# Patient Record
Sex: FEMALE | Race: WHITE | Employment: PART TIME | URBAN - METROPOLITAN AREA
[De-identification: names, ages, dates, MRNs, and addresses within clinical notes are randomized per-mention and may not be internally consistent; named-entity substitution may affect disease eponyms.]

---

## 2017-02-10 ENCOUNTER — HOSPITAL ENCOUNTER (OUTPATIENT)
Dept: LAB | Age: 62
Discharge: HOME | End: 2017-02-10
Payer: COMMERCIAL

## 2017-02-10 LAB
BASOPHILS # BLD AUTO: 0 K/UL (ref 0–0.1)
BASOPHILS # BLD: 1 % (ref 0–2)
DIFFERENTIAL METHOD BLD: ABNORMAL
EOSINOPHIL # BLD: 0.1 K/UL (ref 0–0.4)
EOSINOPHIL NFR BLD: 1 % (ref 0–5)
ERYTHROCYTE [DISTWIDTH] IN BLOOD BY AUTOMATED COUNT: 12.6 % (ref 11.6–14.5)
HCT VFR BLD AUTO: 39.4 % (ref 35–45)
HGB BLD-MCNC: 13.4 G/DL (ref 12–16)
LYMPHOCYTES # BLD AUTO: 34 % (ref 21–52)
LYMPHOCYTES # BLD: 1.4 K/UL (ref 0.9–3.6)
MCH RBC QN AUTO: 30.9 PG (ref 24–34)
MCHC RBC AUTO-ENTMCNC: 34 G/DL (ref 31–37)
MCV RBC AUTO: 91 FL (ref 74–97)
MONOCYTES # BLD: 0.5 K/UL (ref 0.05–1.2)
MONOCYTES NFR BLD AUTO: 13 % (ref 3–10)
NEUTS SEG # BLD: 2.1 K/UL (ref 1.8–8)
NEUTS SEG NFR BLD AUTO: 51 % (ref 40–73)
PLATELET # BLD AUTO: 195 K/UL (ref 135–420)
PMV BLD AUTO: 10.3 FL (ref 9.2–11.8)
RBC # BLD AUTO: 4.33 M/UL (ref 4.2–5.3)
WBC # BLD AUTO: 4.1 K/UL (ref 4.6–13.2)

## 2017-02-10 PROCEDURE — 36415 COLL VENOUS BLD VENIPUNCTURE: CPT | Performed by: NURSE PRACTITIONER

## 2017-02-10 PROCEDURE — 85025 COMPLETE CBC W/AUTO DIFF WBC: CPT | Performed by: NURSE PRACTITIONER

## 2017-11-24 ENCOUNTER — HOSPITAL ENCOUNTER (OUTPATIENT)
Dept: LAB | Age: 62
Discharge: HOME | End: 2017-11-24
Payer: COMMERCIAL

## 2017-11-24 ENCOUNTER — HOSPITAL ENCOUNTER (OUTPATIENT)
Age: 62
Discharge: HOME | End: 2017-11-24
Attending: NURSE PRACTITIONER
Payer: COMMERCIAL

## 2017-11-24 DIAGNOSIS — G36.8: ICD-10-CM

## 2017-11-24 LAB
ALBUMIN SERPL-MCNC: 4.1 G/DL (ref 3.4–5)
ALBUMIN/GLOB SERPL: 1.2 {RATIO} (ref 0.8–1.7)
ALP SERPL-CCNC: 80 U/L (ref 45–117)
ALT SERPL-CCNC: 32 U/L (ref 13–56)
ANION GAP SERPL CALC-SCNC: 7 MMOL/L (ref 3–18)
AST SERPL-CCNC: 18 U/L (ref 15–37)
BILIRUB SERPL-MCNC: 0.5 MG/DL (ref 0.2–1)
BUN SERPL-MCNC: 11 MG/DL (ref 7–18)
BUN/CREAT SERPL: 17 (ref 12–20)
CALCIUM SERPL-MCNC: 9.1 MG/DL (ref 8.5–10.1)
CHLORIDE SERPL-SCNC: 106 MMOL/L (ref 100–108)
CO2 SERPL-SCNC: 29 MMOL/L (ref 21–32)
CREAT SERPL-MCNC: 0.64 MG/DL (ref 0.6–1.3)
CREAT UR-MCNC: 0.7 MG/DL (ref 0.6–1.3)
ERYTHROCYTE [DISTWIDTH] IN BLOOD BY AUTOMATED COUNT: 12.7 % (ref 11.6–14.5)
GLOBULIN SER CALC-MCNC: 3.3 G/DL (ref 2–4)
GLUCOSE SERPL-MCNC: 122 MG/DL (ref 74–99)
HCT VFR BLD AUTO: 42.3 % (ref 35–45)
HGB BLD-MCNC: 14.6 G/DL (ref 12–16)
MCH RBC QN AUTO: 31.7 PG (ref 24–34)
MCHC RBC AUTO-ENTMCNC: 34.5 G/DL (ref 31–37)
MCV RBC AUTO: 92 FL (ref 74–97)
PLATELET # BLD AUTO: 213 K/UL (ref 135–420)
PMV BLD AUTO: 10.4 FL (ref 9.2–11.8)
POTASSIUM SERPL-SCNC: 4.2 MMOL/L (ref 3.5–5.5)
PROT SERPL-MCNC: 7.4 G/DL (ref 6.4–8.2)
RBC # BLD AUTO: 4.6 M/UL (ref 4.2–5.3)
SODIUM SERPL-SCNC: 142 MMOL/L (ref 136–145)
WBC # BLD AUTO: 5.4 K/UL (ref 4.6–13.2)

## 2017-11-24 PROCEDURE — A9585 GADOBUTROL INJECTION: HCPCS

## 2017-11-24 PROCEDURE — 74011250636 HC RX REV CODE- 250/636

## 2017-11-24 PROCEDURE — 70553 MRI BRAIN STEM W/O & W/DYE: CPT

## 2017-11-24 RX ADMIN — GADOBUTROL 6.5 ML: 604.72 INJECTION INTRAVENOUS at 16:00

## 2019-02-25 ENCOUNTER — TELEPHONE (OUTPATIENT)
Dept: SURGERY | Age: 64
End: 2019-02-25

## 2022-05-09 ENCOUNTER — APPOINTMENT (OUTPATIENT)
Age: 67
DRG: 059 | End: 2022-05-09
Attending: STUDENT IN AN ORGANIZED HEALTH CARE EDUCATION/TRAINING PROGRAM
Payer: MEDICARE

## 2022-05-09 ENCOUNTER — APPOINTMENT (OUTPATIENT)
Dept: GENERAL RADIOLOGY | Age: 67
DRG: 059 | End: 2022-05-09
Attending: STUDENT IN AN ORGANIZED HEALTH CARE EDUCATION/TRAINING PROGRAM
Payer: MEDICARE

## 2022-05-09 ENCOUNTER — HOSPITAL ENCOUNTER (INPATIENT)
Age: 67
LOS: 5 days | Discharge: NURSING, REHAB OR LONG-TERM CARE | DRG: 059 | End: 2022-05-14
Attending: STUDENT IN AN ORGANIZED HEALTH CARE EDUCATION/TRAINING PROGRAM | Admitting: INTERNAL MEDICINE
Payer: MEDICARE

## 2022-05-09 DIAGNOSIS — M19.90 ARTHRITIS: ICD-10-CM

## 2022-05-09 DIAGNOSIS — G35 MULTIPLE SCLEROSIS (HCC): Primary | ICD-10-CM

## 2022-05-09 DIAGNOSIS — R53.1 WEAKNESS: ICD-10-CM

## 2022-05-09 LAB
ANION GAP SERPL CALC-SCNC: 6 MMOL/L (ref 3–18)
APPEARANCE UR: CLEAR
BACTERIA URNS QL MICRO: NEGATIVE /HPF
BASOPHILS # BLD: 0 K/UL (ref 0–0.1)
BASOPHILS NFR BLD: 0 % (ref 0–2)
BILIRUB UR QL: NEGATIVE
BUN SERPL-MCNC: 17 MG/DL (ref 7–18)
BUN/CREAT SERPL: 31 (ref 12–20)
CALCIUM SERPL-MCNC: 9 MG/DL (ref 8.5–10.1)
CHLORIDE SERPL-SCNC: 109 MMOL/L (ref 100–111)
CO2 SERPL-SCNC: 26 MMOL/L (ref 21–32)
COLOR UR: YELLOW
CREAT SERPL-MCNC: 0.54 MG/DL (ref 0.6–1.3)
CREAT UR-MCNC: 0.5 MG/DL (ref 0.6–1.3)
DIFFERENTIAL METHOD BLD: ABNORMAL
EOSINOPHIL # BLD: 0 K/UL (ref 0–0.4)
EOSINOPHIL NFR BLD: 1 % (ref 0–5)
EPITH CASTS URNS QL MICRO: NORMAL /LPF (ref 0–5)
ERYTHROCYTE [DISTWIDTH] IN BLOOD BY AUTOMATED COUNT: 11.9 % (ref 11.6–14.5)
GLUCOSE SERPL-MCNC: 85 MG/DL (ref 74–99)
GLUCOSE UR STRIP.AUTO-MCNC: NEGATIVE MG/DL
HCT VFR BLD AUTO: 36.7 % (ref 35–45)
HGB BLD-MCNC: 13 G/DL (ref 12–16)
HGB UR QL STRIP: NEGATIVE
IMM GRANULOCYTES # BLD AUTO: 0 K/UL (ref 0–0.04)
IMM GRANULOCYTES NFR BLD AUTO: 0 % (ref 0–0.5)
KETONES UR QL STRIP.AUTO: 80 MG/DL
LEUKOCYTE ESTERASE UR QL STRIP.AUTO: ABNORMAL
LYMPHOCYTES # BLD: 1.5 K/UL (ref 0.9–3.6)
LYMPHOCYTES NFR BLD: 25 % (ref 21–52)
MCH RBC QN AUTO: 31.4 PG (ref 24–34)
MCHC RBC AUTO-ENTMCNC: 35.4 G/DL (ref 31–37)
MCV RBC AUTO: 88.6 FL (ref 78–100)
MONOCYTES # BLD: 0.6 K/UL (ref 0.05–1.2)
MONOCYTES NFR BLD: 11 % (ref 3–10)
NEUTS SEG # BLD: 3.8 K/UL (ref 1.8–8)
NEUTS SEG NFR BLD: 63 % (ref 40–73)
NITRITE UR QL STRIP.AUTO: NEGATIVE
NRBC # BLD: 0 K/UL (ref 0–0.01)
NRBC BLD-RTO: 0 PER 100 WBC
PH UR STRIP: 5 [PH] (ref 5–8)
PLATELET # BLD AUTO: 218 K/UL (ref 135–420)
PMV BLD AUTO: 9.6 FL (ref 9.2–11.8)
POTASSIUM SERPL-SCNC: 4 MMOL/L (ref 3.5–5.5)
PROT UR STRIP-MCNC: NEGATIVE MG/DL
RBC # BLD AUTO: 4.14 M/UL (ref 4.2–5.3)
RBC #/AREA URNS HPF: NEGATIVE /HPF (ref 0–5)
SODIUM SERPL-SCNC: 141 MMOL/L (ref 136–145)
SP GR UR REFRACTOMETRY: >1.03 (ref 1–1.03)
UROBILINOGEN UR QL STRIP.AUTO: 1 EU/DL (ref 0.2–1)
WBC # BLD AUTO: 6.1 K/UL (ref 4.6–13.2)
WBC URNS QL MICRO: NORMAL /HPF (ref 0–4)

## 2022-05-09 PROCEDURE — 96375 TX/PRO/DX INJ NEW DRUG ADDON: CPT

## 2022-05-09 PROCEDURE — 72170 X-RAY EXAM OF PELVIS: CPT

## 2022-05-09 PROCEDURE — 96374 THER/PROPH/DIAG INJ IV PUSH: CPT

## 2022-05-09 PROCEDURE — 85025 COMPLETE CBC W/AUTO DIFF WBC: CPT

## 2022-05-09 PROCEDURE — 99285 EMERGENCY DEPT VISIT HI MDM: CPT

## 2022-05-09 PROCEDURE — 80048 BASIC METABOLIC PNL TOTAL CA: CPT

## 2022-05-09 PROCEDURE — 73610 X-RAY EXAM OF ANKLE: CPT

## 2022-05-09 PROCEDURE — 72156 MRI NECK SPINE W/O & W/DYE: CPT

## 2022-05-09 PROCEDURE — 73090 X-RAY EXAM OF FOREARM: CPT

## 2022-05-09 PROCEDURE — 70553 MRI BRAIN STEM W/O & W/DYE: CPT

## 2022-05-09 PROCEDURE — 74011250636 HC RX REV CODE- 250/636: Performed by: STUDENT IN AN ORGANIZED HEALTH CARE EDUCATION/TRAINING PROGRAM

## 2022-05-09 PROCEDURE — 73552 X-RAY EXAM OF FEMUR 2/>: CPT

## 2022-05-09 PROCEDURE — 82565 ASSAY OF CREATININE: CPT

## 2022-05-09 PROCEDURE — 82607 VITAMIN B-12: CPT

## 2022-05-09 PROCEDURE — A9575 INJ GADOTERATE MEGLUMI 0.1ML: HCPCS | Performed by: STUDENT IN AN ORGANIZED HEALTH CARE EDUCATION/TRAINING PROGRAM

## 2022-05-09 PROCEDURE — 82306 VITAMIN D 25 HYDROXY: CPT

## 2022-05-09 PROCEDURE — 72157 MRI CHEST SPINE W/O & W/DYE: CPT

## 2022-05-09 PROCEDURE — 65270000029 HC RM PRIVATE

## 2022-05-09 PROCEDURE — 81001 URINALYSIS AUTO W/SCOPE: CPT

## 2022-05-09 PROCEDURE — 72158 MRI LUMBAR SPINE W/O & W/DYE: CPT

## 2022-05-09 RX ORDER — FENTANYL CITRATE 50 UG/ML
25 INJECTION, SOLUTION INTRAMUSCULAR; INTRAVENOUS
Status: COMPLETED | OUTPATIENT
Start: 2022-05-09 | End: 2022-05-09

## 2022-05-09 RX ORDER — LORAZEPAM 2 MG/ML
1 INJECTION, SOLUTION INTRAMUSCULAR; INTRAVENOUS ONCE
Status: COMPLETED | OUTPATIENT
Start: 2022-05-09 | End: 2022-05-09

## 2022-05-09 RX ADMIN — GADOTERATE MEGLUMINE 12 ML: 376.9 INJECTION INTRAVENOUS at 19:48

## 2022-05-09 RX ADMIN — LORAZEPAM 1 MG: 2 INJECTION, SOLUTION INTRAMUSCULAR; INTRAVENOUS at 18:00

## 2022-05-09 RX ADMIN — FENTANYL CITRATE 25 MCG: 50 INJECTION, SOLUTION INTRAMUSCULAR; INTRAVENOUS at 18:51

## 2022-05-09 NOTE — Clinical Note
Status[de-identified] INPATIENT [101]   Type of Bed: Medical [8]   Cardiac Monitoring Required?: No   Inpatient Hospitalization Certified Necessary for the Following Reasons: 3. Patient receiving treatment that can only be provided in an inpatient setting (further clarification in H&P documentation)   Admitting Diagnosis: [facility] ([facility] [340. ICD-9-CM]   Admitting Diagnosis: Weakness [556558]   Admitting Physician: Neli Arciniega [[id number]]   Attending Physician: Neli Arciniega [[id number]]   Estimated Length of Stay: 2 Midnights   Discharge Plan[de-identified] Home with Office Follow-up

## 2022-05-09 NOTE — ED TRIAGE NOTES
Pt states hx of MS and sustained a fall on Friday. States legs \"gave out\" on her causing fall but was still able to walk.  Saturday had no strength in legs and was numb from waist down.  unable to bear weight in left foot.  has not seen her doctors in 3 years. Also reports right arm pain, good Rom. Bruising noted to right hip.    family member has been carrying her to the bathroom since she has been unable to walk on Saturday

## 2022-05-10 ENCOUNTER — APPOINTMENT (OUTPATIENT)
Dept: GENERAL RADIOLOGY | Age: 67
DRG: 059 | End: 2022-05-10
Attending: NURSE PRACTITIONER
Payer: MEDICARE

## 2022-05-10 ENCOUNTER — APPOINTMENT (OUTPATIENT)
Dept: CT IMAGING | Age: 67
DRG: 059 | End: 2022-05-10
Attending: NURSE PRACTITIONER
Payer: MEDICARE

## 2022-05-10 LAB
25(OH)D3 SERPL-MCNC: 16.6 NG/ML (ref 30–100)
VIT B12 SERPL-MCNC: 568 PG/ML (ref 211–911)

## 2022-05-10 PROCEDURE — 72192 CT PELVIS W/O DYE: CPT

## 2022-05-10 PROCEDURE — 77030038269 HC DRN EXT URIN PURWCK BARD -A

## 2022-05-10 PROCEDURE — 74011250637 HC RX REV CODE- 250/637: Performed by: NURSE PRACTITIONER

## 2022-05-10 PROCEDURE — 73080 X-RAY EXAM OF ELBOW: CPT

## 2022-05-10 PROCEDURE — 74011000250 HC RX REV CODE- 250: Performed by: NURSE PRACTITIONER

## 2022-05-10 PROCEDURE — APPSS180 APP SPLIT SHARED TIME > 60 MINUTES: Performed by: NURSE PRACTITIONER

## 2022-05-10 PROCEDURE — 74011250636 HC RX REV CODE- 250/636: Performed by: EMERGENCY MEDICINE

## 2022-05-10 PROCEDURE — 74011000258 HC RX REV CODE- 258: Performed by: EMERGENCY MEDICINE

## 2022-05-10 PROCEDURE — 2709999900 HC NON-CHARGEABLE SUPPLY

## 2022-05-10 PROCEDURE — 65270000029 HC RM PRIVATE

## 2022-05-10 PROCEDURE — 74011250636 HC RX REV CODE- 250/636: Performed by: NURSE PRACTITIONER

## 2022-05-10 PROCEDURE — 74011250636 HC RX REV CODE- 250/636: Performed by: STUDENT IN AN ORGANIZED HEALTH CARE EDUCATION/TRAINING PROGRAM

## 2022-05-10 PROCEDURE — 74011000258 HC RX REV CODE- 258: Performed by: STUDENT IN AN ORGANIZED HEALTH CARE EDUCATION/TRAINING PROGRAM

## 2022-05-10 PROCEDURE — 99223 1ST HOSP IP/OBS HIGH 75: CPT | Performed by: INTERNAL MEDICINE

## 2022-05-10 RX ORDER — ENOXAPARIN SODIUM 100 MG/ML
40 INJECTION SUBCUTANEOUS DAILY
Status: DISCONTINUED | OUTPATIENT
Start: 2022-05-11 | End: 2022-05-14 | Stop reason: HOSPADM

## 2022-05-10 RX ORDER — ONDANSETRON 4 MG/1
4 TABLET, ORALLY DISINTEGRATING ORAL
Status: DISCONTINUED | OUTPATIENT
Start: 2022-05-10 | End: 2022-05-14 | Stop reason: HOSPADM

## 2022-05-10 RX ORDER — POLYETHYLENE GLYCOL 3350 17 G/17G
17 POWDER, FOR SOLUTION ORAL DAILY PRN
Status: DISCONTINUED | OUTPATIENT
Start: 2022-05-10 | End: 2022-05-14 | Stop reason: HOSPADM

## 2022-05-10 RX ORDER — ACETAMINOPHEN 650 MG/1
650 SUPPOSITORY RECTAL
Status: DISCONTINUED | OUTPATIENT
Start: 2022-05-10 | End: 2022-05-14 | Stop reason: HOSPADM

## 2022-05-10 RX ORDER — MELATONIN
1000 DAILY
Status: DISCONTINUED | OUTPATIENT
Start: 2022-05-11 | End: 2022-05-14 | Stop reason: HOSPADM

## 2022-05-10 RX ORDER — SODIUM CHLORIDE 0.9 % (FLUSH) 0.9 %
5-40 SYRINGE (ML) INJECTION EVERY 8 HOURS
Status: DISCONTINUED | OUTPATIENT
Start: 2022-05-10 | End: 2022-05-14 | Stop reason: HOSPADM

## 2022-05-10 RX ORDER — ACETAMINOPHEN 325 MG/1
650 TABLET ORAL
Status: DISCONTINUED | OUTPATIENT
Start: 2022-05-10 | End: 2022-05-14 | Stop reason: HOSPADM

## 2022-05-10 RX ORDER — SODIUM CHLORIDE 0.9 % (FLUSH) 0.9 %
5-40 SYRINGE (ML) INJECTION AS NEEDED
Status: DISCONTINUED | OUTPATIENT
Start: 2022-05-10 | End: 2022-05-14 | Stop reason: HOSPADM

## 2022-05-10 RX ORDER — ONDANSETRON 2 MG/ML
4 INJECTION INTRAMUSCULAR; INTRAVENOUS
Status: DISCONTINUED | OUTPATIENT
Start: 2022-05-10 | End: 2022-05-14 | Stop reason: HOSPADM

## 2022-05-10 RX ORDER — FAMOTIDINE 20 MG/1
20 TABLET, FILM COATED ORAL DAILY
Status: DISCONTINUED | OUTPATIENT
Start: 2022-05-11 | End: 2022-05-14 | Stop reason: HOSPADM

## 2022-05-10 RX ORDER — SODIUM CHLORIDE 9 MG/ML
75 INJECTION, SOLUTION INTRAVENOUS CONTINUOUS
Status: DISPENSED | OUTPATIENT
Start: 2022-05-10 | End: 2022-05-11

## 2022-05-10 RX ADMIN — SODIUM CHLORIDE, PRESERVATIVE FREE 10 ML: 5 INJECTION INTRAVENOUS at 15:30

## 2022-05-10 RX ADMIN — SODIUM CHLORIDE 1000 MG: 9 INJECTION, SOLUTION INTRAVENOUS at 00:02

## 2022-05-10 RX ADMIN — SODIUM CHLORIDE, PRESERVATIVE FREE 10 ML: 5 INJECTION INTRAVENOUS at 22:00

## 2022-05-10 RX ADMIN — ACETAMINOPHEN 650 MG: 325 TABLET ORAL at 22:05

## 2022-05-10 RX ADMIN — SODIUM CHLORIDE 75 ML/HR: 9 INJECTION, SOLUTION INTRAVENOUS at 15:28

## 2022-05-10 RX ADMIN — SODIUM CHLORIDE 1000 MG: 9 INJECTION, SOLUTION INTRAVENOUS at 22:05

## 2022-05-10 RX ADMIN — ACETAMINOPHEN 650 MG: 325 TABLET ORAL at 15:32

## 2022-05-10 NOTE — ED NOTES
TRANSFER - OUT REPORT:    Verbal report given to [employer] on Lyssa Kaur  being transferred to  for routine progression of care       Report consisted of patients Situation, Background, Assessment and   Recommendations(SBAR). Information from the following report(s) SBAR was reviewed with the receiving nurse. Lines:   Peripheral IV 05/09/22 Left Antecubital (Active)        Opportunity for questions and clarification was provided. Patient transported with:   Monitor        .

## 2022-05-10 NOTE — ED NOTES
Patient is admitted by Dr. Maria Esther Hollis for MS flare on IV Solu-Medrol. Pending bed placement at [facility]. Labs and imaging, Home meds reviewed. No issues overnight.

## 2022-05-10 NOTE — PROGRESS NOTES
Patient does not have PCP. Unit secretary requested to arrange new PCP with Dr. Darrick Mojica.  will continue to monitor and assist with transition of care needs.     MARISSA BeltreN, RN  Care Management  Pager # [phone]

## 2022-05-10 NOTE — CONSULTS
74year old female with history of multiple sclerosis diagnosed in , patient on tecfidera  since she is diagnosed with MS, patient denies any other DMT, patient recently admitted with left foot rolled followed by a fall, left side weakness become worse that she came to emergency room, patient has not seen neurologist for few years, admit recent lower extremity paresthesias, patient use cane for lower extremity weakness, denies dizziness, memory problems, live with her  and son, no other recent illness reported. Social History     Socioeconomic History    Marital status:      Spouse name: Not on file    Number of children: Not on file    Years of education: Not on file    Highest education level: Not on file   Occupational History    Not on file   Tobacco Use    Smoking status: Former Smoker     Types: Cigarettes     Quit date: 1980     Years since quittin.9    Smokeless tobacco: Never Used   Substance and Sexual Activity    Alcohol use: Not Currently    Drug use: No    Sexual activity: Never     Partners: Male   Other Topics Concern    Not on file   Social History Narrative    Not on file     Social Determinants of Health     Financial Resource Strain:     Difficulty of Paying Living Expenses: Not on file   Food Insecurity:     Worried About Running Out of Food in the Last Year: Not on file    Marcos of Food in the Last Year: Not on file   Transportation Needs:     Lack of Transportation (Medical): Not on file    Lack of Transportation (Non-Medical):  Not on file   Physical Activity:     Days of Exercise per Week: Not on file    Minutes of Exercise per Session: Not on file   Stress:     Feeling of Stress : Not on file   Social Connections:     Frequency of Communication with Friends and Family: Not on file    Frequency of Social Gatherings with Friends and Family: Not on file    Attends Orthodoxy Services: Not on file    Active Member of Clubs or Organizations: Not on file    Attends Club or Organization Meetings: Not on file    Marital Status: Not on file   Intimate Partner Violence:     Fear of Current or Ex-Partner: Not on file    Emotionally Abused: Not on file    Physically Abused: Not on file    Sexually Abused: Not on file   Housing Stability:     Unable to Pay for Housing in the Last Year: Not on file    Number of Roselyn in the Last Year: Not on file    Unstable Housing in the Last Year: Not on file       History reviewed. No pertinent family history.      Current Facility-Administered Medications   Medication Dose Route Frequency Provider Last Rate Last Admin    methylPREDNISolone (PF) (SOLU-MEDROL) 1,000 mg in 0.9% sodium chloride 100 mL IVPB  1,000 mg IntraVENous DAILY Kahlil Hill MD        [START ON 5/11/2022] cholecalciferol (VITAMIN D3) (1000 Units /25 mcg) tablet 1,000 Units  1,000 Units Oral DAILY Kiersten Calix NP        sodium chloride (NS) flush 5-40 mL  5-40 mL IntraVENous Q8H Paige BRAXTON NP   10 mL at 05/10/22 1530    sodium chloride (NS) flush 5-40 mL  5-40 mL IntraVENous PRN Kiersten Calix NP        acetaminophen (TYLENOL) tablet 650 mg  650 mg Oral Q6H PRN Paige BRAXTON NP   650 mg at 05/10/22 1532    Or    acetaminophen (TYLENOL) suppository 650 mg  650 mg Rectal Q6H PRN Kiersten Calix NP        polyethylene glycol (MIRALAX) packet 17 g  17 g Oral DAILY PRN Kiersten Calix NP        ondansetron (ZOFRAN ODT) tablet 4 mg  4 mg Oral Q8H PRN Paige BRAXTON NP        Or    [facility]dansetron [facility]) injection 4 mg  4 mg IntraVENous Q6H PRN Kiersten Calix NP        [START ON 5/11/2022] enoxaparin (LOVENOX) injection 40 mg  40 mg SubCUTAneous DAILY Paige BRAXTON NP        0.9% sodium chloride infusion  75 mL/hr IntraVENous CONTINUOUS Paige Strong B NP 75 mL/hr at 05/10/22 1528 75 mL/hr at 05/10/22 1528    [START ON 5/11/2022] famotidine (PEPCID) tablet 20 mg  20 mg Oral DAILY Kusum Munoz MD           Past Medical History:   Diagnosis Date    [facility]        Past Surgical History:   Procedure Laterality Date    MI BIOPSY OF BREAST, INCISIONAL  RT BENIGN    OVER 10 YRS. AGO PER PT. Allergies   Allergen Reactions    Amoxicillin Hives and Rash    Clindamycin Hives and Rash    Metronidazole Hives    Tetracycline Hives and Rash       Patient Active Problem List   Diagnosis Code    [facility] sclerosis ([facility] G35    Weakness R53.1         Review of Systems:   As above       PHYSICAL EXAMINATION:      VITAL SIGNS:    Visit Vitals  /76   Pulse (!) 107   Temp 97.5 °F (36.4 °C)   Resp 18   Ht 4' 11\" (1.499 m)   Wt 59 kg (130 lb)   SpO2 95%   BMI 26.26 kg/m²       GENERAL: The patient is well developed, well nourished, and in no apparent distress. EXTREMITIES: No clubbing, cyanosis, or edema is identified. Pulses 2+ and symmetrical.  Muscle tone is normal.  HEAD:   Ear, nose, and throat appear to be without trauma. The patient is normocephalic. NEUROLOGIC EXAMINATION  Mental status: Awake, alert, oriented x3, follows simple,  Speech and languge: slow  But fluent, coherent, naming and repitition intact, reading and comprehension intact  CN: VFF, EOMI, PERRLA, face sensation intact , no facial asymmetry noted, palate elevation symmetric bilat, SS+SCM 5/5 bilat, tongue midline  Motor: poor upper extremity effort but seem to have normal strength.   Left lower 4/5>ileopoas   Sensory: intact to light touch throughout  Coordination: FNF w/o dysmetria  DTR: brisk all over, plantar withdrawl  Gait: not tested     Impression:  74year old female with history of multiple sclerosis diagnosed in 1996, patient on tecfidera  since she is diagnosed with MS, patient denies any other DMT, patient recently admitted with left foot rolled followed by a fall, left side weakness become worse that she came to emergency room, patient has not seen neurologist for few years, admit recent lower extremity paresthesias, patient use cane for lower extremity weakness, denies dizziness, memory problems, live with her  and son, no other recent illness reported. MRI BRAIN showed no new lesion but cervical spine showed active demyelination, patient started on steroid to continue for 5 days, IV I GM Methylprednisolone for 5 days with GI  Prophylaxis. Patient for two years reduced dose of tecfidera to 250 mg once a day, recommend increase to 250 mg BID. RECOMMEND  VIT -D  Steroid x 5 days. Repeat MRI cervical in six months, follow up neurology in 4-6 weeks outpatient. DVT PROPHYLAX SIS. PT and OT, . Tecfidera 250 mg BID. Plan: As above    I spent 30 minutes with the patient in face-to-face consultation, of which greater than 50% was spent in counseling and coordination of care as described above. PLEASE NOTE:   This document has been produced using voice recognition software. Unrecognized errors in transcription may be present.

## 2022-05-10 NOTE — ED NOTES
Assumed care. Patient resting on stretcher, NAD noted, call bell in reach, bed low and locked with side rails up x 2; Reports pain 0/10. Updated on plan of care, patient verbalizes understanding. This nurse will continue to monitor.

## 2022-05-10 NOTE — ED PROVIDER NOTES
EMERGENCY DEPARTMENT HISTORY AND PHYSICAL EXAM    1:26 AM      Date: 2022  Patient Name: Evaristo Gotti    History of Presenting Illness     Chief Complaint   Patient presents with    Extremity Weakness         History Provided By: Patient  Location/Duration/Severity/Modifying factors   Patient is a 44-year-old female with history of MS presenting due to lower extremity weakness and fall. Patient states that she fell roughly 3 days ago and has subsequently has been feeling weaker in her lower extremities with difficulty ambulating. Patient states that when she fell 3 days ago she is noticed that she started to have bilateral lower foot drop with dragging of her feet. Symptoms progressively got worse over the weekend so presented to the emergency department for evaluation of possible MS flare. Patient states that she has not seen her neurologist in over 3 years since her MS was stable. PCP: Robbie Contreras MD    Current Outpatient Medications   Medication Sig Dispense Refill    OTHER texadera 240mg         Past History     Past Medical History:  Past Medical History:   Diagnosis Date    MS (multiple sclerosis) ([address]        Past Surgical History:  Past Surgical History:   Procedure Laterality Date    [place] BIOPSY OF BREAST, INCISIONAL  RT BENIGN    OVER 10 YRS. AGO PER PT. Family History:  No family history on file. Social History:  Social History     Tobacco Use    Smoking status: Former Smoker     Types: Cigarettes     Quit date: 1980     Years since quittin.9    Smokeless tobacco: Never Used   Substance Use Topics    Alcohol use: Yes     Comment: glass of wine with dinner    Drug use: No       Allergies: Allergies   Allergen Reactions    Amoxicillin Hives and Rash    Clindamycin Hives and Rash    Metronidazole Hives    Tetracycline Hives and Rash         Review of Systems       Review of Systems   Constitutional: Negative for chills and fever.    Eyes: Negative for visual disturbance. Respiratory: Negative for chest tightness and shortness of breath. Cardiovascular: Negative for chest pain. Gastrointestinal: Negative for nausea and vomiting. Genitourinary: Negative for difficulty urinating and dysuria. Musculoskeletal:        Patient states having pain in her left ankle after falling and bruising in her right hip and right upper arm   Skin: Negative. Physical Exam     Visit Vitals  /75   Pulse (!) 104   Temp 98 °F (36.7 °C)   Resp 16   Ht 4' 11\" (1.499 m)   Wt 59 kg (130 lb)   SpO2 95%   BMI 26.26 kg/m²         Physical Exam  Vitals and nursing note reviewed. Constitutional:       General: She is not in acute distress. Appearance: Normal appearance. She is not ill-appearing. HENT:      Head: Normocephalic and atraumatic. Nose: Nose normal.   Eyes:      Extraocular Movements: Extraocular movements intact. Cardiovascular:      Rate and Rhythm: Normal rate and regular rhythm. Pulses: Normal pulses. Pulmonary:      Effort: Pulmonary effort is normal.      Breath sounds: Normal breath sounds. No wheezing, rhonchi or rales. Abdominal:      General: Abdomen is flat. Palpations: Abdomen is soft. Tenderness: There is no abdominal tenderness. Musculoskeletal:         General: No swelling or tenderness. Cervical back: Normal range of motion. Right lower leg: No edema. Left lower leg: No edema. Comments: Decreased  strength in right hand when compared to left hand, normal strength and bilateral upper extremities, decreased strength to extension of left hip extension and flexion at left knee and flexion of left ankle when compared to right. Patient with subjective numbness/tingling in thighs. Skin:     General: Skin is warm and dry. Neurological:      Mental Status: She is alert and oriented to person, place, and time.            Diagnostic Study Results     Labs -  Recent Results (from the past 12 hour(s))   CREATININE, POC    Collection Time: 05/09/22  5:31 PM   Result Value Ref Range    Creatinine, POC 0.5 (L) 0.6 - 1.3 MG/DL    GFRAA, POC >60 >60 ml/min/1.73m2    GFRNA, POC >60 >60 ml/min/1.73m2   CBC WITH AUTOMATED DIFF    Collection Time: 05/09/22  8:30 PM   Result Value Ref Range    WBC 6.1 4.6 - 13.2 K/uL    RBC 4.14 (L) 4.20 - 5.30 M/uL    HGB 13.0 12.0 - 16.0 g/dL    HCT 36.7 35.0 - 45.0 %    MCV 88.6 78.0 - 100.0 FL    MCH 31.4 24.0 - 34.0 PG    MCHC 35.4 31.0 - 37.0 g/dL    RDW 11.9 11.6 - 14.5 %    PLATELET 398 202 - 687 K/uL    MPV 9.6 9.2 - 11.8 FL    NRBC 0.0 0  WBC    ABSOLUTE NRBC 0.00 0.00 - 0.01 K/uL    NEUTROPHILS 63 40 - 73 %    LYMPHOCYTES 25 21 - 52 %    MONOCYTES 11 (H) 3 - 10 %    EOSINOPHILS 1 0 - 5 %    BASOPHILS 0 0 - 2 %    IMMATURE GRANULOCYTES 0 0.0 - 0.5 %    ABS. NEUTROPHILS 3.8 1.8 - 8.0 K/UL    ABS. LYMPHOCYTES 1.5 0.9 - 3.6 K/UL    ABS. MONOCYTES 0.6 0.05 - 1.2 K/UL    ABS. EOSINOPHILS 0.0 0.0 - 0.4 K/UL    ABS. BASOPHILS 0.0 0.0 - 0.1 K/UL    ABS. IMM.  GRANS. 0.0 0.00 - 0.04 K/UL    DF AUTOMATED     METABOLIC PANEL, BASIC    Collection Time: 05/09/22  8:30 PM   Result Value Ref Range    Sodium 141 136 - 145 mmol/L    Potassium 4.0 3.5 - 5.5 mmol/L    Chloride 109 100 - 111 mmol/L    CO2 26 21 - 32 mmol/L    Anion gap 6 3.0 - 18 mmol/L    Glucose 85 74 - 99 mg/dL    BUN 17 7.0 - 18 MG/DL    Creatinine 0.54 (L) 0.6 - 1.3 MG/DL    BUN/Creatinine ratio 31 (H) 12 - 20      GFR est AA >60 >60 ml/min/1.73m2    GFR est non-AA >60 >60 ml/min/1.73m2    Calcium 9.0 8.5 - 10.1 MG/DL   URINALYSIS W/ RFLX MICROSCOPIC    Collection Time: 05/09/22  9:45 PM   Result Value Ref Range    Color YELLOW      Appearance CLEAR      Specific gravity >1.030 (H) 1.005 - 1.030    pH (UA) 5.0 5.0 - 8.0      Protein Negative NEG mg/dL    Glucose Negative NEG mg/dL    Ketone 80 (A) NEG mg/dL    Bilirubin Negative NEG      Blood Negative NEG      Urobilinogen 1.0 0.2 - 1.0 EU/dL Nitrites Negative NEG      Leukocyte Esterase TRACE (A) NEG     URINE MICROSCOPIC ONLY    Collection Time: 05/09/22  9:45 PM   Result Value Ref Range    WBC 0 to 3 0 - 4 /hpf    RBC Negative 0 - 5 /hpf    Epithelial cells FEW 0 - 5 /lpf    Bacteria Negative NEG /hpf       Radiologic Studies -   MRI BRAIN W WO CONT         MRI CERV SPINE W WO CONT         XR FOREARM RT AP/LAT    (Results Pending)   XR FEMUR RT 2 VS    (Results Pending)   XR ANKLE LT MIN 3 V    (Results Pending)   XR PELV 1 OR 2 V    (Results Pending)   MRI [facility] SPINE W WO CONT    (Results Pending)   MRI LUMB SPINE W WO CONT    (Results Pending)         Medical Decision Making   I am the first provider for this patient. I reviewed the vital signs, available nursing notes, past medical history, past surgical history, family history and social history. Vital Signs-Reviewed the patient's vital signs. EKG:     Records Reviewed: Nursing Notes (Time of Review: 1:26 AM)    ED Course: Progress Notes, Reevaluation, and Consults:         Provider Notes (Medical Decision Making):   MDM  Number of Diagnoses or Management Options  Diagnosis management comments: Patient is a 51-year-old female with history of MS presenting due to lower extremity weakness and fall. Differential includes MS flare, stroke, deconditioning/muscular atrophy, fracture/dislocation secondary to fall. Plan to assess with CBC BMP x-rays of left ankle, pelvis and right upper extremity due to pain in left ankle and bruising noted on hip and right upper extremity. MRI of brain cervical spine and lumbar spine and thoracic spine to assess for MS flare discussed case with on-call neurologist Dr. Ashlyn Nathan who recommended imaging and treating patient with 1 g of methylprednisolone and admission for further treatment and evaluation. Dr. Ashlyn Nathan to evaluate patient in the morning.       Procedures    Critical Care Time:       Diagnosis     Clinical Impression: No diagnosis found.    Disposition: Admission    Follow-up Information    None          Patient's Medications   Start Taking    No medications on file   Continue Taking    OTHER    texadera 240mg   These Medications have changed    No medications on file   Stop Taking    No medications on file     Disclaimer: Sections of this note are dictated using utilizing voice recognition software. Minor typographical errors may be present. If questions arise, please do not hesitate to contact me or call our department.

## 2022-05-10 NOTE — PROGRESS NOTES
Pt arrived unit from [facility] ER. Alert and oriented x4. Oriented to call bell and surrounding.  Assessment and vital signs ib progress

## 2022-05-10 NOTE — H&P
Hospitalist Admission History and Physical    NAME:  Marielos Guaman   :   1955   MRN:   [id number]     PCP:  Shukri Sosa MD  Date/Time:  5/10/2022 12:31 PM    Subjective:   CHIEF COMPLAINT: Difficulty walking and bilateral leg weakness    HISTORY OF PRESENT ILLNESS:     [place] is a 74 y.o.  female with PMH of multiple sclerosis who presents with difficulty walking and weakness to bilateral legs ongoing for the last approximate 3 days. Patient states she sustained a fall 3 days ago after which she developed pain most prominent to her left arm, she states she did have some pain prior to fall to her right leg as well and then has been having foot drop and noted weakness to bilateral legs since then. Patient has had no further falls. Patient denies other complaints, states she is not used to taking medications but does take her multiple sclerosis medication generally on a daily basis (took last 2 days ago) along with daily Advil on a full stomach. She had previously been following with Melissa Rushing as her PCP and neurology Dr. Lefty Matta previously however not seen recently due to pandemic. Patient denies any other complaints including no headaches, no fevers or chills, no chest pain, shortness of breath, nausea vomiting, blood in stool or blood in urine, feels like her strength is improving to her lower extremities. Patient Active Problem List   Diagnosis Code    Multiple sclerosis ([facility] G35    Weakness R53.1       Past Medical History:   Diagnosis Date    MS (multiple sclerosis) ([facility]        Past Surgical History:   Procedure Laterality Date    DE BIOPSY OF BREAST, INCISIONAL  RT BENIGN    OVER 10 YRS. AGO PER PT. Social History     Tobacco Use    Smoking status: Former Smoker     Types: Cigarettes     Quit date: 1980     Years since quittin.9    Smokeless tobacco: Never Used   Substance Use Topics    Alcohol use: Only occasional           History reviewed. No pertinent family history. Allergies   Allergen Reactions    Amoxicillin Hives and Rash    Clindamycin Hives and Rash    Metronidazole Hives    Tetracycline Hives and Rash        Prior to Admission Medications   Prescriptions Last Dose Informant Patient Reported? Taking? OTHER 5/3/2022 at Unknown time  Yes Yes   Sig: texadera 240mg      Facility-Administered Medications: None       REVIEW OF SYSTEMS:     [] Unable to obtain  ROS due to  []mental status change  []sedated   []intubated   [x]Total of 12 systems reviewed as follows:    GENERAL: no fever or chills   HEENT: no sinus congestion, hearing / vision changes  NECK: No pain or stiffness. PULMONARY: no shortness of breath or cough  Cardiovascular: denies palpitations or chest pain; no lower extremity edema  GASTROINTESTINAL: Denies abdominal pain, constipation, diarrhea, nausea, vomiting or melena / bloody stools  GENITOURINARY: No urinary frequency, urgency, hesitancy or dysuria. MUSCULOSKELETAL:  Admits to some occasional back pain, some pain to her right forearm with certain movements (example wringing type motion, gripping objects), also with some ongoing pain to her right leg present over the last approximate 1 week, + pain to left foot persists with weightbearing  DERMATOLOGIC: denies pruritis, rashes or open areas   ENDOCRINE: No polyuria, polydipsia, no heat or cold intolerance. HEMATOLOGICAL: No easy bruising or bleeding. No anemia, no evidence of active bleeding  NEUROLOGIC:  no focal weakness,  no speech changes     Objective:   VITALS:    Visit Vitals  /64   Pulse 95   Temp 98.2 °F (36.8 °C)   Resp 18   Ht 4' 11\" (1.499 m)   Wt 59 kg (130 lb)   SpO2 96%   BMI 26.26 kg/m²     Temp (24hrs), Av.1 °F (36.7 °C), Min:98 °F (36.7 °C), Max:98.2 °F (36.8 °C)    PHYSICAL EXAM:   General:          Alert, in NAD  HEENT:          Sclera anicteric. Conjunctiva pink.  Mucous membranes                          Moist, no ear or nasal discharge  Neck:               Supple. Trachea midline. No accessory muscle use. No jugular venous distention, no carotid bruit  CV:                   Mild tachycardia current heart rate 110's, regular rhythm. S1S2+  Lungs:             Clear to auscultation bilaterally. No Wheezing or Rhonchi. No rales. Abdomen:        Soft, non-tender. Not distended. Bowel sounds normal.   Extremities:     No cyanosis. No edema. Pulses 2+ b/l  Neurologic:      Alert and oriented X 4. Follows commands, responds appropriately. No focal neurological deficit was noted  Skin:                Warm and dry. No rashes. LAB DATA REVIEWED:    No components found for: [place]  Recent Labs     05/09/22  2030      K 4.0      CO2 26   BUN 17   CREA 0.54*   GLU 85   CA 9.0   WBC 6.1   HGB 13.0   HCT 36.7          IMAGING RESULTS:     [x]  I have personally reviewed the actual   [x] x-rays and MRIs (multiple) []CT scan    Assessment/Plan:      Active Problems:    Multiple sclerosis ([address] (5/9/2022)      Weakness (5/9/2022)     ___________________________________________________  PLAN:    1. Multiple sclerosis exacerbation -neurology consulted by ED, continue methylprednisone 1 g daily, on texadera PTA, previously followed by Dr. Kamilah aMdera as outpatient. PT/OT consult  2. Multiple pain complaints in setting of recent fall -check CT right pelvis and dedicated x-ray of right elbow r/o fracture   3. Vitamin D deficiency -add daily vitamin D  4.  Continue full code in discussion with patient    Consults called / follow up -neurology    Prophylaxis:  [x]Lovenox  []Coumadin  []Hep SQ  []SCDs  []H2B/PPI    Discussed Code Status:    [x]Full Code      []DNR     ___________________________________________________  Admitting Provider: Davidson Nichole NP

## 2022-05-10 NOTE — PROGRESS NOTES
Reason for Admission:  Multiple sclerosis ([facility] [address] [G35]  Weakness [R53.1]                 RUR Score:  8%           Plan for utilizing home health:    Yes, HH vs SNF                      Likelihood of Readmission:   LOW                         Transition of Care Plan:              Initial assessment completed with patient. Cognitive status of patient: oriented to time, place, person and situation. Face sheet information confirmed:  yes. The patient designates  Alvino to participate in her discharge plan and to receive any needed information. This patient lives in a single family home with . Patient is not able to navigate steps as needed. Prior to hospitalization, patient was considered to be independent with ADLs/IADLS : yes . Patient has a current ACP document on file: no      Healthcare Decision Maker: The  will be available to transport patient home upon discharge. The patient already has [employer],  medical equipment available in the home. Patient is not currently active with home health. Patient has not stayed in a skilled nursing facility or rehab. Was  stay within last 60 days : no. This patient is on dialysis :no     Currently, the discharge plan is home health vs SNF    The patient states that she can obtain her medications from the pharmacy, and take her medications as directed. Patient's current insurance is [employer] Part A and B and [facility]      Care Management Interventions  PCP Verified by CM: No (needs new PCP. requested Dr. Clarence Rivera)  Mode of Transport at Discharge:  Other (see comment) ()  Transition of Care Consult (CM Consult): Home Health,SNF  Physical Therapy Consult: Yes  Occupational Therapy Consult: Yes  Support Systems: Spouse/Significant Other  Confirm Follow Up Transport: Family  Discharge Location  Patient Expects to be Discharged to[de-identified] Home with home health    Patient is COVID vaccinated     will continue to monitor and assist with transition of care needs.     MARISSA LemonN, RN  Care Management  Pager # [phone]

## 2022-05-11 LAB
ANION GAP SERPL CALC-SCNC: 7 MMOL/L (ref 3–18)
BASOPHILS # BLD: 0 K/UL (ref 0–0.1)
BASOPHILS NFR BLD: 0 % (ref 0–2)
BUN SERPL-MCNC: 15 MG/DL (ref 7–18)
BUN/CREAT SERPL: 27 (ref 12–20)
CALCIUM SERPL-MCNC: 9.4 MG/DL (ref 8.5–10.1)
CHLORIDE SERPL-SCNC: 111 MMOL/L (ref 100–111)
CO2 SERPL-SCNC: 23 MMOL/L (ref 21–32)
CREAT SERPL-MCNC: 0.56 MG/DL (ref 0.6–1.3)
DIFFERENTIAL METHOD BLD: ABNORMAL
EOSINOPHIL # BLD: 0 K/UL (ref 0–0.4)
EOSINOPHIL NFR BLD: 0 % (ref 0–5)
ERYTHROCYTE [DISTWIDTH] IN BLOOD BY AUTOMATED COUNT: 11.9 % (ref 11.6–14.5)
GLUCOSE SERPL-MCNC: 163 MG/DL (ref 74–99)
HCT VFR BLD AUTO: 35.9 % (ref 35–45)
HGB BLD-MCNC: 12.5 G/DL (ref 12–16)
IMM GRANULOCYTES # BLD AUTO: 0 K/UL (ref 0–0.04)
IMM GRANULOCYTES NFR BLD AUTO: 0 % (ref 0–0.5)
LYMPHOCYTES # BLD: 0.5 K/UL (ref 0.9–3.6)
LYMPHOCYTES NFR BLD: 7 % (ref 21–52)
MCH RBC QN AUTO: 30.7 PG (ref 24–34)
MCHC RBC AUTO-ENTMCNC: 34.8 G/DL (ref 31–37)
MCV RBC AUTO: 88.2 FL (ref 78–100)
MONOCYTES # BLD: 0.1 K/UL (ref 0.05–1.2)
MONOCYTES NFR BLD: 2 % (ref 3–10)
NEUTS SEG # BLD: 6.1 K/UL (ref 1.8–8)
NEUTS SEG NFR BLD: 90 % (ref 40–73)
NRBC # BLD: 0 K/UL (ref 0–0.01)
NRBC BLD-RTO: 0 PER 100 WBC
PLATELET # BLD AUTO: 194 K/UL (ref 135–420)
PMV BLD AUTO: 10.3 FL (ref 9.2–11.8)
POTASSIUM SERPL-SCNC: 4.2 MMOL/L (ref 3.5–5.5)
RBC # BLD AUTO: 4.07 M/UL (ref 4.2–5.3)
SODIUM SERPL-SCNC: 141 MMOL/L (ref 136–145)
WBC # BLD AUTO: 6.7 K/UL (ref 4.6–13.2)

## 2022-05-11 PROCEDURE — 97535 SELF CARE MNGMENT TRAINING: CPT

## 2022-05-11 PROCEDURE — 80048 BASIC METABOLIC PNL TOTAL CA: CPT

## 2022-05-11 PROCEDURE — 74011250637 HC RX REV CODE- 250/637: Performed by: NURSE PRACTITIONER

## 2022-05-11 PROCEDURE — 74011250636 HC RX REV CODE- 250/636: Performed by: NURSE PRACTITIONER

## 2022-05-11 PROCEDURE — 74011250636 HC RX REV CODE- 250/636: Performed by: EMERGENCY MEDICINE

## 2022-05-11 PROCEDURE — 74011000250 HC RX REV CODE- 250: Performed by: NURSE PRACTITIONER

## 2022-05-11 PROCEDURE — 77030038269 HC DRN EXT URIN PURWCK BARD -A

## 2022-05-11 PROCEDURE — 99231 SBSQ HOSP IP/OBS SF/LOW 25: CPT | Performed by: PHYSICIAN ASSISTANT

## 2022-05-11 PROCEDURE — 74011000258 HC RX REV CODE- 258: Performed by: EMERGENCY MEDICINE

## 2022-05-11 PROCEDURE — 97162 PT EVAL MOD COMPLEX 30 MIN: CPT

## 2022-05-11 PROCEDURE — 74011250637 HC RX REV CODE- 250/637: Performed by: INTERNAL MEDICINE

## 2022-05-11 PROCEDURE — 36415 COLL VENOUS BLD VENIPUNCTURE: CPT

## 2022-05-11 PROCEDURE — 99232 SBSQ HOSP IP/OBS MODERATE 35: CPT | Performed by: EMERGENCY MEDICINE

## 2022-05-11 PROCEDURE — 97166 OT EVAL MOD COMPLEX 45 MIN: CPT

## 2022-05-11 PROCEDURE — 65270000029 HC RM PRIVATE

## 2022-05-11 PROCEDURE — 97530 THERAPEUTIC ACTIVITIES: CPT

## 2022-05-11 PROCEDURE — 2709999900 HC NON-CHARGEABLE SUPPLY

## 2022-05-11 PROCEDURE — 74011250637 HC RX REV CODE- 250/637: Performed by: EMERGENCY MEDICINE

## 2022-05-11 PROCEDURE — 85025 COMPLETE CBC W/AUTO DIFF WBC: CPT

## 2022-05-11 RX ORDER — DOCUSATE SODIUM 100 MG/1
100 CAPSULE, LIQUID FILLED ORAL 2 TIMES DAILY
Status: DISCONTINUED | OUTPATIENT
Start: 2022-05-11 | End: 2022-05-14 | Stop reason: HOSPADM

## 2022-05-11 RX ADMIN — SODIUM CHLORIDE, PRESERVATIVE FREE 10 ML: 5 INJECTION INTRAVENOUS at 06:51

## 2022-05-11 RX ADMIN — DOCUSATE SODIUM 100 MG: 100 CAPSULE, LIQUID FILLED ORAL at 20:33

## 2022-05-11 RX ADMIN — FAMOTIDINE 20 MG: 20 TABLET ORAL at 10:58

## 2022-05-11 RX ADMIN — Medication 1000 UNITS: at 10:58

## 2022-05-11 RX ADMIN — SODIUM CHLORIDE 1000 MG: 9 INJECTION, SOLUTION INTRAVENOUS at 20:58

## 2022-05-11 RX ADMIN — ENOXAPARIN SODIUM 40 MG: 100 INJECTION SUBCUTANEOUS at 10:58

## 2022-05-11 RX ADMIN — ACETAMINOPHEN 650 MG: 325 TABLET ORAL at 11:00

## 2022-05-11 NOTE — PROGRESS NOTES
Problem: Mobility Impaired (Adult and Pediatric)  Goal: *Acute Goals and Plan of Care (Insert Text)  Description: Physical Therapy Goals  Initiated 5/11/2022 and to be accomplished within 7 day(s)  1. Patient will move from supine to sit and sit to supine in bed with modified independence. 2.  Patient will transfer from bed to chair and chair to bed with minimal assistance/contact guard assist using the least restrictive device. 3.  Patient will perform sit to stand with minimal assistance/contact guard assist.  4.  Patient will ambulate with minimal assistance/contact guard assist for 25 feet with the least restrictive device. PLOF: Non-ambulatory since fall 5/6/2022; son carrying around home. Prior to fall, independent. Lives in home with first floor set-up; 3 steps to enter with bilateral handrails. Outcome: Progressing Towards Goal   PHYSICAL THERAPY EVALUATION    Patient: Serena Arroyo (60 y.o. female)  Date: 5/11/2022  Primary Diagnosis: Multiple sclerosis (HCC) [G35]  Weakness [R53.1]  Precautions: Fall,Skin,NWB (R proximal radius fx w elbow effusion)  ASSESSMENT :  Close supervision for supine to sit. Good seated balance. Seated EOB with reaching activities. BLE AROM WFL. BLE strength 3+/5 grossly. Poor control on LLE. Bilateral feet rest in plantarflexed position. Min A x2 for sit to stand. Min A x2 for standing balance. Attempted amb with LLE sliding out form under patient and RLE buckling; returned to seated EOB to prevent fall. No righting mechanisms to prevent fall. Min A x2 for sit to stand. Completed standing weight shifts. Cues for knee extension and posture. Returned to seated EOB. Supervision for sit to supine. Seated in bed with HOB elevated. Compliant with NWB RUE entire session. Educated on need for RN assistance with mobility; verbalized understanding. Call bell in reach. Patient will benefit from skilled intervention to address the above impairments.   Patient's rehabilitation potential is considered to be Fair  Factors which may influence rehabilitation potential include:   []         None noted  []         Mental ability/status  [x]         Medical condition  []         Home/family situation and support systems  []         Safety awareness  []         Pain tolerance/management  []         Other:      PLAN :  Recommendations and Planned Interventions:   [x]           Bed Mobility Training             [x]    Neuromuscular Re-Education  [x]           Transfer Training                   []    Orthotic/Prosthetic Training  [x]           Gait Training                          []    Modalities  [x]           Therapeutic Exercises           []    Edema Management/Control  [x]           Therapeutic Activities            [x]    Family Training/Education  [x]           Patient Education  []           Other (comment):    Frequency/Duration: Patient will be followed by physical therapy 3-5 times a week to address goals. Discharge Recommendations: Inpatient Rehab  Further Equipment Recommendations for Discharge: wheelchair      SUBJECTIVE:   Patient stated I fell on Friday.     OBJECTIVE DATA SUMMARY:     Past Medical History:   Diagnosis Date    MS (multiple sclerosis) ([facility] [address]      Past Surgical History:   Procedure Laterality Date    NM BIOPSY OF BREAST, INCISIONAL  RT BENIGN    OVER 10 YRS. AGO PER PT.      Barriers to Learning/Limitations: None  Compensate with: Visual Cues, Verbal Cues, Tactile Cues and Kinesthetic Cues    Home Situation:  Home Situation  Home Environment: Private residence  # Steps to Enter: 3  Rails to Enter: No  One/Two Story Residence: One story  Living Alone: No  Support Systems: Spouse/Significant Other,Child(demetria)  Patient Expects to be Discharged to[de-identified] Home with home health  Current DME Used/Available at Home: Cane, quad,Walker, rolling  Tub or Shower Type: Shower    Critical Behavior:  Neurologic State: Alert  Orientation Level: Oriented X4  Cognition: Follows commands  Safety/Judgement: Fall prevention  Psychosocial  Patient Behaviors: Calm; Cooperative    Strength:    Manual Muscle Testing (LE)         R     L    Hip Flexion:   3+/5  3+/5  Knee EXT:   3+/5  3+/5  Knee FLEX:   3+/5  3+/5  Ankle DF:   3+/5  3+/5  _________________________________________________   Range Of Motion:  BLE AROM WFL  Functional Mobility:  Bed Mobility:  Supine to Sit: Stand-by assistance; Additional time  Sit to Supine: Stand-by assistance; Additional time  Scooting: Contact guard assistance; Additional time  Transfers:  Sit to Stand: Minimum assistance;Assist x2  Stand to Sit: Minimum assistance  Balance:   Sitting: Impaired; Without support  Sitting - Static: Good (unsupported)  Sitting - Dynamic: Fair (occasional)  Standing: Impaired; With support  Standing - Static: Fair;Occasional (F-)  Standing - Dynamic : Poor  Neuro Re-education:  Seated balance 15 minutes  Standing balance 3 minutes   Therapeutic Exercises:   Standing weight shifts x1 minute  Pain:  Pain level pre-treatment: 8/10   Pain level post-treatment: 8/10     Activity Tolerance:   Fair    After treatment:   []         Patient left in no apparent distress sitting up in chair  [x]         Patient left in no apparent distress in bed  [x]         Call bell left within reach  [x]         Nursing notified  []         Caregiver present  []         Bed alarm activated  []         SCDs applied    COMMUNICATION/EDUCATION:   [x]         Role of physical therapy and plan of care in the acute care setting. [x]         Fall prevention education was provided and the patient/caregiver indicated understanding. [x]         Patient/family have participated as able in goal setting and plan of care. []         Patient/family agree to work toward stated goals and plan of care. []         Patient understands intent and goals of therapy, but is neutral about his/her participation.   []         Patient is unable to participate in goal setting/plan of care: ongoing with therapy staff.     Thank you for this referral.  Herve Pryor PT   Time Calculation: 32 mins    Eval Complexity: History: MEDIUM  Complexity : 1-2 comorbidities / personal factors will impact the outcome/ POC Exam:MEDIUM Complexity : 3 Standardized tests and measures addressing body structure, function, activity limitation and / or participation in recreation  Presentation: MEDIUM Complexity : Evolving with changing characteristics  Clinical Decision Making:Medium Complexity   Clinical judgement; ROM, MMT, functional mobility Overall Complexity:MEDIUM

## 2022-05-11 NOTE — PROGRESS NOTES
[facility] Hospitalist Group  Progress Note    Patient: Esther Bangura Age: 74 y.o. : 1955 MR#: [id number] SSN: [id number]  Date/Time: 2022    Subjective:     Patient is sitting in bed in no apparent distress, awake and alert.  at bedside    Assessment/Plan:        1.  Bilateral lower extremity weakness moving left and right could be due to #2  2. Multiple sclerosis exacerbation  3. T1 and T6 enhancement lesion on MRI suggestive of multiple sclerosis with active demyelination  4. Recent fall  5. Low vitamin D level  6. Right distal radius nondisplaced fracture     PLAN  Continue high-dose Solu-Medrol  As per neurology  PT OT  Ortho input appreciated  Discussed with patient and  at bedside    Disposition-therapist are recommending rehab    Anticipated date of discharge is May 13, 2022.   Discussed with case plan    Case discussed with:  [x]Patient  [x]Family  []Nursing  []Case Management  DVT Prophylaxis:  [x]Lovenox  []Hep SQ  []SCDs  []Coumadin   []On Heparin gtt    Objective:   VS:   Visit Vitals  /71   Pulse 81   Temp 98.5 °F (36.9 °C)   Resp 19   Ht 4' 11\" (1.499 m)   Wt 59 kg (130 lb)   SpO2 96%   Breastfeeding No   BMI 26.26 kg/m²      Tmax/24hrs: Temp (24hrs), Av.9 °F (36.6 °C), Min:97.3 °F (36.3 °C), Max:98.5 °F (36.9 °C)    Input/Output:     Intake/Output Summary (Last 24 hours) at 2022 1902  Last data filed at 2022 7407  Gross per 24 hour   Intake --   Output 1100 ml   Net -1100 ml       General:  Awake, alert  Cardiovascular:  S1S2+, RRR  Pulmonary:  CTA b/l  GI:  Soft, BS+, NT, ND  Extremities:  No edema  Bilateral lower extremity weakness    Labs:    Recent Results (from the past 24 hour(s))   METABOLIC PANEL, BASIC    Collection Time: 22  3:40 AM   Result Value Ref Range    Sodium 141 136 - 145 mmol/L    Potassium 4.2 3.5 - 5.5 mmol/L    Chloride 111 100 - 111 mmol/L    CO2 23 21 - 32 mmol/L    Anion gap 7 3.0 - 18 mmol/L    Glucose 163 (H) 74 - 99 mg/dL    BUN 15 7.0 - 18 MG/DL    Creatinine 0.56 (L) 0.6 - 1.3 MG/DL    BUN/Creatinine ratio 27 (H) 12 - 20      GFR est AA >60 >60 ml/min/1.73m2    GFR est non-AA >60 >60 ml/min/1.73m2    Calcium 9.4 8.5 - 10.1 MG/DL   CBC WITH AUTOMATED DIFF    Collection Time: 05/11/22  3:40 AM   Result Value Ref Range    WBC 6.7 4.6 - 13.2 K/uL    RBC 4.07 (L) 4.20 - 5.30 M/uL    HGB 12.5 12.0 - 16.0 g/dL    HCT 35.9 35.0 - 45.0 %    MCV 88.2 78.0 - 100.0 FL    MCH 30.7 24.0 - 34.0 PG    MCHC 34.8 31.0 - 37.0 g/dL    RDW 11.9 11.6 - 14.5 %    PLATELET 459 406 - 565 K/uL    MPV 10.3 9.2 - 11.8 FL    NRBC 0.0 0  WBC    ABSOLUTE NRBC 0.00 0.00 - 0.01 K/uL    NEUTROPHILS 90 (H) 40 - 73 %    LYMPHOCYTES 7 (L) 21 - 52 %    MONOCYTES 2 (L) 3 - 10 %    EOSINOPHILS 0 0 - 5 %    BASOPHILS 0 0 - 2 %    IMMATURE GRANULOCYTES 0 0.0 - 0.5 %    ABS. NEUTROPHILS 6.1 1.8 - 8.0 K/UL    ABS. LYMPHOCYTES 0.5 (L) 0.9 - 3.6 K/UL    ABS. MONOCYTES 0.1 0.05 - 1.2 K/UL    ABS. EOSINOPHILS 0.0 0.0 - 0.4 K/UL    ABS. BASOPHILS 0.0 0.0 - 0.1 K/UL    ABS. IMM.  GRANS. 0.0 0.00 - 0.04 K/UL    DF AUTOMATED       Additional Data Reviewed:      Signed By: Brett Michelle MD     May 11, 2022

## 2022-05-11 NOTE — PROGRESS NOTES
Problem: Self Care Deficits Care Plan (Adult)  Goal: *Acute Goals and Plan of Care (Insert Text)  Description: Occupational Therapy Goals  Initiated 5/11/2022 within 7 day(s). 1.  Patient will perform functional activity standing > 2 minutes with supervision/set-up, F+ balance. 2.  Patient will perform lower body dressing with supervision/set-up standing. 3.  Patient will perform bed mobility in preparation for selfcare with modified independence, maintaining RUE NWB status . 4. Patient will perform toilet transfers with supervision/set-up. 5.  Patient will perform all aspects of toileting with supervision/set-up. 6.  Patient will participate in upper extremity therapeutic exercise/activities with modified independence for 8 minutes. 7.  Patient will utilize energy conservation techniques during functional activities with verbal cues. Prior Level of Function: Patient was independent with self-care and used a hurricane for functional mobility PTA. Outcome: Progressing Towards Goal       OCCUPATIONAL THERAPY EVALUATION      Patient: Alexei Solorzano (69 y.o. female)  Date: 5/11/2022  Primary Diagnosis: Multiple sclerosis (HCC) [G35]  Weakness [R53.1]  Precautions: Fall,Skin,NWB (R proximal radius fx w elbow effusion)    Ok Active range of motion and passive range of motion of both elbow wrist and fingers. No pushing or pulling with R arm. No lifting heavier than a glass of water. ASSESSMENT :  Patient cleared to participate in OT evaluation by RN. Upon entering the room, patient was supine in bed, alert, and agreeable to participate in OT evaluation. Patient was seen with PT to maximize patient safety, participation, and functional mobility in preparation for self-care tasks. Patient s/p mechanical fall at home, imaging (+) R proximal radius fx w elbow effusion, (-) R femur fx and pelvic CT pending. Patient noted with bruise on RUE and RLE; able to move both extremities no difficulty. Patient educated on RUE precautions listed above and demos good understanding throughout evaluation. Patient stand by assist for supine <> sit, stand by assist for lower/upper body dressing seated and minimal assistance x 1-2 for sit <> stand x 2 trials. Upon first trial patient requiring cues for upright posture and noted with L foot sliding from underneath her. Vcs to sit back down and take a break. Second trial, pt's balance improved however R foot rotating inward with weight-shifting side to side. Patient unsafe to take steps at this time. Left semi reclined in bed with RN present and all needs met. Based on the objective data described below, the patient presents with decreased strength, decreased independence, decreased activity tolerance, decreased functional balance, and decreased functional mobility, which impedes pt performance in basic self-care/ADL tasks. Patient would benefit from skilled OT to restore PLOF and maximize function. Patient will benefit from skilled intervention to address the above impairments.   Patient's rehabilitation potential is considered to be Fair  Factors which may influence rehabilitation potential include:   []             None noted  [x]             Mental ability/status  [x]             Medical condition  [x]             Home/family situation and support systems  [x]             Safety awareness  [x]             Pain tolerance/management  []             Other:      PLAN :  Recommendations and Planned Interventions:   [x]               Self Care Training                  [x]      Therapeutic Activities  [x]               Functional Mobility Training   []      Cognitive Retraining  [x]               Therapeutic Exercises           [x]      Endurance Activities  [x]               Balance Training                    [x]      Neuromuscular Re-Education  []               Visual/Perceptual Training     [x]      Home Safety Training  [x]               Patient Education [x]      Family Training/Education  []               Other (comment):    Frequency/Duration: Patient will be followed by occupational therapy 3 - 5 times a week to address goals. Discharge Recommendations: Inpatient Rehab  Further Equipment Recommendations for Discharge: bedside commode, shower chair, and wheelchair      SUBJECTIVE:   Patient stated I dont think im ready to go home    OBJECTIVE DATA SUMMARY:     Past Medical History:   Diagnosis Date    MS (multiple sclerosis) ([facility] [address]      Past Surgical History:   Procedure Laterality Date    MS BIOPSY OF BREAST, INCISIONAL  RT BENIGN    OVER 10 YRS. AGO PER PT. Barriers to Learning/Limitations: None  Compensate with: visual, verbal, tactile, kinesthetic cues/model    Home Situation:   Home Situation  Home Environment: Private residence  # Steps to Enter: 3  Rails to Enter: No  One/Two Story Residence: One story  Living Alone: No  Support Systems: Spouse/Significant Other,Child(demetria)  Patient Expects to be Discharged to[de-identified] Home with home health  Current DME Used/Available at Home: Hselly [employer]ame, rolling  Tub or Shower Type: Shower  [x]  Right hand dominant   []  Left hand dominant    Cognitive/Behavioral Status:  Neurologic State: Alert  Orientation Level: Oriented X4  Cognition: Follows commands  Safety/Judgement: Fall prevention    Skin: Intact  Edema: None noted    Vision/Perceptual:    Acuity: Within Defined Limits      Coordination: BUE  Fine Motor Skills-Upper: Left Intact; Right Intact    Gross Motor Skills-Upper: Left Intact; Right Intact    Balance:  Sitting: Impaired; Without support  Sitting - Static: Good (unsupported)  Sitting - Dynamic: Fair (occasional)  Standing: Impaired; With support  Standing - Static: Fair;Occasional (F-)  Standing - Dynamic : Poor    Strength: BUE  Strength:  Within functional limits (LUE MMT only; R  strength gen dcrsd, fxl)    Tone & Sensation: BUE  Tone: Normal  Sensation: Intact    Range of Motion: BUE  AROM: Within functional limits      Functional Mobility and Transfers for ADLs:  Bed Mobility:     Supine to Sit: Stand-by assistance; Additional time  Sit to Supine: Stand-by assistance; Additional time  Scooting: Contact guard assistance; Additional time    Transfers:  Sit to Stand: Minimum assistance;Assist x2  Stand to Sit: Minimum assistance          ADL Assessment:   Feeding: Independent    Oral Facial Hygiene/Grooming: Setup    Bathing: Moderate assistance    Upper Body Dressing: Stand-by assistance    Lower Body Dressing: Moderate assistance (standing)    Toileting: Minimum assistance      ADL Intervention:  Upper Body Dressing Assistance  Dressing Assistance: Stand-by assistance  Hospital Gown: Stand-by assistance    Lower Body Dressing Assistance  Dressing Assistance: Stand-by assistance  Socks: Stand-by assistance (donning seated EOB)  Leg Crossed Method Used: Yes  Position Performed: Seated edge of bed (prop sit EOB)    Cognitive Retraining  Safety/Judgement: Fall prevention      Pain:  Pain level pre-treatment: 0/10, at rest  Pain level post-treatment: 8/10 , RLE following stand  Pain Intervention(s): Medication (see MAR); Rest, Ice, Repositioning  Response to intervention: Nurse notified, See doc flow    Activity Tolerance:   Fair      Please refer to the flowsheet for vital signs taken during this treatment. After treatment:   [] Patient left in no apparent distress sitting up in chair  [x] Patient left in no apparent distress in bed  [x] Call bell left within reach  [x] Nursing notified  [] Caregiver present  [] Bed alarm activated    COMMUNICATION/EDUCATION:   [x] Role of Occupational Therapy in the acute care setting  [x] Home safety education was provided and the patient/caregiver indicated understanding. [x] Patient/family have participated as able in goal setting and plan of care. [x] Patient/family agree to work toward stated goals and plan of care.   [] Patient understands intent and goals of therapy, but is neutral about his/her participation. [] Patient is unable to participate in goal setting and plan of care. Thank you for this referral.  Enoc Diggs OTR/L  Time Calculation: 33 mins    Eval Complexity: History: MEDIUM Complexity : Expanded review of history including physical, cognitive and psychosocial  history ; Examination: MEDIUM Complexity : 3-5 performance deficits relating to physical, cognitive , or psychosocial skils that result in activity limitations and / or participation restrictions; Decision Making:MEDIUM Complexity : Patient may present with comorbidities that affect occupational performnce.  Miniml to moderate modification of tasks or assistance (eg, physical or verbal ) with assesment(s) is necessary to enable patient to complete evaluation

## 2022-05-11 NOTE — PROGRESS NOTES
Problem: Falls - Risk of  Goal: *Absence of Falls  Description: Document Kizzy Burgos Fall Risk and appropriate interventions in the flowsheet. Outcome: Progressing Towards Goal  Note: Fall Risk Interventions:            Medication Interventions: Patient to call before getting OOB,Teach patient to arise slowly,Bed/chair exit alarm    Elimination Interventions: Call light in reach,Bed/chair exit alarm    History of Falls Interventions: Bed/chair exit alarm,Door open when patient unattended         Problem: Patient Education: Go to Patient Education Activity  Goal: Patient/Family Education  Outcome: Progressing Towards Goal     Problem: Pressure Injury - Risk of  Goal: *Prevention of pressure injury  Description: Document Wagner Scale and appropriate interventions in the flowsheet.   Outcome: Progressing Towards Goal  Note: Pressure Injury Interventions:       Moisture Interventions: Internal/External urinary devices    Activity Interventions: Pressure redistribution bed/mattress(bed type),PT/OT evaluation    Mobility Interventions: Assess need for specialty bed,HOB 30 degrees or less,Pressure redistribution bed/mattress (bed type)    Nutrition Interventions: Document food/fluid/supplement intake                     Problem: Patient Education: Go to Patient Education Activity  Goal: Patient/Family Education  Outcome: Progressing Towards Goal

## 2022-05-11 NOTE — CALL BACK NOTE
Right radial fracture noted on elbow xray. Discussed consult with Dr. Casper Gonzalez whom will see 5/11. CT pelvis pending.      Signed By: Mariajose Zapata NP     May 10, 2022

## 2022-05-11 NOTE — CONSULTS
[facility]    Consult Note    Patient: Kalina Salgado               Sex: female          DOA: 2022         YOB: 1955      Age:  74 y.o.        LOS:  LOS: 2 days              HPI:     Kalina Salgado is a 74 y.o. female who has been seen for right elbow pain. Patient states that she fell last Friday and had some minor pain in her elbow. She does feel like it is markedly improved. She is able to move her wrist but does have pain with pushing pulling or lifting. Denies any numbness or tingling. Does state that she has MS. Also has a large bruise on her hip but is able to move her leg without any difficulty. Past Medical History:   Diagnosis Date    MS (multiple sclerosis) ([facility] [address]        Past Surgical History:   Procedure Laterality Date    MS BIOPSY OF BREAST, INCISIONAL  RT BENIGN    OVER 10 YRS. AGO PER PT. History reviewed. No pertinent family history. Social History     Socioeconomic History    Marital status:    Tobacco Use    Smoking status: Former Smoker     Types: Cigarettes     Quit date: 1980     Years since quittin.9    Smokeless tobacco: Never Used   Substance and Sexual Activity    Alcohol use: Not Currently    Drug use: No    Sexual activity: Never     Partners: Male       Prior to Admission medications    Medication Sig Start Date End Date Taking? Authorizing Provider   OTHER texadera 240mg   Yes Other, MD Alexandre       Allergies   Allergen Reactions    Amoxicillin Hives and Rash    Clindamycin Hives and Rash    Metronidazole Hives    Tetracycline Hives and Rash       Review of Systems  Negative for any fever, chills, sweats, headache, blurred vision, cough, congestion, SOB, abdominal pain, bleeding, bruising, numbness, or tingling.  12 point ROS otherwise negative other than noted in the HPI      Physical Exam:      Visit Vitals  /77   Pulse 81   Temp 97.6 °F (36.4 °C)   Resp 16   Ht 4' 11\" (1.499 m)   Wt 130 lb (59 kg)   SpO2 95%   Breastfeeding No   BMI 26.26 kg/m²       Physical Exam:  General: Well developed, well nourished, 74 y.o. female in  NAD   Vitals: Blood pressure 130/77, pulse 81, temperature 97.6 °F (36.4 °C), resp. rate 16, height 4' 11\" (1.499 m), weight 130 lb (59 kg), SpO2 95 %, not currently breastfeeding. Skin: No rashs, ulcers, icterus, or other obvious lesions/ lacerations  Eyes: EOM intact, PERRLA   ENM: Without obvious lesions. Nares patent. Moist mucous membranes. Neck: Supple, FROM   Lungs: CTAB   Heart: RRR, normal S1, S2. No murmurs, rubs, or gallops  Abdomen: Soft, NT, bowel sounds present all 4 quadrants   Musculoskeletal:   Inspection of her right elbow reveals mild swelling, ecchymosis noted on her forearm, full range of motion passively of her elbow. Mild tenderness on the radial head. No instability. Sensation intact. Inspection of her right hip reveals a large contusion on the lateral aspect of her right hip. She has full passive range of motion of her hip without any discomfort. No gross instability. Sensation is intact distally. Neuro: AAOx3. Without obvious deficits     IMAGING: 3 view x-rays of the right elbow read and reviewed by myself reveal a nondisplaced radial head fracture with some comminution.     CT scan of the right hip is still pending    Labs Reviewed:  BMP:   Lab Results   Component Value Date/Time     05/11/2022 03:40 AM    K 4.2 05/11/2022 03:40 AM     05/11/2022 03:40 AM    CO2 23 05/11/2022 03:40 AM    AGAP 7 05/11/2022 03:40 AM     (H) 05/11/2022 03:40 AM    BUN 15 05/11/2022 03:40 AM    CREA 0.56 (L) 05/11/2022 03:40 AM    GFRAA >60 05/11/2022 03:40 AM    GFRNA >60 05/11/2022 03:40 AM     CBC:   Lab Results   Component Value Date/Time    WBC 6.7 05/11/2022 03:40 AM    HGB 12.5 05/11/2022 03:40 AM    HCT 35.9 05/11/2022 03:40 AM     05/11/2022 03:40 AM       Assessment/Plan   [unfilled]  Active Problems:    Multiple sclerosis ([address] (5/9/2022)      Weakness (5/9/2022)      Patient with nondisplaced radial head fracture in her elbow. Given that her injury is almost a week old no splinting is required. Discussed with patient that she can do active range of motion and passive range of motion of both her elbow wrist and fingers. We would like her to avoid lifting pushing or pulling with her arm. Nothing heavier than a glass of water. We would like to follow-up in the office for repeat x-rays in 1 to 2 weeks.     Thank you for this consult we will sign off at this time    Genevie Chasten, PA-C [facility]   [phone]

## 2022-05-12 PROCEDURE — 74011250636 HC RX REV CODE- 250/636: Performed by: EMERGENCY MEDICINE

## 2022-05-12 PROCEDURE — 74011000258 HC RX REV CODE- 258: Performed by: EMERGENCY MEDICINE

## 2022-05-12 PROCEDURE — 97535 SELF CARE MNGMENT TRAINING: CPT

## 2022-05-12 PROCEDURE — 74011250637 HC RX REV CODE- 250/637: Performed by: EMERGENCY MEDICINE

## 2022-05-12 PROCEDURE — 2709999900 HC NON-CHARGEABLE SUPPLY

## 2022-05-12 PROCEDURE — 74011250637 HC RX REV CODE- 250/637: Performed by: INTERNAL MEDICINE

## 2022-05-12 PROCEDURE — 74011250637 HC RX REV CODE- 250/637: Performed by: NURSE PRACTITIONER

## 2022-05-12 PROCEDURE — 99232 SBSQ HOSP IP/OBS MODERATE 35: CPT | Performed by: EMERGENCY MEDICINE

## 2022-05-12 PROCEDURE — 74011250636 HC RX REV CODE- 250/636: Performed by: NURSE PRACTITIONER

## 2022-05-12 PROCEDURE — 77030038269 HC DRN EXT URIN PURWCK BARD -A

## 2022-05-12 PROCEDURE — 65270000029 HC RM PRIVATE

## 2022-05-12 PROCEDURE — 74011000250 HC RX REV CODE- 250: Performed by: NURSE PRACTITIONER

## 2022-05-12 RX ADMIN — FAMOTIDINE 20 MG: 20 TABLET ORAL at 09:38

## 2022-05-12 RX ADMIN — SODIUM CHLORIDE, PRESERVATIVE FREE 10 ML: 5 INJECTION INTRAVENOUS at 21:07

## 2022-05-12 RX ADMIN — Medication 1000 UNITS: at 09:37

## 2022-05-12 RX ADMIN — ENOXAPARIN SODIUM 40 MG: 100 INJECTION SUBCUTANEOUS at 09:38

## 2022-05-12 RX ADMIN — SODIUM CHLORIDE 1000 MG: 9 INJECTION, SOLUTION INTRAVENOUS at 21:07

## 2022-05-12 RX ADMIN — SODIUM CHLORIDE, PRESERVATIVE FREE 10 ML: 5 INJECTION INTRAVENOUS at 14:00

## 2022-05-12 NOTE — PROGRESS NOTES
Spoke with Cristina at ARU. The patient has been accept to [facility] inpatient rehab. Notified Dr. Preston Naylor via perfect serve. CM to bedside to notify the patient of acceptance to ARU.  will continue to monitor and assist with transition of care needs.     MARISSA GardunoN, RN  Care Management  Pager # [phone]

## 2022-05-12 NOTE — PROGRESS NOTES
ARU/IPR REFERRAL CONTACT NOTE  [id number] [place] [address] for Physical Rehabilitation    RE: Geneva Bowens Referral received to review this patient's case for admission to [address] [place] [address] for Physical Rehabilitation. Based on our pre-admission screening patient meets criteria for admission to [facility] for Physical Rehabilitation.  aware. Will coordinate plans for admission likely tomorrow as appropriate.     Sincerely,    [facility] Physical Rehabilitation  [phone]

## 2022-05-12 NOTE — PROGRESS NOTES
Called ARU to notify PT/OT recommended inpatient rehab. Received VM. Left message requesting return call. 1355-Called Cynthia in ARU. Received VM. Left message requesting return call.  will continue to monitor and assist with transition of care needs.     NABEEL Daley, RN  Care Management  Pager # [phone]

## 2022-05-12 NOTE — REHAB NOTE
ARU/IPR REFERRAL CONTACT NOTE  [id number] [place] [address] for Physical Rehabilitation       Thank you for the opportunity to review this patient's case for admission to [address] [facility] for Physical Rehabilitation. Based on our pre-admission screening:     Pal ] Our Team/Medical Director is following this case. Comments: Case will be discussed in admission huddle on 5/13/22       Again, Thank you for this referral. Should you have any questions please do not hesitate to call.      Sincerely,  Krishan Mendez, [address] [place] [address] Inpatient Rehab Manager  [phone]

## 2022-05-12 NOTE — PROGRESS NOTES
conducted an initial consultation and Spiritual Assessment for Montrell Jurado, who is a 74 y.o.,female. Patients Primary Language is: [place]. According to the patients EMR Worship Affiliation is: No preference. The reason the Patient came to the hospital is:   Patient Active Problem List    Diagnosis Date Noted    Multiple sclerosis ([facility] [address] 05/09/2022    Weakness 05/09/2022        The  provided the following Interventions:  Initiated a relationship of care and support. Provided information about Spiritual Care Services. Chart reviewed. The following outcomes where achieved:  Patient expressed gratitude for 's visit. Assessment:  Patient does not have any Episcopal/cultural needs that will affect patients preferences in health care. There are no spiritual or Episcopal issues which require intervention at this time. Plan:  Chaplains will continue to follow and will provide pastoral care on an as needed/requested basis.  recommends bedside caregivers page  on duty if patient shows signs of acute spiritual or emotional distress.     [address]  [phone])

## 2022-05-12 NOTE — PROGRESS NOTES
[facility]ist [facility]  Progress Note    Patient: Kalina Salgado Age: 74 y.o. : 1955 MR#: [id number] SSN: [id number]  Date/Time: 2022    Subjective:     Patient is sitting in bed in no apparent distress, awake and alert. Feels a little better. Assessment/Plan:        1.  Bilateral lower extremity weakness moving left and right could be due to #2  2. Multiple sclerosis exacerbation  3. T1 and T6 enhancement lesion on MRI suggestive of multiple sclerosis with active demyelination  4. Recent fall  5. Low vitamin D level  6. Right distal radius nondisplaced fracture     PLAN  Continue high-dose steroids for a total of 5 days per neurology recommendation  As per neurology  Continue PT and OT  Ortho input appreciated  Discussed with patient. I offered to call her  but patient declined    Disposition-therapist are recommending rehab    Anticipated date of discharge is May 14, 2022 after patient completes her course of IV steroids    Case discussed with:  [x]Patient  [x]Family  []Nursing  []Case Management  DVT Prophylaxis:  [x]Lovenox  []Hep SQ  []SCDs  []Coumadin   []On Heparin gtt    Objective:   VS:   Visit Vitals  /72 (BP 1 Location: Left upper arm, BP Patient Position: At rest)   Pulse 82   Temp 97.6 °F (36.4 °C)   Resp 18   Ht 4' 11\" (1.499 m)   Wt 59 kg (130 lb)   SpO2 93%   Breastfeeding No   BMI 26.26 kg/m²      Tmax/24hrs: Temp (24hrs), Av.6 °F (36.4 °C), Min:97.5 °F (36.4 °C), Max:97.8 °F (36.6 °C)    Input/Output:     Intake/Output Summary (Last 24 hours) at 2022 1815  Last data filed at 2022 1417  Gross per 24 hour   Intake 340 ml   Output 1150 ml   Net -810 ml       General:  Awake, alert  Cardiovascular:  S1S2+, RRR  Pulmonary:  CTA b/l  GI:  Soft, BS+, NT, ND  Extremities:  No edema  Bilateral lower extremity weakness    Labs:    No results found for this or any previous visit (from the past 24 hour(s)).   Additional Data Reviewed:      Signed By: Erin Shannon MD     May 12, 2022

## 2022-05-12 NOTE — PROGRESS NOTES
Problem: Self Care Deficits Care Plan (Adult)  Goal: *Acute Goals and Plan of Care (Insert Text)  Description: Occupational Therapy Goals  Initiated 5/11/2022 within 7 day(s). 1.  Patient will perform functional activity standing > 2 minutes with supervision/set-up, F+ balance. 2.  Patient will perform lower body dressing with supervision/set-up standing. 3.  Patient will perform bed mobility in preparation for selfcare with modified independence, maintaining RUE NWB status . 4. Patient will perform toilet transfers with supervision/set-up. 5.  Patient will perform all aspects of toileting with supervision/set-up. 6.  Patient will participate in upper extremity therapeutic exercise/activities with modified independence for 8 minutes. 7.  Patient will utilize energy conservation techniques during functional activities with verbal cues. Prior Level of Function: Patient was independent with self-care and used a hurricane for functional mobility PTA. Outcome: Progressing Towards Goal     OCCUPATIONAL THERAPY TREATMENT    Patient: Bev Triplett (35 y.o. female)  Date: 5/12/2022  Diagnosis: [facility] [G35]  Weakness [R53.1] <principal problem not specified>       Precautions: Fall,Skin,NWB (R proximal radius fx w elbow effusion)    Chart, occupational therapy assessment, plan of care, and goals were reviewed. ASSESSMENT:  Pt is pleasant and cooperative. Agreeable to EOB activity. Reviewed NWB RUE and importance of maintaining w/ADLs and functional transfers. Pt requires moderate vc's for NWB RUE and assist w/trunk maneuvering to EOB. Educated on energy conservation techniques w/ADLs, demonstrating w/ADL grooming tasks. BLE weakness, L > R, requires Max Assist w/functional transfer to standing w/hand held assist. Pt performs 2 trials, clearing buttocks from mattress for 1 trial. Pt returned to supine w/Max Assist and \"log roll\" technique. Pt left w/all items within reach.  Anxious for d/c to ARU 5/13. Progression toward goals:  [x]          Improving appropriately and progressing toward goals  []          Improving slowly and progressing toward goals  []          Not making progress toward goals and plan of care will be adjusted     PLAN:  Patient continues to benefit from skilled intervention to address the above impairments. Continue treatment per established plan of care. Discharge Recommendations:  Inpatient Rehab  Further Equipment Recommendations for Discharge:  possibly shower chair     SUBJECTIVE:   Patient stated I still work four hours a day.     OBJECTIVE DATA SUMMARY:   Cognitive/Behavioral Status:  Neurologic State: Alert  Orientation Level: Oriented X4  Cognition: Follows commands  Safety/Judgement: Fall prevention    Functional Mobility and Transfers for ADLs:   Bed Mobility:  Supine to Sit: Additional time; Moderate assistance;Bed Modified  Sit to Supine: Maximum assistance  Scooting: Minimum assistance     Transfers:  Sit to Stand: Maximum assistance    Balance:  Sitting: Impaired; Without support  Standing: Impaired; With support  Standing - Static: Poor    ADL Intervention:  Grooming  Position Performed: Seated edge of bed  Brushing Teeth: Stand-by assistance    Pain:  Pain level pre-treatment: 0/10   Pain level post-treatment: 0/10    Activity Tolerance:    Good at EOB    Please refer to the flowsheet for vital signs taken during this treatment. After treatment:   []  Patient left in no apparent distress sitting up in chair  [x]  Patient left in no apparent distress in bed  [x]  Call bell left within reach  []  Nursing notified  []  Caregiver present  []  Bed alarm activated    COMMUNICATION/EDUCATION:   [] Role of Occupational Therapy in the acute care setting  [] Home safety education was provided and the patient/caregiver indicated understanding. [] Patient/family have participated as able in working towards goals and plan of care.   [x] Patient/family agree to work toward stated goals and plan of care. [] Patient understands intent and goals of therapy, but is neutral about his/her participation. [] Patient is unable to participate in goal setting and plan of care.       Thank you for this referral.  AMY Gonzalez  Time Calculation: 31 mins

## 2022-05-13 LAB
ANION GAP SERPL CALC-SCNC: 7 MMOL/L (ref 3–18)
BASOPHILS # BLD: 0 K/UL (ref 0–0.1)
BASOPHILS NFR BLD: 0 % (ref 0–2)
BUN SERPL-MCNC: 17 MG/DL (ref 7–18)
BUN/CREAT SERPL: 26 (ref 12–20)
CALCIUM SERPL-MCNC: 9.4 MG/DL (ref 8.5–10.1)
CHLORIDE SERPL-SCNC: 108 MMOL/L (ref 100–111)
CO2 SERPL-SCNC: 26 MMOL/L (ref 21–32)
CREAT SERPL-MCNC: 0.66 MG/DL (ref 0.6–1.3)
DIFFERENTIAL METHOD BLD: ABNORMAL
EOSINOPHIL # BLD: 0 K/UL (ref 0–0.4)
EOSINOPHIL NFR BLD: 0 % (ref 0–5)
ERYTHROCYTE [DISTWIDTH] IN BLOOD BY AUTOMATED COUNT: 12.2 % (ref 11.6–14.5)
GLUCOSE SERPL-MCNC: 149 MG/DL (ref 74–99)
HCT VFR BLD AUTO: 35.7 % (ref 35–45)
HGB BLD-MCNC: 12.3 G/DL (ref 12–16)
IMM GRANULOCYTES # BLD AUTO: 0 K/UL (ref 0–0.04)
IMM GRANULOCYTES NFR BLD AUTO: 1 % (ref 0–0.5)
LYMPHOCYTES # BLD: 0.3 K/UL (ref 0.9–3.6)
LYMPHOCYTES NFR BLD: 7 % (ref 21–52)
MAGNESIUM SERPL-MCNC: 2.5 MG/DL (ref 1.6–2.6)
MCH RBC QN AUTO: 31 PG (ref 24–34)
MCHC RBC AUTO-ENTMCNC: 34.5 G/DL (ref 31–37)
MCV RBC AUTO: 89.9 FL (ref 78–100)
MONOCYTES # BLD: 0.1 K/UL (ref 0.05–1.2)
MONOCYTES NFR BLD: 2 % (ref 3–10)
NEUTS SEG # BLD: 3.5 K/UL (ref 1.8–8)
NEUTS SEG NFR BLD: 90 % (ref 40–73)
NRBC # BLD: 0 K/UL (ref 0–0.01)
NRBC BLD-RTO: 0 PER 100 WBC
PLATELET # BLD AUTO: 159 K/UL (ref 135–420)
PMV BLD AUTO: 10.5 FL (ref 9.2–11.8)
POTASSIUM SERPL-SCNC: 4.2 MMOL/L (ref 3.5–5.5)
RBC # BLD AUTO: 3.97 M/UL (ref 4.2–5.3)
SODIUM SERPL-SCNC: 141 MMOL/L (ref 136–145)
WBC # BLD AUTO: 3.9 K/UL (ref 4.6–13.2)

## 2022-05-13 PROCEDURE — 85025 COMPLETE CBC W/AUTO DIFF WBC: CPT

## 2022-05-13 PROCEDURE — 74011250637 HC RX REV CODE- 250/637: Performed by: NURSE PRACTITIONER

## 2022-05-13 PROCEDURE — 2709999900 HC NON-CHARGEABLE SUPPLY

## 2022-05-13 PROCEDURE — 74011250636 HC RX REV CODE- 250/636: Performed by: PSYCHIATRY & NEUROLOGY

## 2022-05-13 PROCEDURE — 36415 COLL VENOUS BLD VENIPUNCTURE: CPT

## 2022-05-13 PROCEDURE — 74011250637 HC RX REV CODE- 250/637: Performed by: EMERGENCY MEDICINE

## 2022-05-13 PROCEDURE — 74011250636 HC RX REV CODE- 250/636: Performed by: NURSE PRACTITIONER

## 2022-05-13 PROCEDURE — 74011000250 HC RX REV CODE- 250: Performed by: NURSE PRACTITIONER

## 2022-05-13 PROCEDURE — 74011000258 HC RX REV CODE- 258: Performed by: PSYCHIATRY & NEUROLOGY

## 2022-05-13 PROCEDURE — 99232 SBSQ HOSP IP/OBS MODERATE 35: CPT | Performed by: EMERGENCY MEDICINE

## 2022-05-13 PROCEDURE — 83735 ASSAY OF MAGNESIUM: CPT

## 2022-05-13 PROCEDURE — 77030038269 HC DRN EXT URIN PURWCK BARD -A

## 2022-05-13 PROCEDURE — 80048 BASIC METABOLIC PNL TOTAL CA: CPT

## 2022-05-13 PROCEDURE — 65270000029 HC RM PRIVATE

## 2022-05-13 PROCEDURE — 74011250637 HC RX REV CODE- 250/637: Performed by: INTERNAL MEDICINE

## 2022-05-13 RX ORDER — OXYCODONE AND ACETAMINOPHEN 5; 325 MG/1; MG/1
1 TABLET ORAL ONCE
Status: COMPLETED | OUTPATIENT
Start: 2022-05-13 | End: 2022-05-13

## 2022-05-13 RX ORDER — OXYCODONE AND ACETAMINOPHEN 5; 325 MG/1; MG/1
1 TABLET ORAL
Status: DISCONTINUED | OUTPATIENT
Start: 2022-05-13 | End: 2022-05-14 | Stop reason: HOSPADM

## 2022-05-13 RX ADMIN — ENOXAPARIN SODIUM 40 MG: 100 INJECTION SUBCUTANEOUS at 08:31

## 2022-05-13 RX ADMIN — FAMOTIDINE 20 MG: 20 TABLET ORAL at 08:31

## 2022-05-13 RX ADMIN — SODIUM CHLORIDE, PRESERVATIVE FREE 10 ML: 5 INJECTION INTRAVENOUS at 21:08

## 2022-05-13 RX ADMIN — DOCUSATE SODIUM 100 MG: 100 CAPSULE, LIQUID FILLED ORAL at 21:07

## 2022-05-13 RX ADMIN — OXYCODONE HYDROCHLORIDE AND ACETAMINOPHEN 1 TABLET: 5; 325 TABLET ORAL at 09:24

## 2022-05-13 RX ADMIN — SODIUM CHLORIDE, PRESERVATIVE FREE 10 ML: 5 INJECTION INTRAVENOUS at 14:00

## 2022-05-13 RX ADMIN — SODIUM CHLORIDE 1000 MG: 9 INJECTION, SOLUTION INTRAVENOUS at 21:08

## 2022-05-13 RX ADMIN — ACETAMINOPHEN 650 MG: 325 TABLET ORAL at 09:15

## 2022-05-13 RX ADMIN — Medication 1000 UNITS: at 08:31

## 2022-05-13 NOTE — PROGRESS NOTES
ARU/IPR REFERRAL CONTACT NOTE  [id number] [place] [address] for Physical Rehabilitation    RE:       Thank you for the opportunity to review this patient's case for admission to [id number] [place] [address] for Physical Rehabilitation. Based on our pre-admission screening patient meets criteria for admission to [facility] for Physical Rehabilitation. Plan for admission Saturday (5/14/22) to room 181 at 1300 if cleared for d/c. Will need d/c summary/med rec completed and report called to [phone] prior to patient's arrival.    Again, Thank you for this referral. Should you have any questions please do not hesitate to call.      Sincerely,    [facility]  [phone]

## 2022-05-13 NOTE — PROGRESS NOTES
[facility] Hospitalist Group  Progress Note    Patient: Kalina Salgado Age: 74 y.o. : 1955 MR#: [id number] SSN: [id number]  Date/Time: 2022    Subjective:     Patient is sitting in bed in no apparent distress. Earlier today patient had some pain radiating down her leg which is improved. Currently patient denies any pain or discomfort. Patient is looking forward to transitioning to the rehab soon. Assessment/Plan:        1.  Bilateral lower extremity weakness moving left and right could be due to #2  2. Multiple sclerosis exacerbation  3. T1 and T6 enhancement lesion on MRI suggestive of multiple sclerosis with active demyelination  4. Recent fall  5. Low vitamin D level  6. Right distal radius nondisplaced fracture     PLAN  Complete course of high-dose Solu-Medrol  As per neurology  PT and OT  Ortho input appreciated  Discussed with patient.   With patient's permission I called her  at phone #[phone] and updated him regarding patient's care and discharge plans    Disposition-therapist are recommending rehab    Anticipated date of discharge is May 14, 2022 after patient completes her course of IV steroids    Case discussed with:  [x]Patient  [x]Family  []Nursing  []Case Management  DVT Prophylaxis:  [x]Lovenox  []Hep SQ  []SCDs  []Coumadin   []On Heparin gtt    Objective:   VS:   Visit Vitals  /67   Pulse 72   Temp 97.7 °F (36.5 °C)   Resp 18   Ht 4' 11\" (1.499 m)   Wt 59 kg (130 lb)   SpO2 96%   Breastfeeding No   BMI 26.26 kg/m²      Tmax/24hrs: Temp (24hrs), Av.8 °F (36.6 °C), Min:97.4 °F (36.3 °C), Max:98.2 °F (36.8 °C)    Input/Output:     Intake/Output Summary (Last 24 hours) at 2022 1715  Last data filed at 2022 0602  Gross per 24 hour   Intake 360 ml   Output 1200 ml   Net -840 ml       General:  Awake, alert  Cardiovascular:  S1S2+, RRR  Pulmonary:  CTA b/l  GI:  Soft, BS+, NT, ND  Extremities:  No edema  Bilateral lower extremity weakness    Labs:    Recent Results (from the past 24 hour(s))   CBC WITH AUTOMATED DIFF    Collection Time: 05/13/22  4:20 AM   Result Value Ref Range    WBC 3.9 (L) 4.6 - 13.2 K/uL    RBC 3.97 (L) 4.20 - 5.30 M/uL    HGB 12.3 12.0 - 16.0 g/dL    HCT 35.7 35.0 - 45.0 %    MCV 89.9 78.0 - 100.0 FL    MCH 31.0 24.0 - 34.0 PG    MCHC 34.5 31.0 - 37.0 g/dL    RDW 12.2 11.6 - 14.5 %    PLATELET 304 548 - 494 K/uL    MPV 10.5 9.2 - 11.8 FL    NRBC 0.0 0  WBC    ABSOLUTE NRBC 0.00 0.00 - 0.01 K/uL    NEUTROPHILS 90 (H) 40 - 73 %    LYMPHOCYTES 7 (L) 21 - 52 %    MONOCYTES 2 (L) 3 - 10 %    EOSINOPHILS 0 0 - 5 %    BASOPHILS 0 0 - 2 %    IMMATURE GRANULOCYTES 1 (H) 0.0 - 0.5 %    ABS. NEUTROPHILS 3.5 1.8 - 8.0 K/UL    ABS. LYMPHOCYTES 0.3 (L) 0.9 - 3.6 K/UL    ABS. MONOCYTES 0.1 0.05 - 1.2 K/UL    ABS. EOSINOPHILS 0.0 0.0 - 0.4 K/UL    ABS. BASOPHILS 0.0 0.0 - 0.1 K/UL    ABS. IMM.  GRANS. 0.0 0.00 - 0.04 K/UL    DF AUTOMATED     METABOLIC PANEL, BASIC    Collection Time: 05/13/22  4:20 AM   Result Value Ref Range    Sodium 141 136 - 145 mmol/L    Potassium 4.2 3.5 - 5.5 mmol/L    Chloride 108 100 - 111 mmol/L    CO2 26 21 - 32 mmol/L    Anion gap 7 3.0 - 18 mmol/L    Glucose 149 (H) 74 - 99 mg/dL    BUN 17 7.0 - 18 MG/DL    Creatinine 0.66 0.6 - 1.3 MG/DL    BUN/Creatinine ratio 26 (H) 12 - 20      GFR est AA >60 >60 ml/min/1.73m2    GFR est non-AA >60 >60 ml/min/1.73m2    Calcium 9.4 8.5 - 10.1 MG/DL   MAGNESIUM    Collection Time: 05/13/22  4:20 AM   Result Value Ref Range    Magnesium 2.5 1.6 - 2.6 mg/dL     Additional Data Reviewed:      Signed By: Jono Bonilla MD     May 13, 2022

## 2022-05-13 NOTE — PROGRESS NOTES
Problem: Falls - Risk of  Goal: *Absence of Falls  Description: Document [place] Fall Risk and appropriate interventions in the flowsheet. Outcome: Progressing Towards Goal  Note: Fall Risk Interventions:  Mobility Interventions: Bed/chair exit alarm,Assess mobility with egress test         Medication Interventions: Bed/chair exit alarm,Patient to call before getting OOB,Teach patient to arise slowly    Elimination Interventions: Call light in reach,Elevated toilet seat    History of Falls Interventions: Consult care management for discharge planning,Door open when patient unattended         Problem: Patient Education: Go to Patient Education Activity  Goal: Patient/Family Education  Outcome: Progressing Towards Goal     Problem: Pressure Injury - Risk of  Goal: *Prevention of pressure injury  Description: Document Wagner Scale and appropriate interventions in the flowsheet.   Outcome: Progressing Towards Goal  Note: Pressure Injury Interventions:       Moisture Interventions: Absorbent underpads    Activity Interventions: Pressure redistribution bed/mattress(bed type)    Mobility Interventions: Pressure redistribution bed/mattress (bed type)    Nutrition Interventions: Document food/fluid/supplement intake                     Problem: Patient Education: Go to Patient Education Activity  Goal: Patient/Family Education  Outcome: Progressing Towards Goal

## 2022-05-13 NOTE — PROGRESS NOTES
Pt heard screaming, upon entering room, pt states she is in 50/10 pain. Tylenol given po, Dr. Dameon Childress notified, her nurse is at bedside.

## 2022-05-14 ENCOUNTER — HOSPITAL ENCOUNTER (INPATIENT)
Age: 67
LOS: 17 days | Discharge: NURSING, REHAB OR LONG-TERM CARE | DRG: 060 | End: 2022-05-31
Attending: INTERNAL MEDICINE | Admitting: FAMILY MEDICINE
Payer: MEDICARE

## 2022-05-14 VITALS
SYSTOLIC BLOOD PRESSURE: 132 MMHG | HEART RATE: 62 BPM | TEMPERATURE: 97.7 F | RESPIRATION RATE: 21 BRPM | DIASTOLIC BLOOD PRESSURE: 79 MMHG | WEIGHT: 130 LBS | BODY MASS INDEX: 26.21 KG/M2 | HEIGHT: 59 IN | OXYGEN SATURATION: 96 %

## 2022-05-14 DIAGNOSIS — E55.9 VITAMIN D DEFICIENCY: Chronic | ICD-10-CM

## 2022-05-14 DIAGNOSIS — R79.89 ELEVATED SERUM FREE T4 LEVEL: ICD-10-CM

## 2022-05-14 DIAGNOSIS — G35 MULTIPLE SCLEROSIS EXACERBATION (HCC): ICD-10-CM

## 2022-05-14 DIAGNOSIS — S52.182D OTHER CLOSED FRACTURE OF PROXIMAL END OF LEFT RADIUS WITH ROUTINE HEALING, SUBSEQUENT ENCOUNTER: ICD-10-CM

## 2022-05-14 DIAGNOSIS — M54.31 SCIATICA OF RIGHT SIDE: Chronic | ICD-10-CM

## 2022-05-14 DIAGNOSIS — R79.89 ELEVATED TSH: ICD-10-CM

## 2022-05-14 DIAGNOSIS — G35 MULTIPLE SCLEROSIS (HCC): Primary | ICD-10-CM

## 2022-05-14 PROCEDURE — 2709999900 HC NON-CHARGEABLE SUPPLY

## 2022-05-14 PROCEDURE — 77030012890

## 2022-05-14 PROCEDURE — 65310000000 HC RM PRIVATE REHAB

## 2022-05-14 PROCEDURE — 77030038269 HC DRN EXT URIN PURWCK BARD -A

## 2022-05-14 PROCEDURE — 74011250637 HC RX REV CODE- 250/637: Performed by: INTERNAL MEDICINE

## 2022-05-14 PROCEDURE — 74011250637 HC RX REV CODE- 250/637: Performed by: FAMILY MEDICINE

## 2022-05-14 PROCEDURE — 99239 HOSP IP/OBS DSCHRG MGMT >30: CPT | Performed by: EMERGENCY MEDICINE

## 2022-05-14 PROCEDURE — 74011250636 HC RX REV CODE- 250/636: Performed by: NURSE PRACTITIONER

## 2022-05-14 PROCEDURE — 74011250637 HC RX REV CODE- 250/637: Performed by: NURSE PRACTITIONER

## 2022-05-14 RX ORDER — ADHESIVE BANDAGE
30 BANDAGE TOPICAL DAILY PRN
Status: DISCONTINUED | OUTPATIENT
Start: 2022-05-14 | End: 2022-05-31 | Stop reason: HOSPADM

## 2022-05-14 RX ORDER — ACETAMINOPHEN 325 MG/1
650 TABLET ORAL
Qty: 10 TABLET | Refills: 0 | Status: ON HOLD
Start: 2022-05-14 | End: 2022-05-16 | Stop reason: CLARIF

## 2022-05-14 RX ORDER — FACIAL-BODY WIPES
10 EACH TOPICAL
Status: DISCONTINUED | OUTPATIENT
Start: 2022-05-14 | End: 2022-05-17

## 2022-05-14 RX ORDER — OXYCODONE AND ACETAMINOPHEN 5; 325 MG/1; MG/1
1 TABLET ORAL
Qty: 5 TABLET | Refills: 0 | Status: ON HOLD
Start: 2022-05-14 | End: 2022-05-16 | Stop reason: CLARIF

## 2022-05-14 RX ORDER — DOCUSATE SODIUM 100 MG/1
100 CAPSULE, LIQUID FILLED ORAL 2 TIMES DAILY
Status: DISCONTINUED | OUTPATIENT
Start: 2022-05-14 | End: 2022-05-17

## 2022-05-14 RX ORDER — IBUPROFEN 200 MG
200 TABLET ORAL DAILY
COMMUNITY
End: 2022-05-31

## 2022-05-14 RX ORDER — FAMOTIDINE 20 MG/1
20 TABLET, FILM COATED ORAL DAILY
Status: DISCONTINUED | OUTPATIENT
Start: 2022-05-15 | End: 2022-05-31 | Stop reason: HOSPADM

## 2022-05-14 RX ORDER — BISACODYL 5 MG
10 TABLET, DELAYED RELEASE (ENTERIC COATED) ORAL
Status: DISCONTINUED | OUTPATIENT
Start: 2022-05-14 | End: 2022-05-31 | Stop reason: HOSPADM

## 2022-05-14 RX ORDER — MELATONIN
1000 DAILY
Qty: 30 TABLET | Refills: 0 | Status: ON HOLD
Start: 2022-05-15 | End: 2022-05-16 | Stop reason: CLARIF

## 2022-05-14 RX ORDER — MELATONIN
1000 DAILY
Status: DISCONTINUED | OUTPATIENT
Start: 2022-05-15 | End: 2022-05-16

## 2022-05-14 RX ORDER — DOCUSATE SODIUM 100 MG/1
100 CAPSULE, LIQUID FILLED ORAL 2 TIMES DAILY
Qty: 60 CAPSULE | Refills: 0 | Status: ON HOLD
Start: 2022-05-14 | End: 2022-05-16 | Stop reason: CLARIF

## 2022-05-14 RX ORDER — OXYCODONE AND ACETAMINOPHEN 5; 325 MG/1; MG/1
1 TABLET ORAL
Status: DISCONTINUED | OUTPATIENT
Start: 2022-05-14 | End: 2022-05-19

## 2022-05-14 RX ORDER — FAMOTIDINE 20 MG/1
20 TABLET, FILM COATED ORAL DAILY
Qty: 30 TABLET | Refills: 0 | Status: ON HOLD
Start: 2022-05-15 | End: 2022-05-16 | Stop reason: CLARIF

## 2022-05-14 RX ORDER — ACETAMINOPHEN 325 MG/1
650 TABLET ORAL
Status: DISCONTINUED | OUTPATIENT
Start: 2022-05-14 | End: 2022-05-19

## 2022-05-14 RX ADMIN — ENOXAPARIN SODIUM 40 MG: 100 INJECTION SUBCUTANEOUS at 09:25

## 2022-05-14 RX ADMIN — FAMOTIDINE 20 MG: 20 TABLET ORAL at 09:25

## 2022-05-14 RX ADMIN — DOCUSATE SODIUM 100 MG: 100 CAPSULE, LIQUID FILLED ORAL at 17:24

## 2022-05-14 RX ADMIN — Medication 1000 UNITS: at 09:25

## 2022-05-14 RX ADMIN — ACETAMINOPHEN 650 MG: 325 TABLET ORAL at 06:17

## 2022-05-14 NOTE — DISCHARGE SUMMARY
[address], [address]     DISCHARGE SUMMARY    Name: Berneta Soulier MRN: [id number]   Age / Sex: 74 y.o. / female CSN: [id number]   YOB: 1955 Length of Stay: 5 days   Admit Date: 5/9/2022 Discharge Date:        PRIMARY CARE PHYSICIAN: Ronald Hammer MD      DISCHARGE DIAGNOSES:    1.  Bilateral lower extremity weakness moving left and right could be due to #2  2.  Multiple sclerosis exacerbation  3.  T1 and T6 enhancement lesion on MRI suggestive of multiple sclerosis with active demyelination  4.  Recent fall  5.  Low vitamin D level  6.    Right distal radius nondisplaced fracture     CONSULTS CALLED: Neurology      PROCEDURES DONE: None      [facility] This is a 62-year-old female who presented to the ED with some lower extremity weakness and numbness. Patient also gave history of a recent fall. Patient was evaluated and was admitted. Neurology was consulted and patient was started on high-dose Solu-Medrol. PT OT were consulted and patient was mobilized. Patient was also seen by orthopedics regarding right distal radius nondisplaced fracture. Orthopedics has recommended conservative management. PT OT were continued. Patient has now completed the course of IV Solu-Medrol. Patient has been accepted to the inpatient rehab and will be transferred there later today. Discharge plans have been discussed with the patient. MEDICATIONS ON DISCHARGE:    Current Discharge Medication List      START taking these medications    Details   acetaminophen (TYLENOL) 325 mg tablet Take 2 Tablets by mouth every six (6) hours as needed for Pain. Qty: 10 Tablet, Refills: 0  Start date: 5/14/2022      cholecalciferol (VITAMIN D3) (1000 Units /25 mcg) tablet Take 1 Tablet by mouth daily. Qty: 30 Tablet, Refills: 0  Start date: 5/15/2022      docusate sodium (COLACE) 100 mg capsule Take 1 Capsule by mouth two (2) times a day.   Qty: 60 Capsule, Refills: 0  Start date: 5/14/2022      famotidine (PEPCID) 20 mg tablet Take 1 Tablet by mouth daily. Qty: 30 Tablet, Refills: 0  Start date: 5/15/2022      oxyCODONE-acetaminophen (PERCOCET) 5-325 mg per tablet Take 1 Tablet by mouth every eight (8) hours as needed for Pain for up to 3 days. Max Daily Amount: 3 Tablets. Qty: 5 Tablet, Refills: 0  Start date: 5/14/2022, End date: 5/17/2022    Associated Diagnoses: Arthritis         STOP taking these medications       OTHER Comments:   Reason for Stopping:                 DISCHARGE VITAL SIGNS:  Visit Vitals  /79   Pulse 62   Temp 97.7 °F (36.5 °C)   Resp 21   Ht 4' 11\" (1.499 m)   Wt 59 kg (130 lb)   SpO2 96%   Breastfeeding No   BMI 26.26 kg/m²           CONDITION ON DISCHARGE: Stable. DISPOSITION: Inpatient rehab      FOLLOW-UP RECOMMENDATIONS:   Follow-up Information     Follow up With Specialties Details Why Contact Info    Coleen White MD Internal Medicine Physician   [address] [facility] [phone]      Evon Taylor MD Family Medicine   [address] [address] [phone]            OTHER INSTRUCTIONS:  Diet- REGULAR, NO MEAT OR EGGS  Activity - as tolerated  FALL PRECAUTIONS  ASPIRATION PRECAUTIONS  DECUBITUS PRECAUTIONS  SCDs b/l LE- while in bed  Incentive Spirometry Q30 minutes when awake  PT, OT Evaluate and Treat  Check CBC, CMP, Mg in 2 days  F/u with PCP in 1 week  F/u with Neurologist in 10 days        TIME SPENT ON DISCHARGE ACTIVITIES: More than 35 minutes. Dragon medical dictation software was used for portions of this report. Unintended errors may occur.       Signed:  Laron White MD      5/14/2022

## 2022-05-14 NOTE — PROGRESS NOTES
Problem: Falls - Risk of  Goal: *Absence of Falls  Description: Document Elpidio Vance Fall Risk and appropriate interventions in the flowsheet. Outcome: Progressing Towards Goal  Note: Fall Risk Interventions:  Mobility Interventions: Bed/chair exit alarm,Communicate number of staff needed for ambulation/transfer,OT consult for ADLs,Patient to call before getting OOB,PT Consult for mobility concerns,PT Consult for assist device competence,Strengthening exercises (ROM-active/passive),Utilize walker, cane, or other assistive device,Utilize gait belt for transfers/ambulation         Medication Interventions: Bed/chair exit alarm,Patient to call before getting OOB,Teach patient to arise slowly,Utilize gait belt for transfers/ambulation    Elimination Interventions: Bed/chair exit alarm,Call light in reach,Patient to call for help with toileting needs,Toileting schedule/hourly rounds    History of Falls Interventions: Bed/chair exit alarm,Door open when patient unattended,Room close to nurse's station,Utilize gait belt for transfer/ambulation         Problem: Pressure Injury - Risk of  Goal: *Prevention of pressure injury  Description: Document Wagner Scale and appropriate interventions in the flowsheet. Note: Pressure Injury Interventions:       Moisture Interventions: Absorbent underpads,Assess need for specialty bed,Check for incontinence Q2 hours and as needed,Maintain skin hydration (lotion/cream),Minimize layers    Activity Interventions: Assess need for specialty bed,Chair cushion,Increase time out of bed,Pressure redistribution bed/mattress(bed type),PT/OT evaluation    Mobility Interventions: Assess need for specialty bed,Chair cushion,HOB 30 degrees or less,Pressure redistribution bed/mattress (bed type),Turn and reposition approx.  every two hours(pillow and wedges)    Nutrition Interventions: Document food/fluid/supplement intake

## 2022-05-14 NOTE — REHAB NOTE
SHIFT CHANGE NOTE FOR [facility]    Bedside and Verbal shift change report given to RICHY Morel (oncoming nurse) by Nikita Ramirez RN (offgoing nurse). Report included the following information SBAR, Kardex, MAR and Recent Results.     Situation:   Code Status: Full Code   Hospital Day: 0   Problem List:   Hospital Problems  Date Reviewed: 9/2/2014          Codes Class Noted POA    Multiple sclerosis ([facility] ICD-10-CM: Everett Milton  ICD-9-CM: 807  5/9/2022 Unknown              Background:   Past Medical History:   Past Medical History:   Diagnosis Date    MS (multiple sclerosis) ([facility]         Assessment:   Changes in Assessment throughout shift: No change to previous assessment     Patient has a central line: no Reasons if yes: n/a  Insertion date: n/a Last dressing date: n/a   Patient has Knight Cath: no Reasons if yes: n/a   Insertion date: n/a  Shift knight care completed: NO, n/a     Last Vitals:     Vitals:    05/14/22 1414 05/14/22 1423   BP: 123/71    Pulse: 76    Resp: 18    Temp: 98 °F (36.7 °C)    SpO2: 94%    Height:  4' 11\" (1.499 m)        PAIN    Pain Assessment    Pain Intensity 1: 0 (05/14/22 1610) Pain Intensity 1: 2 (12/29/14 1105)      Pain Location 1: Abdomen      Pain Intervention(s) 1: Medication (see MAR)  Patient Stated Pain Goal: 0 Patient Stated Pain Goal: 0  o Intervention effective: yes  o Other actions taken for pain:       Skin Assessment  Skin color    Condition/Temperature    Integrity Skin Integrity: Other (comment) (bruising to R hip, R arm and L foot)  Turgor    Weekly Pressure Ulcer Documentation  Pressure  Injury Documentation: No Pressure Injury Noted-Pressure Ulcer Prevention Initiated  Wound Prevention & Protection Methods  Orientation of wound Orientation of Wound Prevention: Posterior  Location of Prevention Location of Wound Prevention: Buttocks,Sacrum/Coccyx  Dressing Present Dressing Present : No  Dressing Status    Wound Offloading Wound Offloading (Prevention Methods): Bed, pressure redistribution/air,Bed, pressure reduction mattress,Chair cushion,Wheelchair     INTAKE/OUPUT  Date 05/13/22 0700 - 05/14/22 0659(Not Admitted) 05/14/22 0700 - 05/15/22 0659   Shift 1015-7095 0667-1341 24 Hour Total 1618-1563 0812-4136 24 Hour Total   INTAKE   Shift Total         OUTPUT   Urine           Urine Occurrence(s)    1 x  1 x   Stool           Stool Occurrence(s)    1 x  1 x   Shift Total         NET         Weight (kg)             Recommendations:  1. Patient needs and requests: Toileting, repositioning    2. Pending tests/procedures: AM labs, 5/15     3. Functional Level/Equipment: Partial (two people) / Bed (comment); Wheelchair    Fall Precautions:   Fall risk precautions were reinforced with the patient; she was instructed to call for help prior to getting up. The following fall risk precautions were continued: bed/ chair alarms, door signage, yellow bracelet and socks as well as update of the Kizzy Burgos tool in the patient's room. Rupesh Score: 4    HEALS Safety Check    A safety check occurred in the patient's room between off going nurse and oncoming nurse listed above. The safety check included the below items  Area Items   H  High Alert Medications - Verify all high alert medication drips (heparin, PCA, etc.)   E  Equipment - Suction is set up for ALL patients (with yanker)  - Red plugs utilized for all equipment (IV pumps, etc.)  - WOWs wiped down at end of shift.  - Room stocked with oxygen, suction, and other unit-specific supplies   A  Alarms - Bed alarm is set for fall risk patients  - Ensure chair alarm is in place and activated if patient is up in a chair   L  Lines - Check IV for any infiltration  - Torres bag is empty if patient has a Torres   - Tubing and IV bags are labeled   S  Safety   - Room is clean, patient is clean, and equipment is clean. - Hallways are clear from equipment besides carts.    - Fall bracelet on for fall risk patients  - Ensure room is clear and free of clutter  - Suction is set up for ALL patients (with cyn)  - Hallways are clear from equipment besides carts.    - Isolation precautions followed, supplies available outside room, sign posted     Eduar Serrano RN

## 2022-05-14 NOTE — ROUTINE PROCESS
Carmella Lauren is a 74 y.o.  female admitted on 5/14/2022 from . Report received from Caprice Wilson RN. Transportation was provided by hospital transporter, and the patient was transferred to her room via [facility]. Patient was assisted to bed with assist of 2. The patient was oriented to her room. Fall risk precautions were discussed with the patient; she was instructed to call for help prior to getting up. The following fall risk precautions were initiated: bed/ chair alarms, door signage, yellow bracelet and socks as well as completion of the Lavaughn Harp tool in the patient's room. Four eyes nurse skin assessment was performed by Silas Simmons and Efrain Andre RN.  Skin problems noted: SOURAV Ugarte RN

## 2022-05-14 NOTE — PROGRESS NOTES
[facility] accepted pt today to room 181 at 1pm.  Informed Nurse Citlaly Puente.               MARISSA RodriguezN RN  Care Management  Pager: [phone]

## 2022-05-15 LAB
ALBUMIN SERPL-MCNC: 3 G/DL (ref 3.4–5)
ALBUMIN/GLOB SERPL: 1 {RATIO} (ref 0.8–1.7)
ALP SERPL-CCNC: 45 U/L (ref 45–117)
ALT SERPL-CCNC: 20 U/L (ref 13–56)
ANION GAP SERPL CALC-SCNC: 4 MMOL/L (ref 3–18)
AST SERPL-CCNC: 11 U/L (ref 10–38)
BASOPHILS # BLD: 0 K/UL (ref 0–0.1)
BASOPHILS NFR BLD: 0 % (ref 0–2)
BILIRUB SERPL-MCNC: 0.4 MG/DL (ref 0.2–1)
BUN SERPL-MCNC: 18 MG/DL (ref 7–18)
BUN/CREAT SERPL: 28 (ref 12–20)
CALCIUM SERPL-MCNC: 8.9 MG/DL (ref 8.5–10.1)
CHLORIDE SERPL-SCNC: 107 MMOL/L (ref 100–111)
CO2 SERPL-SCNC: 30 MMOL/L (ref 21–32)
CREAT SERPL-MCNC: 0.64 MG/DL (ref 0.6–1.3)
DIFFERENTIAL METHOD BLD: ABNORMAL
EOSINOPHIL # BLD: 0 K/UL (ref 0–0.4)
EOSINOPHIL NFR BLD: 0 % (ref 0–5)
ERYTHROCYTE [DISTWIDTH] IN BLOOD BY AUTOMATED COUNT: 12.2 % (ref 11.6–14.5)
GLOBULIN SER CALC-MCNC: 2.9 G/DL (ref 2–4)
GLUCOSE SERPL-MCNC: 81 MG/DL (ref 74–99)
HCT VFR BLD AUTO: 38 % (ref 35–45)
HGB BLD-MCNC: 13 G/DL (ref 12–16)
IMM GRANULOCYTES # BLD AUTO: 0.1 K/UL (ref 0–0.04)
IMM GRANULOCYTES NFR BLD AUTO: 1 % (ref 0–0.5)
LYMPHOCYTES # BLD: 0.8 K/UL (ref 0.9–3.6)
LYMPHOCYTES NFR BLD: 18 % (ref 21–52)
MAGNESIUM SERPL-MCNC: 2.6 MG/DL (ref 1.6–2.6)
MCH RBC QN AUTO: 30.7 PG (ref 24–34)
MCHC RBC AUTO-ENTMCNC: 34.2 G/DL (ref 31–37)
MCV RBC AUTO: 89.6 FL (ref 78–100)
MONOCYTES # BLD: 0.5 K/UL (ref 0.05–1.2)
MONOCYTES NFR BLD: 11 % (ref 3–10)
NEUTS SEG # BLD: 3 K/UL (ref 1.8–8)
NEUTS SEG NFR BLD: 71 % (ref 40–73)
NRBC # BLD: 0 K/UL (ref 0–0.01)
NRBC BLD-RTO: 0 PER 100 WBC
PLATELET # BLD AUTO: 145 K/UL (ref 135–420)
PMV BLD AUTO: 10.1 FL (ref 9.2–11.8)
POTASSIUM SERPL-SCNC: 3.8 MMOL/L (ref 3.5–5.5)
PROT SERPL-MCNC: 5.9 G/DL (ref 6.4–8.2)
RBC # BLD AUTO: 4.24 M/UL (ref 4.2–5.3)
SODIUM SERPL-SCNC: 141 MMOL/L (ref 136–145)
TSH SERPL DL<=0.05 MIU/L-ACNC: 4 UIU/ML (ref 0.36–3.74)
WBC # BLD AUTO: 4.3 K/UL (ref 4.6–13.2)

## 2022-05-15 PROCEDURE — 36415 COLL VENOUS BLD VENIPUNCTURE: CPT

## 2022-05-15 PROCEDURE — 80053 COMPREHEN METABOLIC PANEL: CPT

## 2022-05-15 PROCEDURE — 83735 ASSAY OF MAGNESIUM: CPT

## 2022-05-15 PROCEDURE — 97530 THERAPEUTIC ACTIVITIES: CPT

## 2022-05-15 PROCEDURE — 97166 OT EVAL MOD COMPLEX 45 MIN: CPT

## 2022-05-15 PROCEDURE — 85025 COMPLETE CBC W/AUTO DIFF WBC: CPT

## 2022-05-15 PROCEDURE — 74011250637 HC RX REV CODE- 250/637: Performed by: FAMILY MEDICINE

## 2022-05-15 PROCEDURE — 97163 PT EVAL HIGH COMPLEX 45 MIN: CPT

## 2022-05-15 PROCEDURE — 97535 SELF CARE MNGMENT TRAINING: CPT

## 2022-05-15 PROCEDURE — 84443 ASSAY THYROID STIM HORMONE: CPT

## 2022-05-15 PROCEDURE — 97110 THERAPEUTIC EXERCISES: CPT

## 2022-05-15 PROCEDURE — 65310000000 HC RM PRIVATE REHAB

## 2022-05-15 RX ADMIN — CHOLECALCIFEROL TAB 25 MCG (1000 UNIT) 1000 UNITS: 25 TAB at 08:06

## 2022-05-15 RX ADMIN — ACETAMINOPHEN 650 MG: 325 TABLET ORAL at 17:09

## 2022-05-15 RX ADMIN — OXYCODONE HYDROCHLORIDE AND ACETAMINOPHEN 1 TABLET: 5; 325 TABLET ORAL at 10:18

## 2022-05-15 RX ADMIN — FAMOTIDINE 20 MG: 20 TABLET ORAL at 08:06

## 2022-05-15 NOTE — PROGRESS NOTES
Patient states she received Moderna Covid vaccination, dose 1 and 2 in April 2021 and booster dose in December of 2021.

## 2022-05-15 NOTE — PROGRESS NOTES
OCCUPATIONAL THERAPY EVALUATION  Occupational Therapy Goals   Long Term Goals  Initiated 5/15 and to be accomplished within 2 week(s)  1. Pt will perform self-feeding with I.  2. Pt will perform grooming with I.  3. Pt will perform UB bathing with Mod I.  4. Pt will perform LB bathing with Mod I.  5. Pt will perform tub/shower transfer with Mod I.   6. Pt will perform UB dressing with I.  7. Pt will perform LB dressing with Mod I.  8. Pt will perform toileting task with Mod I.  9. Pt will perform toilet transfer with Mod I.  10. Pt. Will increase BUE strength to 5/5 in order to increase safety at home during ADLs. Short Term Goals   Initiated 5/15 and to be accomplished within 7 day(s)  1. Pt will perform self-feeding with Mod I.   2. Pt will perform grooming with Mod I.  3. Pt will perform UB bathing with Supervision . 4. Pt will perform LB bathing with Supervision. 5. Pt will perform tub/shower transfer with 4.  6. Pt will perform UB dressing with Mod I.  7. Pt will perform LB dressing with Min A.  8. Pt will perform toileting task with Mod A.  9. Pt will perform toilet transfer with Min A.   10. Pt. Will demonstrate functional mobility with CGA with RW in order to increase I for ADLs, by setting up prior to activities. Patient: Asia Loo 74 y.o. Date: 5/15/2022  Primary Diagnosis: Multiple sclerosis ([facility] [G35]    Patient identified with name and : yes    Past Medical History:   Past Medical History:   Diagnosis Date    MS (multiple sclerosis) ([facility]      Precautions: fall risk, MS, right forearm fx    Time In: 1:29  Time Out[de-identified] 2:12    Pain:  Pt reports 0/10 pain or discomfort prior to treatment. Pt reports 9/10 pain or discomfort post treatment, right leg. SUBJECTIVE:   Patient stated my leg is hurting very much when I put weight on it.  I see the neurologist tomorrow and I am hoping he can give me some insight to why my leg is so painful    OBJECTIVE DATA SUMMARY:   Pt. Is a right hand dominant 75 yo female who was admitted due to a fall at her house, secondary to a MS exacerbation. She claims that she rolled her ankle and fell on the floor. Patient was previously Mod I in all ADLs/IADLs, and used a cane at home. She lives with her , however her son moved in recently in order to assist with home duties for support. Pt. States she has a proximal forearm fracture, however said there was nothing that could be done for it, however is under non weight bearing instructions from MD. She also stated that she does use her right arm with weight bearing activities even though she knows she is not supposed to. Pt. Presents with decreased UB strength, decreased transfers, and decreased ADLs due to pain in right leg. Pt. Would benefit from skilled OT services in order to increase strength and regain skills required for ADL I.      [x]  Right hand dominant   []  Left hand dominant    Therapy Diagnosis:   Difficulty with ADLs  [x]     Difficulty with functional transfers  [x]     Difficulty with ambulation  [x]     Difficulty with IADLs  [x]       Problem List:    Decreased strength B UE  [x]     Decreased strength trunk/core  []     Decreased AROM   []     Decreased endurance  [x]     Decreased balance sitting  []     Decreased balance standing  [x]     Pain   [x]     Decreased PROM  []       Functional Limitations:   Decreased independence with ADL  [x]     Decreased independence with functional transfers  [x]     Decreased independence with ambulation  [x]     Decreased independence with IADL  [x]       Previous Functional Level: I in all ADLs/IADLs    Home Environment: Home Situation  Home Environment: Private residence  # Steps to Enter: 3  One/Two Story Residence: One story  Living Alone: No  Support Systems: Spouse/Significant Other,Child(demetria)  Patient Expects to be Discharged to[de-identified] Home  Current DME Used/Available at Home: Cane, quad    Barriers to Learning/Limitations: None  Compensate with: visual, verbal, tactile, kinesthetic cues/model    Outcome Measures:      MMT Initial Assessment   Right Upper Extremity  Left Upper Extremity    UE AROM WNL WNL   Shoulder flexion 4 4   Shoulder extension 4 4   Shoulder ABDuction 4 4   Shoulder ADDUction 4 4   Elbow Flexion 4 4   Elbow Extension 4 4   Wrist Extension/Flexion 4 4    4 4   0/5 No palpable muscle contraction  1/5 Palpable muscle contraction, no joint movement  2-/5 Less than full range of motion in gravity eliminated position  2/5 Able to complete full range of motion in gravity eliminated position  2+/5 Able to initiate movement against gravity  3-/5 More than half but not full range of motion against gravity  3/5 Able to complete full range of motion against gravity  3+/5 Completes full range of motion against gravity with minimal resistance  4-/5 Completes full range of motion against gravity with minimal resistance  4/5 Completes full range of motion against gravity with moderate resistance  5/5 Completes full range of motion against gravity with maximum resistance    Sensation: No limitations, other than pain in right leg  Coordination: WNL    COGNITION/PERCEPTION Initial Assessment   Orientation Level Oriented X4       EATING Initial Assessment   Functional Level 5   Comments right forearm fx     GROOMING Initial Assessment   Functional Level 5    Oral Hygiene FIM:7   Tasks completed by patient Brushed hair,Brushed teeth,Washed face   Comments       BATHING Initial Assessment   Functional Level 4   Body parts patient bathed     Comments Benefit from long handled sponge     TUB/SHOWER TRANSFER INDEPENDENCE Initial Assessment   Score 0   Comments  (Pt. with right leg pain limiting activity OOB)     UPPER BODY DRESSING/UNDRESSING Initial Assessment   Functional Level 5   Items applied/Steps completed     Comments         LOWER BODY DRESSING/UNDRESSING Initial Assessment   Functional Level 2    Sock and/or Shoe Management FIM: Mod I with severe pain during sock donning   Items applied/Steps completed  Pt. Was able to don socks with mod I using ankle over knee method, however experienced severe pain. Therapist stopped activity due to pain. Comments       TOILETING Initial Assessment   Functional Level 2   Comments  Patient was unable to toilet this date due to pain and inability to t/f.     TOILET TRANSFER INDEPENDENCE Initial Assessment   Transfer score 1   Comments  Pt. Unable to t/f this date. INSTRUMENTAL ADL Initial Assessment (PLOF)   Meal preparation     Homemaking 1    Medicine Management     Financial Management          ASSESSMENT :  Pt. Is a right hand dominant 75 yo female who was admitted due to a fall at her house, secondary to a MS exacerbation. She claims that she rolled her ankle and fell on the floor. Patient was previously Mod I in all ADLs/IADLs, and used a cane at home. She lives with her , however her son moved in recently in order to assist with home duties for support. Pt. States she has a proximal forearm fracture, however said there was nothing that could be done for it, however is under non weight bearing instructions from MD. She also stated that she does use her right arm with weight bearing activities even though she knows she is not supposed to. Pt. Presents with decreased UB strength, decreased transfers, and decreased ADLs due to pain in right leg. Pt. Would benefit from skilled OT services in order to increase strength and regain skills required for ADL I. Pt would benefit from skilled occupational therapy in order to improve independent functional mobility/ADLs,/IADLs within the home. Interventions may include range of motion (AROM, PROM B UE), motor function (B UE/ strengthening/coordination), activity tolerance (vitals, oxygen saturation levels), balance training, ADL/IADL training and functional transfer training. Please see IRC;  Interdisciplinary Eval, Care Plan, and Patient Education for further information regarding occupational therapy examination and plan of care. PLAN :  Recommendations and Planned Interventions:  [x]               Self Care Training                   [x]        Therapeutic Activities  [x]               Functional Mobility Training    []        Cognitive Retraining  [x]               Therapeutic Exercises            [x]        Endurance Activities  []               Balance Training                     []        Neuromuscular Re-Education  []               Visual/Perceptual Training      [x]   Home Safety Training  [x]               Patient Education                    [x]        Family Training/Education  []               Other (comment):    Frequency/Duration: Patient will be followed by occupational therapy daily to address goals. Discharge Recommendations: To Be Determined  Further Equipment Recommendations for Discharge: To be determined     Please refer to the flow sheet for vital signs taken during this treatment. After treatment:   [] Patient left in no apparent distress sitting up in chair  [x] Patient left in no apparent distress in bed  [x] Call bell left within reach  [] Nursing notified  [] Caregiver present  [] Bed alarm activated    COMMUNICATION/EDUCATION:   [] Home safety education was provided and the patient/caregiver indicated understanding. [x] Patient/family have participated as able in goal setting and plan of care. [x] Patient/family agree to work toward stated goals and plan of care. [] Patient understands intent and goals of therapy, but is neutral about his/her participation. [] Patient is unable to participate in goal setting and plan of care. Order received from MD for occupational therapy services and chart reviewed. Pt to be seen 5 times per week for 3 hours of total therapy per day for 2 weeks.   Thank you for the referral.    Thank you for this referral.  Ines Shone, OT

## 2022-05-15 NOTE — PROGRESS NOTES
Problem: Falls - Risk of  Goal: *Absence of Falls  Description: Document Néstor Liraann Fall Risk and appropriate interventions in the flowsheet. Outcome: Progressing Towards Goal  Note: Fall Risk Interventions:  Mobility Interventions: Bed/chair exit alarm,Communicate number of staff needed for ambulation/transfer,Patient to call before getting OOB,Utilize walker, cane, or other assistive device,Utilize gait belt for transfers/ambulation         Medication Interventions: Bed/chair exit alarm,Patient to call before getting OOB,Teach patient to arise slowly,Utilize gait belt for transfers/ambulation    Elimination Interventions: Bed/chair exit alarm,Call light in reach,Patient to call for help with toileting needs,Toileting schedule/hourly rounds    History of Falls Interventions: Bed/chair exit alarm,Door open when patient unattended,Room close to nurse's station,Utilize gait belt for transfer/ambulation         Problem: Pressure Injury - Risk of  Goal: *Prevention of pressure injury  Description: Document Wagner Scale and appropriate interventions in the flowsheet. Outcome: Progressing Towards Goal  Note: Pressure Injury Interventions:       Moisture Interventions: Absorbent underpads,Assess need for specialty bed,Check for incontinence Q2 hours and as needed,Internal/External urinary devices,Maintain skin hydration (lotion/cream),Minimize layers    Activity Interventions: Chair cushion,Increase time out of bed,Pressure redistribution bed/mattress(bed type),Assess need for specialty bed    Mobility Interventions: Assess need for specialty bed,Chair cushion,HOB 30 degrees or less,Pressure redistribution bed/mattress (bed type),Turn and reposition approx.  every two hours(pillow and wedges)    Nutrition Interventions: Document food/fluid/supplement intake                     Problem: Pain  Goal: *Control of Pain  Outcome: Progressing Towards Goal

## 2022-05-15 NOTE — H&P
History & Physical    Patient: Tee Slade MRN: [id number]  CSN: [id number]    YOB: 1955  Age: 74 y.o. Sex: female      DOA: 5/14/2022      Primary Rehab Impairment Category (QASIM): Neurological    Impairment Group Label: Multiple Sclerosis    Etiologic Diagnosis: Multiple Sclerosis           HPI:     Tee Slade is a 74 y.o.  female who has history of multiple sclerosis presented to the ER with difficulty walking and weakness in bilateral legs for 3 days. Patient had fallen 3 days before admission was pain left arm. Patient also had pain to the right leg and foot drop bilateral leg    Patient has been following up with neurology Dr. Junie Li but not seen lately. Patient was admitted to the hospital had neurology consult with Dr. Breanna Sanderson started with IV Solu-Medrol    Patient has been complaining of pain in the right elbow and right hip and thigh after she fell down before her admission    X-ray of the right elbow nondisplaced radial head fracture with some comminution. Patient had orthopedic consult due to the injury has been more than 1 week patient had active range of motion neurosurgery recommended patient was advised to avoid lifting pushing or pulling with her arm nothing heavier than a glass of water and follow-up with x-ray in 1 to 2 weeks and follow-up orthopedic    Patient had telemetry neurology consult was Dr. Delfina Flores finish course of a steroid 1 g IV for 5 days. Patient was told to hold on Tecfidera until further instruction. Past Medical History:   Diagnosis Date    MS (multiple sclerosis) ([address]        Past Surgical History:   Procedure Laterality Date    ID BIOPSY OF BREAST, INCISIONAL  RT BENIGN    OVER 10 YRS. AGO PER PT.        family history on file.   Denied any significant medical problems runs in her family but she think her great-grandfather grandfather might have multiple sclerosis    Social History     Socioeconomic History    Marital status:  Tobacco Use    Smoking status: Former Smoker     Types: Cigarettes     Quit date: 1980     Years since quittin.9    Smokeless tobacco: Never Used   Substance and Sexual Activity    Alcohol use: Not Currently    Drug use: No    Sexual activity: Never     Partners: Male       Prior to Admission medications    Medication Sig Start Date End Date Taking? Authorizing Provider   acetaminophen (TYLENOL) 325 mg tablet Take 2 Tablets by mouth every six (6) hours as needed for Pain. 22  Yes Anne Mann MD   cholecalciferol (VITAMIN D3) (1000 Units /25 mcg) tablet Take 1 Tablet by mouth daily. 5/15/22  Yes Anne Mann MD   docusate sodium (COLACE) 100 mg capsule Take 1 Capsule by mouth two (2) times a day. 22  Yes Anne Mann MD   famotidine (PEPCID) 20 mg tablet Take 1 Tablet by mouth daily. 5/15/22  Yes Anne Mann MD   oxyCODONE-acetaminophen (PERCOCET) 5-325 mg per tablet Take 1 Tablet by mouth every eight (8) hours as needed for Pain for up to 3 days. Max Daily Amount: 3 Tablets. 22 Yes Anne Mann MD   ibuprofen (AdviL) 200 mg tablet Take 200 mg by mouth daily. Takes with tecfidera. Indications: abdominal pain and skin flushing associated with taking tecfidera   Yes Provider, Historical       Allergies   Allergen Reactions    Amoxicillin Hives and Rash    Clindamycin Hives and Rash    Metronidazole Hives    Tetracycline Hives and Rash       Review of Systems  GENERAL: Patient alert, awake and oriented times 3, able to communicate full sentences and not in distress. HEENT: No change in vision, no earache, tinnitus, sore throat or sinus congestion. NECK: No pain or stiffness. CARDIOVASCULAR: No chest pain or pressure. No palpitations. PULMONARY: No shortness of breath, cough or wheeze. GASTROINTESTINAL: No abdominal pain, nausea, vomiting or diarrhea, melena or       bright red blood per rectum.    GENITOURINARY: No urinary frequency, urgency, hesitancy or   MUSCULOSKELETAL: Rt elbow and Rt hip pain  DERMATOLOGIC: Rt lateral thigh bruises   ENDOCRINE: No polyuria, polydipsia, no heat or cold intolerance. No recent change in    weight. HEMATOLOGICAL: No anemia or easy bruising or bleeding. NEUROLOGIC: No headache, seizures, tingling and numbness leg improved   PSYCHIATRIC: No depression, anxiety, mood disorder, no loss of interest in normal       activity or change in sleep pattern. Physical Exam:     Physical Exam:  Visit Vitals  BP (!) 148/74 (BP 1 Location: Right upper arm, BP Patient Position: Supine)   Pulse 62   Temp 97 °F (36.1 °C)   Resp 18   Ht 4' 11\" (1.499 m)   SpO2 95%   Breastfeeding No   BMI 26.26 kg/m²           Temp (24hrs), Av °F (36.1 °C), Min:97 °F (36.1 °C), Max:97 °F (36.1 °C)    05/15 07 - 05/15 190  In: 120 [P.O.:120]  Out: -    1901 - 05/15 0700  In: -   Out: 700 [Urine:700]    General:  Alert, cooperative, no distress, appears stated age. Head:  Normocephalic, without obvious abnormality, atraumatic. Eyes:  Conjunctivae/corneas clear. PERRL, EOMs intact. Nose: Nares normal. No drainage or sinus tenderness. Throat: Lips, mucosa, and tongue normal. Teeth and gums normal.   Neck: Supple, symmetrical, trachea midline, no adenopathy, thyroid: no enlargement/tenderness/nodules, no carotid bruit and no JVD. Back:   ROM normal. No CVA tenderness. Lungs:   Clear to auscultation bilaterally. Chest wall:  No tenderness or deformity. Heart:  Regular rate and rhythm, S1, S2 normal, no murmur, click, rub or gallop. Abdomen: Soft, non-tender. Bowel sounds normal. No masses,  No organomegaly. Extremities: Large bruises Rt thigh and Rt elbow no cyanosis or edema. Pulses: 2+ and symmetric all extremities. Skin: Skin color, texture, turgor normal.Bruises Rt lateral leg   Neurologic: CNII-XII intact. Decrease Rt hand  3+ lower extremities B/L       Labs Reviewed:     All lab results for the last 24 hours reviewed. MRI, x-ray Rt elbow     Procedures/imaging: see electronic medical records for all procedures/Xrays and details which were not copied into this note but were reviewed prior to creation of Plan      Functional Assessment:     Occupational Therapy   Prior Level of Function  Pre-Admission Screen  Post-Admission Evaluation   Eating   Independent Eating   Supervision Eating      Grooming   Independent Grooming   Supervision Grooming      Upper Body Dressing   Independent Upper Body Dressing   Supervision Upper Body Dressing      Lower Body Dressing   Independent Lower Body Dressing   Moderate Assist Lower Body Dressing      Bladder Management   Independent Bladder Management   Modified Independent Toileting      Bowel Management   Independent Bowel Management   Modified Independent      Physical Therapy   Prior Level of Function  Pre-Admission Screen  Post-Admission Evaluation   Ambulation   Modified Independent Ambulation   Modified Independent  Maximal assistant   Bed Mobility   Independent Bed Mobility   Independent    Supine to Sit   Independent Supine to Sit   Contact Guard Assist    Sit to Stand   Independent Sit to Stand   Moderate Assist    Bed/Chair Transfers   Independent Bed/Chair Transfers   Contact Guard Assist     Toilet Transfers   Modified Independent Toilet Transfers   Moderate Assist Toilet Transfers        Speech and Language Pathology  Post-Admission Evaluation                                     Legend:   7 - Independent   6 - Modified Independent   5 - Standby Assistance / Supervision / Set-up   4 - Minimum Assistance / Contact Guard Assistance   3 - Moderate Assistance   2 - Maximum Assistance   1 - Total Assistance / Dependent           Assessment/Plan     Primary Rehabilitation Diagnosis  1.  Impaired Mobility and ADLs  2. Multiple sclerosis Excacerbation  3 nondisplaced radial head fracture with some comminution    Comorbidities  Patient Active Problem List   Diagnosis Code    [facility] sclerosis ([facility] G35    Weakness R53.1       Willingness to participate in the program: Good      Rehabilitation Potential: Good      Plan:     1. Medical Issues being followed closely:    [x]  Fall and safety precautions     []  Wound Care     [x]  Bowel and Bladder Function     [x]  Fluid Electrolyte and Nutrition Balance     []  Pain Control      2. Issues that 24 hour rehabilitation nursing is following:    [x]  Fall and safety precautions     []  Wound Care     [x]  Bowel and Bladder Function     [x]  Fluid Electrolyte and Nutrition Balance     []  Pain Control      [x]  Assistance with and education on in-room safety with transfers to and from the bed, wheelchair, toilet and shower. 3. Acute rehabilitation plan of care:    [x]  Patient to be evaluated and treated by:           [x]  Physical Therapy           [x]  Occupational Therapy           []  Speech Therapy     []  Hold Rehab until further notice     5. Medications:    [x]  MAR Reviewed     [x]  Continue Present Medications     6. DVT Prophylaxis:      []  Lovenox     []  Unfractionated Heparin     []  Coumadin     []  NOAC     []  CONNIE Stockings     []  Sequential Compression Device     []  None     7. Rehabilitation program and expectations from patient, as well as medical issues discussed with the patient. REHABILITATION PLAN:    1. The patient is being admitted to a comprehensive acute inpatient rehabilitation program consisting of at least 180 minutes a day, 5 out of 7 days a week of  combined physical and occupational therapy, and close supervision by a physician with special training and experience in rehabilitation medicine. 2. The patient's prognosis for significant practical improvement within a reasonable period of time appears to be good. 3. The estimated length of stay is 15 days. 4. The patient/family has a good understanding of our discharge process.  The patient has potential to make improvement and is in need of at least two of the following multidisciplinary therapies including but not limited to physical, occupational, speech, nutritional services, wound care, prosthetics and orthotics. The patient is expected to be able to return to home with home health therapy and family support. 5. Given the patient's multiple co-morbidities and risk for further medical complications, rehabilitation services could not be safely provided at a lower level of care such as a skilled nursing facility. 6. Physical therapy for therapeutic exercise, progressive mobility, gait training, transfer training, bed mobility training, patient and family education, and wheelchair mobility training. Physical therapy goals to address - extremity function, range of motion, balance, safety awareness, independence in transfers, activity tolerance, independence in bed mobility, and independence in ambulation. 7. Occupational therapy for self-care home management, transfer training, therapeutic exercise, activity, wheelchair mobility training. Occupational therapy goals to address - extremity function, cognition, balance, activity tolerance, independence in functional transfers, range of motion, safety awareness, independence in ADL  and independence in home management skills. 8. Specialized 24 hour rehabilitation nursing care for bowel and bladder retraining, disease management, pain management, pressure ulcer prevention and management per policy, education on pressure relief techniques, embolism prevention, nutrition management, hydration management, transfer training and   medication distribution. 9. Nutrition and Dietary services will be obtained for assessment of adequate calorie needs, hydration and calorie counts as appropriate. 10. Therapeutic recreation for leisure skills. 6. Rehab psychology for coping skills. 12. Social work services for patient and family counseling and safe discharge planning.    15. Dr Mihir Sánchez will be in charge of the inpatient rehab program. Full details of the inpatient rehab program will be outlined in the initial team conference. REHABILITATION GOALS:  Improve functional and activities of daily living skills in order to return back to independent living with family support. MEDICAL PLAN:  Multiple sclerosis exacerbation  Patient finished course of IV steroid for 5 days  Tecfidera on hold  Follow-up neurology for further recommendation for maintenance med  PT and OT evaluation and treatment  Fall precaution    Nondisplaced right radial head fracture minimally comminuted  Repeat x-ray 1 to 2 weeks  Follow-up Ortho  No lifting more than a glass of water    Constipation  Colace 100 mg twice daily    Vitamin D deficiency  Check CBC CMP mag  Vitamin D level  Continue vitamin D3 1000 mg once daily    DVT/GI Prophylaxis: SCD's and H2B/PPI  Part of this note was dictated use Dragon medical software. Please excuse errors that have escaped final proofreading.     Time for admission more than 65 minutes included review previous record,hospital record ,test result previous discharge neelam , ,discussion with patient and exam. Discussion with nursing staff and documentation    Mana Daniel MD  5/15/2022 2:57 PM

## 2022-05-15 NOTE — PROGRESS NOTES
Problem: Mobility Impaired (Adult and Pediatric)  Goal: *Acute Goals and Plan of Care (Insert Text)  Description: Physical Therapy Short Term Goals  Initiated 5/15/2022 and to be accomplished within 7 day(s) (5/22/2022)  1. Patient will move from supine to sit and sit to supine , scoot up and down, and roll side to side in bed with minimal assistance/contact guard assist.    2.  Patient will transfer from bed to chair and chair to bed with moderate assistance  using the least restrictive device. 3.  Patient will perform sit to stand with moderate assistance . 4. Patient will ambulate with moderate assistance  for 10 feet with the least restrictive device. 5.  Patient will propel w/c over level surfaces for 150 feet with minimal assistance. Physical Therapy Long Term Goals  Initiated 5/15/2022 and to be accomplished within 3-4 weeks (6/12/2022)  1. Patient will move from supine to sit and sit to supine , scoot up and down, and roll side to side in bed with modified independence. 2.  Patient will transfer from bed to chair and chair to bed with supervision/set-up using the least restrictive device. 3.  Patient will perform sit to stand with supervision/set-up. 4.  Patient will ambulate with supervision/set-up for 50 feet with the least restrictive device. 5.  Patient will ascend/descend 3 stairs with 1 handrail(s) with minimal assistance/contact guard assist.      Outcome: Progressing Towards Goal    PHYSICAL THERAPY EVALUATION    Patient: Toya Jasso (57 y.o. female)  Date: 5/15/2022  Diagnosis: Multiple sclerosis ([facility] Endy Lang <principal problem not specified>  Precautions: Fall,NWB (right UE)  Chart, physical therapy assessment, plan of care and goals were reviewed. Time in:0910  Time out:1000    Patient seen for: Balance activities; Patient education;Transfer training; Wheelchair mobility   Pt missed 10 minutes within evaluation time 2/2 toileting needs on bed pan requesting more time to attempt to have bowel movement. Pain:  Pt pain was reported as 8/10 pre-treatment. Pt pain was reported as 8/10 post-treatment. Intervention: Pt's nursing staff notified re: pt's request for pain medication    Patient identified with name and : yes    SUBJECTIVE:     Patient stated I haven't gone upstairs in it's got to be at least a year, year and a half, re: first floor set-up at home. Pt is pleasant and cooperative noting she feels her left LE is usually weaker but since she fell on her right side that has been more painful since she's been in the hospital.  Pt notes she fell after her left ankle rolled. Requested that pt speak with family re: bringing in supportive footwear for safety with participation in functional mobility. Patient's Goal for Physical Therapy: \"to get moving. \"    OBJECTIVE DATA SUMMARY:     Past Medical History:   Diagnosis Date    MS (multiple sclerosis) ([facility]      Past Surgical History:   Procedure Laterality Date    LA BIOPSY OF BREAST, INCISIONAL  RT BENIGN    OVER 10 YRS. AGO PER PT. Problem List:    Decreased strength B LE  [x]     Decreased strength trunk/core  [x]     Decreased AROM   []     Decreased PROM  []    Decreased endurance  [x]     Decreased balance sitting  [x]     Decreased balance standing  [x]     Pain   [x]     Slow ambulation velocity  [x]    Decreased coordination  [x]    Decreased safety awareness  [x]      Functional Limitations:   Decreased independence with bed mobility  [x]     Decreased independence with functional transfers  [x]     Decreased independence with ambulation  [x]     Decreased independence with stair negotiation  [x]       Previous Functional Level: Per pt she participated in household mobility utilizing a straight cane but she notes she has not ambulated community distances recently and usually does grocery pick-up if she goes out to the store.   She reports she lives with her  who is retired and that her son recently moved in to the home. She and her son both work from home and she reports feeling well supported. She states she utilizes her cane for stair negotiation into and out of the home and that her son or  also assist.    Home Environment: Home Environment: Private residence  # Steps to Enter: 3 with no handrails  One/Two Story Residence: Two story with first floor set up.   Living Alone: No  Support Systems: Spouse/Significant Other,Child(demetria)  Patient Expects to be Discharged to[de-identified] Home  Current DME Used/Available at Home: Cane, quad    Barriers to Learning/Limitations: yes;  physical  Compensate with: visual, verbal, tactile, kinesthetic cues/model         Outcome Measures: mobility assessment     MMT Initial Assessment   Right Lower Extremity Left Lower Extremity   Hip Flexion 4- 4-   Knee Extension 4- 4   Knee Flexion NT NT   Ankle Dorsiflexion NT NT   0/5 No palpable muscle contraction  1/5 Palpable muscle contraction, no joint movement  2-/5 Less than full range of motion in gravity eliminated position  2/5 Able to complete full range of motion in gravity eliminated position  2+/5 Able to initiate movement against gravity  3-/5 More than half but not full range of motion against gravity  3/5 Able to complete full range of motion against gravity  3+/5 Completes full range of motion against gravity with minimal resistance  4-/5 Completes full range of motion against gravity with minimal-moderate resistance  4/5 Completes full range of motion against gravity with moderate resistance  4+/5 Completes full range of motion against gravity with moderate-maximum resistance  5/5 Completes full range of motion against gravity with maximum resistance        GROSS ASSESSMENT Initial Assessment 5/15/2022   AROM Generally decreased, functional   Strength Generally decreased, functional   Coordination Within functional limits   Tone Abnormal   Sensation Impaired   PROM Within functional limits       POSTURE Initial Assessment 5/15/2022   Posture (WDL) Exceptions to WDL   Posture Assessment Rounded shoulders       BALANCE Initial Assessment 5/15/2022    Sitting - Static: Fair (occasional)  Sitting - Dynamic: Fair (occasional)  Standing - Static: Poor   Scooting to EOB, pt demonstrates decreased trunk stability without UE support requiring minimal assistance for balance. BED/CHAIR/WHEELCHAIR TRANSFERS Initial Assessment 5/15/2022   Rolling Right 3 (Moderate assistance ) at pelvis and for left LE management due to functional left LE weakness   Rolling Left 4 (Minimal assistance) at pelvis   Supine to Sit 2 (Maximal assistance)   Pt initiates with managing B LEs over EOB and attempts to perform sit up reaching out to pull up with right UE. Pt requires maximal re-education re: not pulling/weight bearing with right UE and maximal cuing to problem solve safe supine to sit transfer performing rolling supine to left sidelying and then managing B LEs over EOB with minimal assistance and minimal assistance from PT for trunk management from left sidelying to sit. Sit to Stand Maximum assistance (from edge of Keaton bed with B feet on 6\" block )   Pt performs sit to stand with maximal assistance for lift off and B hip extension to achieve upright posture. Pt requires moderate assistance for slow controlled descent with stand to sit transfer to EOB. Sit to Supine 4 (Minimal assistance) (for left LE management)   Pt reports she is unable to tell that left LE was still in dependent position when performing sit to supine transfer noting impaired sensation. Transfer Assist Score 0 (Not tested) (spoke to nursing for more appropriate height bed for safety)   Transfer Type  (NT 2/2 safety concerns with environment)   Comments Environment not yet safe for transfer to w/c due to pt's body habitus. Pt is petite and with Keaton bed in lowest position has difficulty placing feet on floor.   Due to B ankle instability and bed height, it was not safe to attempt stand step or squat pivot transfer. Spoke with nursing staff who report they will switch pt's bed out for one that lowers closer to the floor for a safe bed to w/c transfer. Car Transfer  NT   Car Type  N/A       WHEELCHAIR MOBILITY/MANAGEMENT Initial Assessment 5/15/2022   Able to Propel     Assist Level  NT   Curbs/ramps assistance required     Wheelchair set up assistance required     Wheelchair management     Comments NT       GAIT Initial Assessment 5/15/2022   Gait Description (WDL)  NT   Gait Abnormalities  N/A       WALKING INDEPENDENCE Initial Assessment 5/15/2022   Assistive device     Ambulation assistance - level surface     Distance     Comments  NT secondary to environmental barriers   Ambulation assistance - unlevel surface         STEPS/STAIRS Initial Assessment 5/15/2022   Steps/Stairs ambulated     InternetArray Use     Assistance Level  NT   Comments     Curbs/Ramps         ASSESSMENT :  Based on the objective data described above, the patient presents with impaired sensation, impaired functional strength, impaired balance and postural dysfunction as well as c/o \"nerve\" pain in right LE limiting safety and independence with mobility. Patient will benefit from skilled intervention to address the above impairments. Patient's rehabilitation potential is considered to be Good  Factors which may influence rehabilitation potential include:   []         None noted  []         Mental ability/status  [x]         Medical condition  []         Home/family situation and support systems  [x]         Safety awareness  [x]         Pain tolerance/management  []         Other:      PLAN :  Please refer to POC for details re: long term goals. Order received from MD for physical therapy services and chart reviewed. Pt to be seen 5 times per week for 3 hours of total therapy per day for 3-4 weeks.   Thank you for the referral.    Pt would benefit from skilled physical therapy in order to improve independent functional mobility within the home. Interventions may include range of motion (AROM, PROM B LE/trunk), motor function (B LE/trunk strengthening/coordination), activity tolerance (vitals, oxygen saturation levels), bed mobility training, balance activities, gait training (progressive ambulation program), wheelchair management and functional transfer training. Patient would also benefit from concurrent and group therapy sessions to promote increased participation in skilled therapy interventions and to provide opportunities for increased social interaction. Discharge Recommendations: Home Physical Therapy with 24 hour assistance  Further Equipment Recommendations for Discharge: gait belt, rolling walker and TBD pending further OOB evaluation. Activity Tolerance:   Fair  Please refer to the flowsheet for vital signs taken during this treatment. After treatment:   [x] Patient left in no apparent distress in bed  [] Patient left in no apparent distress sitting up in chair  [] Patient left in no apparent distress in w/c mobilizing under own power  [] Patient left in no apparent distress dining area  [] Patient left in no apparent distress mobilizing under own power  [x] Call bell left within reach  [x] Nursing notified  [] Caregiver present  [] Bed alarm activated   [] Chair alarm activated. COMMUNICATION/EDUCATION:   [x]         Fall prevention education was provided and the patient/caregiver indicated understanding. [x]         Patient/family have participated as able in goal setting and plan of care. [x]         Patient/family agree to work toward stated goals and plan of care. []         Patient understands intent and goals of therapy, but is neutral about his/her participation. []         Patient is unable to participate in goal setting and plan of care.     Thank you for this referral.  Sunday Chapman, PT, DPT  5/15/2022

## 2022-05-15 NOTE — REHAB NOTE
SHIFT CHANGE NOTE FOR [facility]    Bedside and Verbal shift change report given to RICHY Morel (oncoming nurse) by Jorge Luis Coleman RN (offgoing nurse). Report included the following information SBAR, Kardex, MAR and Recent Results.     Situation:   Code Status: Full Code   Hospital Day: 1   Problem List:   Hospital Problems  Date Reviewed: 9/2/2014          Codes Class Noted POA    Multiple sclerosis ([facility] ICD-10-CM: G35  ICD-9-CM: 629  5/9/2022 Unknown              Background:   Past Medical History:   Past Medical History:   Diagnosis Date    MS (multiple sclerosis) ([facility]         Assessment:   Changes in Assessment throughout shift:       Patient has a central line: no Reasons if yes: n/a  Insertion date: n/a Last dressing date: n/a   Patient has Knight Cath: no Reasons if yes: n/a   Insertion date: n/a  Shift knight care completed: NO, n/a     Last Vitals:     Vitals:    05/14/22 1414 05/14/22 1423 05/15/22 0725 05/15/22 1605   BP: 123/71  (!) 148/74 111/70   Pulse: 76  62 76   Resp: 18  18 16   Temp: 98 °F (36.7 °C)  97 °F (36.1 °C) 97.5 °F (36.4 °C)   SpO2: 94%  95% 97%   Height:  4' 11\" (1.499 m)          PAIN    Pain Assessment    Pain Intensity 1: 7 (05/15/22 1713) Pain Intensity 1: 2 (12/29/14 1105)    Pain Location 1: Leg Pain Location 1: Abdomen    Pain Intervention(s) 1: Medication (see MAR) Pain Intervention(s) 1: Medication (see MAR)  Patient Stated Pain Goal: 0 Patient Stated Pain Goal: 0  o Intervention effective: yes  o Other actions taken for pain: Medication (see MAR)     Skin Assessment  Skin color    Condition/Temperature    Integrity Skin Integrity: Other (comment) (bruising to R hip, RUE, LUE, L foot)  Turgor    Weekly Pressure Ulcer Documentation  Pressure  Injury Documentation: No Pressure Injury Noted-Pressure Ulcer Prevention Initiated  Wound Prevention & Protection Methods  Orientation of wound Orientation of Wound Prevention: Posterior  Location of Prevention Location of Wound Prevention: Buttocks,Sacrum/Coccyx  Dressing Present Dressing Present : No  Dressing Status    Wound Offloading Wound Offloading (Prevention Methods): Bed, pressure redistribution/air,Bed, pressure reduction mattress,Chair cushion,Wheelchair     INTAKE/OUPUT  Date 05/14/22 0700 - 05/15/22 0659 05/15/22 0700 - 05/16/22 0659   Shift 0700-1859 1900-0659 24 Hour Total 0700-1859 1900-0659 24 Hour Total   INTAKE   P.O.    120  120     P. O.    120  120   Shift Total    120  120   OUTPUT   Urine  700 700 200  200     Urine Voided  700 700 200  200     Urine Occurrence(s) 1 x 1 x 2 x 1 x  1 x   Emesis/NG output           Emesis Occurrence(s)  0 x 0 x      Stool           Stool Occurrence(s) 1 x 1 x 2 x 0 x  0 x   Shift Total  700 700 200  200   NET  -700 -700 -80  -80   Weight (kg)             Recommendations:  1. Patient needs and requests: Toileting, repositioning    2. Pending tests/procedures: AM labs, 5/16    3. Functional Level/Equipment: Partial (one person) / Bed (comment); Wheelchair    Fall Precautions:   Fall risk precautions were reinforced with the patient; she was instructed to call for help prior to getting up. The following fall risk precautions were continued: bed/ chair alarms, door signage, yellow bracelet and socks as well as update of the Fredi Sites tool in the patient's room. Rupesh Score: 4    HEALS Safety Check    A safety check occurred in the patient's room between off going nurse and oncoming nurse listed above.     The safety check included the below items  Area Items   H  High Alert Medications - Verify all high alert medication drips (heparin, PCA, etc.)   E  Equipment - Suction is set up for ALL patients (with yanker)  - Red plugs utilized for all equipment (IV pumps, etc.)  - WOWs wiped down at end of shift.  - Room stocked with oxygen, suction, and other unit-specific supplies   A  Alarms - Bed alarm is set for fall risk patients  - Ensure chair alarm is in place and activated if patient is up in a chair L  Lines - Check IV for any infiltration  - Torres bag is empty if patient has a Torres   - Tubing and IV bags are labeled   S  Safety   - Room is clean, patient is clean, and equipment is clean. - Hallways are clear from equipment besides carts. - Fall bracelet on for fall risk patients  - Ensure room is clear and free of clutter  - Suction is set up for ALL patients (with yanker)  - Hallways are clear from equipment besides carts.    - Isolation precautions followed, supplies available outside room, sign posted     Nuha Camilo RN

## 2022-05-16 PROBLEM — Z78.9 IMPAIRED MOBILITY AND ADLS: Status: ACTIVE | Noted: 2022-05-09

## 2022-05-16 PROBLEM — R29.898 WEAKNESS OF BOTH LOWER EXTREMITIES: Status: ACTIVE | Noted: 2022-05-09

## 2022-05-16 PROBLEM — Z74.09 IMPAIRED MOBILITY AND ADLS: Status: ACTIVE | Noted: 2022-05-09

## 2022-05-16 PROBLEM — R79.89 ELEVATED SERUM FREE T4 LEVEL: Status: ACTIVE | Noted: 2022-05-16

## 2022-05-16 PROBLEM — R79.89 ELEVATED TSH: Status: ACTIVE | Noted: 2022-05-15

## 2022-05-16 PROBLEM — E55.9 VITAMIN D DEFICIENCY: Chronic | Status: ACTIVE | Noted: 2022-05-09

## 2022-05-16 PROBLEM — E55.9 VITAMIN D DEFICIENCY: Status: ACTIVE | Noted: 2022-05-09

## 2022-05-16 PROBLEM — G35 MULTIPLE SCLEROSIS EXACERBATION (HCC): Status: ACTIVE | Noted: 2022-05-09

## 2022-05-16 LAB — T4 FREE SERPL-MCNC: 1.7 NG/DL (ref 0.7–1.5)

## 2022-05-16 PROCEDURE — 97112 NEUROMUSCULAR REEDUCATION: CPT

## 2022-05-16 PROCEDURE — 99232 SBSQ HOSP IP/OBS MODERATE 35: CPT | Performed by: INTERNAL MEDICINE

## 2022-05-16 PROCEDURE — 74011250637 HC RX REV CODE- 250/637: Performed by: FAMILY MEDICINE

## 2022-05-16 PROCEDURE — 97110 THERAPEUTIC EXERCISES: CPT

## 2022-05-16 PROCEDURE — 74011250637 HC RX REV CODE- 250/637: Performed by: INTERNAL MEDICINE

## 2022-05-16 PROCEDURE — 84480 ASSAY TRIIODOTHYRONINE (T3): CPT

## 2022-05-16 PROCEDURE — 97530 THERAPEUTIC ACTIVITIES: CPT

## 2022-05-16 PROCEDURE — 97535 SELF CARE MNGMENT TRAINING: CPT

## 2022-05-16 PROCEDURE — 84439 ASSAY OF FREE THYROXINE: CPT

## 2022-05-16 PROCEDURE — 36415 COLL VENOUS BLD VENIPUNCTURE: CPT

## 2022-05-16 PROCEDURE — 65310000000 HC RM PRIVATE REHAB

## 2022-05-16 PROCEDURE — 77030038269 HC DRN EXT URIN PURWCK BARD -A

## 2022-05-16 PROCEDURE — 2709999900 HC NON-CHARGEABLE SUPPLY

## 2022-05-16 RX ORDER — MELATONIN
5000 DAILY
Status: DISCONTINUED | OUTPATIENT
Start: 2022-05-17 | End: 2022-05-31 | Stop reason: HOSPADM

## 2022-05-16 RX ORDER — DIMETHYL FUMARATE 240 MG/1
240 CAPSULE ORAL DAILY
COMMUNITY
End: 2022-05-31

## 2022-05-16 RX ADMIN — CHOLECALCIFEROL TAB 25 MCG (1000 UNIT) 1000 UNITS: 25 TAB at 08:52

## 2022-05-16 RX ADMIN — ACETAMINOPHEN 650 MG: 325 TABLET ORAL at 12:46

## 2022-05-16 RX ADMIN — Medication 50000 UNITS: at 18:46

## 2022-05-16 RX ADMIN — FAMOTIDINE 20 MG: 20 TABLET ORAL at 08:52

## 2022-05-16 NOTE — REHAB NOTE
[facility] PHYSICAL REHABILITATION  [address] [place] [address], [address]     INPATIENT REHABILITATION  OVERALL PLAN OF CARE    Name: Renea Saenz CSN: [id number]   Age: 74 y.o. MRN: [id number]   Sex: female Admit Date: 5/14/2022     Primary Rehabilitation Diagnosis  1. Impaired Mobility and ADLs  2. Multiple sclerosis exacerbation    Comorbidities  Patient Active Problem List   Diagnosis Code    Multiple sclerosis exacerbation (HCC) G35    Weakness of both lower extremities R29.898    Vitamin D deficiency E55.9    Elevated TSH R79.89    Elevated serum free T4 level R79.89    Impaired mobility and ADLs Z74.09, Z78.9    Multiple sclerosis (HCC) G35       ANTICIPATED INTERVENTIONS THAT SUPPORT THE MEDICAL NECESSITY OF THIS ADMISSION:    I. Physical Therapy              A. Intensity: 1.5 hour per day              B. Frequency: 5 times per week              C. Duration: 3-4 weeks              D. Short Term Goals:    1. Patient will move from supine to sit and sit to supine , scoot up and down, and roll side to side in bed with minimal assistance/contact guard assist.      2.  Patient will transfer from bed to chair and chair to bed with moderate assistance  using the least restrictive device. 3.  Patient will perform sit to stand with moderate assistance . 4. Patient will ambulate with moderate assistance  for 10 feet with the least restrictive device. 5.  Patient will propel w/c over level surfaces for 150 feet with minimal assistance. E. Long Term Goals:    1. Patient will move from supine to sit and sit to supine , scoot up and down, and roll side to side in bed with modified independence. 2.  Patient will transfer from bed to chair and chair to bed with supervision/set-up using the least restrictive device. 3.  Patient will perform sit to stand with supervision/set-up.     4.  Patient will ambulate with supervision/set-up for 50 feet with the least restrictive device. 5.  Patient will ascend/descend 3 stairs with 1 handrail(s) with minimal assistance/contact guard assist.   F. Interventions: Interventions may include range of motion (AROM, PROM B LE/trunk), motor function (B LE/trunk strengthening/coordination), activity tolerance (vitals, oxygen saturation levels), bed mobility training, balance activities, gait training (progressive ambulation program), wheelchair management and functional transfer training. Patient would also benefit from concurrent and group therapy sessions to promote increased participation in skilled therapy interventions and to provide opportunities for increased social interaction. II. Occupational Therapy              A. Intensity: 1.5 hour per day              B. Frequency: 5 times per week              C. Duration: 2 weeks              D. Short Term Goals:    1. Pt will perform self-feeding with Mod I.     2. Pt will perform grooming with Mod I.    3. Pt will perform UB bathing with Supervision . 4. Pt will perform LB bathing with Supervision. 5. Pt will perform tub/shower transfer with 4.    6. Pt will perform UB dressing with Mod I.    7. Pt will perform LB dressing with Min A.    8. Pt will perform toileting task with Mod A.    9. Pt will perform toilet transfer with Min A.     10. Pt. Will demonstrate functional mobility with CGA with RW in order to increase I for ADLs, by setting up prior to activities. E. Long Term Goals:    1. Pt will perform self-feeding with I.    2. Pt will perform grooming with I.    3. Pt will perform UB bathing with Mod I.    4. Pt will perform LB bathing with Mod I.    5. Pt will perform tub/shower transfer with Mod I.     6. Pt will perform UB dressing with I.    7. Pt will perform LB dressing with Mod I.    8. Pt will perform toileting task with Mod I.    9. Pt will perform toilet transfer with Mod I.    10. Pt.  Will increase BUE strength to 5/5 in order to increase safety at home during ADLs. F. Interventions: Interventions may include range of motion (AROM, PROM B UE), motor function (B UE/ strengthening/coordination), activity tolerance (vitals, oxygen saturation levels), balance training, ADL/IADL training and functional transfer training. PHYSICIAN'S ASSESSMENT OF FINDINGS:    Are the established goals sufficient for achieving the optimal level of function? [x]  Yes      []  No    What changes would you recommend to the goals as written? None      Are the interventions noted sufficient for achieving the optimal level of function? [x]  Yes      []  No    What changes would you recommend to the interventions noted? If therapy staff is unable to provide 3 hr of total therapy per day in 5 days due to medical issues or decreased patient tolerance, may modify treatment schedule to 15 hr/week.       Estimated length of stay: 2 weeks      Medical rehabilitation prognosis:    []  Excellent     [x]  Good     []  Fair     []  Guarded       Discharge Destination:     [x]  Home     []  [address]     []  [place] [facility]     []  [address]     []  [place]     []  Other:       Signed:    Triny St MD    May 16, 2022

## 2022-05-16 NOTE — PROGRESS NOTES
Problem: Mobility Impaired (Adult and Pediatric)  Goal: *Acute Goals and Plan of Care (Insert Text)  Description: Physical Therapy Short Term Goals  Initiated 5/15/2022 and to be accomplished within 7 day(s) (5/22/2022)  1. Patient will move from supine to sit and sit to supine , scoot up and down, and roll side to side in bed with minimal assistance/contact guard assist.    2.  Patient will transfer from bed to chair and chair to bed with moderate assistance  using the least restrictive device. 3.  Patient will perform sit to stand with moderate assistance . 4. Patient will ambulate with moderate assistance  for 10 feet with the least restrictive device. 5.  Patient will propel w/c over level surfaces for 150 feet with minimal assistance. Physical Therapy Long Term Goals  Initiated 5/15/2022 and to be accomplished within 3-4 weeks (6/12/2022)  1. Patient will move from supine to sit and sit to supine , scoot up and down, and roll side to side in bed with modified independence. 2.  Patient will transfer from bed to chair and chair to bed with supervision/set-up using the least restrictive device. 3.  Patient will perform sit to stand with supervision/set-up. 4.  Patient will ambulate with supervision/set-up for 50 feet with the least restrictive device. 5.  Patient will ascend/descend 3 stairs with 1 handrail(s) with minimal assistance/contact guard assist.      Outcome: Progressing Towards Goal   PHYSICAL THERAPY TREATMENT    Patient: Carmella Lauren (18 y.o. female)  Date: 5/16/2022  Diagnosis: Multiple sclerosis ([facility] [G35] Multiple sclerosis exacerbation ([facility]  Precautions: Fall,NWB (right UE)  Chart, physical therapy assessment, plan of care and goals were reviewed. Time In:1136  Time PDX:5358    Patient seen for: Balance activities; Patient education; Therapeutic exercise;Transfer training (bed mobility training)    Pain:  Pt pain was reported as 0/10 pre-treatment.   Pt pain was reported as 0/10 post-treatment. Intervention: none indicated    Patient identified with name and : yes    SUBJECTIVE:      Pt c/o \"nerve pain\" in R LE and a \"burning\" feeling. Pt agreeable to participate in PT. Pt stated \"I haven't done this much in about a week\". Pt without c/o's during or after session. OBJECTIVE DATA SUMMARY:    Objective: spoke with nursing again about changing pt's bed to one that will go lower to floor. Pt is petite in height, and with current bed, pt's feet are about 6\" off floor when sitting EOB. Nursing stated that they would contact the unit secretary about requesting another type of bed. BED/MAT MOBILITY Daily Assessment     Rolling Right : 4 (Minimal assistance) (using L UE to pull on bed rail; PT assisting L LE)  Rolling Left : 4 (Contact guard assistance) (using bed rail to pull on with L UE)  Supine to Sit : 3 (Moderate assistance) (from L side-lying, using L UE to push up; NWB R UE)  Sit to Supine : 4 (Minimal assistance) (L LE management)    Pt with weakness in B LE's but L is weaker than R; pt also has decreased control of L LE. Pt needed cues at times to remember not to use R UE to help pull or push at this time due to distal radial fx and NWB R UE. Pt participated in rolling L and R training x 3 reps each direction. Pt performed scooting up toward [facility] with mod A, using B LE's to try to help push and L UE to pull on bed rail. Attempted scooting at EOB to L and R laterally, but pt required max A and was not able to clear bottom well with only being able to push up with L UE. Pt worked on push ups x 10 reps from EOB with max A trying to get improved clearance of bottom off bed. Pt using a 6\" stool under her feet during these attempts at scooting EOB and push-up at EOB due to her feet not touching the floor due to shorter stature and higher bed.        TRANSFERS Daily Assessment     Transfer Assistance : 0 (Not tested) (pt unable to reach floor with LE's when sitting EOB)  Sit to Stand Assistance: Maximum assistance (using L UE; B knees blocked by PT; using 6\" stool, x3 reps) stood for 10-30 secs each trial and worked on some minor wt shift L and R with PT blocking knees. Nursing is working on obtaining a different bed for pt that will go lower to the floor in order to try to facilitate being able to work on transfers OOB to w/c with safe technique. BALANCE Daily Assessment     Sitting - Static: Fair (occasional)  Sitting - Dynamic: Fair (occasional)  Standing - Static: Poor  Standing - Dynamic : Impaired        THERAPEUTIC EXERCISES Daily Assessment     Extremity: Both  Exercise Type #1: Seated lower extremity strengthening (AROM R/AAROM L: marches, LAQ's)  Sets Performed: 2  Reps Performed: 10  Level of Assist: Minimal assistance (for L LE)  Exercise Type #2: Supine lower extremity strengthening (AROM R/AAROM L: heel-slides, hip abd, AP's, quad sets)  Sets Performed: 1  Reps Performed: 10  Level of Assist: Moderate assistance (min to mod A for L LE)       Neuro Re-Education:  Sitting EOB balance with feet supported on 6\" stool due to short stature and higher bed height. Worked on trunk strengthening exercises for trunk flexion, extension, lateral flexion, and trunk rotation. Needed SBA/CGA for balance during these activities. ASSESSMENT:  Pt tolerated session well. Seen for bedside treatment, working on bed mobility, sitting up to EOB, sitting balance, LE strengthening, and sit to stands on a 6\" stool due to higher bed and feet unable to reach the floor. Transfers bed to w/c not addressed today due to safety concerns due to height difference. Spoke with nursing again about obtaining a different bed for patient that will go lower to floor so her feet will touch the floor when sitting EOB. Pt worked hard during session and tolerated session without c/o's. Fatigued at end of session.    Progression toward goals:  [x]      Improving appropriately and progressing toward goals  []      Improving slowly and progressing toward goals  []      Not making progress toward goals and plan of care will be adjusted      PLAN:  Patient continues to benefit from skilled intervention to address the above impairments. Continue treatment per established plan of care. Discharge Recommendations:  Home Health PT  Further Equipment Recommendations for Discharge:  TBD pending progress      Estimated Discharge Date: 6/12/22    Activity Tolerance:   Fair; pt needs slower pace and frequent rest breaks due to fatigue. No c/o's pain, dizziness, or SOB  Please refer to the flowsheet for vital signs taken during this treatment.     After treatment:   [x] Patient left in no apparent distress in bed, tray table set up in front of patient with lunch tray on it  [] Patient left in no apparent distress sitting up in chair  [] Patient left in no apparent distress in w/c mobilizing under own power  [] Patient left in no apparent distress dining area  [] Patient left in no apparent distress mobilizing under own power  [x] Call bell left within reach  [x] Nursing notified  [] Caregiver present  [] Bed alarm activated   [] Chair alarm activated      Robert Dumont, PT  5/16/2022

## 2022-05-16 NOTE — REHAB NOTE
SHIFT CHANGE NOTE FOR [facility]    Bedside and Verbal shift change report given to Troy Atkinson RN (oncoming nurse) by Ellis Mccullough RN (offgoing nurse). Report included the following information SBAR, Kardex, MAR and Recent Results.     Situation:   Code Status: Full Code   Hospital Day: 2   Problem List:   Hospital Problems  Date Reviewed: 9/2/2014          Codes Class Noted POA    Multiple sclerosis ([facility] ICD-10-CM: G35  ICD-9-CM: 180  5/9/2022 Unknown              Background:   Past Medical History:   Past Medical History:   Diagnosis Date    MS (multiple sclerosis) ([facility]         Assessment:   Changes in Assessment throughout shift: No change to previous assessment     Patient has a central line: no Reasons if yes: n/a  Insertion date: n/a Last dressing date: n/a   Patient has Knight Cath: no Reasons if yes: n/a   Insertion date: n/a  Shift knight care completed: NO, n/a     Last Vitals:     Vitals:    05/15/22 0725 05/15/22 1605 05/15/22 2036 05/16/22 0718   BP: (!) 148/74 111/70 (!) 95/57 118/62   Pulse: 62 76 84 71   Resp: 18 16 18 18   Temp: 97 °F (36.1 °C) 97.5 °F (36.4 °C) 97.5 °F (36.4 °C) 97.8 °F (36.6 °C)   SpO2: 95% 97% 95% 95%   Height:            PAIN    Pain Assessment    Pain Intensity 1: 0 (05/16/22 0429) Pain Intensity 1: 2 (12/29/14 1105)    Pain Location 1: Leg Pain Location 1: Abdomen    Pain Intervention(s) 1: Medication (see MAR) Pain Intervention(s) 1: Medication (see MAR)  Patient Stated Pain Goal: 0 Patient Stated Pain Goal: 0  o Intervention effective: yes  o Other actions taken for pain:       Skin Assessment  Skin color    Condition/Temperature    Integrity Skin Integrity: Other (comment)  Turgor    Weekly Pressure Ulcer Documentation  Pressure  Injury Documentation: No Pressure Injury Noted-Pressure Ulcer Prevention Initiated  Wound Prevention & Protection Methods  Orientation of wound Orientation of Wound Prevention: Posterior  Location of Prevention Location of Wound Prevention: Buttocks,Sacrum/Coccyx  Dressing Present Dressing Present : No  Dressing Status    Wound Offloading Wound Offloading (Prevention Methods): Bed, pressure reduction mattress,Elevate heels,Pillows,Repositioning,Wheelchair,Walker     INTAKE/OUPUT  Date 05/15/22 0700 - 05/16/22 0659 05/16/22 0700 - 05/17/22 0659   Shift 0700-1859 1900-0659 24 Hour Total 0700-1859 1900-0659 24 Hour Total   INTAKE   P.O. 120  120        P. O. 120  120      Shift Total 120  120      OUTPUT   Urine 200 1200 1400        Urine Voided 200 1200 1400        Urine Occurrence(s) 1 x  1 x      Emesis/NG output           Emesis Occurrence(s)  0 x 0 x      Stool           Stool Occurrence(s) 0 x 0 x 0 x      Shift Total 200 1200 1400      NET -80 -1200 -1280      Weight (kg)             Recommendations:  1. Patient needs and requests: Toileting, repositioning    2. Pending tests/procedures: AM labs, 5/16    3. Functional Level/Equipment: Partial (one person) / Bed (comment); [place] Savage; Wheelchair    Fall Precautions:   Fall risk precautions were reinforced with the patient; she was instructed to call for help prior to getting up. The following fall risk precautions were continued: bed/ chair alarms, door signage, yellow bracelet and socks as well as update of the [employer] tool in the patient's room. Rupesh Score: 4    HEALS Safety Check    A safety check occurred in the patient's room between off going nurse and oncoming nurse listed above.     The safety check included the below items  Area Items   H  High Alert Medications - Verify all high alert medication drips (heparin, PCA, etc.)   E  Equipment - Suction is set up for ALL patients (with yanker)  - Red plugs utilized for all equipment (IV pumps, etc.)  - WOWs wiped down at end of shift.  - Room stocked with oxygen, suction, and other unit-specific supplies   A  Alarms - Bed alarm is set for fall risk patients  - Ensure chair alarm is in place and activated if patient is up in a chair   L  Lines - Check IV for any infiltration  - Torres bag is empty if patient has a Torres   - Tubing and IV bags are labeled   S  Safety   - Room is clean, patient is clean, and equipment is clean. - Hallways are clear from equipment besides carts. - Fall bracelet on for fall risk patients  - Ensure room is clear and free of clutter  - Suction is set up for ALL patients (with yanker)  - Hallways are clear from equipment besides carts.    - Isolation precautions followed, supplies available outside room, sign posted     Elton Blunt RN

## 2022-05-16 NOTE — PROGRESS NOTES
Occupational Therapy Goals   Long Term Goals  Initiated 5/15 and to be accomplished within 2 week(s)  1. Pt will perform self-feeding with I.  2. Pt will perform grooming with I.  3. Pt will perform UB bathing with Mod I.  4. Pt will perform LB bathing with Mod I.  5. Pt will perform tub/shower transfer with Mod I.   6. Pt will perform UB dressing with I.  7. Pt will perform LB dressing with Mod I.  8. Pt will perform toileting task with Mod I.  9. Pt will perform toilet transfer with Mod I.  10. Pt. Will increase BUE strength to 5/5 in order to increase safety at home during ADLs. Short Term Goals   Initiated 5/15 and to be accomplished within 7 day(s)  1. Pt will perform self-feeding with Mod I.   2. Pt will perform grooming with Mod I.  3. Pt will perform UB bathing with Supervision . 4. Pt will perform LB bathing with Supervision. 5. Pt will perform tub/shower transfer with 4.  6. Pt will perform UB dressing with Mod I.  7. Pt will perform LB dressing with Min A.  8. Pt will perform toileting task with Mod A.  9. Pt will perform toilet transfer with Min A.   10. Pt. Will demonstrate functional mobility with CGA with RW in order to increase I for ADLs, by setting up prior to activities. Problem: Self Care Deficits Care Plan (Adult)  Goal: *Therapy Goal (Edit Goal, Insert Text)  Outcome: Progressing Towards Goal  Goal: Interventions  Outcome: Progressing Towards Goal   Occupational Therapy TREATMENT    Patient: Bev Triplett   74 y.o. Patient identified with name and : yes    Date: 2022    First Tx Session  Time In: 0900  Time Out[de-identified] [id number]        Diagnosis: Multiple sclerosis ([facility] [G35]   Precautions: Fall,NWB (right UE)  Chart, occupational therapy assessment, plan of care, and goals were reviewed. Pain:  Pt reports -/10 pain or discomfort prior to treatment. Pt reports 8.5/10 pain or discomfort post treatment.    Intervention Provided: lower back or R hip, reported to nursing, pt declined pain medications reporting \"I'll just take it and let it relax when we're done\"      SUBJECTIVE:   Patient stated no matter what I do that legs gonna hurt.  \"not sure if it's my hip or my back\"    OBJECTIVE DATA SUMMARY:     THERAPEUTIC EXERCISE Daily Assessment    Pt engaged in LUE strengthening from EOB initially with 2# free weight in all planes. Following 1 exercise pt reported RLE hip/back hurting and requested to lie down for relief and relaxation. Following EOB to supine transfer pt agreeable to continue and reported relief. Pt completed LUE strengthening in supine 4x15-20 reps as tolerated with RB's prn to increase LUE strength and activity tolerance. GROOMING Daily Assessment        Pt performed oral care with setup in upright position in bed. Oral hygiene: 5     BATHING Daily Assessment    Functional Level: 3  Body Parts Patient Bathed: Abdomen;Arm, right;Buttocks; Chest;Lower leg and foot, left; Lower leg and foot, right;Latanya area; Thigh, left; Thigh, right  Comments: Pt required modA to perfomr bathing via sponge bath for assistance to wash back and LUE thoroughly and washing lower BLE legs and buttocks thoroughly      TOILETING Daily Assessment    Functional Level:  (Pt declined)        UPPER BODY DRESSING Daily Assessment    Functional Level: 4  Items Applied/Steps Completed: Pullover (4 steps)  Comments: Pt donned pullover dress with Alise to pull over head     LOWER BODY DRESSING Daily Assessment    Functional Level: 2  Items Applied/Steps Completed: Underpants (3 steps)  Comments: Pt required maxA to don pul tab brief from bed level rolling to right with Alise. Pt utilized step stool at EOB for increased safety and stability at EOB. MOBILITY/TRANSFERS Daily Assessment       Pt performed bed mobility rolling to right with Alise and transferring supine to sit with modA to raise UB to upright position secondary to RUE NWB restrictions and increased safety.   Pt attempted to scoot side to side at EOB as tolerated with LUE in prep for sliding board transfers however pt required maxA. ASSESSMENT:  Pt reporting pain in hip/back however agreeable to engage in OT as tolerated from bed level. Pt performed self cares at EOB and supine using compensatory strategies to increase pt safety and independence. Pt required frequent VC's for safety and maintaining RUE NWB status throughout session. Progression toward goals:  []          Improving appropriately and progressing toward goals  [x]          Improving slowly and progressing toward goals  []          Not making progress toward goals and plan of care will be adjusted     PLAN:  Patient continues to benefit from skilled intervention to address the above impairments. Continue treatment per established plan of care. Discharge Recommendations:  Home Health  Further Equipment Recommendations for Discharge:  TBD dependent on progress     Activity Tolerance:  fair      Estimated LOS:TBD    Please refer to the flowsheet for vital signs taken during this treatment. After treatment:   []  Patient left in no apparent distress sitting up in chair   [x]  Patient left in no apparent distress in bed  [x]  Call bell left within reach  []  Nursing notified  []  Caregiver present  []  Bed alarm activated    COMMUNICATION/EDUCATION:   [] Home safety education was provided and the patient/caregiver indicated understanding. [] Patient/family have participated as able in goal setting and plan of care. [x] Patient/family agree to work toward stated goals and plan of care. [] Patient understands intent and goals of therapy, but is neutral about his/her participation. [] Patient is unable to participate in goal setting and plan of care.       Hector Earl, OT

## 2022-05-16 NOTE — PROGRESS NOTES
05/16/22 0800   COVID Screening   Have you ever received a dose of COVID-19 Vaccine? Yes   Do you have documentation of COVID Vaccine? Yes  (at home;  Moderna 2 doses in April 2021; booster in Dec 2021)

## 2022-05-16 NOTE — REHAB NOTE
[facility]    Bedside and Verbal shift change report given to Nuha Camilo RN (oncoming nurse) by Kajal Gordon RN (offgoing nurse). Report included the following information SBAR, Kardex, MAR and Recent Results.     Situation:   Code Status: Full Code   Hospital Day: 2   Problem List:   Hospital Problems  Date Reviewed: 5/16/2022          Codes Class Noted POA    Elevated serum free T4 level ICD-10-CM: R79.89  ICD-9-CM: 794.5  5/16/2022 Yes    Overview Signed 5/16/2022  3:30 PM by Lynn Jackman MD     Free T4 (5/16/2022) = 1.7             Multiple sclerosis (HCC) (Chronic) ICD-10-CM: G35  ICD-9-CM: 340  Unknown Yes        Elevated TSH ICD-10-CM: R79.89  ICD-9-CM: 794.5  5/15/2022 Yes    Overview Signed 5/16/2022  3:30 PM by Lynn Jackman MD     TSH (5/15/2022) = 4.00             * (Principal) Multiple sclerosis exacerbation ([facility] ICD-10-CM: G35  ICD-9-CM: 340  5/9/2022 Yes        Weakness of both lower extremities ICD-10-CM: R29.898  ICD-9-CM: 729.89  5/9/2022 Yes        Vitamin D deficiency (Chronic) ICD-10-CM: E55.9  ICD-9-CM: 268.9  5/9/2022 Yes    Overview Signed 5/16/2022  3:29 PM by Lynn Jackman MD     Vitamin D 25-Hydroxy (5/9/2022) = 16.6              Impaired mobility and ADLs ICD-10-CM: Z74.09, Z78.9  ICD-9-CM: V49.89  5/9/2022 Yes              Background:   Past Medical History:   Past Medical History:   Diagnosis Date    Elevated serum free T4 level 5/16/2022    Free T4 (5/16/2022) = 1.7    Elevated TSH 5/15/2022    TSH (5/15/2022) = 4.00    MS (multiple sclerosis) (HCC)     Multiple sclerosis ([facility]     Vitamin D deficiency 5/9/2022    Vitamin D 25-Hydroxy (5/9/2022) = 16.6         Assessment:   Changes in Assessment throughout shift: No change to previous assessment     Patient has a central line: no Reasons if yes: n/a  Insertion date: n/a Last dressing date: n/a   Patient has Knight Cath: no Reasons if yes: n/a   Insertion date: n/a  Shift knight care completed: NO, n/a     Last Vitals:     Vitals:    05/16/22 0718 05/16/22 1532 05/16/22 1658 05/16/22 1707   BP: 118/62 98/64     Pulse: 71 87     Resp: 18 18     Temp: 97.8 °F (36.6 °C) 98.7 °F (37.1 °C)     SpO2: 95% 94%     Weight:   62.6 kg (138 lb)    Height:   4' 11\" (1.499 m) 4' 11\" (1.499 m)        PAIN    Pain Assessment    Pain Intensity 1: 0 (05/16/22 1634) Pain Intensity 1: 2 (12/29/14 1105)    Pain Location 1: Leg Pain Location 1: Abdomen    Pain Intervention(s) 1: Medication (see MAR) Pain Intervention(s) 1: Medication (see MAR)  Patient Stated Pain Goal: 0 Patient Stated Pain Goal: 0  o Intervention effective: yes  o Other actions taken for pain: Medication (see MAR)     Skin Assessment  Skin color    Condition/Temperature    Integrity Skin Integrity: Intact  Turgor    Weekly Pressure Ulcer Documentation  Pressure  Injury Documentation: No Pressure Injury Noted-Pressure Ulcer Prevention Initiated  Wound Prevention & Protection Methods  Orientation of wound Orientation of Wound Prevention: Posterior  Location of Prevention Location of Wound Prevention: Sacrum/Coccyx  Dressing Present Dressing Present : No  Dressing Status    Wound Offloading Wound Offloading (Prevention Methods): Bed, pressure redistribution/air,Bed, pressure reduction mattress,Repositioning,Wheelchair     INTAKE/OUPUT  Date 05/15/22 1900 - 05/16/22 0659 05/16/22 0700 - 05/17/22 0659   Shift 0280-4698 24 Hour Total 4749-0199 0419-0870 24 Hour Total   INTAKE   P.O.  120 720  720     P. O.  120 720  720   Shift Total(mL/kg)  120 720(11.5)  720(11.5)   OUTPUT   Urine 1200 1400        Urine Voided 1200 1400        Urine Occurrence(s)  1 x 0 x 1 x 1 x   Emesis/NG output          Emesis Occurrence(s) 0 x 0 x      Stool          Stool Occurrence(s) 0 x 0 x 0 x 0 x 0 x   Shift Total(mL/kg) 1200 1400      NET -1200 -1280 720  720   Weight (kg)   62.6 62.6 62.6       Recommendations:  1. Patient needs and requests: Toileting, repositioning    2.  Pending tests/procedures: AM labs, 5/16    3. Functional Level/Equipment: Partial (one person) / Bed (comment); Stabilization belt; Wheelchair    Fall Precautions:   Fall risk precautions were reinforced with the patient; she was instructed to call for help prior to getting up. The following fall risk precautions were continued: bed/ chair alarms, door signage, yellow bracelet and socks as well as update of the [employer] tool in the patient's room. Rupesh Score: 4    HEALS Safety Check    A safety check occurred in the patient's room between off going nurse and oncoming nurse listed above. The safety check included the below items  Area Items   H  High Alert Medications - Verify all high alert medication drips (heparin, PCA, etc.)   E  Equipment - Suction is set up for ALL patients (with cyn)  - Red plugs utilized for all equipment (IV pumps, etc.)  - WOWs wiped down at end of shift.  - Room stocked with oxygen, suction, and other unit-specific supplies   A  Alarms - Bed alarm is set for fall risk patients  - Ensure chair alarm is in place and activated if patient is up in a chair   L  Lines - Check IV for any infiltration  - Torres bag is empty if patient has a Torres   - Tubing and IV bags are labeled   S  Safety   - Room is clean, patient is clean, and equipment is clean. - Hallways are clear from equipment besides carts. - Fall bracelet on for fall risk patients  - Ensure room is clear and free of clutter  - Suction is set up for ALL patients (with cyn)  - Hallways are clear from equipment besides carts.    - Isolation precautions followed, supplies available outside room, sign posted     Ibrahima Pollock RN

## 2022-05-16 NOTE — CONSULTS
Comprehensive Nutrition Assessment    Type and Reason for Visit: Initial,Consult    Nutrition Recommendations/Plan:   1. Modify diet: remove diabetic diet and Na restrictions. Continue with low fat/ cholesterol restrictions  2. Update food preferences in diet order  3. Plan to follow up with patient regarding any desired/ needed diet education prior to discharge. Malnutrition Assessment:  Malnutrition Status:  No malnutrition (05/16/22 2679)      Nutrition History and Allergies: Past medical hx:  MS.    Pt reported UBW is 130 lb. Wt checked via bed scale on 5/16/2022; pt weighing 138 lb. Noted pt weighing 157 lb on 9/2/2014 per chart hx. Good appetite/ meal intake PTA;  Pt stated she usually eats small meals. No known food allergies     Nutrition Assessment:    Pt reported good appetite/ meal intake PTA and since admission. Usually eats small meals (smaller than what she receives at this facility). Food preferences discussed. Patient's  asking why pt on diabetic and cardiac restrictions; discussed with pt and her  about removing restrictions as medically appropriate pending BG levels and Na, blood pressure levels. Patient's BG are currently not being monitored and pt has no hx of DM. Blood pressure has been normal since admission. Na WNL. Triglyceride, cholesterol, LDL high in 2014; not checked since then. Will remove diabetic diet restrictions and low Na restrictions, but continue with low fat restrictions at this time. Nutrition consult received for diet education; plan to follow up with pt prior to discharge regarding any desired/ needed education. Nutrition Related Findings:    BM 5/15. No edema. Wound Type: None    Current Nutrition Intake & Therapies:  Average Meal Intake: 51-75%  Average Supplement Intake: None ordered  ADULT DIET Regular; 3 carb choices (45 gm/meal);  Low Fat/Low Chol/High Fiber/MARGARET    Anthropometric Measures:  Height: 4' 11\" (149.9 cm)  Ideal Body Weight (IBW): 95 lbs (43 kg)  Admission Body Weight: 138 lb 0.1 oz  Current Body Wt:  62.6 kg (138 lb 0.1 oz), 145.3 % IBW. Bed scale  Current BMI (kg/m2): 27.9  Usual Body Weight: 59 kg (130 lb)  % Weight Change (Calculated): 6.2  Weight Adjustment: No adjustment  BMI Category: Overweight (BMI 25.0-29. 9)    Estimated Daily Nutrient Needs:  Energy Requirements Based On: Formula  Weight Used for Energy Requirements: Admission  Energy (kcal/day): 6086-1827  Weight Used for Protein Requirements: Admission (x0.8-1)  Protein (g/day): 50-63  Method Used for Fluid Requirements: 1 ml/kcal  Fluid (ml/day): 4380-8963    Nutrition Diagnosis:   · No nutrition diagnosis at this time        Nutrition Interventions:   Food and/or Nutrient Delivery: Modify current diet  Nutrition Education/Counseling: No recommendations at this time  Coordination of Nutrition Care: Continue to monitor while inpatient  Plan of Care discussed with: pt and her     Goals:     Goals: Meet at least 75% of estimated needs,by next RD assessment       Nutrition Monitoring and Evaluation:   Behavioral-Environmental Outcomes: None identified  Food/Nutrient Intake Outcomes: Food and nutrient intake  Physical Signs/Symptoms Outcomes: Biochemical data,Meal time behavior    Discharge Planning:    No discharge needs at this time    Oumou Becker, [address]: [phone]

## 2022-05-16 NOTE — INTERDISCIPLINARY ROUNDS
[facility] PHYSICAL REHABILITATION  [address] [place] [address], [address]    INPATIENT REHABILITATION  PRE-TEAM CONFERENCE SUMMARY     Date of Conference: 5/17/2022    Patient Information:        Name: Romulo Johnson Age / Sex: 74 y.o. / female   CSN: [id number] MRN: [id number]   Admit Date: 5/14/2022 Length of Stay: 2 days     Primary Rehabilitation Diagnosis  1. Impaired Mobility and ADLs  2. Multiple sclerosis exacerbation    Comorbidities  Patient Active Problem List   Diagnosis Code    Multiple sclerosis exacerbation (HCC) G35    Weakness of both lower extremities R29.898    Vitamin D deficiency E55.9    Elevated TSH R79.89    Elevated serum free T4 level R79.89    Impaired mobility and ADLs Z74.09, Z78.9    Multiple sclerosis (HCC) G35          Therapy:     FIM SCORES Initial Assessment Weekly Progress Assessment 5/16/2022   Eating Functional Level: 5  Comments: right forearm fx  5   Swallowing     Grooming 5  5   Bathing 4 34   Upper Body Dressing Functional Level: 5  Items Applied/Steps Completed: Pullover (4 steps)  Comments: Pt donned pullover dress with Alise to pull over head Functional Level: 4  Items Applied/Steps Completed: Pullover (4 steps)  Comments: Pt donned pullover dress with Alise to pull over head5   Lower Body Dressing Functional Level: 2  Items Applied/Steps Completed: Underpants (3 steps)  Comments: Pt required maxA to don pul tab brief from bed level rolling to right with Alise. Functional Level: 2  Items Applied/Steps Completed: Underpants (3 steps)  Comments: Pt required maxA to don pul tab brief from bed level rolling to right with Alise. 2   Toileting Functional Level: 5 Functional Level:  (Pt declined)5   Bladder 0 0   Bowel  0 0   Toilet Transfer [place] Toilet Transfer Score: 5  0   Tub/Shower Transfer [place] Tub/Shower Transfer Score: 0  Comments:  (Pt. with right leg pain limiting activity OOB)  0     Comprehension      5   Expression    5   Social Interaction    5   Problem Solving    4   Memory    4     FIM SCORES Initial Assessment Weekly Progress Assessment 5/16/2022   Bed/Chair/Wheelchair Transfers Transfer Type:  (NT 2/2 safety concerns with environment)  Transfer Assistance : 0 (Not tested) (spoke to nursing for more appropriate height bed for safety)  Sit to Stand Assistance: Maximum assistance (from edge of [place] bed with B feet on 6\" block ) Transfer Assistance : 0 (Not tested) (pt unable to reach floor with LE's when sitting EOB)  Sit to Stand Assistance: Maximum assistance (using L UE; B knees blocked by PT; using 6\" stool, x3 reps)   Bed Mobility Rolling Right 3 (Moderate assistance )   Rolling Left 4 (Minimal assistance)   Supine to Sit 2 (Maximal assistance)   Sit to Stand Maximum assistance (from edge of [place] bed with B feet on 6\" block )   Sit to Supine 4 (Minimal assistance) (for left LE management)    Rolling Right   4 (Minimal assistance) (using L UE to pull on bed rail; PT assisting L LE)   Rolling Left   4 (Contact guard assistance) (using bed rail to pull on with L UE)   Supine to Sit   3 (Moderate assistance) (from L side-lying, using L UE to push up; NWB R UE)   Sit to Stand   Maximum assistance (using L UE; B knees blocked by PT; using 6\" stool, x3 reps)   Sit to Supine   4 (Minimal assistance) (L LE management)      Locomotion (W/C) Functional Level: 0 (didn't attempt out of bed to w/c transfer due to safety) Function 0 (didn't attempt out of bed to w/c transfer due to safety)  Setup Assistance         Locomotion (W/C distance)       Locomotion (Walk)          Locomotion (Walk dist.)       Steps/Stairs             Nursing:     Neuro:   AAA&O x 4           Respiratory:   [x] WNL   [] O2 LPM:   Other:  Peripheral Vascular:   [] TEDS present   [] Edema present ____ Grade   Cardiac:   [x] WNL   [] Other  Genitourinary:   [x] continent   [x] incontinent   [] knight  Abdominal __5/15/22_____ LBM  GI: ___Regular____ Diet __thin____ Liquids _____ tube feeds  Musculoskeletal: _active___ ROM Transfers _wheelchair____ Assistive Device Used  _x1___ Level of Assistance  Skin Integumentary:   [x] Intact   [] Not Intact   _repositioning _________Preventative Measures  Details______________________________________________________________  Pain: [x] Controlled   [] Not Controlled   Pain Meds:   [] Scheduled   [x] PRN        Interdisciplinary Team Goals:     1. Discipline  Physical Therapy    Goal  pt will demonstrate supine to sit EOB from L side-lying without use of bed rail, using L UE and NWB R UE, with CGA in preparation for transfers. Barrier  NWB R UE, decreased strength L UE and B LE's, decreased control/ coordination of L LE, impaired sitting balance    Intervention  therex, therapeutic activities, balance/NMR training; pt educ. Goal written by:   Nba Garcia, CATHERINE     2. Discipline  Occupational Therapy    Goal  Pt will demonstrate ability to scoot EOB with LUE (RUE NWB, BLE feet flat on stool) and modA in prep for self cares. Barrier  RUE NWB, decreased LUE strength, decreased core strength, decreased activity tolerance and independence and safety    Intervention  ADL training, LUE strengthening    Goal written by:  Denilson SCHMITT/L     3. Discipline  Speech Therapy    Goal      Barrier      Intervention      Goal written by:       4. Discipline  Nursing    Goal  Patient's pain level will be less than 5/10 at rest and less than 7/10 with activity. Barrier  complaints of right leg pain    Intervention Monitor patient's pain level every 4 hours and offer pain medication when able. Encourage patient to call for PRN pain medication prior to her pain level becoming elevated. Goal written by: Sebas Moore RN     5.   Discipline  Clinical Psychology    Goal  Understand all parameters of rehabilitation need and recovery effort    Barrier  Presumed limited insight about all recovery    Intervention  Patient education, as needed    Goal written by:  Freddie Gonzales, PhD         Disposition / Discharge Planning: Follow-up services:  [x] Physical Therapy             [x] Occupational Therapy       [] Speech Therapy           [] Skilled Nursing      [] Medical Social Worker   [] Aide        [] Outpatient      [] vs   [x] Home Health  [] vs       [] to progress to outpatient       [] with 24-hour supervision       [x] with 24-hour assistance   [] [place]   [facility] recommendations:     Estimated discharge date:     Discharge Location:  [] Home  [] versus    [] [place]    [] [address]   [] Other:           Electronic Signatures:      Signature Date Signed   Physical Therapist   Elvira Ng, MPT  5/16/22   Occupational Therapist   Nathanael Howard MSOTR/L  5/16/22   Speech Therapist         Recreational Therapist    Brandon Sanchez, CTRS 5/16/2022   [address], RN 5/16/2022   Clinical Psychologist    Freddie Gonzales, PhD  5/16/2022    Physician    Jeovany Godoy MD   5/16/2022               Opportunity to share with family/caregiver[] YES [] NO    Relationship to patient____________________________________________________      The above information has been reviewed with the patient in a language that they can understand. Opportunity for comments and questions has been provided and a signed attestation has been scanned into the \"media tab\" of the EMR.       Patient Signature: ______________________________________________________    Date Signed: __________________________________________________________

## 2022-05-16 NOTE — PROGRESS NOTES
[address]  [address] [place] [address], [address]     INPATIENT REHABILITATION  DAILY PROGRESS NOTE     Date: 5/16/2022    Name: Asia Loo Age / Sex: 74 y.o. / female   CSN: [id number] MRN: [id number]   [facility] Date: 5/14/2022 Length of Stay: 2 days     Primary Rehabilitation Diagnosis: Impaired Mobility and ADLs secondary to Multiple sclerosis exacerbation      Subjective:     Patient seen and examined. Blood pressure WNL. Patient's Complaint:   Intermittent shooting pain on right lower extremity for the past few weeks    Pain Control: stable, mild-to-moderate joint symptoms intermittently, reasonably well controlled by current meds      Objective:     Vital Signs:  Patient Vitals for the past 24 hrs:   BP Temp Pulse Resp SpO2   05/16/22 0718 118/62 97.8 °F (36.6 °C) 71 18 95 %   05/15/22 2036 (!) 95/57 97.5 °F (36.4 °C) 84 18 95 %   05/15/22 1605 111/70 97.5 °F (36.4 °C) 76 16 97 %        Physical Examination:  GENERAL SURVEY: Patient is awake, alert, oriented x 3, laying comfortably on the bed, not in acute respiratory distress. HEENT: pink palpebral conjunctivae, anicteric sclerae, no nasoaural discharge, moist oral mucosa  NECK: supple, no jugular venous distention, no palpable lymph nodes  CHEST/LUNGS: symmetrical chest expansion, good air entry, clear breath sounds  HEART: adynamic precordium, good S1 S2, no S3, regular rhythm, no murmurs  ABDOMEN: flat, bowel sounds appreciated, soft, non-tender  EXTREMITIES: pink nailbeds, no edema, full and equal pulses, no calf tenderness   NEUROLOGICAL EXAM: The patient is awake, alert and oriented x3, able to answer questions fairly appropriately, able to follow 1 and 2 step commands. Able to tell time from the wall clock. Cranial nerves II-XII are grossly intact. No gross sensory deficit. Motor strength is 4/5 on BUE, 3+ to 4-/5 on BLE.        Current Medications:  Current Facility-Administered Medications   Medication Dose Route Frequency    docusate sodium (COLACE) capsule 100 mg  100 mg Oral BID    magnesium hydroxide (MILK OF MAGNESIA) 400 mg/5 mL oral suspension 30 mL  30 mL Oral DAILY PRN    bisacodyL (DULCOLAX) tablet 10 mg  10 mg Oral Q48H PRN    bisacodyL (DULCOLAX) suppository 10 mg  10 mg Rectal Q48H PRN    acetaminophen (TYLENOL) tablet 650 mg  650 mg Oral Q6H PRN    cholecalciferol (VITAMIN D3) (1000 Units /25 mcg) tablet 1,000 Units  1,000 Units Oral DAILY    famotidine (PEPCID) tablet 20 mg  20 mg Oral DAILY    oxyCODONE-acetaminophen (PERCOCET) 5-325 mg per tablet 1 Tablet  1 Tablet Oral Q8H PRN       Allergies: Allergies   Allergen Reactions    Amoxicillin Hives and Rash    Clindamycin Hives and Rash    Metronidazole Hives    Tetracycline Hives and Rash       Lab/Data Review:  No results found for this or any previous visit (from the past 24 hour(s)). Assessment:     Primary Rehabilitation Diagnosis  1. Impaired Mobility and ADLs  2. Multiple sclerosis exacerbation    Comorbidities  Patient Active Problem List   Diagnosis Code    Multiple sclerosis exacerbation (HCC) G35    Weakness of both lower extremities R29.898    Vitamin D deficiency E55.9    Elevated TSH R79.89    Elevated serum free T4 level R79.89    Impaired mobility and ADLs Z74.09, Z78.9    Multiple sclerosis ([facility] [address] G35       Plan:     1. Justification for continued stay: Good progression towards established rehabilitation goals. 2. Medical Issues being followed closely:    [x]  Fall and safety precautions     []  Wound Care     [x]  Bowel and Bladder Function     [x]  Fluid Electrolyte and Nutrition Balance     []  Pain Control      3.  Issues that 24 hour rehabilitation nursing is following:    [x]  Fall and safety precautions     []  Wound Care     [x]  Bowel and Bladder Function     [x]  Fluid Electrolyte and Nutrition Balance     []  Pain Control      [x]  Assistance with and education on in-room safety with transfers to and from the bed, wheelchair, toilet and shower. 4. Acute rehabilitation plan of care:    [x]  Continue current care and rehab. [x]  Physical Therapy           [x]  Occupational Therapy           []  Speech Therapy     []  Hold Rehab until further notice     5. Medications:    [x]  MAR Reviewed     [x]  Continue Present Medications     6. Chemical DVT Prophylaxis:      []  Enoxaparin     []  Unfractionated Heparin     []  Warfarin     []  NOAC     []  Aspirin     [x]  None     7. Mechanical DVT Prophylaxis:      [x]  CONNIE Stockings     [x]  Sequential Compression Device     []  None     8. GI Prophylaxis:      []  PPI     [x]  H2 Blocker     []  None / Not indicated     9. Code status:    [x]  Full code     []  Partial code     []  Do not intubate     []  Do not resuscitate     10. Diet:  Specifications  Low fat/Low cholesterol   Solids (consistency)  Regular   Liquids (consistency)  Thin   Fluid Restriction  None     11. Orders:   > Multiple sclerosis exacerbation   > Patient has completed a 5-day course of Methylprednisolone IV on 5/13/2022    > Abnormal thyroid function tests (elevated TSH, elevated free T4)   > TSH (5/15/2022) = 4.00   > Free T4 (5/16/2022) = 1.7   > Total T3 (5/16/2022): PENDING   > Patient not on Levothyroxine     > Vitamin D Deficiency   > Vitamin D 25-Hydroxy (5/9/2022) = 16.6   > Cholecalciferol 50,000 units PO x 1 dose today   > Increase Cholecalciferol 5,000 units PO once daily (starting tomorrow)     > Analgesia   > Continue:    > Acetaminophen 650 mg PO q 6 hr PRN for pain level 4/10 or lesser    > Percocet 5/325 1 tab PO q 8 hr PRN for pain level 5/10 or greater       12. Personal Protective Equipment (N95 face mask) was used while interacting with the patient. Patient was using a surgical mask. 15. Patient's progress in rehabilitation and medical issues discussed with the patient. All questions answered to the best of my ability.  Care plan discussed with patient and nurse.    14. Total clinical care time is 30 minutes, including review of chart including all labs, radiology, past medical history, and discussion with patient. Greater than 50% of my time was spent in coordination of care and counseling.       Signed:    Catalina Kong MD    May 16, 2022

## 2022-05-17 LAB — T3 SERPL-MCNC: 84 NG/DL (ref 71–180)

## 2022-05-17 PROCEDURE — 97542 WHEELCHAIR MNGMENT TRAINING: CPT

## 2022-05-17 PROCEDURE — 77030038269 HC DRN EXT URIN PURWCK BARD -A

## 2022-05-17 PROCEDURE — 65310000000 HC RM PRIVATE REHAB

## 2022-05-17 PROCEDURE — 97530 THERAPEUTIC ACTIVITIES: CPT

## 2022-05-17 PROCEDURE — 74011250637 HC RX REV CODE- 250/637: Performed by: INTERNAL MEDICINE

## 2022-05-17 PROCEDURE — 97535 SELF CARE MNGMENT TRAINING: CPT

## 2022-05-17 PROCEDURE — 97110 THERAPEUTIC EXERCISES: CPT

## 2022-05-17 PROCEDURE — 99232 SBSQ HOSP IP/OBS MODERATE 35: CPT | Performed by: INTERNAL MEDICINE

## 2022-05-17 PROCEDURE — 74011250637 HC RX REV CODE- 250/637: Performed by: FAMILY MEDICINE

## 2022-05-17 RX ADMIN — ACETAMINOPHEN 650 MG: 325 TABLET ORAL at 09:40

## 2022-05-17 RX ADMIN — FAMOTIDINE 20 MG: 20 TABLET ORAL at 08:20

## 2022-05-17 RX ADMIN — CHOLECALCIFEROL TAB 25 MCG (1000 UNIT) 5000 UNITS: 25 TAB at 08:20

## 2022-05-17 RX ADMIN — ACETAMINOPHEN 650 MG: 325 TABLET ORAL at 20:46

## 2022-05-17 NOTE — PROGRESS NOTES
conducted an initial consultation and Spiritual Assessment for Claire Valente, who is a 74 y.o.,female. Patients Primary Language is: [place]. According to the patients EMR Judaism Affiliation is: No preference. The reason the Patient came to the hospital is:   Patient Active Problem List    Diagnosis Date Noted    Elevated serum free T4 level 05/16/2022    Multiple sclerosis ([facility] [address]     Elevated TSH 05/15/2022    Multiple sclerosis exacerbation ([facility] [address] 05/09/2022    Weakness of both lower extremities 05/09/2022    Vitamin D deficiency 05/09/2022    Impaired mobility and ADLs 05/09/2022        The  provided the following Interventions:  Initiated a relationship of care and support with patient in room 181 of the rehab unit  . Listened empathically as patient talked about her being here in the rehab and her hoes for a speedy recovery. There is no advance directive present. Provided chaplaincy education. Provided information about Spiritual Care Services. Offered prayer and assurance of continued prayers on patients behalf. Chart reviewed. The following outcomes were achieved:  Patient shared limited information about both their medical narrative and spiritual journey/beliefs. Patient processed feeling about current hospitalization. Patient expressed gratitude for pastoral care visit. Assessment:  Patient does not have any Holiness/cultural needs that will affect patients preferences in health care. There are no further spiritual or Holiness issues which require Spiritual Care Services interventions at this time. Plan:  Chaplains will continue to follow and will provide pastoral care on an as needed/requested basis    . Popeye Negro   Spiritual Care   [phone]

## 2022-05-17 NOTE — INTERDISCIPLINARY ROUNDS
[facility] PHYSICAL REHABILITATION  [address] [place] [address], [address]    INPATIENT REHABILITATION  PRE-TEAM CONFERENCE SUMMARY     Date of Conference: 5/18/2022    Patient Information:        Name: Berneta Soulier Age / Sex: 74 y.o. / female   CSN: [id number] MRN: [id number]   Admit Date: 5/14/2022 Length of Stay: 3 days     Primary Rehabilitation Diagnosis  1. Impaired Mobility and ADLs  2. Multiple sclerosis exacerbation    Comorbidities  Patient Active Problem List   Diagnosis Code    Multiple sclerosis exacerbation (HCC) G35    Weakness of both lower extremities R29.898    Vitamin D deficiency E55.9    Elevated TSH R79.89    Elevated serum free T4 level R79.89    Impaired mobility and ADLs Z74.09, Z78.9    Multiple sclerosis (HCC) G35          Therapy:     FIM SCORES Initial Assessment Weekly Progress Assessment 5/17/2022   Eating Functional Level: 5  Comments: right forearm fx  5   Swallowing     Grooming 5  5   Bathing 4  4   Upper Body Dressing Functional Level: 5  Items Applied/Steps Completed: Pullover (4 steps)  Comments: Pt donned pullover dress with Alise to pull over head  5   Lower Body Dressing Functional Level: 2  Items Applied/Steps Completed: Underpants (3 steps)  Comments: Pt required maxA to don pul tab brief from bed level rolling to right with Alise.   2   Toileting Functional Level: 5     Bladder 0 0 (pt offered bed pan but declined)   Bowel  0 0   Toilet Transfer Farrar Toilet Transfer Score: 5  5   Tub/Shower Transfer Farrar Tub/Shower Transfer Score: 0  Comments:  (Pt. with right leg pain limiting activity OOB)    0   Comprehension      5   Expression    5   Social Interaction    5   Problem Solving    5   Memory    5     FIM SCORES Initial Assessment Weekly Progress Assessment 5/17/2022   Bed/Chair/Wheelchair Transfers Transfer Type:  (NT 2/2 safety concerns with environment)  Other: stand step without AD  Transfer Assistance : 0 (Not tested) (spoke to nursing for more appropriate height bed for safety)  Sit to Stand Assistance: Maximum assistance (from edge of [place] bed with B feet on 6\" block ) Transfer Type: Other  Other: stand step without AD  Transfer Assistance : 2 (Maximal assistance)  Sit to Stand Assistance: Maximum assistance   Bed Mobility Rolling Right 3 (Moderate assistance )   Rolling Left 4 (Minimal assistance)   Supine to Sit 2 (Maximal assistance)   Sit to Stand Maximum assistance (from edge of [place] bed with B feet on 6\" block )   Sit to Supine 4 (Minimal assistance) (for left LE management)    Rolling Right   4 (Minimal assistance)   Rolling Left   4 (Contact guard assistance)   Supine to Sit   4 (Minimal assistance) (with HOB elevated)   Sit to Stand   Maximum assistance   Sit to Supine          Locomotion (W/C) Functional Level: 0 (didn't attempt out of bed to w/c transfer due to safety) Function  : Moderate assistance  Setup Assistance         Locomotion (W/C distance)    70 feet   Locomotion (Walk)    NT      Locomotion (Walk dist.)    NT   Steps/Stairs    NT         Nursing:     Neuro:   AAA&O x   4         Respiratory:   [] WNL   [] O2 LPM:   Other:  Peripheral Vascular:   [] TEDS present   [] Edema present ____ Grade   Cardiac:   [x] WNL   [] Other  Genitourinary:   [x] continent   [] incontinent   [] knight  Abdominal 5/17_______ LBM  GI: _______ Diet ______ Liquids _____ tube feeds  Musculoskeletal: __4x__ ROM Transfers __wheelchair___ Assistive Device Used  __2__ Level of Assistance  Skin Integumentary:   [x] Intact   [] Not Intact   __________Preventative Measures  Details______________________________________________________________  Pain: [x] Controlled   [] Not Controlled   Pain Meds:   [] Scheduled   [x] PRN        Interdisciplinary Team Goals:     1.  Discipline  Physical Therapy    Goal Encourage pt to perform bed <> w/c transfers with moderate assistance with decreased verbal cuing for safety and to avoid weight bearing on right UE    Barrier  Impaired sensation, Impaired functional strength, Impaired balance    Intervention  Education, Transfer training, Balance training    Goal written by:   Nii Harrell PT, DPT     2. Discipline  Occupational Therapy    Goal  Pt will perform toilet transfer with mod-maxA and AE. Barrier  RUE NWB, impaired BUE strength, impaired standing balance    Intervention  ADL training, LUE strengthening, transfer training    Goal written by:  Alvin Phlegm MSOTR/L     3. Discipline  Speech Therapy    Goal      Barrier      Intervention      Goal written by:       4. Discipline  Nursing    Goal  Pt will able to identify alternatives to help maintain desired acitivity level    Barrier  pain/discomfort;increase in physical complain;fatigue    Intervention Gave pain medicines as needed; repositioning    Goal written by: Verna Pal RN     5. Discipline  Clinical Psychology    Goal  Increase insight about all recovery with emphasis on safety with restrictions in place    Barrier  Variable compliance with NWB recommendations    Intervention  Patient education and reinforcement    Goal written by:  Brayan Rothman, PhD         Disposition / Discharge Planning:      Follow-up services:  [x] Physical Therapy             [x] Occupational Therapy       [] Speech Therapy           [] Skilled Nursing      [] Medical Social Worker   [] Aide        [] Outpatient      [] vs   [x] Home Health  [] vs       [] to progress to outpatient       [] with 24-hour supervision       [x] with 24-hour assistance   [] Travis LIVE recommendations:  w/c   Estimated discharge date:  6/11/2022   Discharge Location:  [x] Home  [] versus    [] [place]    [] [address] [place] [address]   [] Other:           Electronic Signatures:      Signature Date Signed   Physical Therapist   Nii Harrell PT, DPT 5/17/2022    Occupational Therapist   Alvin Asher MSOTR/L  5/17/22   Speech Therapist Recreational Therapist    Steffanie Shelby 5/17/2022   Nursing    Phoebe BRO [facility] RN 5/18/2022   Clinical Psychologist    Dominique Arrieta, PhD  5/18/2022    Physician    Julian Kenny MD   5/17/2022               Opportunity to share with family/caregiver[] YES [] NO    Relationship to patient____________________________________________________      The above information has been reviewed with the patient in a language that they can understand. Opportunity for comments and questions has been provided and a signed attestation has been scanned into the \"media tab\" of the EMR.       Patient Signature: ______________________________________________________    Date Signed: __________________________________________________________

## 2022-05-17 NOTE — CONSULTS
ARU PSYCHOLOGICAL SCREENING    Assessment Initiated:  May 17, 2022    Rehab Diagnosis:  MS Exacerbation and Non Displaced Radial Head Fx    Pertinent Physical/Psychiatric History:     Patient Active Problem List   Diagnosis Code    Multiple sclerosis exacerbation (HCC) G35    Weakness of both lower extremities R29.898    Vitamin D deficiency E55.9    Elevated TSH R79.89    Elevated serum free T4 level R79.89    Impaired mobility and ADLs Z74.09, Z78.9    Multiple sclerosis ([facility] G35       Patient denies history of mental health services and is not requiring psychotropic medication on admit to ARU for stability.   She stopped tobacco use in 1980 and record does not indicate history of other substance use nor dependence      OBJECTIVE  GENERAL OBSERVATIONS  Willingness to participate in program: [x] good   [] fair [] indifferent [] poor    General Appearance:  Patient appears casually and appropriately dressed and groomed, sitting upright in wheelchair after breakfast and becoming increasingly uncomfortable while in seated position    Sensory Impairments:  Patient has satisfactory auditory reception and comprehension and responds to inquiry with intelligibility; she is observed not necessarily conforming to \"non weight bearing status with UE\"    Mosque Affiliation:  Unknown    Admission Assessment  Discharge Status   [x] alert  [] lethargic  [] difficult to arouse  [] fluctuating  [] other: Level of Consciousness [x] alert  [] lethargic  [] difficult to arouse  [] fluctuating  [] other:   [x] person  [x] place  [x] time  [x] situation Oriented [x] person  [x] place  [x] time  [x] situation   [x] within normal limits  [] impaired       [] mild        [] moderate        [] severe Attention [x] within normal limits  [] impaired       [] mild        [] moderate        [] severe   [x] within normal limits  [] impaired       [] mild        [] moderate        [] severe Memory [x] within normal limits  [] impaired [] mild        [] moderate        [] severe   [x] appropriate to situation  [] depressed  [] anxious  [] angry   [] fearful  [] emotionally labile  [x] other: Patient is observed uncomfortable with extended sitting but not reporting acute feelings of emotional distress, and no lability is observed Mood [x] appropriate to situation  [] depressed  [] anxious  [] angry   [] fearful  [] emotionally labile  [] other:   [x] appropriate  [] flat  [] inappropriate to content of speech Affect [x] appropriate  [] flat  [] inappropriate to content of speech   [x] appropriate  [] aggressive/agitated  [] withdrawn  [] inappropriate  [] other: Behavior [x] appropriate  [] aggressive/agitated  [] withdrawn  [] inappropriate  [] other:   [] good  [x] limited  [] denial  [] none Insight Into Illness [x] good  [] limited  [] denial  [] none   [x] intact  [] impaired       [] mild        [] moderate        [] severe       Describe: Patient may benefit from cues and prompts if stressed during rehabilitation effort, especially with observed, variable, subjective pain and discomfort Cognition [x] intact  [] impaired       [] mild        [] moderate        [] severe       Describe:    [x] coping  [] demonstrates poor adjustment  [] undetermined       As evidenced by: Patient's attitude about health issues and immediate circumstances is very positive and genuine, stating: \"You've just got to adapt. \" Patient Adjustment to Disability [x] coping  [] demonstrates poor adjustment  [] undetermined       As evidenced by:    [] coping  [] demonstrates poor adjustment  [x] undetermined      As evidenced by: Not available on evaluation but patient reports that her son has moved into family residence to assist his parents Family Adjustment to Disability [x] coping  [] demonstrates poor adjustment  [] undetermined      As evidenced by:      ASSESSMENT  Clinical Impression:  Patient is a 74year old, , employed ([employer] and working part time from home),  female. She now resides with spouse (who has significant health problems) and their only child, a son, who recently moved back to home to assist his parents. Patient reports that she was diagnosed with MS approximately twenty years ago but has persevered in her efforts to remain active and productive. For instance, as noted, she continues to work as well as ambulate with can and even able to navigate steps. Patient presents with a very positive outlook about her presenting issues and describes \"you've just got to adapt. \"  Patient seems well positioned to work to the best of her ability in therapy program and persevere in his scheduled therapy. Emotionally, she only seems stressed by situational circumstances with (possible) chronic nerve pain which, as noted, is evident for her while in extended sitting position. She denies history of mental health services. She is not Rx psychotropic medication on admit to ARU for mood nor behavior stability. In spite of living with MS related problems for several decades, she essentially gives no indication that she feels particularly overwhelmed nor saddened by recent turn of events. Patient will be monitored for any possible, emergent, emotional and/or behavioral difficulties while on ARU and be encouraged to persevere in her recovery effort on ARU. Cognitively, patient is alert and oriented, able to understand all inquiry and she responds with intelligibility and detail. Patient was observed to not be following NWB restrictions for effected UE; and, she will certainly benefit from cues and prompts, repetition and redirection to maximize all problem solving and carry over, especially if feeling fatigued or temporarily distracted.        Patient Strengths:  Alert, oriented, pleasant, cooperative and obviously motivated to regain functional capabilities and return to premorbid level of function, as able    Patient Preferences: Patient expects to return to home with immediate family members    Rehab Potential:  Good and expressed motivation and desire to regain function    Educational Needs: Under each heading list the specific items in which the patient or family will need education/training.  Example: hip precautions, use of walker, ADL equipment, neglect, judgment, adjustment, etc.     Special considerations or accommodations for teaching:  [x] Yes     [] No     [] NA  If Yes, explain: Cues for safety and recall about NWB status of effected UE Discharge Status    Completed Demonstrated/ Verbalized Understanding    Yes No Yes No   Info regarding disability:  [] [] [] []   Adjustment:  [] [] [] []   Cognition:  [] [] [] []   Other: [] [] [] []   Other: [] [] [] []   If education not completed, explain: [] [] [] []     PLAN  Problem: Limited insight about all recovery  Long Term Goal: Increase insight about recovery  Intervention: Patient education and reinforcement  At Discharge - LTG Achieved: [x] Yes [] No If not achieved, explain:    Problem: Safety awareness  Long Term Goal: Maximize safety awareness  Intervention: Patient education, cues and prompts  At Discharge - LTG Achieved: [x] Yes [] No If not achieved, explain:    Problem: Stress with increased dependency  Long Term Goal: Minimize subjective stress in recovery  Intervention: Support  as needed and redirection  At Discharge - LTG Achieved: [x] Yes [] No If not achieved, explain:    Problem: Pace in recovery  Long Term Goal: Appropriate pace in recovery effort  Intervention: Patient education and behavioral redirection, as needed  At Discharge - LTG Achieved: [x] Yes [] No If not achieved, explain:    Problem:   Long Term Goal:   Intervention:   At Discharge - LTG Achieved: [] Yes [] No If not achieved, explain:    Espinoza Tripp, [facility]  5/17/2022 10:02 AM    DISCHARGE STATUS    Clinical Impressions: Patient effectively participated in scheduled therapy on ARU and made satisfactory gains. She was transferred to SNF on discharge from ARU because of need for further assist for safety and family unable to provide same to her at home, while she is still in recovery. She seemed to be accepting of decision for skilled nursing care transfer and understood efficacy for same.     Follow-up Services Recommended Purpose                 Nevin Torres, PHD  Discharge Date/Time:

## 2022-05-17 NOTE — REHAB NOTE
SHIFT CHANGE NOTE FOR [facility]    Bedside and Verbal shift change report given to Tai Dover RN (oncoming nurse) by Velvet Ceja RN (offgoing nurse). Report included the following information SBAR, Kardex, MAR and Recent Results.     Situation:   Code Status: Full Code   Hospital Day: 3   Problem List:   Hospital Problems  Date Reviewed: 5/16/2022          Codes Class Noted POA    Elevated serum free T4 level ICD-10-CM: R79.89  ICD-9-CM: 794.5  5/16/2022 Yes    Overview Signed 5/16/2022  3:30 PM by General Jewel MD     Free T4 (5/16/2022) = 1.7             Multiple sclerosis ([facility] (Chronic) ICD-10-CM: G35  ICD-9-CM: 340  Unknown Yes        Elevated TSH ICD-10-CM: R79.89  ICD-9-CM: 794.5  5/15/2022 Yes    Overview Signed 5/16/2022  3:30 PM by General Jewel MD     TSH (5/15/2022) = 4.00             * (Principal) Multiple sclerosis exacerbation ([facility] ICD-10-CM: G35  ICD-9-CM: 340  5/9/2022 Yes        Weakness of both lower extremities ICD-10-CM: R29.898  ICD-9-CM: 729.89  5/9/2022 Yes        Vitamin D deficiency (Chronic) ICD-10-CM: E55.9  ICD-9-CM: 268.9  5/9/2022 Yes    Overview Signed 5/16/2022  3:29 PM by General Jewel MD     Vitamin D 25-Hydroxy (5/9/2022) = 16.6              Impaired mobility and ADLs ICD-10-CM: Z74.09, Z78.9  ICD-9-CM: V49.89  5/9/2022 Yes              Background:   Past Medical History:   Past Medical History:   Diagnosis Date    Elevated serum free T4 level 5/16/2022    Free T4 (5/16/2022) = 1.7    Elevated TSH 5/15/2022    TSH (5/15/2022) = 4.00    MS (multiple sclerosis) (HCC)     Multiple sclerosis ([facility]     Vitamin D deficiency 5/9/2022    Vitamin D 25-Hydroxy (5/9/2022) = 16.6         Assessment:   Changes in Assessment throughout shift: No change to previous assessment     Patient has a central line: no Reasons if yes: n/a  Insertion date: n/a Last dressing date: n/a   Patient has Knight Cath: no Reasons if yes: n/a   Insertion date: n/a  Shift knight care completed: NO, n/a     Last Vitals:     Vitals:    05/16/22 1532 05/16/22 1658 05/16/22 1707 05/16/22 2025   BP: 98/64   107/72   Pulse: 87   77   Resp: 18   18   Temp: 98.7 °F (37.1 °C)   98.4 °F (36.9 °C)   SpO2: 94%   94%   Weight:  62.6 kg (138 lb)     Height:  4' 11\" (1.499 m) 4' 11\" (1.499 m)         PAIN    Pain Assessment    Pain Intensity 1: 0 (05/17/22 0404) Pain Intensity 1: 2 (12/29/14 1105)    Pain Location 1: Leg Pain Location 1: Abdomen    Pain Intervention(s) 1: Medication (see MAR) Pain Intervention(s) 1: Medication (see MAR)  Patient Stated Pain Goal: 0 Patient Stated Pain Goal: 0  o Intervention effective: yes  o Other actions taken for pain:       Skin Assessment  Skin color    Condition/Temperature    Integrity Skin Integrity: Intact  Turgor    Weekly Pressure Ulcer Documentation  Pressure  Injury Documentation: No Pressure Injury Noted-Pressure Ulcer Prevention Initiated  Wound Prevention & Protection Methods  Orientation of wound Orientation of Wound Prevention: Posterior  Location of Prevention Location of Wound Prevention: Buttocks,Sacrum/Coccyx  Dressing Present Dressing Present : No  Dressing Status    Wound Offloading Wound Offloading (Prevention Methods): Bed, pressure redistribution/air,Bed, pressure reduction mattress,Chair cushion,Wheelchair     INTAKE/OUPUT  Date 05/16/22 0700 - 05/17/22 0659 05/17/22 0700 - 05/18/22 0659   Shift 7650-3469 7530-3193 24 Hour Total 4215-1757 0724-4990 24 Hour Total   INTAKE   P.O. 720  720 480  480     P. O. 720  720 480  480   Shift Total(mL/kg) 720(11.5)  720(11.5) 480(7.7)  480(7.7)   OUTPUT   Urine(mL/kg/hr)  600(0.8) 600(0.4)        Urine Voided  600 600        Urine Occurrence(s) 0 x 1 x 1 x      Stool           Stool Occurrence(s) 0 x 0 x 0 x      Shift Total(mL/kg)  600(9.6) 600(9.6)       -600 120 480  480   Weight (kg) 62.6 62.6 62.6 62.6 62.6 62.6       Recommendations:  1. Patient needs and requests: Toileting, repositioning    2.  Pending tests/procedures: None at this time. 3. Functional Level/Equipment: Partial (one person) / Bed (comment); Wheelchair    Fall Precautions:   Fall risk precautions were reinforced with the patient; she was instructed to call for help prior to getting up. The following fall risk precautions were continued: bed/ chair alarms, door signage, yellow bracelet and socks as well as update of the [employer] tool in the patient's room. Rupesh Score: 4    HEALS Safety Check    A safety check occurred in the patient's room between off going nurse and oncoming nurse listed above. The safety check included the below items  Area Items   H  High Alert Medications - Verify all high alert medication drips (heparin, PCA, etc.)   E  Equipment - Suction is set up for ALL patients (with cyn)  - Red plugs utilized for all equipment (IV pumps, etc.)  - WOWs wiped down at end of shift.  - Room stocked with oxygen, suction, and other unit-specific supplies   A  Alarms - Bed alarm is set for fall risk patients  - Ensure chair alarm is in place and activated if patient is up in a chair   L  Lines - Check IV for any infiltration  - Torres bag is empty if patient has a Torres   - Tubing and IV bags are labeled   S  Safety   - Room is clean, patient is clean, and equipment is clean. - Hallways are clear from equipment besides carts. - Fall bracelet on for fall risk patients  - Ensure room is clear and free of clutter  - Suction is set up for ALL patients (with cyn)  - Hallways are clear from equipment besides carts.    - Isolation precautions followed, supplies available outside room, sign posted     Galindo Griffin RN

## 2022-05-17 NOTE — PROGRESS NOTES
Problem: Self Care Deficits Care Plan (Adult)  Goal: *Therapy Goal (Edit Goal, Insert Text)  Description: Occupational Therapy Goals   Long Term Goals  Initiated 5/15 and to be accomplished within 2 week(s)  1. Pt will perform self-feeding with I.  2. Pt will perform grooming with I.  3. Pt will perform UB bathing with Mod I.  4. Pt will perform LB bathing with Mod I.  5. Pt will perform tub/shower transfer with Mod I.   6. Pt will perform UB dressing with I.  7. Pt will perform LB dressing with Mod I.  8. Pt will perform toileting task with Mod I.  9. Pt will perform toilet transfer with Mod I.  10. Pt. Will increase BUE strength to 5/5 in order to increase safety at home during ADLs. Short Term Goals   Initiated 5/15 and to be accomplished within 7 day(s)  1. Pt will perform self-feeding with Mod I.   2. Pt will perform grooming with Mod I.  3. Pt will perform UB bathing with Supervision . 4. Pt will perform LB bathing with Supervision. 5. Pt will perform tub/shower transfer with 4.  6. Pt will perform UB dressing with Mod I.  7. Pt will perform LB dressing with Min A.  8. Pt will perform toileting task with Mod A.  9. Pt will perform toilet transfer with Min A.   10. Pt. Will demonstrate functional mobility with CGA with RW in order to increase I for ADLs, by setting up prior to activities. Outcome: Progressing Towards Goal  Goal: Interventions  Outcome: Progressing Towards Goal   Occupational Therapy TREATMENT    Patient: Meghann Mansfield   74 y.o. Patient identified with name and : yes    Date: 2022    First Tx Session  Time In: 1330  Time Out: 1400    Diagnosis: Multiple sclerosis ([facility] [address] [G35]   Precautions: Fall,NWB (right UE)  Chart, occupational therapy assessment, plan of care, and goals were reviewed. Pain:  Pt reports \"nerve pain or discomfort\" prior to treatment; right hip/ leg. Pt reports \"nerve pain or discomfort\" post treatment; right hip/ leg.   Intervention Provided: repositioning; tasks modified based on patient's tolerance. Intermittent rest breaks. Pt denies need for pain medication at this time. SUBJECTIVE:   Patient stated I would love to wash my hair.     OBJECTIVE DATA SUMMARY:     THERAPEUTIC ACTIVITY Daily Assessment    RUE gentle AROM performed seated wheelchair level 15 reps x1 for shoulder flexion to ~90 degrees, elbow flexion/ extension, wrist flexion/ extension, and digit flexion/ extension to facilitate range of motion and to prevent deconditioning and  joint stiffness while maintaining weight bearing restrictions. Edu/ instruction regarding RUE NWB restrictions within context of self-care/ ADL performance. GROOMING Daily Assessment    Functional Level: 5  Tasks Completed by Patient: Brushed teeth (Combed hair )  Comments: Pt performed oral hygiene/ brushed her teeth (set-up) level with supplies set-up on tray table. Patient combed her hair (set-up) level after her hair was washed. Oral hygiene: 5 (set-up)     BATHING Daily Assessment    Therapist assisted with washing patient's hair at this time; per her request. Wheelchair was positioned in pt's shower with waterproof barrier and towels draped across patients shoulders, clothing, and across back of wheelchair. Nursing assistant assisted with set-up. Hair was shampooed x2 and rinsed using hand held shower nozzle; pt assisted as able during rinsing process while maintaining RUE NWB restrictions. Pt assisted with towel drying her hair and with combing hair at conclusion of task. LOWER BODY DRESSING Daily Assessment    Therapist introduced use of AE for LB dressing tasks; e.g. reacher; long handled shoe horn. Sock and/or Shoe management: 3 Mod A overall (Min A for doffing/ donning socks using crossed leg technique;  Mod/ Max for donning sneakers using LH shoe horn)      ASSESSMENT:  Today's skilled occupational therapy session focused on self-care performance, edu/ instruction in use of AE for LB ADL's (e.g. reacher; long handled shoe horn), and gentle RUE AROM to prevent joint stiffness and deconditioning while maintaining weight bearing restrictions. Pt will continue to benefit from skilled occupational therapy interventions to address decreased (I) with ADL's, impaired functional transfers/ mobility, impaired balance/ coordination, RUE NWB status, decreased strength/ endurance, and generalized weakness impacting pt's functional independence and safety with ADL's and mobility. Progression toward goals:  []          Improving appropriately and progressing toward goals  [x]          Improving slowly and progressing toward goals  []          Not making progress toward goals and plan of care will be adjusted     PLAN:  Patient continues to benefit from skilled intervention to address the above impairments. Continue treatment per established plan of care. Discharge Recommendations:  Home Health occupational therapy with family support/ assist  Further Equipment Recommendations for Discharge:  TBD pending progress     Activity Tolerance:  Fair; intermittent rest breaks due to fatigue   Estimated LOS: 6/11/2022    Please refer to the flowsheet for vital signs taken during this treatment. After treatment:   [x]  Patient left in no apparent distress sitting up in wheelchair with needs met  []  Patient left in no apparent distress in bed  [x]  Call bell left within reach  [x]  Nursing notified  []  Caregiver present  [x]  Wheelchair alarm activated    COMMUNICATION/EDUCATION:   [] Home safety education was provided and the patient/caregiver indicated understanding. [x] Patient/family have participated as able in goal setting and plan of care. [x] Patient/family agree to work toward stated goals and plan of care. [] Patient understands intent and goals of therapy, but is neutral about his/her participation. [] Patient is unable to participate in goal setting and plan of care.     Yanna Nunez, OT

## 2022-05-17 NOTE — PROGRESS NOTES
Problem: Falls - Risk of  Goal: *Absence of Falls  Description: Document Angieflor Locke Fall Risk and appropriate interventions in the flowsheet. Outcome: Progressing Towards Goal  Note: Fall Risk Interventions:  Mobility Interventions: Bed/chair exit alarm,Communicate number of staff needed for ambulation/transfer,Patient to call before getting OOB,Utilize walker, cane, or other assistive device,Utilize gait belt for transfers/ambulation         Medication Interventions: Bed/chair exit alarm,Patient to call before getting OOB,Teach patient to arise slowly,Utilize gait belt for transfers/ambulation    Elimination Interventions: Bed/chair exit alarm,Call light in reach,Patient to call for help with toileting needs,Toileting schedule/hourly rounds    History of Falls Interventions: Bed/chair exit alarm,Door open when patient unattended,Utilize gait belt for transfer/ambulation,Room close to nurse's station         Problem: Pressure Injury - Risk of  Goal: *Prevention of pressure injury  Description: Document Wagner Scale and appropriate interventions in the flowsheet. Outcome: Progressing Towards Goal  Note: Pressure Injury Interventions:       Moisture Interventions: Absorbent underpads,Assess need for specialty bed,Check for incontinence Q2 hours and as needed,Internal/External urinary devices,Maintain skin hydration (lotion/cream),Minimize layers    Activity Interventions: Assess need for specialty bed,Chair cushion,Increase time out of bed,Pressure redistribution bed/mattress(bed type)    Mobility Interventions: Assess need for specialty bed,Chair cushion,HOB 30 degrees or less,Pressure redistribution bed/mattress (bed type),Turn and reposition approx.  every two hours(pillow and wedges)    Nutrition Interventions: Document food/fluid/supplement intake                     Problem: Pain  Goal: *Control of Pain  Outcome: Progressing Towards Goal

## 2022-05-17 NOTE — PROGRESS NOTES
[id number]  [address] [place] [address], [address]     INPATIENT REHABILITATION  DAILY PROGRESS NOTE     Date: 5/17/2022    Name: Caden Harper Age / Sex: 74 y.o. / female   CSN: [id number] MRN: [id number]   Admit Date: 5/14/2022 Length of Stay: 3 days     Primary Rehabilitation Diagnosis: Impaired Mobility and ADLs secondary to Multiple sclerosis exacerbation      Subjective:     Patient seen and examined. Blood pressure WNL. Team conference was held at bedside this PM.     Patient's Complaint:   No significant medical complaints    Pain Control: stable, mild-to-moderate joint symptoms intermittently, reasonably well controlled by current meds      Objective:     Vital Signs:  Patient Vitals for the past 24 hrs:   BP Temp Pulse Resp SpO2 Height Weight   05/17/22 0740 122/69 97.9 °F (36.6 °C) 79 18 98 % -- --   05/16/22 2025 107/72 98.4 °F (36.9 °C) 77 18 94 % -- --   05/16/22 1707 -- -- -- -- -- 4' 11\" (1.499 m) --   05/16/22 1658 -- -- -- -- -- 4' 11\" (1.499 m) 62.6 kg (138 lb)   05/16/22 1532 98/64 98.7 °F (37.1 °C) 87 18 94 % -- --        Physical Examination:  GENERAL SURVEY: Patient is awake, alert, oriented x 3, laying comfortably on the bed, not in acute respiratory distress. HEENT: pink palpebral conjunctivae, anicteric sclerae, no nasoaural discharge, moist oral mucosa  NECK: supple, no jugular venous distention, no palpable lymph nodes  CHEST/LUNGS: symmetrical chest expansion, good air entry, clear breath sounds  HEART: adynamic precordium, good S1 S2, no S3, regular rhythm, no murmurs  ABDOMEN: flat, bowel sounds appreciated, soft, non-tender  EXTREMITIES: pink nailbeds, no edema, full and equal pulses, no calf tenderness   NEUROLOGICAL EXAM: The patient is awake, alert and oriented x3, able to answer questions fairly appropriately, able to follow 1 and 2 step commands. Able to tell time from the wall clock. Cranial nerves II-XII are grossly intact.   No gross sensory deficit. Motor strength is 4/5 on BUE, 3+ to 4-/5 on BLE. Current Medications:  Current Facility-Administered Medications   Medication Dose Route Frequency    cholecalciferol (VITAMIN D3) (1000 Units /25 mcg) tablet 5,000 Units  5,000 Units Oral DAILY    docusate sodium (COLACE) capsule 100 mg  100 mg Oral BID    magnesium hydroxide (MILK OF MAGNESIA) 400 mg/5 mL oral suspension 30 mL  30 mL Oral DAILY PRN    bisacodyL (DULCOLAX) tablet 10 mg  10 mg Oral Q48H PRN    bisacodyL (DULCOLAX) suppository 10 mg  10 mg Rectal Q48H PRN    acetaminophen (TYLENOL) tablet 650 mg  650 mg Oral Q6H PRN    famotidine (PEPCID) tablet 20 mg  20 mg Oral DAILY    oxyCODONE-acetaminophen (PERCOCET) 5-325 mg per tablet 1 Tablet  1 Tablet Oral Q8H PRN       Allergies:   Allergies   Allergen Reactions    Amoxicillin Hives and Rash    Clindamycin Hives and Rash    Metronidazole Hives    Tetracycline Hives and Rash       Functional Progress:    PHYSICAL THERAPY    ON ADMISSION MOST RECENT   Wheelchair Mobility/Management  Functional Level: 0 (didn't attempt out of bed to w/c transfer due to safety) Wheelchair Mobility/Management  Functional Level: 0 (didn't attempt out of bed to w/c transfer due to safety)             Balance-Sitting/Standing  Sitting - Static: Fair (occasional)  Sitting - Dynamic: Fair (occasional)  Standing - Static: Poor  Standing - Dynamic : Impaired Balance-Sitting/Standing  Sitting - Static: Fair (occasional)  Sitting - Dynamic: Fair (occasional)  Standing - Static: Poor  Standing - Dynamic : Impaired     Bed/Mat Mobility  Rolling Right : 3 (Moderate assistance )  Rolling Left : 4 (Minimal assistance)  Supine to Sit : 2 (Maximal assistance)  Sit to Supine : 4 (Minimal assistance) (for left LE management) Bed/Mat Mobility  Rolling Right : 4 (Minimal assistance)  Rolling Left : 4 (Contact guard assistance)  Supine to Sit : 4 (Minimal assistance) (with HOB elevated)  Sit to Supine : 4 (Minimal assistance) (L LE management)     Transfers  Transfer Type:  (NT 2/2 safety concerns with environment)  Other: stand step without AD  Transfer Assistance : 0 (Not tested) (spoke to nursing for more appropriate height bed for safety)  Sit to Stand Assistance: Maximum assistance (from edge of [place] bed with B feet on 6\" block ) Transfers  Transfer Type: Other  Other: stand step without AD  Transfer Assistance : 2 (Maximal assistance)  Sit to Stand Assistance: Maximum assistance                 Lab/Data Review:  Recent Results (from the past 24 hour(s))   T4, FREE    Collection Time: 05/16/22 11:20 AM   Result Value Ref Range    T4, Free 1.7 (H) 0.7 - 1.5 NG/DL   T3 TOTAL    Collection Time: 05/16/22 11:20 AM   Result Value Ref Range    T3, total 84 71 - 180 ng/dL       Assessment:     Primary Rehabilitation Diagnosis  1. Impaired Mobility and ADLs  2. Multiple sclerosis exacerbation    Comorbidities  Patient Active Problem List   Diagnosis Code    Multiple sclerosis exacerbation (HCC) G35    Weakness of both lower extremities R29.898    Vitamin D deficiency E55.9    Elevated TSH R79.89    Elevated serum free T4 level R79.89    Impaired mobility and ADLs Z74.09, Z78.9    Multiple sclerosis ([facility] [address] G35       Plan:     1. Justification for continued stay: Good progression towards established rehabilitation goals. 2. Medical Issues being followed closely:    [x]  Fall and safety precautions     []  Wound Care     [x]  Bowel and Bladder Function     [x]  Fluid Electrolyte and Nutrition Balance     []  Pain Control      3. Issues that 24 hour rehabilitation nursing is following:    [x]  Fall and safety precautions     []  Wound Care     [x]  Bowel and Bladder Function     [x]  Fluid Electrolyte and Nutrition Balance     []  Pain Control      [x]  Assistance with and education on in-room safety with transfers to and from the bed, wheelchair, toilet and shower.       4. Acute rehabilitation plan of care:    [x] Continue current care and rehab. [x]  Physical Therapy           [x]  Occupational Therapy           []  Speech Therapy     []  Hold Rehab until further notice     5. Medications:    [x]  MAR Reviewed     [x]  Continue Present Medications     6. Chemical DVT Prophylaxis:      []  Enoxaparin     []  Unfractionated Heparin     []  Warfarin     []  NOAC     []  Aspirin     [x]  None     7. Mechanical DVT Prophylaxis:      [x]  CONNIE Stockings     [x]  Sequential Compression Device     []  None     8. GI Prophylaxis:      []  PPI     [x]  H2 Blocker     []  None / Not indicated     9. Code status:    [x]  Full code     []  Partial code     []  Do not intubate     []  Do not resuscitate     10. Diet:  Specifications  Low fat/Low cholesterol   Solids (consistency)  Regular   Liquids (consistency)  Thin   Fluid Restriction  None     11. Orders:   > Multiple sclerosis exacerbation   > Patient has completed a 5-day course of Methylprednisolone IV on 5/13/2022    > Abnormal thyroid function tests (elevated TSH, elevated free T4) ~ Euthyroid sick syndrome vs Subclinical hypothyroidism   > TSH (5/15/2022) = 4.00   > Free T4 (5/16/2022) = 1.7   > Total T3 (5/16/2022) = 84   > TFTs to be rechecked in 4 to 6 weeks on an outpatient basis   > No need for Levothyroxine at this time      > Vitamin D Deficiency   > Vitamin D 25-Hydroxy (5/9/2022) = 16.6   > On 5/16/2022, patient was given Cholecalciferol 50,000 units PO x 1 dose   > Increase Cholecalciferol 5,000 units PO once daily    > Analgesia   > Continue:    > Acetaminophen 650 mg PO q 6 hr PRN for pain level 4/10 or lesser    > Percocet 5/325 1 tab PO q 8 hr PRN for pain level 5/10 or greater       12. Personal Protective Equipment (N95 face mask) was used while interacting with the patient. Patient was using a surgical mask. 15. Patient's progress in rehabilitation and medical issues discussed with the patient and .  All questions answered to the best of my ability. Care plan discussed with patient and nurse. 14. Total clinical care time is 30 minutes, including review of chart including all labs, radiology, past medical history, and discussion with patient. Greater than 50% of my time was spent in coordination of care and counseling.       Emilee Weber MD    May 17, 2022

## 2022-05-17 NOTE — INTERDISCIPLINARY ROUNDS
[facility] PHYSICAL REHABILITATION  [address] [place] [address], [address]    INPATIENT REHABILITATION  TEAM CONFERENCE SUMMARY     Date of Conference: 5/17/2022    Patient Information:        Name: Nanette Marroquin Age / Sex: 74 y.o. / female   CSN: [id number] MRN: [id number]   Admit Date: 5/14/2022 Length of Stay: 3 days     Primary Rehabilitation Diagnosis  1. Impaired Mobility and ADLs  2. Multiple sclerosis exacerbation    Comorbidities  Patient Active Problem List   Diagnosis Code    Multiple sclerosis exacerbation (HCC) G35    Weakness of both lower extremities R29.898    Vitamin D deficiency E55.9    Elevated TSH R79.89    Elevated serum free T4 level R79.89    Impaired mobility and ADLs Z74.09, Z78.9    Multiple sclerosis (HCC) G35          Therapy:     FIM SCORES Initial Assessment Weekly Progress Assessment 5/17/2022   Eating Functional Level: 5  Comments: right forearm fx  5   Swallowing     Grooming 5  5   Bathing 4  4   Upper Body Dressing Functional Level: 5  Items Applied/Steps Completed: Pullover (4 steps)  Comments: Pt donned pullover dress with Alise to pull over head  5   Lower Body Dressing Functional Level: 2  Items Applied/Steps Completed: Underpants (3 steps)  Comments: Pt required maxA to don pul tab brief from bed level rolling to right with Alise.   2   Toileting Functional Level: 5  5   Bladder 0 0 (pt offered bed pan but declined)   Bowel  0 0   Toilet Transfer [place] Toilet Transfer Score: 5  0   Tub/Shower Transfer [place] Tub/Shower Transfer Score: 0  Comments:  (Pt. with right leg pain limiting activity OOB)  0     Comprehension      5   Expression    5   Social Interaction    5   Problem Solving    4   Memory    4     FIM SCORES Initial Assessment Weekly Progress Assessment 5/17/2022   Bed/Chair/Wheelchair Transfers Transfer Type:  (NT 2/2 safety concerns with environment)  Other: stand step without AD  Transfer Assistance : 0 (Not tested) (spoke to nursing for more appropriate height bed for safety)  Sit to Stand Assistance: Maximum assistance (from edge of [place] bed with B feet on 6\" block ) Transfer Type: Other  Other: stand step without AD  Transfer Assistance : 2 (Maximal assistance)  Sit to Stand Assistance: Maximum assistance   Bed Mobility Rolling Right 3 (Moderate assistance )   Rolling Left 4 (Minimal assistance)   Supine to Sit 2 (Maximal assistance)   Sit to Stand Maximum assistance (from edge of [place] bed with B feet on 6\" block )   Sit to Supine 4 (Minimal assistance) (for left LE management)    Rolling Right   4 (Minimal assistance)   Rolling Left   4 (Contact guard assistance)   Supine to Sit   4 (Minimal assistance) (with HOB elevated)   Sit to Stand   Maximum assistance   Sit to Supine          Locomotion (W/C) Functional Level: 0 (didn't attempt out of bed to w/c transfer due to safety) Function    Setup Assistance         Locomotion (W/C distance)       Locomotion (Walk)          Locomotion (Walk dist.)       Steps/Stairs             Nursing:     Neuro:   AAA&O x 4           Respiratory:   [x] WNL   [] O2 LPM:   Other:  Peripheral Vascular:   [] TEDS present   [] Edema present ____ Grade   Cardiac:   [x] WNL   [] Other  Genitourinary:   [x] continent   [x] incontinent   [] knight  Abdominal __5/15/22_____ LBM  GI: ___Regular____ Diet __thin____ Liquids _____ tube feeds  Musculoskeletal: _active___ ROM Transfers _wheelchair____ Assistive Device Used  _x1___ Level of Assistance  Skin Integumentary:   [x] Intact   [] Not Intact   _repositioning _________Preventative Measures  Details______________________________________________________________  Pain: [x] Controlled   [] Not Controlled   Pain Meds:   [] Scheduled   [x] PRN        Interdisciplinary Team Goals:     1.  Discipline  Physical Therapy    Goal  pt will demonstrate supine to sit EOB from L side-lying without use of bed rail, using L UE and NWB R UE, with CGA in preparation for transfers. Barrier  NWB R UE, decreased strength L UE and B LE's, decreased control/ coordination of L LE, impaired sitting balance    Intervention  therex, therapeutic activities, balance/NMR training; pt educ. Goal written by:   CATHERINE Lawrence     2. Discipline  Occupational Therapy    Goal  Pt will demonstrate ability to scoot EOB with LUE (RUE NWB, BLE feet flat on stool) and modA in prep for self cares. Barrier  RUE NWB, decreased LUE strength, decreased core strength, decreased activity tolerance and independence and safety    Intervention  ADL training, LUE strengthening    Goal written by:  Anju Frost MSOTR/L     3. Discipline  Speech Therapy    Goal      Barrier      Intervention      Goal written by:       4. Discipline  Nursing    Goal  Patient's pain level will be less than 5/10 at rest and less than 7/10 with activity. Barrier  complaints of right leg pain    Intervention Monitor patient's pain level every 4 hours and offer pain medication when able. Encourage patient to call for PRN pain medication prior to her pain level becoming elevated. Goal written by: Live Rios RN     5. Discipline  Clinical Psychology    Goal  Understand all parameters of rehabilitation need and recovery effort    Barrier  Presumed limited insight about all recovery    Intervention  Patient education, as needed    Goal written by:  Sunny Sanchez, PhD         Disposition / Discharge Planning:      Follow-up services:  [x] Physical Therapy             [x] Occupational Therapy       [] Speech Therapy           [] Skilled Nursing      [] Medical Social Worker   [] Aide        [] Outpatient      [] vs   [x] Home Health  [] vs       [] to progress to outpatient       [] with 24-hour supervision       [x] with 24-hour assistance   [] Travis LIVE recommendations:  tbd   Estimated discharge date:  tbd   Discharge Location:  [] Home  [] versus    [] [facility]    [] [address]   [] Other:           Interdisciplinary team rounds were held this PM with the following team members:       Name   Physical Therapist    Carlyle Ceja PT, DPT     Occupational Therapist    Savage Newton MS, OTR/L   Recreational Therapist    Yves Archer, [facility] MSN RN [facility]   Physician    Kya Newell MD        Signed:  Kya Newell MD    May 17, 2022

## 2022-05-17 NOTE — PROGRESS NOTES
[x] Psychology  [] Social Work [] Recreational Therapy    INTERVENTION  UNITS/TIME OF SERVICE   Assessment  May 17, 2022   Supportive Counseling    Orientation    Discharge Planning    Resource Linkage              Progress/Current Status    Patient seen for Psychological Evaluation as requested on admission to ARU by Dr. Servando Rudd. Patient found sitting upright in wheelchair in bedroom after breakfast and observed to become increasingly more uncomfortable, with increased complaint of \"nerve pain\" while in seated position. Patient was eventually offered pain medication as well as assisted in returning to bed from wheelchair, at end of this interview. She is able to describe recent circumstances of her need for hospitalization. She acknowledges having been first diagnosed with MS approximately twenty years ago; but, she also insists that she has remained active and employed through these years. Furthermore, in spite of apparent functional changes, she reportedly can navigate steps at home and utilizes a cane for gait and balance. At present, patient is not reporting acute feelings of emotional distress and no lability is observed. She denies history of mental health services and she is not requiring psychotropic medication for mood nor behavior stability. Patient does describe that spouse has significant health problems and that their only child, a son, has moved into the residence to assist them as necessary. Patient will be monitored for any possible, emergent, emotional and/or behavioral difficulties while on ARU and be encouraged to persevere in her therapy effort.     Brayan Rothman, [facility] 5/17/2022 9:56 AM

## 2022-05-17 NOTE — PROGRESS NOTES
Problem: Self Care Deficits Care Plan (Adult)  Goal: *Therapy Goal (Edit Goal, Insert Text)  Description: Occupational Therapy Goals   Long Term Goals  Initiated 5/15 and to be accomplished within 2 week(s)  1. Pt will perform self-feeding with I.  2. Pt will perform grooming with I.  3. Pt will perform UB bathing with Mod I.  4. Pt will perform LB bathing with Mod I.  5. Pt will perform tub/shower transfer with Mod I.   6. Pt will perform UB dressing with I.  7. Pt will perform LB dressing with Mod I.  8. Pt will perform toileting task with Mod I.  9. Pt will perform toilet transfer with Mod I.  10. Pt. Will increase BUE strength to 5/5 in order to increase safety at home during ADLs. Short Term Goals   Initiated 5/15 and to be accomplished within 7 day(s)  1. Pt will perform self-feeding with Mod I.   2. Pt will perform grooming with Mod I.  3. Pt will perform UB bathing with Supervision . 4. Pt will perform LB bathing with Supervision. 5. Pt will perform tub/shower transfer with 4.  6. Pt will perform UB dressing with Mod I.  7. Pt will perform LB dressing with Min A.  8. Pt will perform toileting task with Mod A.  9. Pt will perform toilet transfer with Min A.   10. Pt. Will demonstrate functional mobility with CGA with RW in order to increase I for ADLs, by setting up prior to activities. Outcome: Progressing Towards Goal  Goal: Interventions  Outcome: Progressing Towards Goal   Occupational Therapy TREATMENT    Patient: Lydia Fischer   74 y.o. Patient identified with name and : yes    Date: 2022    First Tx Session  Time In: 1118  Time Out[de-identified] 5427    Pt refused second OT session  OTR encouraged pt to engage in OT treatment 1436 (second Ot session, participated in first session earlier) however pt refused reporting I'm getting ready to relax.  OTR encouraged pt to engage in transfer training to increase independence with toilet transfers however pt refused reporting I need to let my food digest, I had a busy morning. Pt declined reporting not having to use the bathroom. OTR provided education x2 for need for transfer training to increase independence with transfers for toileting however did not need to complete toileting due to using bedpan. Pt reported not being able to participate in OT due to having meeting later this afternoon. OTR provided education for team conference being at  and assuring pt would have enough time to get back to bed before meeting. OTR reiterated education for use of call bell following consult with CNA reporting pt in pain in w/c and leaning over bed earlier lying next to call bell within reach however did not use call bell for assistance. OTR encouraged pt to try to sit up in w/c however if in pain or uncomfortable, wanting to go to the bed, use bathroom or perform self cares etc. To use call bell. Pt verbalized understanding. Pt reported I just want to lie down and refused OT session this date and confirmed OT session tomorrow morning at 0700 x2 reps for shower. Diagnosis: Multiple sclerosis ([facility] [address] [G35]   Precautions: Fall,NWB (right UE)  Chart, occupational therapy assessment, plan of care, and goals were reviewed. Pain:  Pt reports 7/10 pain or discomfort prior to treatment. (R leg)  Pt reports -/10 pain or discomfort post treatment. Intervention Provided: nursing aware      SUBJECTIVE:   Patient stated I'd like to wash my hair it's been over a week.     OBJECTIVE DATA SUMMARY:     THERAPEUTIC EXERCISE Daily Assessment    Pt performed LUE strengthening with rainbow arch and 1# wrist weight to increase pt LUE strength transporting 30 rings using smooth and controlled movements without touching white arch with ring. MOBILITY/TRANSFERS Daily Assessment        Pt engaged in transfer training to increase independence with shower transfers in prep for showering secondary to pt reporting wanting to wash hair and get in shower.  OTR adjusted tub transfer bench to height. Following education pt performed shower transfer via squat pivot transfer using grab bar with LUE to transfer to tub transfer bench with maxA. Pt demonstrated increased difficulty with transfer due to chair alarm and pt dress getting stuck to back of legs and w/c.              ASSESSMENT:  Pt is improving LUE strength for self cares and functional mobility. Pt requires VC's to maintain RUE NWB status with functional transfers and self cares. Pt continues to require maxA for functional transfers. OTR provided education for use of call bell with fatigue and/or pain. Continue to encourage OOB activity in w/c however negatively impacted by R hip and back pain. Progression toward goals:  []          Improving appropriately and progressing toward goals  [x]          Improving slowly and progressing toward goals  []          Not making progress toward goals and plan of care will be adjusted     PLAN:  Patient continues to benefit from skilled intervention to address the above impairments. Continue treatment per established plan of care. Discharge Recommendations:  Home Health  Further Equipment Recommendations for Discharge:  TBD dependent on progress     Activity Tolerance:  fair      Estimated LOS:6/11/22    Please refer to the flowsheet for vital signs taken during this treatment. After treatment:   [x]  Patient left in no apparent distress sitting up in chair   [x]  Patient left in no apparent distress in bed  [x]  Call bell left within reach  [x]  Nursing notified  []  Caregiver present  []  Bed alarm activated    COMMUNICATION/EDUCATION:   [] Home safety education was provided and the patient/caregiver indicated understanding. [] Patient/family have participated as able in goal setting and plan of care. [x] Patient/family agree to work toward stated goals and plan of care. [] Patient understands intent and goals of therapy, but is neutral about his/her participation.   [] Patient is unable to participate in goal setting and plan of care.       Vale Danielle, OT

## 2022-05-17 NOTE — PROGRESS NOTES
OTR encouraged pt to engage in OT treatment 1436 (second Ot session, participated in first session earlier) however pt refused reporting I'm getting ready to relax. OTR encouraged pt to engage in transfer training to increase independence with toilet transfers however pt refused reporting I need to let my food digest, I had a busy morning. Pt declined reporting not having to use the bathroom. OTR provided education x2 for need for transfer training to increase independence with transfers for toileting however did not need to complete toileting due to using bedpan. Pt reported not being able to participate in OT due to having meeting later this afternoon. OTR provided education for team conference being at  and assuring pt would have enough time to get back to bed before meeting. OTR reiterated education for use of call bell following consult with CNA reporting pt in pain in w/c and leaning over bed earlier lying next to call bell within reach however did not use call bell for assistance. OTR encouraged pt to try to sit up in w/c however if in pain or uncomfortable, wanting to go to the bed, use bathroom or perform self cares etc. To use call bell. Pt verbalized understanding. Pt reported I just want to lie down and refused OT session this date and confirmed OT session tomorrow morning at 0700 x2 reps for shower.    Maria T SCHMITT/LANG

## 2022-05-17 NOTE — REHAB NOTE
[facility]    Bedside and Verbal shift change report given to Aminta Livingston RN (oncoming nurse) by Jamal Lipscomb RN (offgoing nurse). Report included the following information SBAR, Kardex, MAR and Recent Results.     Situation:   Code Status: Full Code   Hospital Day: 3   Problem List:   Hospital Problems  Date Reviewed: 5/17/2022          Codes Class Noted POA    Elevated serum free T4 level ICD-10-CM: R79.89  ICD-9-CM: 794.5  5/16/2022 Yes    Overview Signed 5/16/2022  3:30 PM by Ilda Aceves MD     Free T4 (5/16/2022) = 1.7             Multiple sclerosis (HCC) (Chronic) ICD-10-CM: G35  ICD-9-CM: 340  Unknown Yes        Elevated TSH ICD-10-CM: R79.89  ICD-9-CM: 794.5  5/15/2022 Yes    Overview Signed 5/16/2022  3:30 PM by Ilda Aceves MD     TSH (5/15/2022) = 4.00             * (Principal) [facility] ICD-10-CM: G35  ICD-9-CM: 340  5/9/2022 Yes        Weakness of both lower extremities ICD-10-CM: R29.898  ICD-9-CM: 729.89  5/9/2022 Yes        Vitamin D deficiency (Chronic) ICD-10-CM: E55.9  ICD-9-CM: 268.9  5/9/2022 Yes    Overview Signed 5/16/2022  3:29 PM by Ilda Aceves MD     Vitamin D 25-Hydroxy (5/9/2022) = 16.6              Impaired mobility and ADLs ICD-10-CM: Z74.09, Z78.9  ICD-9-CM: V49.89  5/9/2022 Yes              Background:   Past Medical History:   Past Medical History:   Diagnosis Date    Elevated serum free T4 level 5/16/2022    Free T4 (5/16/2022) = 1.7    Elevated TSH 5/15/2022    TSH (5/15/2022) = 4.00    MS (multiple sclerosis) (HCC)     Multiple sclerosis ([facility]     Vitamin D deficiency 5/9/2022    Vitamin D 25-Hydroxy (5/9/2022) = 16.6         Assessment:   Changes in Assessment throughout shift: No change to previous assessment     Patient has a central line: no Reasons if yes: n/a  Insertion date: n/a Last dressing date: n/a   Patient has Knight Cath: no Reasons if yes: n/a   Insertion date: n/a  Shift knight care completed: NO, n/a     Last Vitals:     Vitals:    05/16/22 1707 05/16/22 2025 05/17/22 0740 05/17/22 1613   BP:  107/72 122/69 123/72   Pulse:  77 79 82   Resp:  18 18 18   Temp:  98.4 °F (36.9 °C) 97.9 °F (36.6 °C) 97.9 °F (36.6 °C)   SpO2:  94% 98% 97%   Weight:       Height: 4' 11\" (1.499 m)           PAIN    Pain Assessment    Pain Intensity 1: 4 (05/17/22 1602) Pain Intensity 1: 2 (12/29/14 1105)    Pain Location 1: Leg Pain Location 1: Abdomen    Pain Intervention(s) 1: Medication (see MAR) Pain Intervention(s) 1: Medication (see MAR)  Patient Stated Pain Goal: 0 Patient Stated Pain Goal: 0  o Intervention effective: yes  o Other actions taken for pain: Medication (see MAR)     Skin Assessment  Skin color    Condition/Temperature    Integrity Skin Integrity: Intact  Turgor    Weekly Pressure Ulcer Documentation  Pressure  Injury Documentation: No Pressure Injury Noted-Pressure Ulcer Prevention Initiated  Wound Prevention & Protection Methods  Orientation of wound Orientation of Wound Prevention: Posterior  Location of Prevention Location of Wound Prevention: Sacrum/Coccyx  Dressing Present Dressing Present : No  Dressing Status    Wound Offloading Wound Offloading (Prevention Methods): Bed, pressure redistribution/air,Bed, pressure reduction mattress,Repositioning,Wheelchair     INTAKE/OUPUT  Date 05/16/22 1900 - 05/17/22 0659 05/17/22 0700 - 05/18/22 0659   Shift 6768-6593 24 Hour Total 2998-5136 2058-1123 24 Hour Total   INTAKE   P.O.  720 720  720     P. O.  720 720  720   Shift Total(mL/kg)  720(11.5) 720(11.5)  720(11.5)   OUTPUT   Urine(mL/kg/hr) 600 600        Urine Voided 600 600        Urine Occurrence(s) 1 x 1 x 4 x  4 x   Stool          Stool Occurrence(s) 0 x 0 x 1 x  1 x   Shift Total(mL/kg) 600(9.6) 600(9.6)      NET -600 120 720  720   Weight (kg) 62.6 62.6 62.6 62.6 62.6       Recommendations:  1. Patient needs and requests: Toileting, repositioning    2. Pending tests/procedures: None at this time. 3. Functional Level/Equipment: Partial (one person) / Bed (comment); Wheelchair;Stabilization belt    Fall Precautions:   Fall risk precautions were reinforced with the patient; she was instructed to call for help prior to getting up. The following fall risk precautions were continued: bed/ chair alarms, door signage, yellow bracelet and socks as well as update of the Tenino tool in the patient's room. Rupesh Score: 4    HEALS Safety Check    A safety check occurred in the patient's room between off going nurse and oncoming nurse listed above. The safety check included the below items  Area Items   H  High Alert Medications - Verify all high alert medication drips (heparin, PCA, etc.)   E  Equipment - Suction is set up for ALL patients (with cyn)  - Red plugs utilized for all equipment (IV pumps, etc.)  - WOWs wiped down at end of shift.  - Room stocked with oxygen, suction, and other unit-specific supplies   A  Alarms - Bed alarm is set for fall risk patients  - Ensure chair alarm is in place and activated if patient is up in a chair   L  Lines - Check IV for any infiltration  - Torres bag is empty if patient has a Torres   - Tubing and IV bags are labeled   S  Safety   - Room is clean, patient is clean, and equipment is clean. - Hallways are clear from equipment besides carts. - Fall bracelet on for fall risk patients  - Ensure room is clear and free of clutter  - Suction is set up for ALL patients (with cyn)  - Hallways are clear from equipment besides carts.    - Isolation precautions followed, supplies available outside room, sign posted     Melvin Roblero RN

## 2022-05-17 NOTE — PROGRESS NOTES
Problem: Mobility Impaired (Adult and Pediatric)  Goal: *Acute Goals and Plan of Care (Insert Text)  Description: Physical Therapy Short Term Goals  Initiated 5/15/2022 and to be accomplished within 7 day(s) (5/22/2022)  1. Patient will move from supine to sit and sit to supine , scoot up and down, and roll side to side in bed with minimal assistance/contact guard assist.    2.  Patient will transfer from bed to chair and chair to bed with moderate assistance  using the least restrictive device. 3.  Patient will perform sit to stand with moderate assistance . 4. Patient will ambulate with moderate assistance  for 10 feet with the least restrictive device. 5.  Patient will propel w/c over level surfaces for 150 feet with minimal assistance. Physical Therapy Long Term Goals  Initiated 5/15/2022 and to be accomplished within 3-4 weeks (6/12/2022)  1. Patient will move from supine to sit and sit to supine , scoot up and down, and roll side to side in bed with modified independence. 2.  Patient will transfer from bed to chair and chair to bed with supervision/set-up using the least restrictive device. 3.  Patient will perform sit to stand with supervision/set-up. 4.  Patient will ambulate with supervision/set-up for 50 feet with the least restrictive device. 5.  Patient will ascend/descend 3 stairs with 1 handrail(s) with minimal assistance/contact guard assist.      Outcome: Progressing Towards Goal    PHYSICAL THERAPY TREATMENT    Patient: Caden Harper (89 y.o. female)  Date: 5/17/2022  Diagnosis: Multiple sclerosis ([facility] [address] [G35] Multiple sclerosis exacerbation ([facility] [address]  Precautions: Fall,NWB (right UE)  Chart, physical therapy assessment, plan of care and goals were reviewed. Time EW:7278  Time JBW:6249    Patient seen for: Balance activities; Patient education;Transfer training; Wheelchair mobility     Time In:1046  Time Out:1103    Patient seen for: Balance activities; Patient education;Transfer training    Pain:  Pt pain was reported as right LE \"buring pain,\" with mobility pre-treatment. Pt pain was reported as right LE \"burning pain,\" with weight bearing and mobility post-treatment. Intervention: Nursing staff notified and aware, assisted pt with repositioning    Patient identified with name and : yes    SUBJECTIVE:      Pt reports right hip pain with sitting OOB requiring education re: repositioning and un-weighting sacrum and hips as well as encouragement for increased time OOB. Pt is pleasant and cooperative throughout treatment sessions reporting her left LE feels, \"wobbly,\" in standing. OBJECTIVE DATA SUMMARY:    Objective:     BED/MAT MOBILITY Daily Assessment     Rolling Left : 4 (Contact guard assistance) with tactile cuing at pelvis and right LE and maximal verbal cues to avoid pulling with right UE  Supine to Sit : 4 (Minimal assistance) (with HOB elevated) from left sidelying at right pelvis with maximal cuing to avoid pushing up with right UE.      TRANSFERS Daily Assessment     Transfer Type: Other  Other: stand step without AD  Transfer Assistance : 2 (Maximal assistance)  Sit to Stand Assistance: Maximum assistance   Pt performs sit to stand with PT guarding left LE and maximal manual cues and assistance for lift off and hip extension for upright posture. Pt requires maximal assistance and manual cues for increased hip flexion and slow controlled descent with stand to sit transfers. Pt performed one stand step transfer to her right during first treatment session with maximal assistance for weight shifting and left LE advancement as well as PT blocking left LE in stance for weight bearing. Attempted stand step transfer during second treatment session, pt with noted left ankle instability with left ankle supinating in weight bearing. Therefor, pt was assisted to sit EOB and participated in squat pivot transfer to her right with maximal assistance for sacral clearance. GAIT Daily Assessment    Comments NT 2/2 ankle instability in stance        BALANCE Daily Assessment     Sitting - Static: Fair (occasional)  Sitting - Dynamic: Fair (occasional)  Standing - Static: Poor  Standing - Dynamic : Impaired   Pt requires supervision for safety with static sitting balance without UE support and intermittent CGA with unsupported seated therapeutic exercises. In standing, pt requires maximal assistance for balance. WHEELCHAIR MOBILITY Daily Assessment     Pt propelled w/c 70 feet with right LE with minimal assistance. THERAPEUTIC EXERCISES Daily Assessment     Seated EOB without posterior support and with B hands on distal third of B femurs:  Right and Left LAQ  Alternate Hip Flexion  Pt requires supervision to CGA throughout and intermittent manual cues for left lateral lumbar flexor engagement and neutral spinal posture. ASSESSMENT:  Pt is progressing with functional mobility participating in stand step transfers this treatment session. However, pt demonstrates left ankle instability and may benefit from an orthotics consult to address left ankle stability in stance   Progression toward goals:  []      Improving appropriately and progressing toward goals  [x]      Improving slowly and progressing toward goals  []      Not making progress toward goals and plan of care will be adjusted      PLAN:  Patient continues to benefit from skilled intervention to address the above impairments. Continue treatment per established plan of care. Emphasize functional strength training and mobility training to promote increased safety and independence with mobility. Discharge Recommendations:  Home Physical Therapy with 24 hour assistance.   Further Equipment Recommendations for Discharge:  gait belt, wheelchair 18\" lisa-height      Estimated Discharge Date:6/11/2022    Activity Tolerance:   Fair  Please refer to the flowsheet for vital signs taken during this treatment.     After treatment:   [] Patient left in no apparent distress in bed  [x] Patient left in no apparent distress sitting up in chair  [] Patient left in no apparent distress in w/c mobilizing under own power  [] Patient left in no apparent distress dining area  [] Patient left in no apparent distress mobilizing under own power  [x] Call bell left within reach  [x] Nursing notified  [] Caregiver present  [] Bed alarm activated   [x] Chair alarm activated      Leyda Payton, PT, DPT  5/17/2022

## 2022-05-18 PROCEDURE — 74011250637 HC RX REV CODE- 250/637: Performed by: FAMILY MEDICINE

## 2022-05-18 PROCEDURE — 65310000000 HC RM PRIVATE REHAB

## 2022-05-18 PROCEDURE — 97535 SELF CARE MNGMENT TRAINING: CPT

## 2022-05-18 PROCEDURE — 97110 THERAPEUTIC EXERCISES: CPT

## 2022-05-18 PROCEDURE — 74011250637 HC RX REV CODE- 250/637: Performed by: INTERNAL MEDICINE

## 2022-05-18 PROCEDURE — 97530 THERAPEUTIC ACTIVITIES: CPT

## 2022-05-18 PROCEDURE — 99232 SBSQ HOSP IP/OBS MODERATE 35: CPT | Performed by: INTERNAL MEDICINE

## 2022-05-18 PROCEDURE — 2709999900 HC NON-CHARGEABLE SUPPLY

## 2022-05-18 RX ADMIN — FAMOTIDINE 20 MG: 20 TABLET ORAL at 08:22

## 2022-05-18 RX ADMIN — ACETAMINOPHEN 650 MG: 325 TABLET ORAL at 11:00

## 2022-05-18 RX ADMIN — CHOLECALCIFEROL TAB 25 MCG (1000 UNIT) 5000 UNITS: 25 TAB at 08:22

## 2022-05-18 RX ADMIN — OXYCODONE HYDROCHLORIDE AND ACETAMINOPHEN 1 TABLET: 5; 325 TABLET ORAL at 08:21

## 2022-05-18 NOTE — PROGRESS NOTES
[address]  [address] [place] [address], [address]     INPATIENT REHABILITATION  DAILY PROGRESS NOTE     Date: 5/18/2022    Name: Richi Navarrete Age / Sex: 74 y.o. / female   CSN: [id number] MRN: [id number]   Admit Date: 5/14/2022 Length of Stay: 4 days     Primary Rehabilitation Diagnosis: Impaired Mobility and ADLs secondary to Multiple sclerosis exacerbation      Subjective:     Patient seen and examined. Blood pressure WNL. Team conference was held at bedside this PM.     Patient's Complaint:   No significant medical complaints    Pain Control: stable, mild-to-moderate joint symptoms intermittently, reasonably well controlled by current meds      Objective:     Vital Signs:  Patient Vitals for the past 24 hrs:   BP Temp Pulse Resp SpO2   05/18/22 0735 120/79 98 °F (36.7 °C) 79 18 96 %   05/17/22 1936 137/83 97.7 °F (36.5 °C) 97 18 97 %   05/17/22 1613 123/72 97.9 °F (36.6 °C) 82 18 97 %        Physical Examination:  GENERAL SURVEY: Patient is awake, alert, oriented x 3, laying comfortably on the bed, not in acute respiratory distress. HEENT: pink palpebral conjunctivae, anicteric sclerae, no nasoaural discharge, moist oral mucosa  NECK: supple, no jugular venous distention, no palpable lymph nodes  CHEST/LUNGS: symmetrical chest expansion, good air entry, clear breath sounds  HEART: adynamic precordium, good S1 S2, no S3, regular rhythm, no murmurs  ABDOMEN: flat, bowel sounds appreciated, soft, non-tender  EXTREMITIES: pink nailbeds, no edema, full and equal pulses, no calf tenderness   NEUROLOGICAL EXAM: The patient is awake, alert and oriented x3, able to answer questions fairly appropriately, able to follow 1 and 2 step commands. Able to tell time from the wall clock. Cranial nerves II-XII are grossly intact. No gross sensory deficit. Motor strength is 4/5 on BUE, 3+ to 4-/5 on BLE.        Current Medications:  Current Facility-Administered Medications   Medication Dose Route Frequency    cholecalciferol (VITAMIN D3) (1000 Units /25 mcg) tablet 5,000 Units  5,000 Units Oral DAILY    magnesium hydroxide (MILK OF MAGNESIA) 400 mg/5 mL oral suspension 30 mL  30 mL Oral DAILY PRN    bisacodyL (DULCOLAX) tablet 10 mg  10 mg Oral Q48H PRN    acetaminophen (TYLENOL) tablet 650 mg  650 mg Oral Q6H PRN    famotidine (PEPCID) tablet 20 mg  20 mg Oral DAILY    oxyCODONE-acetaminophen (PERCOCET) 5-325 mg per tablet 1 Tablet  1 Tablet Oral Q8H PRN       Allergies: Allergies   Allergen Reactions    Amoxicillin Hives and Rash    Clindamycin Hives and Rash    Metronidazole Hives    Tetracycline Hives and Rash       Functional Progress:    OCCUPATIONAL THERAPY    ON ADMISSION MOST RECENT   Eating  Functional Level: 5   Eating  Functional Level: 5     Grooming  Functional Level: 5   Grooming  Functional Level: 5     Bathing  Functional Level: 4   Bathing  Functional Level: 3     Upper Body Dressing  Functional Level: 5   Upper Body Dressing  Functional Level: 4     Lower Body Dressing  Functional Level: 2   Lower Body Dressing  Functional Level: 2     Toileting  Functional Level: 5   Toileting  Functional Level:  (Pt declined.)     Toilet Transfers  Toilet Transfer Score: 5   Toilet Transfers  Toilet Transfer Score: 5     Tub /Shower Transfers  Tub/Shower Transfer Score: 0   Tub/Shower Transfers  Tub/Shower Transfer Score: 3       Legend:   7 - Independent   6 - Modified Independent   5 - Standby Assistance / Supervision / Set-up   4 - Minimum Assistance / Contact Guard Assistance   3 - Moderate Assistance   2 - Maximum Assistance   1 - Total Assistance / Dependent       Lab/Data Review:  No results found for this or any previous visit (from the past 24 hour(s)). Assessment:     Primary Rehabilitation Diagnosis  1.  Impaired Mobility and ADLs  2. Multiple sclerosis exacerbation    Comorbidities  Patient Active Problem List   Diagnosis Code    Multiple sclerosis exacerbation ([facility] G35    Weakness of both lower extremities R29.898    Vitamin D deficiency E55.9    Elevated TSH R79.89    Elevated serum free T4 level R79.89    Impaired mobility and ADLs Z74.09, Z78.9    Multiple sclerosis ([facility] G35       Plan:     1. Justification for continued stay: Good progression towards established rehabilitation goals. 2. Medical Issues being followed closely:    [x]  Fall and safety precautions     []  Wound Care     [x]  Bowel and Bladder Function     [x]  Fluid Electrolyte and Nutrition Balance     []  Pain Control      3. Issues that 24 hour rehabilitation nursing is following:    [x]  Fall and safety precautions     []  Wound Care     [x]  Bowel and Bladder Function     [x]  Fluid Electrolyte and Nutrition Balance     []  Pain Control      [x]  Assistance with and education on in-room safety with transfers to and from the bed, wheelchair, toilet and shower. 4. Acute rehabilitation plan of care:    [x]  Continue current care and rehab. [x]  Physical Therapy           [x]  Occupational Therapy           []  Speech Therapy     []  Hold Rehab until further notice     5. Medications:    [x]  MAR Reviewed     [x]  Continue Present Medications     6. Chemical DVT Prophylaxis:      []  Enoxaparin     []  Unfractionated Heparin     []  Warfarin     []  NOAC     []  Aspirin     [x]  None     7. Mechanical DVT Prophylaxis:      [x]  CONNIE Stockings     [x]  Sequential Compression Device     []  None     8. GI Prophylaxis:      []  PPI     [x]  H2 Blocker     []  None / Not indicated     9. Code status:    [x]  Full code     []  Partial code     []  Do not intubate     []  Do not resuscitate     10. Diet:  Specifications  Low fat/Low cholesterol   Solids (consistency)  Regular   Liquids (consistency)  Thin   Fluid Restriction  None     11.  Orders:   > Multiple sclerosis exacerbation   > Patient has completed a 5-day course of Methylprednisolone IV on 5/13/2022    > Abnormal thyroid function tests (elevated TSH, elevated free T4) ~ Euthyroid sick syndrome vs Subclinical hypothyroidism   > TSH (5/15/2022) = 4.00   > Free T4 (5/16/2022) = 1.7   > Total T3 (5/16/2022) = 84   > TFTs to be rechecked in 4 to 6 weeks on an outpatient basis   > No need for Levothyroxine at this time      > Vitamin D Deficiency   > Vitamin D 25-Hydroxy (5/9/2022) = 16.6   > On 5/16/2022, patient was given Cholecalciferol 50,000 units PO x 1 dose   > On 5/17/2022, increased Cholecalciferol 5,000 units PO once daily   > Continue Cholecalciferol 5,000 units PO once daily    > Analgesia   > Continue:    > Acetaminophen 650 mg PO q 6 hr PRN for pain level 4/10 or lesser    > Percocet 5/325 1 tab PO q 8 hr PRN for pain level 5/10 or greater       12. Personal Protective Equipment (N95 face mask) was used while interacting with the patient. Patient was using a surgical mask. 15. Patient's progress in rehabilitation and medical issues discussed with the patient. All questions answered to the best of my ability. Care plan discussed with patient and nurse. 14. Total clinical care time is 30 minutes, including review of chart including all labs, radiology, past medical history, and discussion with patient. Greater than 50% of my time was spent in coordination of care and counseling.       Signed:    Mini Hughes MD    May 18, 2022

## 2022-05-18 NOTE — PROGRESS NOTES
Problem: Mobility Impaired (Adult and Pediatric)  Goal: *Acute Goals and Plan of Care (Insert Text)  Description: Physical Therapy Short Term Goals  Initiated 5/15/2022 and to be accomplished within 7 day(s) (5/22/2022)  1. Patient will move from supine to sit and sit to supine , scoot up and down, and roll side to side in bed with minimal assistance/contact guard assist.    2.  Patient will transfer from bed to chair and chair to bed with moderate assistance  using the least restrictive device. 3.  Patient will perform sit to stand with moderate assistance . 4. Patient will ambulate with moderate assistance  for 10 feet with the least restrictive device. 5.  Patient will propel w/c over level surfaces for 150 feet with minimal assistance. Physical Therapy Long Term Goals  Initiated 5/15/2022 and to be accomplished within 3-4 weeks (6/12/2022)  1. Patient will move from supine to sit and sit to supine , scoot up and down, and roll side to side in bed with modified independence. 2.  Patient will transfer from bed to chair and chair to bed with supervision/set-up using the least restrictive device. 3.  Patient will perform sit to stand with supervision/set-up. 4.  Patient will ambulate with supervision/set-up for 50 feet with the least restrictive device. 5.  Patient will ascend/descend 3 stairs with 1 handrail(s) with minimal assistance/contact guard assist.      Outcome: Progressing Towards Goal    PHYSICAL THERAPY TREATMENT    Patient: Alexei Solorzano (43 y.o. female)  Date: 5/18/2022  Diagnosis: Multiple sclerosis ([facility] [G35] Multiple sclerosis exacerbation ([facility]  Precautions: Fall,NWB (right UE)  Chart, physical therapy assessment, plan of care and goals were reviewed. Time In:0930  Time Out:1003    Patient seen for: Balance activities; Patient education;Transfer training; Therapeutic exercise; Wheelchair mobility   Pt missed treatment time 2/2 finishing breakfast and toileting at bed level with nursing staff. Time In:1405  Time FN    Patient seen for: Balance activities; Patient education;Transfer training;    Pain:  Pt pain was reported as right LE and back pain with weight bearing pre-treatment. Pt pain was reported as right LE and back pain with weight bearing post-treatment. Intervention: Pt received pain medication prior to treatment session and was assisted with repositioning. Patient identified with name and : yes    SUBJECTIVE:      Pt initially reports, \"I just took a percocet so I'm not sure what I'll be able to do,\" indicating she was feeling lethargic from the medication. Pt then notes she has not finished her breakfast and afterwards feels she'll need to utilize the bed pan asking the PT, \"what are your thoughts? \"  PT indicates that the pt should finish her breakfast and the PT would return to assist the pt and initiate the treatment session. The patient then asks, Robin Sy, so you still want to go through with all of it then? \"    At end of first treatment session, pt was encouraged to sit OOB in w/c as PT had switched out the cushion for more pressure relief. Pt then appears to become frustrated after receiving education on importance of gradual increase in time OOB. Pt states, \"I'm not sitting in this chair. \"  PT attempts to alleviate pt's concerns indicating she could call for assistance as needed and educating pt on repositioning. When PT helped pt to room she requested her tablet stating she felt it would help take her mind off of the discomfort. At start of second treatment session, pt reflected that she was able to sit up for one hour but then needed to lie back down. Pt notes, \"they've got to figure this pain out first,\" but appears receptive to education re: importance of participating in strengthening and mobility training to promote more efficient movement patterns to promote decreased pain.     OBJECTIVE DATA SUMMARY:    Objective:     BED/MAT MOBILITY Daily Assessment     Rolling Left : 4 (Contact guard assistance)  Pt requires maximal re-education re; initiating supine to left sidelying roll to initiate supine to sit transfer  Supine to Sit : 4 (Minimal assistance)  Pt requires minimal assistance for trunk control      TRANSFERS Daily Assessment     Transfer Type: Other  Other: stand step without AD  Transfer Assistance : 2 (Maximal assistance)  Sit to Stand Assistance: Maximum assistance   Pt performs sit <> stand with maximal assistance for anterior weight shift, lift off, and PT guarding left LE due to functional weakness. Pt performed two sit <> stand transfers from EOB utilizing left UE for support to push up with PT guarding pt on her left side and pt utilizing PT's hand for balance in standing. Pt performs stand to sit transfers with moderate to maximal assistance and manual cues for increased hip flexion for slow controlled descent with stand to sit. Pt performed one stand step transfer bed to w/c to her right with maximal assistance for balance and weight shifting with PT guarding left LE in stance. BALANCE Daily Assessment     Sitting - Static: Fair (occasional)  Sitting - Dynamic: Fair (occasional)  Standing - Static: Poor  Standing - Dynamic : Impaired   Pt requires moderate to maximal assistance for standing balance with maximal manual and verbal cues for glut engagement and PT guarding left knee in stance. Pt stood for one trial of 40 seconds and one trial of 50 seconds with maximal encouragement. Pt demonstrates left knee instability as she fatigues. WHEELCHAIR MOBILITY Daily Assessment     Pt propels w/c with right LE and left UE x 186 feet with minimal assistance for straight path negotiation.         THERAPEUTIC EXERCISES Daily Assessment     Seated LE Strength Trainin Sets of 10 Repetitions  Right and Left LAQ  Right and Left Alternate Hip Flexion        ASSESSMENT:  Pt is progressing with mobility demonstrating improved OOB tolerance with encouragement and participating in static standing trials without instances of left ankle inversion. Progression toward goals:  []      Improving appropriately and progressing toward goals  [x]      Improving slowly and progressing toward goals  []      Not making progress toward goals and plan of care will be adjusted      PLAN:  Patient continues to benefit from skilled intervention to address the above impairments. Continue treatment per established plan of care. Emphasize functional strengthening to promote increased safety and independence with mobility. Discharge Recommendations:  Home Physical Therapy versus SNF  Further Equipment Recommendations for Discharge:  gait belt and wheelchair 18\" lisa-height      Estimated Discharge Date: 6/11/2022    Activity Tolerance:   Fair  Please refer to the flowsheet for vital signs taken during this treatment.     After treatment:   [] Patient left in no apparent distress in bed  [x] Patient left in no apparent distress sitting up in chair  [] Patient left in no apparent distress in w/c mobilizing under own power  [] Patient left in no apparent distress dining area  [] Patient left in no apparent distress mobilizing under own power  [x] Call bell left within reach  [x] Nursing notified  [] Caregiver present  [] Bed alarm activated   [x] Chair alarm activated      Radha Driver PT, DPT  5/18/2022

## 2022-05-18 NOTE — PROGRESS NOTES
[facility]    Bedside and Verbal shift change report given to Phoebe RN (oncoming nurse) by Joanne Harrell RN (offgoing nurse). Report included the following information SBAR, Kardex, MAR and Recent Results. Situation:   Code Status: Full Code   Hospital Day: 4   Problem List:   Hospital Problems  Date Reviewed: 5/17/2022          Codes Class Noted POA    Elevated serum free T4 level ICD-10-CM: R79.89  ICD-9-CM: 794.5  5/16/2022 Yes    Overview Signed 5/16/2022  3:30 PM by Kristie Bae MD     Free T4 (5/16/2022) = 1.7             Multiple sclerosis (HCC) (Chronic) ICD-10-CM: G35  ICD-9-CM: 340  Unknown Yes        Elevated TSH ICD-10-CM: R79.89  ICD-9-CM: 794.5  5/15/2022 Yes    Overview Signed 5/16/2022  3:30 PM by Kristie Bae MD     TSH (5/15/2022) = 4.00             * (Principal) Multiple sclerosis exacerbation ([facility] ICD-10-CM: G35  ICD-9-CM: 340  5/9/2022 Yes        Weakness of both lower extremities ICD-10-CM: R29.898  ICD-9-CM: 729.89  5/9/2022 Yes        Vitamin D deficiency (Chronic) ICD-10-CM: E55.9  ICD-9-CM: 268.9  5/9/2022 Yes    Overview Signed 5/16/2022  3:29 PM by Kristie Bae MD     Vitamin D 25-Hydroxy (5/9/2022) = 16.6              Impaired mobility and ADLs ICD-10-CM: Z74.09, Z78.9  ICD-9-CM: V49.89  5/9/2022 Yes              Background:   Past Medical History:   Past Medical History:   Diagnosis Date    Elevated serum free T4 level 5/16/2022    Free T4 (5/16/2022) = 1.7    Elevated TSH 5/15/2022    TSH (5/15/2022) = 4.00    MS (multiple sclerosis) (HCC)     Multiple sclerosis ([facility]     Vitamin D deficiency 5/9/2022    Vitamin D 25-Hydroxy (5/9/2022) = 16.6         Assessment:   Changes in Assessment throughout shift: No change to previous assessment     Patient has a central line: no Reasons if yes: Insertion date: Last dressing date:   Patient has Knight Cath: NO Reasons if yes:     Insertion date:  Shift knight care completed:      Last Vitals:     Vitals:    05/16/22 2025 05/17/22 0740 05/17/22 1613 05/17/22 1936   BP: 107/72 122/69 123/72 137/83   Pulse: 77 79 82 97   Resp: 18 18 18 18   Temp: 98.4 °F (36.9 °C) 97.9 °F (36.6 °C) 97.9 °F (36.6 °C) 97.7 °F (36.5 °C)   SpO2: 94% 98% 97% 97%   Weight:       Height:            PAIN    Pain Assessment    Pain Intensity 1: 0 (05/18/22 0008) Pain Intensity 1: 2 (12/29/14 1105)    Pain Location 1: Leg Pain Location 1: Abdomen    Pain Intervention(s) 1: Rest Pain Intervention(s) 1: Medication (see MAR)  Patient Stated Pain Goal: 0 Patient Stated Pain Goal: 0  o Intervention effective: yes  o Other actions taken for pain: Rest     Skin Assessment  Skin color    Condition/Temperature    Integrity Skin Integrity: Intact  Turgor    Weekly Pressure Ulcer Documentation  Pressure  Injury Documentation: No Pressure Injury Noted-Pressure Ulcer Prevention Initiated  Wound Prevention & Protection Methods  Orientation of wound Orientation of Wound Prevention: Posterior  Location of Prevention Location of Wound Prevention: Sacrum/Coccyx  Dressing Present Dressing Present : No  Dressing Status    Wound Offloading Wound Offloading (Prevention Methods): Bed, pressure reduction mattress     INTAKE/OUPUT  Date 05/17/22 0700 - 05/18/22 0659 05/18/22 0700 - 05/19/22 0659   Shift 9179-7589 0809-5607 24 Hour Total 5590-4991 7692-0761 24 Hour Total   INTAKE   P.O. 720 120 840        P. O. 720 120 840      Shift Total(mL/kg) 720(11.5) 120(1.9) 840(13.4)      OUTPUT   Urine(mL/kg/hr)  0 0        Urine Voided  0 0        Urine Occurrence(s) 4 x 1 x 5 x      Stool           Stool Occurrence(s) 1 x 1 x 2 x      Shift Total(mL/kg)  0(0) 0(0)       120 840      Weight (kg) 62.6 62.6 62.6 62.6 62.6 62.6       Recommendations:  1. Patient needs and requests: none at this time    2. Pending tests/procedures: none at this time     3.  Functional Level/Equipment: Partial (two people) /      Fall Precautions:   Fall risk precautions were reinforced with the patient; she was instructed to call for help prior to getting up. The following fall risk precautions were continued: bed/ chair alarms, door signage, yellow bracelet and socks as well as update of the Georges Showman tool in the patient's room. Rupesh Score: 4    HEALS Safety Check    A safety check occurred in the patient's room between off going nurse and oncoming nurse listed above. The safety check included the below items  Area Items   H  High Alert Medications - Verify all high alert medication drips (heparin, PCA, etc.)   E  Equipment - Suction is set up for ALL patients (with cyn)  - Red plugs utilized for all equipment (IV pumps, etc.)  - WOWs wiped down at end of shift.  - Room stocked with oxygen, suction, and other unit-specific supplies   A  Alarms - Bed alarm is set for fall risk patients  - Ensure chair alarm is in place and activated if patient is up in a chair   L  Lines - Check IV for any infiltration  - Torres bag is empty if patient has a Torres   - Tubing and IV bags are labeled   S  Safety   - Room is clean, patient is clean, and equipment is clean. - Hallways are clear from equipment besides carts. - Fall bracelet on for fall risk patients  - Ensure room is clear and free of clutter  - Suction is set up for ALL patients (with cyn)  - Hallways are clear from equipment besides carts.    - Isolation precautions followed, supplies available outside room, sign posted     Niya Cherry RN

## 2022-05-18 NOTE — PROGRESS NOTES
Problem: Falls - Risk of  Goal: *Absence of Falls  Description: Document Steffen Lancaster Fall Risk and appropriate interventions in the flowsheet. Outcome: Progressing Towards Goal  Note: Fall Risk Interventions:  Mobility Interventions: Bed/chair exit alarm,Patient to call before getting OOB,Utilize walker, cane, or other assistive device         Medication Interventions: Patient to call before getting OOB,Teach patient to arise slowly,Bed/chair exit alarm    Elimination Interventions: Bed/chair exit alarm,Call light in reach,Patient to call for help with toileting needs,Stay With Me (per policy)    History of Falls Interventions: Bed/chair exit alarm,Door open when patient unattended,Room close to nurse's station         Problem: Patient Education: Go to Patient Education Activity  Goal: Patient/Family Education  Outcome: Progressing Towards Goal     Problem: Pressure Injury - Risk of  Goal: *Prevention of pressure injury  Description: Document Wagner Scale and appropriate interventions in the flowsheet.   Outcome: Progressing Towards Goal  Note: Pressure Injury Interventions:       Moisture Interventions: Absorbent underpads    Activity Interventions: Increase time out of bed,Pressure redistribution bed/mattress(bed type)    Mobility Interventions: HOB 30 degrees or less,Pressure redistribution bed/mattress (bed type)    Nutrition Interventions: Document food/fluid/supplement intake                     Problem: Pain  Goal: *Control of Pain  Outcome: Progressing Towards Goal

## 2022-05-18 NOTE — REHAB NOTE
SHIFT CHANGE NOTE FOR [facility]    Bedside and Verbal shift change report given to Maria Teresa RN (oncoming nurse) by Chente Lo RN (offgoing nurse). Report included the following information SBAR, Kardex, MAR and Recent Results.     Situation:   Code Status: Full Code   Hospital Day: 4   Problem List:   Hospital Problems  Date Reviewed: 5/18/2022          Codes Class Noted POA    Elevated serum free T4 level ICD-10-CM: R79.89  ICD-9-CM: 794.5  5/16/2022 Yes    Overview Signed 5/16/2022  3:30 PM by Donavon Murillo MD     Free T4 (5/16/2022) = 1.7             Multiple sclerosis ([facility] (Chronic) ICD-10-CM: G35  ICD-9-CM: 340  Unknown Yes        Elevated TSH ICD-10-CM: R79.89  ICD-9-CM: 794.5  5/15/2022 Yes    Overview Signed 5/16/2022  3:30 PM by Donavon Murillo MD     TSH (5/15/2022) = 4.00             * (Principal) Multiple sclerosis exacerbation ([facility] ICD-10-CM: G35  ICD-9-CM: 340  5/9/2022 Yes        Weakness of both lower extremities ICD-10-CM: R29.898  ICD-9-CM: 729.89  5/9/2022 Yes        Vitamin D deficiency (Chronic) ICD-10-CM: E55.9  ICD-9-CM: 268.9  5/9/2022 Yes    Overview Signed 5/16/2022  3:29 PM by Donavon Murillo MD     Vitamin D 25-Hydroxy (5/9/2022) = 16.6              Impaired mobility and ADLs ICD-10-CM: Z74.09, Z78.9  ICD-9-CM: V49.89  5/9/2022 Yes              Background:   Past Medical History:   Past Medical History:   Diagnosis Date    Elevated serum free T4 level 5/16/2022    Free T4 (5/16/2022) = 1.7    Elevated TSH 5/15/2022    TSH (5/15/2022) = 4.00    MS (multiple sclerosis) (HCC)     Multiple sclerosis ([facility]     Vitamin D deficiency 5/9/2022    Vitamin D 25-Hydroxy (5/9/2022) = 16.6         Assessment:   Changes in Assessment throughout shift: No change to previous assessment     Patient has a central line: no Reasons if yes: n/a  Insertion date: n/a Last dressing date: n/a   Patient has Knight Cath: no Reasons if yes: n/a   Insertion date: n/a  Shift knight care completed: NO, n/a     Last Vitals:     Vitals:    05/17/22 1613 05/17/22 1936 05/18/22 0735 05/18/22 1604   BP: 123/72 137/83 120/79 111/65   Pulse: 82 97 79 78   Resp: 18 18 18 18   Temp: 97.9 °F (36.6 °C) 97.7 °F (36.5 °C) 98 °F (36.7 °C) 97.9 °F (36.6 °C)   SpO2: 97% 97% 96% 97%   Weight:       Height:            PAIN    Pain Assessment    Pain Intensity 1: 0 (05/18/22 1631) Pain Intensity 1: 2 (12/29/14 1105)    Pain Location 1: Leg Pain Location 1: Abdomen    Pain Intervention(s) 1: Medication (see MAR) Pain Intervention(s) 1: Medication (see MAR)  Patient Stated Pain Goal: 0 Patient Stated Pain Goal: 0  o Intervention effective: yes  o Other actions taken for pain: Medication (see MAR)     Skin Assessment  Skin color    Condition/Temperature    Integrity Skin Integrity: Intact  Turgor    Weekly Pressure Ulcer Documentation  Pressure  Injury Documentation: No Pressure Injury Noted-Pressure Ulcer Prevention Initiated  Wound Prevention & Protection Methods  Orientation of wound Orientation of Wound Prevention: Posterior  Location of Prevention Location of Wound Prevention: Sacrum/Coccyx  Dressing Present Dressing Present : No  Dressing Status    Wound Offloading Wound Offloading (Prevention Methods): Bed, pressure reduction mattress     INTAKE/OUPUT  Date 05/17/22 1900 - 05/18/22 0659 05/18/22 0700 - 05/19/22 0659   Shift 1267-1961 24 Hour Total 4879-7477 4728-2347 24 Hour Total   INTAKE   P.O. 240 960        P.O. 240 960      Shift Total(mL/kg) 240(3.8) 960(15.3)      OUTPUT   Urine(mL/kg/hr) 900 900        Urine Voided 900 900        Urine Occurrence(s) 1 x 5 x 3 x  3 x   Stool          Stool Occurrence(s) 1 x 2 x 0 x  0 x   Shift Total(mL/kg) 900(14.4) 900(14.4)      NET -660 60      Weight (kg) 62.6 62.6 62.6 62.6 62.6       Recommendations:  1. Patient needs and requests: Toileting, repositioning    2. Pending tests/procedures: None at this time.      3. Functional Level/Equipment: Partial (one person) /      Fall Precautions:   Fall risk precautions were reinforced with the patient; she was instructed to call for help prior to getting up. The following fall risk precautions were continued: bed/ chair alarms, door signage, yellow bracelet and socks as well as update of the Verneice Frock tool in the patient's room. Rupesh Score: 4    HEALS Safety Check    A safety check occurred in the patient's room between off going nurse and oncoming nurse listed above. The safety check included the below items  Area Items   H  High Alert Medications - Verify all high alert medication drips (heparin, PCA, etc.)   E  Equipment - Suction is set up for ALL patients (with cyn)  - Red plugs utilized for all equipment (IV pumps, etc.)  - WOWs wiped down at end of shift.  - Room stocked with oxygen, suction, and other unit-specific supplies   A  Alarms - Bed alarm is set for fall risk patients  - Ensure chair alarm is in place and activated if patient is up in a chair   L  Lines - Check IV for any infiltration  - Torres bag is empty if patient has a Torres   - Tubing and IV bags are labeled   S  Safety   - Room is clean, patient is clean, and equipment is clean. - Hallways are clear from equipment besides carts. - Fall bracelet on for fall risk patients  - Ensure room is clear and free of clutter  - Suction is set up for ALL patients (with cyn)  - Hallways are clear from equipment besides carts.    - Isolation precautions followed, supplies available outside room, sign posted     Bertram Santos RN

## 2022-05-18 NOTE — INTERDISCIPLINARY ROUNDS
[facility] PHYSICAL REHABILITATION  [address] [place] [address], [address]    INPATIENT REHABILITATION  TEAM CONFERENCE SUMMARY     Date of Conference: 5/18/2022    Patient Information:        Name: Dirk Giang Age / Sex: 74 y.o. / female   CSN: [id number] MRN: [id number]   Admit Date: 5/14/2022 Length of Stay: 4 days     Primary Rehabilitation Diagnosis  1. Impaired Mobility and ADLs  2. Multiple sclerosis exacerbation    Comorbidities  Patient Active Problem List   Diagnosis Code    Multiple sclerosis exacerbation (HCC) G35    Weakness of both lower extremities R29.898    Vitamin D deficiency E55.9    Elevated TSH R79.89    Elevated serum free T4 level R79.89    Impaired mobility and ADLs Z74.09, Z78.9    Multiple sclerosis (HCC) G35          Therapy:     FIM SCORES Initial Assessment Weekly Progress Assessment 5/18/2022   Eating Functional Level: 5  Comments: right forearm fx  5   Swallowing     Grooming 5 55   Bathing 4 34   Upper Body Dressing Functional Level: 5  Items Applied/Steps Completed: Pullover (4 steps)  Comments: Pt donned pullover dress with Alise to pull over head Functional Level: 4  Items Applied/Steps Completed: Pullover (4 steps)  Comments: Pt donned pullover with Alise to pull down over back5   Lower Body Dressing Functional Level: 2  Items Applied/Steps Completed: Underpants (3 steps)  Comments: Pt required maxA to don pul tab brief from bed level rolling to right with Alise. Functional Level: 2  Items Applied/Steps Completed: Elastic waist pants (3 steps); Sock, left (1 step); Sock, right (1 step); Underpants (3 steps)  Comments: Pt performed LB dressing with maxA  to thread BLE feet into brief and pants and pull up over BLE thighs and buttocks in standing. Pt required TA to don BLE socks. OTR provided education and demonstration for use of one handed sock aid. 2   Toileting Functional Level: 5 Functional Level:  (Pt declined.)   Bladder 0 0   Bowel  0 0   Toilet Transfer [place] Toilet Transfer Score: 5  5   Tub/Shower Transfer [place] Tub/Shower Transfer Score: 0  Comments:  (Pt. with right leg pain limiting activity OOB) Tub/Shower Transfer Score: 3  Comments: Pt performed tub transfer from w/c using bear hug with LUE and pushing up with BLE legs, while bracing LLE knee (with OTR knee) performing stand pivot transfer  0   Comprehension      5   Expression    5   Social Interaction    5   Problem Solving    5   Memory    5     FIM SCORES Initial Assessment Weekly Progress Assessment 5/18/2022   Bed/Chair/Wheelchair Transfers Transfer Type:  (NT 2/2 safety concerns with environment)  Other: stand step without AD  Transfer Assistance : 0 (Not tested) (spoke to nursing for more appropriate height bed for safety)  Sit to Stand Assistance: Maximum assistance (from edge of [place] bed with B feet on 6\" block ) Transfer Type:  Other  Other: stand step without AD  Transfer Assistance : 2 (Maximal assistance)  Sit to Stand Assistance: Maximum assistance   Bed Mobility Rolling Right 3 (Moderate assistance )   Rolling Left 4 (Minimal assistance)   Supine to Sit 2 (Maximal assistance)   Sit to Stand Maximum assistance (from edge of [place] bed with B feet on 6\" block )   Sit to Supine 4 (Minimal assistance) (for left LE management)    Rolling Right       Rolling Left   4 (Contact guard assistance)   Supine to Sit   4 (Minimal assistance)   Sit to Stand   Maximum assistance   Sit to Supine          Locomotion (W/C) Functional Level: 0 (didn't attempt out of bed to w/c transfer due to safety) Function  : Moderate assistance  Setup Assistance         Locomotion (W/C distance)    70 feet   Locomotion (Walk)    NT      Locomotion (Walk dist.)    NT   Steps/Stairs    NT         Nursing:     Neuro:   AAA&O x   4         Respiratory:   [] WNL   [] O2 LPM:   Other:  Peripheral Vascular:   [] TEDS present   [] Edema present ____ Grade   Cardiac:   [x] WNL   [] Other  Genitourinary:   [x] continent   [] incontinent   [] knight  Abdominal 5/17_______ LBM  GI: _______ Diet ______ Liquids _____ tube feeds  Musculoskeletal: __4x__ ROM Transfers __wheelchair___ Assistive Device Used  __2__ Level of Assistance  Skin Integumentary:   [x] Intact   [] Not Intact   __________Preventative Measures  Details______________________________________________________________  Pain: [x] Controlled   [] Not Controlled   Pain Meds:   [] Scheduled   [x] PRN        Interdisciplinary Team Goals:     1. Discipline  Physical Therapy    Goal Encourage pt to perform bed <> w/c transfers with moderate assistance with decreased verbal cuing for safety and to avoid weight bearing on right UE    Barrier  Impaired sensation, Impaired functional strength, Impaired balance    Intervention  Education, Transfer training, Balance training    Goal written by:   Juana Childs PT, DPT     2. Discipline  Occupational Therapy    Goal  Pt will perform toilet transfer with mod-maxA and AE. Barrier  RUE NWB, impaired BUE strength, impaired standing balance    Intervention  ADL training, LUE strengthening, transfer training    Goal written by:  Harrison Rouse MSOTR/L     3. Discipline  Speech Therapy    Goal      Barrier      Intervention      Goal written by:       4. Discipline  Nursing    Goal  Pt will able to identify alternatives to help maintain desired acitivity level    Barrier  pain/discomfort;increase in physical complain;fatigue    Intervention Gave pain medicines as needed; repositioning    Goal written by: Jan Abdi RN     5. Discipline  Clinical Psychology    Goal  Increase insight about all recovery with emphasis on safety with restrictions in place    Barrier  Variable compliance with NWB recommendations    Intervention  Patient education and reinforcement    Goal written by:  Ronn Cihno, PhD         Disposition / Discharge Planning:      Follow-up services:  [x] Physical Therapy             [x] Occupational Therapy [] Speech Therapy           [] Skilled Nursing      [] Medical Social Worker   [] Aide        [] Outpatient      [] vs   [x] Home Health  [] vs       [] to progress to outpatient       [] with 24-hour supervision       [x] with 24-hour assistance   [] [facility] recommendations:  w/c   Estimated discharge date:  6/11/2022   Discharge Location:  [x] Home  [] versus    [] [facility]    [] [address]   [] Other:           Interdisciplinary team rounds were held this PM with the following team members:       Name   Physical Therapist    Royal Arcos PT, DPT     Occupational Therapist    Ken Zarco MS, OTR/L   Nursing    Alanis Barrera, RICHY   Physician    Tara Luna MD        Blu Ogden MSW          Signed:  Tara Luna MD    May 18, 2022

## 2022-05-18 NOTE — PROGRESS NOTES
Problem: Self Care Deficits Care Plan (Adult)  Goal: *Therapy Goal (Edit Goal, Insert Text)  Description: Occupational Therapy Goals   Long Term Goals  Initiated 5/15 and to be accomplished within 2 week(s)  1. Pt will perform self-feeding with I.  2. Pt will perform grooming with I.  3. Pt will perform UB bathing with Mod I.  4. Pt will perform LB bathing with Mod I.  5. Pt will perform tub/shower transfer with Mod I.   6. Pt will perform UB dressing with I.  7. Pt will perform LB dressing with Mod I.  8. Pt will perform toileting task with Mod I.  9. Pt will perform toilet transfer with Mod I.  10. Pt. Will increase BUE strength to 5/5 in order to increase safety at home during ADLs. Short Term Goals   Initiated 5/15 and to be accomplished within 7 day(s)  1. Pt will perform self-feeding with Mod I.   2. Pt will perform grooming with Mod I.  3. Pt will perform UB bathing with Supervision . 4. Pt will perform LB bathing with Supervision. 5. Pt will perform tub/shower transfer with 4.  6. Pt will perform UB dressing with Mod I.  7. Pt will perform LB dressing with Min A.  8. Pt will perform toileting task with Mod A.  9. Pt will perform toilet transfer with Min A.   10. Pt. Will demonstrate functional mobility with CGA with RW in order to increase I for ADLs, by setting up prior to activities. Outcome: Progressing Towards Goal  Goal: Interventions  Outcome: Progressing Towards Goal   Occupational Therapy TREATMENT    Patient: Caden Harper   74 y.o. Patient identified with name and : yes    Date: 2022    First Tx Session  Time In: 0700  Time Out[de-identified] 2381        Diagnosis: Multiple sclerosis ([facility] [G35]   Precautions: Fall,NWB (right UE)  Chart, occupational therapy assessment, plan of care, and goals were reviewed. Pain:  Pt reports -/10 pain or discomfort prior to treatment. Pt reports 10/10 pain or discomfort post treatment. Intervention Provided:  Following dressing pt reported pulsating pain 10/10 when firing, pt declined pain meds initially however following report to nurse pt requested pain meds      SUBJECTIVE:   Patient stated It's like a shooting nerve pain.     OBJECTIVE DATA SUMMARY:     GROOMING Daily Assessment    Functional Level: 5  Tasks Completed by Patient: Brushed teeth; Washed face; Washed hands  Comments: Pt performed grooming tasks performing oral care with supervision from bed level and washing face and hands from seated position in tub transfer bench with supervision. Oral hygiene: 5     BATHING Daily Assessment    Functional Level: 3  Body Parts Patient Bathed: Abdomen;Arm, right; Lower leg and foot, right;Latanya area; Thigh, left; Thigh, right  Comments: Pt performed UB/LB showering from tub transfer bench with modA to wash pt back, buttocks and lower BLE feet and legs thoroughly from seated position. Pt demosntrated use of long handled sponge to wash lower BLE legs and back with LUE as tolerated. Pt required VC's to maintain RUE NWB. TOILETING Daily Assessment    Functional Level:  (Pt declined.)        UPPER BODY DRESSING Daily Assessment    Functional Level: 4  Items Applied/Steps Completed: Pullover (4 steps)  Comments: Pt donned pullover with Alise to pull down over back     LOWER BODY DRESSING Daily Assessment    Functional Level: 2  Items Applied/Steps Completed: Elastic waist pants (3 steps); Sock, left (1 step); Sock, right (1 step); Underpants (3 steps)  Comments: Pt performed LB dressing with maxA  to thread BLE feet into brief and pants and pull up over BLE thighs and buttocks in standing. Pt required TA to don BLE socks. OTR provided education and demonstration for use of one handed sock aid.     Sock and/or Shoe management: 1       MOBILITY/TRANSFERS Daily Assessment          Tub/Shower Transfer Score: 3  Comments: Pt performed tub transfer from w/c using bear hug with LUE and pushing up with BLE legs, while bracing LLE knee (with OTR knee) performing stand pivot transfer   Pt demonstrated improved performance with BLE legs and functional transfer       ASSESSMENT:  Pt did not report any pain prior to shower however reported pulsating nerve pain in back and hip following showering/dressing. Pt required frequent VC's to avoid applying pressure to RUE with bathing/dressing tasks and functional transfers. Pt required mod-maxA with self cares due to decreased activity tolerance, weakness and impaired stability. Pt requires VC's for safety. Following dressing pt requested lying down in bed due to pain in R hip and back increasing. Pt  reported home is not w/c accessible at the moment but could be in the future as well as pt  not able to lift. Progression toward goals:  []          Improving appropriately and progressing toward goals  [x]          Improving slowly and progressing toward goals  []          Not making progress toward goals and plan of care will be adjusted     PLAN:  Patient continues to benefit from skilled intervention to address the above impairments. Continue treatment per established plan of care. Discharge Recommendations:  [id number] [address] [address] Recommendations for Discharge: TBD dependent on progress     Activity Tolerance:  fair      Estimated LOS:~ DC 6/11/22    Please refer to the flowsheet for vital signs taken during this treatment. After treatment:   []  Patient left in no apparent distress sitting up in chair   [x]  Patient left in no apparent distress in bed  [x]  Call bell left within reach  [x]  Nursing notified  []  Caregiver present  []  Bed alarm activated    COMMUNICATION/EDUCATION:   [] Home safety education was provided and the patient/caregiver indicated understanding. [] Patient/family have participated as able in goal setting and plan of care. [x] Patient/family agree to work toward stated goals and plan of care.   [] Patient understands intent and goals of therapy, but is neutral about his/her participation. [] Patient is unable to participate in goal setting and plan of care.       Vale Segal, OT

## 2022-05-19 PROBLEM — S52.102D: Status: ACTIVE | Noted: 2022-05-10

## 2022-05-19 PROCEDURE — 74011250637 HC RX REV CODE- 250/637: Performed by: FAMILY MEDICINE

## 2022-05-19 PROCEDURE — 99232 SBSQ HOSP IP/OBS MODERATE 35: CPT | Performed by: INTERNAL MEDICINE

## 2022-05-19 PROCEDURE — 65310000000 HC RM PRIVATE REHAB

## 2022-05-19 PROCEDURE — 74011250637 HC RX REV CODE- 250/637: Performed by: INTERNAL MEDICINE

## 2022-05-19 RX ORDER — GABAPENTIN 100 MG/1
100 CAPSULE ORAL 2 TIMES DAILY
Status: DISCONTINUED | OUTPATIENT
Start: 2022-05-19 | End: 2022-05-23

## 2022-05-19 RX ORDER — OXYCODONE AND ACETAMINOPHEN 5; 325 MG/1; MG/1
1 TABLET ORAL
Status: DISCONTINUED | OUTPATIENT
Start: 2022-05-19 | End: 2022-05-31 | Stop reason: HOSPADM

## 2022-05-19 RX ORDER — ACETAMINOPHEN 325 MG/1
650 TABLET ORAL
Status: DISCONTINUED | OUTPATIENT
Start: 2022-05-19 | End: 2022-05-23

## 2022-05-19 RX ORDER — ACETAMINOPHEN 325 MG/1
650 TABLET ORAL 2 TIMES DAILY
Status: DISCONTINUED | OUTPATIENT
Start: 2022-05-19 | End: 2022-05-23

## 2022-05-19 RX ADMIN — OXYCODONE HYDROCHLORIDE AND ACETAMINOPHEN 1 TABLET: 5; 325 TABLET ORAL at 05:49

## 2022-05-19 RX ADMIN — GABAPENTIN 100 MG: 100 CAPSULE ORAL at 11:01

## 2022-05-19 RX ADMIN — CHOLECALCIFEROL TAB 25 MCG (1000 UNIT) 5000 UNITS: 25 TAB at 07:48

## 2022-05-19 RX ADMIN — FAMOTIDINE 20 MG: 20 TABLET ORAL at 07:48

## 2022-05-19 RX ADMIN — GABAPENTIN 100 MG: 100 CAPSULE ORAL at 17:03

## 2022-05-19 RX ADMIN — ACETAMINOPHEN 650 MG: 325 TABLET ORAL at 11:01

## 2022-05-19 RX ADMIN — ACETAMINOPHEN 650 MG: 325 TABLET ORAL at 07:49

## 2022-05-19 NOTE — REHAB NOTE
SHIFT CHANGE [facility]    Bedside and Verbal shift change report given to PhoebeRN(oncoming nurse) by Obie Domingo RN (offgoing nurse). Report included the following information SBAR, Kardex, MAR and Recent Results.     Situation:   Code Status: Full Code   Hospital Day: 5   Problem List:   Hospital Problems  Date Reviewed: 5/18/2022          Codes Class Noted POA    Elevated serum free T4 level ICD-10-CM: R79.89  ICD-9-CM: 794.5  5/16/2022 Yes    Overview Signed 5/16/2022  3:30 PM by Richard Crews MD     Free T4 (5/16/2022) = 1.7             Multiple sclerosis ([facility] (Chronic) ICD-10-CM: G35  ICD-9-CM: 340  Unknown Yes        Elevated TSH ICD-10-CM: R79.89  ICD-9-CM: 794.5  5/15/2022 Yes    Overview Signed 5/16/2022  3:30 PM by Richard Crews MD     TSH (5/15/2022) = 4.00             * (Principal) Multiple sclerosis exacerbation ([facility] ICD-10-CM: G35  ICD-9-CM: 340  5/9/2022 Yes        Weakness of both lower extremities ICD-10-CM: R29.898  ICD-9-CM: 729.89  5/9/2022 Yes        Vitamin D deficiency (Chronic) ICD-10-CM: E55.9  ICD-9-CM: 268.9  5/9/2022 Yes    Overview Signed 5/16/2022  3:29 PM by Richard Crews MD     Vitamin D 25-Hydroxy (5/9/2022) = 16.6              Impaired mobility and ADLs ICD-10-CM: Z74.09, Z78.9  ICD-9-CM: V49.89  5/9/2022 Yes              Background:   Past Medical History:   Past Medical History:   Diagnosis Date    Elevated serum free T4 level 5/16/2022    Free T4 (5/16/2022) = 1.7    Elevated TSH 5/15/2022    TSH (5/15/2022) = 4.00    MS (multiple sclerosis) (HCC)     Multiple sclerosis ([facility]     Vitamin D deficiency 5/9/2022    Vitamin D 25-Hydroxy (5/9/2022) = 16.6         Assessment:   Changes in Assessment throughout shift:       Patient has a central line: no Reasons if yes: n/a  Insertion date: n/a Last dressing date: n/a   Patient has Knight Cath: no Reasons if yes: n/a   Insertion date: n/a  Shift knight care completed: NO, n/a     Last Vitals:     Vitals: 05/17/22 1936 05/18/22 0735 05/18/22 1604 05/18/22 2032   BP: 137/83 120/79 111/65 108/68   Pulse: 97 79 78 80   Resp: 18 18 18 18   Temp: 97.7 °F (36.5 °C) 98 °F (36.7 °C) 97.9 °F (36.6 °C) 97.1 °F (36.2 °C)   SpO2: 97% 96% 97% 99%   Weight:       Height:            PAIN    Pain Assessment    Pain Intensity 1: 0 (05/19/22 0645) Pain Intensity 1: 2 (12/29/14 1105)    Pain Location 1: Leg Pain Location 1: Abdomen    Pain Intervention(s) 1: Medication (see MAR) Pain Intervention(s) 1: Medication (see MAR)  Patient Stated Pain Goal: 0 Patient Stated Pain Goal: 0  o Intervention effective: yes  o Other actions taken for pain: Medication (see MAR)     Skin Assessment  Skin color    Condition/Temperature    Integrity Skin Integrity: Other (comment) (red rash noted back/front trunk/groins)  Turgor    Weekly Pressure Ulcer Documentation  Pressure  Injury Documentation: No Pressure Injury Noted-Pressure Ulcer Prevention Initiated  Wound Prevention & Protection Methods  Orientation of wound Orientation of Wound Prevention: Posterior  Location of Prevention Location of Wound Prevention: Sacrum/Coccyx  Dressing Present Dressing Present : No  Dressing Status    Wound Offloading Wound Offloading (Prevention Methods): Bed, pressure reduction mattress,Elevate heels,Pillows,Wheelchair     INTAKE/OUPUT  Date 05/18/22 0700 - 05/19/22 0659 05/19/22 0700 - 05/20/22 0659   Shift [id number] 1900-0659 24 Hour Total 8959-2330 7283-4325 24 Hour Total   INTAKE   Shift Total(mL/kg)         OUTPUT   Urine(mL/kg/hr)  300(0.4) 300(0.2)        Urine Voided  300 300        Urine Occurrence(s) 3 x 1 x 4 x      Emesis/NG output           Emesis Occurrence(s)  0 x 0 x      Stool           Stool Occurrence(s) 0 x 0 x 0 x      Shift Total(mL/kg)  300(4.8) 300(4.8)      NET  -300 -300      Weight (kg) 62.6 62.6 62.6 62.6 62.6 62.6       Recommendations:  1. Patient needs and requests: Toileting, repositioning    2.  Pending tests/procedures: None at this time. 3. Functional Level/Equipment: Partial (one person) / Bed (comment); Pat Joe; Wheelchair    Fall Precautions:   Fall risk precautions were reinforced with the patient; she was instructed to call for help prior to getting up. The following fall risk precautions were continued: bed/ chair alarms, door signage, yellow bracelet and socks as well as update of the Nany Locket tool in the patient's room. Rupesh Score:      HEALS Safety Check    A safety check occurred in the patient's room between off going nurse and oncoming nurse listed above. The safety check included the below items  Area Items   H  High Alert Medications - Verify all high alert medication drips (heparin, PCA, etc.)   E  Equipment - Suction is set up for ALL patients (with cyn)  - Red plugs utilized for all equipment (IV pumps, etc.)  - WOWs wiped down at end of shift.  - Room stocked with oxygen, suction, and other unit-specific supplies   A  Alarms - Bed alarm is set for fall risk patients  - Ensure chair alarm is in place and activated if patient is up in a chair   L  Lines - Check IV for any infiltration  - Torres bag is empty if patient has a Torres   - Tubing and IV bags are labeled   S  Safety   - Room is clean, patient is clean, and equipment is clean. - Hallways are clear from equipment besides carts. - Fall bracelet on for fall risk patients  - Ensure room is clear and free of clutter  - Suction is set up for ALL patients (with cyn)  - Hallways are clear from equipment besides carts.    - Isolation precautions followed, supplies available outside room, sign posted     Dominique Kasper RN

## 2022-05-19 NOTE — PROGRESS NOTES
[address]  [address] [place] [address], [address]     INPATIENT REHABILITATION  DAILY PROGRESS NOTE     Date: 5/19/2022    Name: Marielos Guaman Age / Sex: 74 y.o. / female   CSN: [id number] MRN: [id number]   Admit Date: 5/14/2022 Length of Stay: 5 days     Primary Rehabilitation Diagnosis: Impaired Mobility and ADLs secondary to Multiple sclerosis exacerbation      Subjective:     Patient seen and examined. Blood pressure WNL. Patient's Complaint: Patient was complaining of right sided leg pain this morning with nurses. She stated, \"I had a shooting nerve pain down the back and side of my right leg. \" She states that her pain is not related to her MS diagnosis. Patient was very apologetic and concerned about missing out on therapy. Currently patient is resting comfortably in bed watching television. Pain Control: ongoing significant pain in which is stable and controlled by current meds. Objective:     Vital Signs:  Patient Vitals for the past 24 hrs:   BP Temp Pulse Resp SpO2   05/19/22 0747 121/77 98.4 °F (36.9 °C) 80 17 97 %   05/18/22 2032 108/68 97.1 °F (36.2 °C) 80 18 99 %   05/18/22 1604 111/65 97.9 °F (36.6 °C) 78 18 97 %        Physical Examination:  GENERAL SURVEY: Patient is awake, alert, oriented x 3, laying comfortably on the bed, not in acute respiratory distress.   HEENT: pink palpebral conjunctivae, anicteric sclerae, no nasoaural discharge, moist oral mucosa  NECK: supple, no jugular venous distention, no palpable lymph nodes  CHEST/LUNGS: symmetrical chest expansion, good air entry, clear breath sounds  HEART: adynamic precordium, good S1 S2, no S3, regular rhythm, no murmurs  ABDOMEN: flat, bowel sounds appreciated, soft, non-tender  EXTREMITIES: pink nailbeds, no edema, full and equal pulses, no calf tenderness   NEUROLOGICAL EXAM: The patient is awake, alert and oriented x 3, able to answer questions fairly appropriately, able to follow 1 and 2 step commands. Able to tell time from the wall clock. Cranial nerves II-XII are grossly intact. No gross sensory deficit. Motor strength is 4/5 on BUE, 3+ to 4-/5 on BLE. Current Medications:  Current Facility-Administered Medications   Medication Dose Route Frequency    gabapentin (NEURONTIN) capsule 100 mg  100 mg Oral BID    acetaminophen (TYLENOL) tablet 650 mg  650 mg Oral BID    acetaminophen (TYLENOL) tablet 650 mg  650 mg Oral Q6H PRN    oxyCODONE-acetaminophen (PERCOCET) 5-325 mg per tablet 1 Tablet  1 Tablet Oral Q6H PRN    cholecalciferol (VITAMIN D3) (1000 Units /25 mcg) tablet 5,000 Units  5,000 Units Oral DAILY    magnesium hydroxide (MILK OF MAGNESIA) 400 mg/5 mL oral suspension 30 mL  30 mL Oral DAILY PRN    bisacodyL (DULCOLAX) tablet 10 mg  10 mg Oral Q48H PRN    famotidine (PEPCID) tablet 20 mg  20 mg Oral DAILY       Allergies:   Allergies   Allergen Reactions    Amoxicillin Hives and Rash    Clindamycin Hives and Rash    Metronidazole Hives    Tetracycline Hives and Rash       Functional Progress:    PHYSICAL THERAPY    ON ADMISSION MOST RECENT   Wheelchair Mobility/Management  Functional Level: 0 (didn't attempt out of bed to w/c transfer due to safety) Wheelchair Mobility/Management  Functional Level: 0 (didn't attempt out of bed to w/c transfer due to safety)             Balance-Sitting/Standing  Sitting - Static: Fair (occasional)  Sitting - Dynamic: Fair (occasional)  Standing - Static: Poor  Standing - Dynamic : Impaired Balance-Sitting/Standing  Sitting - Static: Fair (occasional)  Sitting - Dynamic: Fair (occasional)  Standing - Static: Poor  Standing - Dynamic : Impaired     Bed/Mat Mobility  Rolling Right : 3 (Moderate assistance )  Rolling Left : 4 (Minimal assistance)  Supine to Sit : 2 (Maximal assistance)  Sit to Supine : 4 (Minimal assistance) (for left LE management) Bed/Mat Mobility  Rolling Right : 4 (Minimal assistance)  Rolling Left : 4 (Contact guard assistance)  Supine to Sit : 4 (Minimal assistance)  Sit to Supine : 4 (Minimal assistance) (L LE management)     Transfers  Transfer Type:  (NT 2/2 safety concerns with environment)  Other: stand step without AD  Transfer Assistance : 0 (Not tested) (spoke to nursing for more appropriate height bed for safety)  Sit to Stand Assistance: Maximum assistance (from edge of Keaton bed with B feet on 6\" block ) Transfers  Transfer Type: Other  Other: stand step without AD  Transfer Assistance : 2 (Maximal assistance)  Sit to Stand Assistance: Maximum assistance                 Lab/Data Review:  No results found for this or any previous visit (from the past 24 hour(s)). Assessment:     Primary Rehabilitation Diagnosis  1. Impaired Mobility and ADLs  2. Multiple sclerosis exacerbation    Comorbidities  Patient Active Problem List   Diagnosis Code    Multiple sclerosis exacerbation (HCC) G35    Weakness of both lower extremities R29.898    Vitamin D deficiency E55.9    Elevated TSH R79.89    Elevated serum free T4 level R79.89    Impaired mobility and ADLs Z74.09, Z78.9    Multiple sclerosis (HCC) G35    Sciatica of right side M54.31    Closed fracture of proximal end of left radius with routine healing S52.102D       Plan:     1. Justification for continued stay: Good progression towards established rehabilitation goals. 2. Medical Issues being followed closely:    [x]  Fall and safety precautions     []  Wound Care     [x]  Bowel and Bladder Function     [x]  Fluid Electrolyte and Nutrition Balance     []  Pain Control      3. Issues that 24 hour rehabilitation nursing is following:    [x]  Fall and safety precautions     []  Wound Care     [x]  Bowel and Bladder Function     [x]  Fluid Electrolyte and Nutrition Balance     []  Pain Control      [x]  Assistance with and education on in-room safety with transfers to and from the bed, wheelchair, toilet and shower.       4. Acute rehabilitation plan of care:    [x]  Continue current care and rehab. [x]  Physical Therapy           [x]  Occupational Therapy           []  Speech Therapy     []  Hold Rehab until further notice     5. Medications:    [x]  MAR Reviewed     [x]  Continue Present Medications     6. Chemical DVT Prophylaxis:      []  Enoxaparin     []  Unfractionated Heparin     []  Warfarin     []  NOAC     []  Aspirin     [x]  None     7. Mechanical DVT Prophylaxis:      [x]  CONNIE Stockings     [x]  Sequential Compression Device     []  None     8. GI Prophylaxis:      []  PPI     [x]  H2 Blocker     []  None / Not indicated     9. Code status:    [x]  Full code     []  Partial code     []  Do not intubate     []  Do not resuscitate     10. Diet:  Specifications  Low fat/Low cholesterol   Solids (consistency)  Regular   Liquids (consistency)  Thin   Fluid Restriction  None     11. Orders:   > Multiple sclerosis exacerbation   > Patient has completed a 5-day course of Methylprednisolone IV on 5/13/2022    > Abnormal thyroid function tests (elevated TSH, elevated free T4) ~ Euthyroid sick syndrome vs Subclinical hypothyroidism   > TSH (5/15/2022) = 4.00   > Free T4 (5/16/2022) = 1.7   > Total T3 (5/16/2022) = 84   > TFTs to be rechecked in 4 to 6 weeks on an outpatient basis   > No need for Levothyroxine at this time      > Vitamin D Deficiency   > Vitamin D 25-Hydroxy (5/9/2022) = 16.6   > On 5/16/2022, patient was given Cholecalciferol 50,000 units PO x 1 dose   > On 5/17/2022, increased Cholecalciferol 5,000 units PO once daily   > Continue Cholecalciferol 5,000 units PO once daily    > Closed fracture of proximal end of left radius with routine healing   > X-ray of the right elbow (5/10/2022) showed a mildly impacted right proximal radius fracture, in the region of the radial neck.  No other definite fractures or malalignment   > Nonweightbearing on the right elbow    > Sciatica of right side   > On 5/14/2022, started:    > Acetaminophen 650 mg PO q 6 hr PRN for pain level 4/10 or lesser    > Percocet 5/325 1 tab PO q 8 hr PRN for pain level 5/10 or greater   > Start:    > Acetaminophen 650 mg PO BID (8AM, 12PM)     > Gabapentin 100 mg PO BID    > Neurology consult (Dr. Mabel Cherry) called for evaluation and comanagement   > Continue:     > Acetaminophen 650 mg PO q 6 hr PRN for pain level 4/10 or lesser (from 4PM to 4AM only)    > Increase Percocet 5/325 from 1 tab PO q 8 hr PRN for pain level 5.10 or greater to 1 tab PO q 6 hr PRN for pain level 5/10 or greater      12. Personal Protective Equipment (N95 face mask) was used while interacting with the patient. Patient was using a surgical mask. 15. Patient's progress in rehabilitation and medical issues discussed with the patient. All questions answered to the best of my ability. Care plan discussed with patient and nurse. 14. Patient seen and examined by Santy Lerner NP this AM. I placed ordered for medications to address the sciatica pain and I called the Neurology consult. Patient was seen and examined by me this PM. Total clinical care time is 30 minutes, including review of chart including all labs, radiology, past medical history, and discussion with patient. Greater than 50% of my time was spent in coordination of care and counseling.       Signed:    Sheldon Roberts MD    May 19, 2022

## 2022-05-19 NOTE — REHAB NOTE
[facility]    Bedside and Verbal shift change report given to Maria Teresa LOCKHART (oncoming nurse) by Wally Lancaster RN (offgoing nurse). Report included the following information SBAR, Kardex, MAR and Recent Results.     Situation:   Code Status: Full Code   Hospital Day: 5   Problem List:   Hospital Problems  Date Reviewed: 5/19/2022          Codes Class Noted POA    Sciatica of right side (Chronic) ICD-10-CM: M54.31  ICD-9-CM: 724.3  Unknown Yes        Elevated serum free T4 level ICD-10-CM: R79.89  ICD-9-CM: 794.5  5/16/2022 Yes    Overview Signed 5/16/2022  3:30 PM by Catrina Montalvo MD     Free T4 (5/16/2022) = 1.7             Multiple sclerosis ([facility] (Chronic) ICD-10-CM: G35  ICD-9-CM: 340  Unknown Yes        Elevated TSH ICD-10-CM: R79.89  ICD-9-CM: 794.5  5/15/2022 Yes    Overview Signed 5/16/2022  3:30 PM by Catrina Montalvo MD     TSH (5/15/2022) = 4.00             Closed fracture of proximal end of left radius with routine healing ICD-10-CM: S52.102D  ICD-9-CM: V54.12  5/10/2022 Yes        * (Principal) Multiple sclerosis exacerbation ([facility] ICD-10-CM: G35  ICD-9-CM: 340  5/9/2022 Yes        Weakness of both lower extremities ICD-10-CM: R29.898  ICD-9-CM: 729.89  5/9/2022 Yes        Vitamin D deficiency (Chronic) ICD-10-CM: E55.9  ICD-9-CM: 268.9  5/9/2022 Yes    Overview Signed 5/16/2022  3:29 PM by Catrina Montalvo MD     Vitamin D 25-Hydroxy (5/9/2022) = 16.6              Impaired mobility and ADLs ICD-10-CM: Z74.09, Z78.9  ICD-9-CM: V49.89  5/9/2022 Yes              Background:   Past Medical History:   Past Medical History:   Diagnosis Date    Closed fracture of proximal end of left radius with routine healing 5/10/2022    Elevated serum free T4 level 5/16/2022    Free T4 (5/16/2022) = 1.7    Elevated TSH 5/15/2022    TSH (5/15/2022) = 4.00    MS (multiple sclerosis) ([facility]     Multiple sclerosis ([facility]     Sciatica of right side     Vitamin D deficiency 5/9/2022    Vitamin D 25-Hydroxy (5/9/2022) = 16.6         Assessment:   Changes in Assessment throughout shift: No change to previous assessment     Patient has a central line: no Reasons if yes: n/a  Insertion date: n/a Last dressing date: n/a   Patient has Knight Cath: no Reasons if yes: n/a   Insertion date: n/a  Shift knight care completed: NO, n/a     Last Vitals:     Vitals:    05/18/22 1604 05/18/22 2032 05/19/22 0747 05/19/22 1545   BP: 111/65 108/68 121/77 (!) 97/59   Pulse: 78 80 80 83   Resp: 18 18 17 20   Temp: 97.9 °F (36.6 °C) 97.1 °F (36.2 °C) 98.4 °F (36.9 °C) 98.7 °F (37.1 °C)   SpO2: 97% 99% 97% 97%   Weight:       Height:            PAIN    Pain Assessment    Pain Intensity 1: 0 (05/19/22 1609) Pain Intensity 1: 2 (12/29/14 1105)    Pain Location 1: Leg Pain Location 1: Abdomen    Pain Intervention(s) 1: Medication (see MAR),Ice,Massage,MD notified (comment),Meditation Pain Intervention(s) 1: Medication (see MAR)  Patient Stated Pain Goal: 0 Patient Stated Pain Goal: 0  o Intervention effective: yes  o Other actions taken for pain: Medication (see MAR); Ice;Massage;MD notified (comment); Meditation     Skin Assessment  Skin color    Condition/Temperature    Integrity Skin Integrity: Intact  Turgor    Weekly Pressure Ulcer Documentation  Pressure  Injury Documentation: No Pressure Injury Noted-Pressure Ulcer Prevention Initiated  Wound Prevention & Protection Methods  Orientation of wound Orientation of Wound Prevention: Posterior  Location of Prevention Location of Wound Prevention: Sacrum/Coccyx  Dressing Present Dressing Present : No  Dressing Status    Wound Offloading Wound Offloading (Prevention Methods): Bed, pressure reduction mattress     INTAKE/OUPUT  Date 05/18/22 0700 - 05/19/22 0659 05/19/22 0700 - 05/20/22 0659   Shift 0522-5526 1258-1678 24 Hour Total 8548-3094 6900-5291 24 Hour Total   INTAKE   P.O.    720  720     P. O.    720  720   Shift Total(mL/kg)    720(11.5)  720(11.5)   OUTPUT Urine(mL/kg/hr)  300(0.4) 300(0.2)        Urine Voided  300 300        Urine Occurrence(s) 3 x 1 x 4 x 5 x  5 x   Emesis/NG output           Emesis Occurrence(s)  0 x 0 x      Stool           Stool Occurrence(s) 0 x 0 x 0 x 0 x  0 x   Shift Total(mL/kg)  300(4.8) 300(4.8)      NET  -300 -300 720  720   Weight (kg) 62.6 62.6 62.6 62.6 62.6 62.6       Recommendations:  1. Patient needs and requests: Toileting, repositioning    2. Pending tests/procedures: None at this time. 3. Functional Level/Equipment: Partial (one person) /      Fall Precautions:   Fall risk precautions were reinforced with the patient; she was instructed to call for help prior to getting up. The following fall risk precautions were continued: bed/ chair alarms, door signage, yellow bracelet and socks as well as update of the [place] tool in the patient's room. Rupesh Score: 4    HEALS Safety Check    A safety check occurred in the patient's room between off going nurse and oncoming nurse listed above. The safety check included the below items  Area Items   H  High Alert Medications - Verify all high alert medication drips (heparin, PCA, etc.)   E  Equipment - Suction is set up for ALL patients (with cyn)  - Red plugs utilized for all equipment (IV pumps, etc.)  - WOWs wiped down at end of shift.  - Room stocked with oxygen, suction, and other unit-specific supplies   A  Alarms - Bed alarm is set for fall risk patients  - Ensure chair alarm is in place and activated if patient is up in a chair   L  Lines - Check IV for any infiltration  - Torres bag is empty if patient has a Torres   - Tubing and IV bags are labeled   S  Safety   - Room is clean, patient is clean, and equipment is clean. - Hallways are clear from equipment besides carts. - Fall bracelet on for fall risk patients  - Ensure room is clear and free of clutter  - Suction is set up for ALL patients (with cyn)  - Hallways are clear from equipment besides carts. - Isolation precautions followed, supplies available outside room, sign posted     Chente Lo RN

## 2022-05-19 NOTE — PROGRESS NOTES
Problem: Falls - Risk of  Goal: *Absence of Falls  Description: Document Maxim Bustillos Fall Risk and appropriate interventions in the flowsheet. Outcome: Progressing Towards Goal  Note: Fall Risk Interventions:  Mobility Interventions: Bed/chair exit alarm,Patient to call before getting OOB         Medication Interventions: Bed/chair exit alarm,Evaluate medications/consider consulting pharmacy,Patient to call before getting OOB    Elimination Interventions: Bed/chair exit alarm,Call light in reach,Patient to call for help with toileting needs    History of Falls Interventions: Bed/chair exit alarm,Evaluate medications/consider consulting pharmacy         Problem: Patient Education: Go to Patient Education Activity  Goal: Patient/Family Education  Outcome: Progressing Towards Goal     Problem: Pressure Injury - Risk of  Goal: *Prevention of pressure injury  Description: Document Wagner Scale and appropriate interventions in the flowsheet.   Outcome: Progressing Towards Goal  Note: Pressure Injury Interventions:  Sensory Interventions: Assess changes in LOC    Moisture Interventions: Absorbent underpads    Activity Interventions: Chair cushion,Increase time out of bed,Pressure redistribution bed/mattress(bed type)    Mobility Interventions: Chair cushion,Pressure redistribution bed/mattress (bed type)    Nutrition Interventions: Document food/fluid/supplement intake                     Problem: Pain  Goal: *Control of Pain  Outcome: Progressing Towards Goal     Problem: Patient Education: Go to Patient Education Activity  Goal: Patient/Family Education  Outcome: Progressing Towards Goal     Problem: Patient Education: Go to Patient Education Activity  Goal: Patient/Family Education  Outcome: Progressing Towards Goal     Problem: Patient Education: Go to Patient Education Activity  Goal: Patient/Family Education  Outcome: Progressing Towards Goal

## 2022-05-19 NOTE — PROGRESS NOTES
~ 1110: Upon arrival pt visibly in pain lying in bed, pt reported having 2 episodes of shooting pain and pain being just terrible, increasing with movement. Pt reported being dissapointed in herself due to not being able to do anything with physical and occupational therapy this date. Pt reported wanting to speak with Dr. Megan Maciel right away so he could see the kind of pain she is in. Pt reported orthotist arrived to take measurements of leg and receiving medication from RN not to long ago and pain has eased up some but if it got so bad again pt was going to call 911. OTR provided education and sympathy for pt encouraging pt to press call bell if pain increased. OTR reported pt present level of function to RN and reiterated to pt top press call bell if pain increases. Pt declined therapy reporting I'm just not able. OTR will attempt to follow up with pt as able. Soumya Nicole MSOTR/L    Addendum:  OTR made second attempt to initiate OT treatment session. Pt lying in bed with HOB elevated and appeared comfortable. Pt reported speaking with MD Crooks's assistant and feeling better however awaiting neurologist and unable to do therapy at this time. OTR attempted to assist pt with needs however reported didn't need anything and would see therapist tomorrow.    Soumya Nicole MSOTR/L [phone] 5/19/22

## 2022-05-19 NOTE — PROGRESS NOTES
Physical Therapy Attempt Note    Time In:0901  Time DKT:[id number]      Attempted to see pt at scheduled PT treatment time. Pt presents supine in bed with HOB elevated and finished breakfast tray in front of pt on tray table. Pt is pleasant greeting PT but adamantly states, \"I'm not able to do any therapy today,\" reporting she did not sleep well due to right sided pain. Pt appears tearful at times explaining that the pain is overwhelming and notes, \"I already let them know that I want to see Dr. Lucio Hernandez and the neurologist first.\"  PT spend time with the pt listening and educating pt that she would return to the pt's room later to re-attempt treatment session. Pt verbalizes understanding. Pt notes that she feels her pain is worse in the morning but that it also increases with activity throughout the day and does not identify any position/posture that alleviates the pain. Pt missed 90 minutes of scheduled treatment time. Will re-attempt as able. Luis Philip PT, DPT    Addendum:  Re-attempt treatment at [phone]. Pt presents supine in bed. Pt notes ongoing pain and states she just laid back to attempt to alleviate pain. Will re-attempt as able.     Luis Philip PT, DPT

## 2022-05-20 PROCEDURE — 65310000000 HC RM PRIVATE REHAB

## 2022-05-20 PROCEDURE — 97535 SELF CARE MNGMENT TRAINING: CPT

## 2022-05-20 PROCEDURE — 77030038269 HC DRN EXT URIN PURWCK BARD -A

## 2022-05-20 PROCEDURE — 74011250637 HC RX REV CODE- 250/637: Performed by: NURSE PRACTITIONER

## 2022-05-20 PROCEDURE — 97542 WHEELCHAIR MNGMENT TRAINING: CPT

## 2022-05-20 PROCEDURE — 74011250637 HC RX REV CODE- 250/637: Performed by: FAMILY MEDICINE

## 2022-05-20 PROCEDURE — 97110 THERAPEUTIC EXERCISES: CPT

## 2022-05-20 PROCEDURE — 74011250637 HC RX REV CODE- 250/637: Performed by: INTERNAL MEDICINE

## 2022-05-20 PROCEDURE — 99232 SBSQ HOSP IP/OBS MODERATE 35: CPT | Performed by: INTERNAL MEDICINE

## 2022-05-20 PROCEDURE — 97530 THERAPEUTIC ACTIVITIES: CPT

## 2022-05-20 RX ORDER — CETIRIZINE HCL 10 MG
10 TABLET ORAL DAILY
Status: DISCONTINUED | OUTPATIENT
Start: 2022-05-21 | End: 2022-05-31 | Stop reason: HOSPADM

## 2022-05-20 RX ORDER — DIMETHYL FUMARATE 240 MG/1
240 CAPSULE ORAL 2 TIMES DAILY
Status: DISCONTINUED | OUTPATIENT
Start: 2022-05-21 | End: 2022-05-31 | Stop reason: HOSPADM

## 2022-05-20 RX ORDER — DIAPER,BRIEF,INFANT-TODD,DISP
EACH MISCELLANEOUS 2 TIMES DAILY
Status: DISCONTINUED | OUTPATIENT
Start: 2022-05-20 | End: 2022-05-31 | Stop reason: HOSPADM

## 2022-05-20 RX ADMIN — HYDROCORTISONE: 0.5 CREAM TOPICAL at 17:59

## 2022-05-20 RX ADMIN — ACETAMINOPHEN 650 MG: 325 TABLET ORAL at 12:35

## 2022-05-20 RX ADMIN — CHOLECALCIFEROL TAB 25 MCG (1000 UNIT) 5000 UNITS: 25 TAB at 08:29

## 2022-05-20 RX ADMIN — GABAPENTIN 100 MG: 100 CAPSULE ORAL at 08:29

## 2022-05-20 RX ADMIN — FAMOTIDINE 20 MG: 20 TABLET ORAL at 08:29

## 2022-05-20 RX ADMIN — GABAPENTIN 100 MG: 100 CAPSULE ORAL at 17:33

## 2022-05-20 RX ADMIN — OXYCODONE HYDROCHLORIDE AND ACETAMINOPHEN 1 TABLET: 5; 325 TABLET ORAL at 06:17

## 2022-05-20 RX ADMIN — ACETAMINOPHEN 650 MG: 325 TABLET ORAL at 08:30

## 2022-05-20 NOTE — PROGRESS NOTES
Problem: Self Care Deficits Care Plan (Adult)  Goal: *Therapy Goal (Edit Goal, Insert Text)  Description: Occupational Therapy Goals   Long Term Goals  Initiated 5/15 and to be accomplished within 2 week(s)  1. Pt will perform self-feeding with I.  2. Pt will perform grooming with I.  3. Pt will perform UB bathing with Mod I.  4. Pt will perform LB bathing with Mod I.  5. Pt will perform tub/shower transfer with Mod I.   6. Pt will perform UB dressing with I.  7. Pt will perform LB dressing with Mod I.  8. Pt will perform toileting task with Mod I.  9. Pt will perform toilet transfer with Mod I.  10. Pt. Will increase BUE strength to 5/5 in order to increase safety at home during ADLs. Short Term Goals   Initiated 5/15 and to be accomplished within 7 day(s)  1. Pt will perform self-feeding with Mod I.   2. Pt will perform grooming with Mod I.  3. Pt will perform UB bathing with Supervision . 4. Pt will perform LB bathing with Supervision. 5. Pt will perform tub/shower transfer with 4.  6. Pt will perform UB dressing with Mod I.  7. Pt will perform LB dressing with Min A.  8. Pt will perform toileting task with Mod A.  9. Pt will perform toilet transfer with Min A.   10. Pt. Will demonstrate functional mobility with CGA with RW in order to increase I for ADLs, by setting up prior to activities. Outcome: Progressing Towards Goal  Goal: Interventions  Outcome: Progressing Towards Goal   OT WEEKLY PROGRESS NOTE  Patient Name:Makenzie Webster      Time In: 0900  Time Out: 1005    Medical Diagnosis:  Multiple sclerosis (HCC) [G35] Multiple sclerosis exacerbation (HCC)     Pain at start of tx:0/10 pain or discomfort. R hip and back  Pain at stop of tx:3/10 pain or discomfort. Pt reported 3/10 pain following UB dressing however reported it's hard to rate because when it's surging it's an 8, I just don't know when it's going to do that. Pt requested to return back to bed following UB dressing.     Patient identified with name and :yes  Subjective:          Objective:       Outcome Measures:      [facility]      COGNITION/PERCEPTION Initial Assessment Weekly Progress Assessment 2022   Premorbid Reading Status       Premorbid Writing Status       Arousal/Alertness       Orientation Level Oriented X4  Oriented x4   Visual Fields       Praxis       Body Scheme       COMPREHENSION MODE Initial Assessment Weekly Progress Assessment 2022   Primary Mode of Comprehension    Verbal   Hearing Aide    none   Corrective Lenses       Score    5     EXPRESSION Initial Assessment Weekly Progress Assessment 2022   Primary Mode of Expression   Auditory    Score    5   Comments         SOCIAL INTERACTION/ PRAGMATICS Initial Assessment Weekly Progress Assessment 2022   Score    5   Comments         PROBLEM SOLVING Initial Assessment Weekly Progress Assessment 2022   Score    5   Comments         MEMORY Initial Assessment Weekly Progress Assessment 2022   Score    5   Comments         EATING Initial Assessment Weekly Progress Assessment 2022   Functional Level 5  5   Comments right forearm fx       GROOMING Initial Assessment Weekly Progress Assessment 2022   Functional Level 5 Functional Level: 5   Tasks completed by patient Brushed hair,Brushed teeth,Washed face Tasks Completed by Patient: Brushed teeth; Washed face; Washed hands   Comments Pt performed oral hygiene/ brushed her teeth (set-up) level with supplies set-up on tray table. Patient combed her hair (set-up) level after her hair was washed. BATHING Initial Assessment Weekly Progress Assessment 2022   Functional Level 4 Functional Level: 3   Body parts patient bathed Abdomen,Arm, right,Buttocks,Chest,Lower leg and foot, left,Lower leg and foot, right,Latanya area,Thigh, left,Thigh, right Body Parts Patient Bathed: Abdomen;Arm, right;Chest;Lower leg and foot, left; Lower leg and foot, right;Latanya area; Thigh, left; Thigh, right   Comments Benefit from long handled sponge Comments: Pt performed UB showering from tub transfer bench in seated position with modA to wash LUE arm and underarm secondary to RUE NWB. Pt performed LB showering with min-modA to wash BLE feet throughly. Pt demonstrated ability to wash BLE feet with long handled sponge and nona area using weight shifting technique with supervision. Pt demonstrated ability to wash BLE thighs and knees with supervision. Pt required VC's for reminders of RUE NWB.      TUB/SHOWER TRANSFER INDEPENDENCE Initial Assessment Weekly Progress Assessment 5/20/2022   Score 0 Tub/Shower Transfer Score: 2   Comments  (Pt. with right leg pain limiting activity OOB) Comments: Pt performed shower transfer to shower bench via stand pivot transfer using LUE for support and maxA  due to weakness and decreased stability. UPPER BODY DRESSING/UNDRESSING Initial Assessment Weekly Progress Assessment 5/20/2022   Functional Level 5 Functional Level: 5   Items applied/Steps completed Pullover (4 steps) Items Applied/Steps Completed: Pullover (4 steps)   Comments Pt donned pullover dress with Alise to pull over head Comments: Pt donned pullover shirt with supervision in seated position     LOWER BODY DRESSING/UNDRESSING Initial Assessment Weekly Progress Assessment 5/20/2022   Functional Level 2 Functional Level: 3   Items applied/Steps completed Underpants (3 steps) Items Applied/Steps Completed: Elastic waist pants (3 steps); Sock, left (1 step); Sock, right (1 step); Underpants (3 steps)   Comments Pt required maxA to don pul tab brief from bed level rolling to right with Alise. Comments: Pt donned BLE socks with maxA from seated position. Pt donend brief threading BLE feet through leg holes with modA and pulled up over BLE hips and butocks with maxA.       TOILETING Initial Assessment Weekly Progress Assessment 5/20/2022   Functional Level 5  1/2   Comments         TOILET TRANSFER INDEPENDENCE Initial Assessment Weekly Progress Assessment 5/20/2022   Transfer score 5  2   Comments                  ASSESSMENT:  Pt agreeable to perform and requested UB/LB showering to wash hair. Pt reported no pain initially and no s/sx of pain appearing relaxed and well rested. Pt performed showering with min-modA and UB dressing with supervision from seated position. OTR noted red bumps and pink tone in color to pt back and reported to nursing, RN assessed prior to UB dressing. Following UB dressing pt grimaced in pain reported \"there it was again the pain is starting up again\", its too bad, do you think it would be easier to get my pants on in the bed? . Pt reported 3/10 pain in R hip and back however it being hard to rate because it surges, and when it's surging pt reported it being an 8/10. Pt also reported I just don't know when that's going to happen. Pt reported being anxious to see neurologist today and asked when \"Jonathan\" was coming (orthotist) for RLE. Pt declined further OT due to pain and wanting to rest prior to PT session. Progression toward goals:  []          Improving appropriately and progressing toward goals  [x]          Improving slowly and progressing toward goals  []          Not making progress toward goals and plan of care will be adjusted     PLAN:  Patient continues to benefit from skilled intervention to address the above impairments. Continue treatment per established plan of care. Discharge Recommendations:  [id number] [id number] [address] Recommendations for Discharge: TBD dependent on progress     Please refer to the flow sheet for vital signs taken during this treatment.   After treatment:   []  Patient left in no apparent distress sitting up in chair  [x]  Patient left in no apparent distress in bed  [x]  Call bell left within reach  [x]  Nursing notified  []  Caregiver present  []  Bed alarm activated    COMMUNICATION/EDUCATION:   [] Home safety education was provided and the patient/caregiver indicated understanding. [] Patient/family have participated as able in goal setting and plan of care. [x] Patient/family agree to work toward stated goals and plan of care. [] Patient understands intent and goals of therapy, but is neutral about his/her participation. [] Patient is unable to participate in goal setting and plan of care. Plan of Care: Please see Care Plan for updated STG/LTGs.    Family Training:    Estimated LOS: ~ DC 5/31/22    Ciera Rosenthal OT  5/20/2022

## 2022-05-20 NOTE — PROGRESS NOTES
Pt is a 74year old female admitted to ARU for MS exacerbation. Pt is alert and oriented, alone in the room. Pt states that she lives at with her spouse and her adult son in a 1 level home with 3 steps to enter with no rails and a tub shower. Pt states that prior to admission she was able to self care and states that she uses a \"hurry\" cane for mobility. Pt states that she has no history with home health, outpatient therapy or SNF. Pt states that she may have a POA with her spouse but is unsure. Pt notes that her spouse, Mary Ball, [phone], is her NOK contact. Pt states that her son, Afua Calvert, is in the home and not currently working. Pt states that she does not have a permanent PCP and would like to be set up with a female provider at [place]. Pt states that she fills her medications at [facility] on [address] and declines the Meds to [facility].     Pt states that she has received 3 doses of the [address] vaccine. Pt reports that her last dose was in December. Pt confirms her insurance as Medicare and BCBS. Sw reviewed [place] date of 5/31, [place] planning and team conference. Pt expresses hope that she can return home and feels the ordered brace will make walking an option. Pt notes concern that her home is not wheelchair accessible and sw encouraged the pt to discuss mobility options with the treatment team.     Sw will follow.

## 2022-05-20 NOTE — PROGRESS NOTES
3002- informed by OT [facility] that patient has red rash like on her back area, this had beed seen by NP,Jose yesterday but not considerd rash since it was very few,but today [facility] said it has been more. Patient claimed it to be itching. Will inform MD.    9522-Dr. Megan Maciel made aware of the rash. 2370-Seen ZELALEM Garcia made aware of the rash. 5687-ZELALEM Garcia evaluated the said rash at the patient's back.

## 2022-05-20 NOTE — CONSULTS
74year old female with history of multiple sclerosis diagnosed in , patient on tecfidera since she is diagnosed with MS, patient denies any other DMT, patient recently admitted with left foot rolled followed by a fall, left side weakness become worse that she came to emergency room, patient has not seen neurologist for few years, admit recent lower extremity paresthesias, patient use cane for lower extremity weakness, denies dizziness, memory problems, live with her  and son, no other recent illness reported. MRI BRAIN showed no new lesion but cervical spine showed active demyelination, patient started on steroid to continue for 5 days, IV I GM Methylprednisolone for 5 days with GI  Prophylaxis. Patient for two years reduced dose of tecfidera to 250 mg once a day, recommend increase to 250 mg BID. Social History     Socioeconomic History    Marital status:      Spouse name: Not on file    Number of children: Not on file    Years of education: Not on file    Highest education level: Not on file   Occupational History    Not on file   Tobacco Use    Smoking status: Former Smoker     Types: Cigarettes     Quit date: 1980     Years since quittin.9    Smokeless tobacco: Never Used   Substance and Sexual Activity    Alcohol use: Not Currently    Drug use: No    Sexual activity: Never     Partners: Male   Other Topics Concern    Not on file   Social History Narrative    Not on file     Social Determinants of Health     Financial Resource Strain:     Difficulty of Paying Living Expenses: Not on file   Food Insecurity:     Worried About Running Out of Food in the Last Year: Not on file    Marcos of Food in the Last Year: Not on file   Transportation Needs:     Lack of Transportation (Medical): Not on file    Lack of Transportation (Non-Medical):  Not on file   Physical Activity:     Days of Exercise per Week: Not on file    Minutes of Exercise per Session: Not on file Stress:     Feeling of Stress : Not on file   Social Connections:     Frequency of Communication with Friends and Family: Not on file    Frequency of Social Gatherings with Friends and Family: Not on file    Attends Voodoo Services: Not on file    Active Member of Clubs or Organizations: Not on file    Attends Club or Organization Meetings: Not on file    Marital Status: Not on file   Intimate Partner Violence:     Fear of Current or Ex-Partner: Not on file    Emotionally Abused: Not on file    Physically Abused: Not on file    Sexually Abused: Not on file   Housing Stability:     Unable to Pay for Housing in the Last Year: Not on file    Number of [place] in the Last Year: Not on file    Unstable Housing in the Last Year: Not on file       No family history on file.      Current Facility-Administered Medications   Medication Dose Route Frequency Provider Last Rate Last Admin    hydrocortisone (CORTAID) 0.5 % cream   Topical BID Remi Snyder, NP        gabapentin (NEURONTIN) capsule 100 mg  100 mg Oral BID Belinda Ray MD   100 mg at 05/20/22 3525    acetaminophen (TYLENOL) tablet 650 mg  650 mg Oral BID Belinda Ray MD   650 mg at 05/20/22 1235    acetaminophen (TYLENOL) tablet 650 mg  650 mg Oral Q6H PRN Belinda Ray MD        oxyCODONE-acetaminophen (PERCOCET) 5-325 mg per tablet 1 Tablet  1 Tablet Oral Q6H PRN Belinda Ray MD   1 Tablet at 05/20/22 0617    cholecalciferol (VITAMIN D3) (1000 Units /25 mcg) tablet 5,000 Units  5,000 Units Oral DAILY Belinda Ray MD   5,000 Units at 05/20/22 8447    magnesium hydroxide (MILK OF MAGNESIA) 400 mg/5 mL oral suspension 30 mL  30 mL Oral DAILY PRN Rosaline Hirsch MD        bisacodyL (DULCOLAX) tablet 10 mg  10 mg Oral Q48H PRN Rosaline Hirsch MD        famotidine (PEPCID) tablet 20 mg  20 mg Oral DAILY Rosaline Hirsch MD   20 mg at 05/20/22 1923       Past Medical History:   Diagnosis Date    Closed fracture of proximal end of left radius with routine healing 5/10/2022    Elevated serum free T4 level 5/16/2022    Free T4 (5/16/2022) = 1.7    Elevated TSH 5/15/2022    TSH (5/15/2022) = 4.00    MS (multiple sclerosis) (HCC)     Multiple sclerosis (HCC)     Sciatica of right side     Vitamin D deficiency 5/9/2022    Vitamin D 25-Hydroxy (5/9/2022) = 16.6        Past Surgical History:   Procedure Laterality Date    [place] BIOPSY OF BREAST, INCISIONAL  RT BENIGN    OVER 10 YRS. AGO PER PT.        Allergies   Allergen Reactions    Amoxicillin Hives and Rash    Clindamycin Hives and Rash    Metronidazole Hives    Tetracycline Hives and Rash       Patient Active Problem List   Diagnosis Code    Multiple sclerosis exacerbation (HCC) G35    Weakness of both lower extremities R29.898    Vitamin D deficiency E55.9    Elevated TSH R79.89    Elevated serum free T4 level R79.89    Impaired mobility and ADLs Z74.09, Z78.9    Multiple sclerosis (HCC) G35    Sciatica of right side M54.31    Closed fracture of proximal end of left radius with routine healing S52.102D         Review of Systems:   As above otherwise 11 point review of systems negative including;   Constitutional no fever or chills  Skin denies rash or itching  HENT  Denies tinnitus, hearing lose  Eyes denies diplopia vision lose  Respiratory denies shortness of breath  Cardiovascular denies chest pain, dyspnea on exertion  Gastrointestinal denies nausea, vomiting, diarrhea, constipation  Genitourinary denies incontinence  Musculoskeletal denies joint pain or swelling  Endocrine denies weight change  Hematology denies easy bruising or bleeding   Neurological as above in HPI      PHYSICAL EXAMINATION:      VITAL SIGNS:    Visit Vitals  /67 (BP 1 Location: Left upper arm, BP Patient Position: At rest)   Pulse 82   Temp 97.6 °F (36.4 °C)   Resp 18   Ht 4' 11\" (1.499 m)   Wt 62.6 kg (138 lb)   SpO2 98%   Breastfeeding No   BMI 27.87 kg/m² GENERAL: The patient is well developed, well nourished, and in no apparent distress. EXTREMITIES: No clubbing, cyanosis, or edema is identified. Pulses 2+ and symmetrical.   HEAD:   Ear, nose, and throat appear to be without trauma. The patient is normocephalic. NEUROLOGIC EXAMINATION  Mental status: Awake, alert, oriented x3, follows simple,   Speech and languge: fluent, coherent, naming and repitition intact, reading and comprehension intact  CN: VFF, EOMI, PERRLA, face sensation intact , no facial asymmetry noted, tongue midline  Motor: upper extremity have good strength but poor effort, lower right side 4/5 and left side 3/5. Sensory: intact to light touch throughout  Coordination: FNFw/o dysmetria  DTR:brisk all over with plantar withdrawl  Gait: not tested    Impression:   74year old female came with worsening left side weakness followed by fall, mri remain unchanged but cervical spine showed small lesion at C5 as well as thoracic showing active enhancement. Follow up recommended since patient complain right side lower extremity pain and burning  Radiating paresthesias most likely related to thoracic cord lesion, patient complain sharp pain from the back of buttock radiating to side and lower extremity, lumbar spine unremarkable. Patient reduced her dose of tecfidera in the past to half. Recommend   1-Restart tecfidera to 240 mg BID, patient had low WBC and lymphopenia now better need to monitor closely if continue to decrease will consider switch to another DMT. 2-Increase Gabapentin to 300 mg BID. 3-DVT prophylaxsis  4-PT and OT  5-VIT-D    Plan: as above     I spent 20 minutes with the patient in face-to-face consultation, of which greater than 50% was spent in counseling and coordination of care as described above. PLEASE NOTE:   This document has been produced using voice recognition software. Unrecognized errors in transcription may be present.

## 2022-05-20 NOTE — PROGRESS NOTES
Problem: Mobility Impaired (Adult and Pediatric)  Goal: *Acute Goals and Plan of Care (Insert Text)  Description: Physical Therapy Short Term Goals  Initiated 5/15/2022 and to be accomplished within 7 day(s) (5/22/2022)  1. Patient will move from supine to sit and sit to supine , scoot up and down, and roll side to side in bed with minimal assistance/contact guard assist.    2.  Patient will transfer from bed to chair and chair to bed with moderate assistance  using the least restrictive device. 3.  Patient will perform sit to stand with moderate assistance . 4. Patient will ambulate with moderate assistance  for 10 feet with the least restrictive device. 5.  Patient will propel w/c over level surfaces for 150 feet with minimal assistance. Physical Therapy Long Term Goals  Initiated 5/15/2022 and to be accomplished within 3-4 weeks (6/12/2022)  1. Patient will move from supine to sit and sit to supine , scoot up and down, and roll side to side in bed with modified independence. 2.  Patient will transfer from bed to chair and chair to bed with supervision/set-up using the least restrictive device. 3.  Patient will perform sit to stand with supervision/set-up. 4.  Patient will ambulate with supervision/set-up for 50 feet with the least restrictive device. 5.  Patient will ascend/descend 3 stairs with 1 handrail(s) with minimal assistance/contact guard assist.      Outcome: Progressing Towards Goal       PHYSICAL THERAPY TREATMENT    Patient: Marielos Guaman (89 y.o. female)  Date: 5/20/2022  Diagnosis: Multiple sclerosis ([facility] [G35] Multiple sclerosis exacerbation ([facility]  Precautions: Fall,NWB (right UE)  Chart, physical therapy assessment, plan of care and goals were reviewed. Time In:1040  Time FAU:[id number]    Patient seen for: Balance activities; Patient education;Transfer training; Therapeutic exercise; Wheelchair mobility    Pain:  Pt pain was reported as right LE and back pain intermittently during-treatment. Intervention: Assisted pt with repositioning. Patient identified with name and : yes    SUBJECTIVE:      Pt reports, \"it's just wobbly,\" re: left LE and reports right LE pain in standing. Pt is pleasant and cooperative during treatment session appearing receptive to education provided but appears reluctant to sit OOB after treatment session. With encouragement, pt was agreeable to sit OOB for lunch/meal time prior to resting in bed. OBJECTIVE DATA SUMMARY:    Objective:     BED/MAT MOBILITY Daily Assessment     Rolling Left : 5 (Supervision)  Supine to Sit : 5 (Supervision) (with HOB elevated, pt notes she does this at home as the head of her bed can be elevated)   Pt requires increased time and verbal reminders for sequencing to perform supine to sit transfer through left side lying with minimal verbal reminders to avoid pushing up with right hand. TRANSFERS Daily Assessment     Transfer Type: Other  Other: stand step without AD  Transfer Assistance : 3 (Moderate assistance )  Sit to Stand Assistance: Moderate assistance   Pt requires moderate assistance for anterior and up weight shift with sit to stand and moderate assistance for slow controlled descent with stand to sit transfers with moderate verbal reminders to avoid weight bearing on right UE and for safe left hand placement. PT guards left knee for stability throughout. As pt fatigued during treatment session, pt required increased physical assistance from moderate to maximal assistance with PT blocking left knee due to functional weakness to avoid buckling. Pt performed 5 sit <> stand transfers requiring moderate assistance for the first two trials and moderate to maximal assistance for the final three trials. Pt performed one stand step transfer bed to w/c to her right side with moderate assistance for balance and weight shifting with PT guarding left LE/knee throughout.         BALANCE Daily Assessment     Sitting - Static: Good (unsupported)  Sitting - Dynamic: Fair (occasional)  Standing - Static: Poor  Standing - Dynamic : Impaired   Pt stood for three standing trials of 45-60 seconds each with moderate to maximal assistance for static standing balance with PT guarding left knee and pt utilizing SBQC on her left side (due to right UE NWB). Pt requires moderate manual cues for glut engagement and hip extension for upright posture and when PT attempts to decrease guarding/blocking pt demonstrates left knee buckling requiring PT to resume blocking left knee in stance for stability. WHEELCHAIR MOBILITY Daily Assessment     Able to Propel (ft): 155 feet  Functional Level: 4   Pt propels w/c with left UE and right LE with minimal assistance for straight path negotiation. THERAPEUTIC EXERCISES Daily Assessment     Seated LE Strength Training:  3 Sets of 10 Repetitions  Right and Left LAQ  Alternate Hip Flexion  Red Theraband Resisted Hip Abd/ER  Right and Left Ankle DF and PF        ASSESSMENT:  Pt is slowly progressing with functional mobility demonstrating improved standing tolerance and quality of transfers prior to fatigue during treatment session. Progression toward goals:  []      Improving appropriately and progressing toward goals  [x]      Improving slowly and progressing toward goals  []      Not making progress toward goals and plan of care will be adjusted      PLAN:  Patient continues to benefit from skilled intervention to address the above impairments. Continue treatment per established plan of care. Emphasize ongoing functional strengthening to promote increased safety and independence with transfers.   Discharge Recommendations:  Home Physical Therapy with 24 hour assistance versus SNF  Further Equipment Recommendations for Discharge:  wheelchair 18 inch lisa-height, gait belt      Estimated Discharge Date: 5/31/2022    Activity Tolerance:   Good  Please refer to the flowsheet for vital signs taken during this treatment.     After treatment:   [] Patient left in no apparent distress in bed  [x] Patient left in no apparent distress sitting up in chair  [] Patient left in no apparent distress in w/c mobilizing under own power  [] Patient left in no apparent distress dining area  [] Patient left in no apparent distress mobilizing under own power  [x] Call bell left within reach  [] Nursing notified  [] Caregiver present  [] Bed alarm activated   [x] Chair alarm activated      Asa Biswas, PT, DPT  5/20/2022

## 2022-05-20 NOTE — REHAB NOTE
SHIFT CHANGE NOTE FOR [facility]    Bedside and Verbal shift change report given to Jaleel Luke (oncoming nurse) by Tory Cowart RN (offgoing nurse). Report included the following information SBAR, Kardex, MAR and Recent Results.     Situation:   Code Status: Full Code   Hospital Day: 6   Problem List:   Hospital Problems  Date Reviewed: 5/19/2022          Codes Class Noted POA    Sciatica of right side (Chronic) ICD-10-CM: M54.31  ICD-9-CM: 724.3  Unknown Yes        Elevated serum free T4 level ICD-10-CM: R79.89  ICD-9-CM: 794.5  5/16/2022 Yes    Overview Signed 5/16/2022  3:30 PM by Theodore Stover MD     Free T4 (5/16/2022) = 1.7             Multiple sclerosis ([facility] (Chronic) ICD-10-CM: G35  ICD-9-CM: 340  Unknown Yes        Elevated TSH ICD-10-CM: R79.89  ICD-9-CM: 794.5  5/15/2022 Yes    Overview Signed 5/16/2022  3:30 PM by Theodore Stover MD     TSH (5/15/2022) = 4.00             Closed fracture of proximal end of left radius with routine healing ICD-10-CM: S52.102D  ICD-9-CM: V54.12  5/10/2022 Yes        * (Principal) Multiple sclerosis exacerbation ([facility] ICD-10-CM: G35  ICD-9-CM: 340  5/9/2022 Yes        Weakness of both lower extremities ICD-10-CM: R29.898  ICD-9-CM: 729.89  5/9/2022 Yes        Vitamin D deficiency (Chronic) ICD-10-CM: E55.9  ICD-9-CM: 268.9  5/9/2022 Yes    Overview Signed 5/16/2022  3:29 PM by Theodore Stover MD     Vitamin D 25-Hydroxy (5/9/2022) = 16.6              Impaired mobility and ADLs ICD-10-CM: Z74.09, Z78.9  ICD-9-CM: V49.89  5/9/2022 Yes              Background:   Past Medical History:   Past Medical History:   Diagnosis Date    Closed fracture of proximal end of left radius with routine healing 5/10/2022    Elevated serum free T4 level 5/16/2022    Free T4 (5/16/2022) = 1.7    Elevated TSH 5/15/2022    TSH (5/15/2022) = 4.00    MS (multiple sclerosis) ([facility]     Multiple sclerosis ([facility]     Sciatica of right side     Vitamin D deficiency 5/9/2022    Vitamin D 25-Hydroxy (5/9/2022) = 16.6         Assessment:   Changes in Assessment throughout shift:       Patient has a central line: no Reasons if yes: n/a  Insertion date: n/a Last dressing date: n/a   Patient has Knight Cath: no Reasons if yes: n/a   Insertion date: n/a  Shift knight care completed: NO, n/a     Last Vitals:     Vitals:    05/18/22 2032 05/19/22 0747 05/19/22 1545 05/19/22 2029   BP: 108/68 121/77 (!) 97/59 95/62   Pulse: 80 80 83 77   Resp: 18 17 20 18   Temp: 97.1 °F (36.2 °C) 98.4 °F (36.9 °C) 98.7 °F (37.1 °C) 97.3 °F (36.3 °C)   SpO2: 99% 97% 97% 98%   Weight:       Height:            PAIN    Pain Assessment    Pain Intensity 1: 5 (05/20/22 0618) Pain Intensity 1: 2 (12/29/14 1105)    Pain Location 1: Leg Pain Location 1: Abdomen    Pain Intervention(s) 1: Medication (see MAR) Pain Intervention(s) 1: Medication (see MAR)  Patient Stated Pain Goal: 0 Patient Stated Pain Goal: 0  o Intervention effective: yes  o Other actions taken for pain: Medication (see MAR)     Skin Assessment  Skin color    Condition/Temperature    Integrity Skin Integrity: Intact  Turgor    Weekly Pressure Ulcer Documentation  Pressure  Injury Documentation: No Pressure Injury Noted-Pressure Ulcer Prevention Initiated  Wound Prevention & Protection Methods  Orientation of wound Orientation of Wound Prevention: Posterior  Location of Prevention Location of Wound Prevention: Sacrum/Coccyx  Dressing Present Dressing Present : No  Dressing Status    Wound Offloading Wound Offloading (Prevention Methods): Bed, pressure reduction mattress,Elevate heels,Walker,Wheelchair,Pillows,Repositioning     INTAKE/OUPUT  Date 05/19/22 0700 - 05/20/22 0659 05/20/22 0700 - 05/21/22 0659   Shift 9316-3303 8534-5121 24 Hour Total 0300-1859 9875-5748 24 Hour Total   INTAKE   P.O. 720  720        P. O. 720  720      Shift Total(mL/kg) 720(11.5)  720(11.5)      OUTPUT   Urine(mL/kg/hr)  700(0.9) 700(0.5)        Urine Voided  700 700        Urine Occurrence(s) 5 x 1 x 6 x      Emesis/NG output           Emesis Occurrence(s)  0 x 0 x      Stool           Stool Occurrence(s) 0 x 0 x 0 x      Shift Total(mL/kg)  700(11.2) 700(11.2)       -700 20      Weight (kg) 62.6 62.6 62.6 62.6 62.6 62.6       Recommendations:  1. Patient needs and requests: Toileting, repositioning    2. Pending tests/procedures: None at this time. 3. Functional Level/Equipment: Partial (one person) / Bed (comment); [place] Savage; Wheelchair    Fall Precautions:   Fall risk precautions were reinforced with the patient; she was instructed to call for help prior to getting up. The following fall risk precautions were continued: bed/ chair alarms, door signage, yellow bracelet and socks as well as update of the Gioe Tee tool in the patient's room. Rupesh Score:      HEALS Safety Check    A safety check occurred in the patient's room between off going nurse and oncoming nurse listed above. The safety check included the below items  Area Items   H  High Alert Medications - Verify all high alert medication drips (heparin, PCA, etc.)   E  Equipment - Suction is set up for ALL patients (with cyn)  - Red plugs utilized for all equipment (IV pumps, etc.)  - WOWs wiped down at end of shift.  - Room stocked with oxygen, suction, and other unit-specific supplies   A  Alarms - Bed alarm is set for fall risk patients  - Ensure chair alarm is in place and activated if patient is up in a chair   L  Lines - Check IV for any infiltration  - Torres bag is empty if patient has a Torres   - Tubing and IV bags are labeled   S  Safety   - Room is clean, patient is clean, and equipment is clean. - Hallways are clear from equipment besides carts. - Fall bracelet on for fall risk patients  - Ensure room is clear and free of clutter  - Suction is set up for ALL patients (with cyn)  - Hallways are clear from equipment besides carts.    - Isolation precautions followed, supplies available outside room, sign posted     Iliana Cerna RN

## 2022-05-20 NOTE — PROGRESS NOTES
[address]  [address] [place] [address], [address]     INPATIENT REHABILITATION  DAILY PROGRESS NOTE     Date: 5/20/2022    Name: Nanette Marroquin Age / Sex: 74 y.o. / female   CSN: [id number] MRN: [id number]   Admit Date: 5/14/2022 Length of Stay: 6 days     Primary Rehabilitation Diagnosis: Impaired Mobility and ADLs secondary to Multiple sclerosis exacerbation      Subjective:     Patient seen and examined. Blood pressure controlled. Patient's Complaint:   Nurse reported rash on back    Pain Control: stable, mild-to-moderate joint symptoms intermittently, reasonably well controlled by current meds       Objective:     Vital Signs:  Patient Vitals for the past 24 hrs:   BP Temp Pulse Resp SpO2   05/20/22 0750 116/76 97.8 °F (36.6 °C) 74 16 96 %   05/19/22 2029 95/62 97.3 °F (36.3 °C) 77 18 98 %   05/19/22 1545 (!) 97/59 98.7 °F (37.1 °C) 83 20 97 %        Physical Examination:  GENERAL SURVEY: Patient is awake, alert, oriented x 3, laying comfortably on the bed, not in acute respiratory distress. HEENT: pink palpebral conjunctivae, anicteric sclerae, no nasoaural discharge, moist oral mucosa  NECK: supple, no jugular venous distention, no palpable lymph nodes  CHEST/LUNGS: symmetrical chest expansion, good air entry, clear breath sounds  HEART: adynamic precordium, good S1 S2, no S3, regular rhythm, no murmurs  ABDOMEN: flat, bowel sounds appreciated, soft, non-tender  EXTREMITIES: pink nailbeds, no edema, full and equal pulses, no calf tenderness   NEUROLOGICAL EXAM: The patient is awake, alert and oriented x 3, able to answer questions fairly appropriately, able to follow 1 and 2 step commands. Able to tell time from the wall clock. Cranial nerves II-XII are grossly intact. No gross sensory deficit. Motor strength is 4/5 on BUE, 3+ to 4-/5 on BLE.        Current Medications:  Current Facility-Administered Medications   Medication Dose Route Frequency    hydrocortisone (CORTAID) 0.5 % cream   Topical BID    gabapentin (NEURONTIN) capsule 100 mg  100 mg Oral BID    acetaminophen (TYLENOL) tablet 650 mg  650 mg Oral BID    acetaminophen (TYLENOL) tablet 650 mg  650 mg Oral Q6H PRN    oxyCODONE-acetaminophen (PERCOCET) 5-325 mg per tablet 1 Tablet  1 Tablet Oral Q6H PRN    cholecalciferol (VITAMIN D3) (1000 Units /25 mcg) tablet 5,000 Units  5,000 Units Oral DAILY    magnesium hydroxide (MILK OF MAGNESIA) 400 mg/5 mL oral suspension 30 mL  30 mL Oral DAILY PRN    bisacodyL (DULCOLAX) tablet 10 mg  10 mg Oral Q48H PRN    famotidine (PEPCID) tablet 20 mg  20 mg Oral DAILY       Allergies: Allergies   Allergen Reactions    Amoxicillin Hives and Rash    Clindamycin Hives and Rash    Metronidazole Hives    Tetracycline Hives and Rash       Lab/Data Review:  No results found for this or any previous visit (from the past 24 hour(s)). Assessment:     Primary Rehabilitation Diagnosis  1. Impaired Mobility and ADLs  2. Multiple sclerosis exacerbation    Comorbidities  Patient Active Problem List   Diagnosis Code    Multiple sclerosis exacerbation (HCC) G35    Weakness of both lower extremities R29.898    Vitamin D deficiency E55.9    Elevated TSH R79.89    Elevated serum free T4 level R79.89    Impaired mobility and ADLs Z74.09, Z78.9    Multiple sclerosis (HCC) G35    Sciatica of right side M54.31    Closed fracture of proximal end of left radius with routine healing S52.102D       Plan:     1. Justification for continued stay: Good progression towards established rehabilitation goals. 2. Medical Issues being followed closely:    [x]  Fall and safety precautions     []  Wound Care     [x]  Bowel and Bladder Function     [x]  Fluid Electrolyte and Nutrition Balance     []  Pain Control      3.  Issues that 24 hour rehabilitation nursing is following:    [x]  Fall and safety precautions     []  Wound Care     [x]  Bowel and Bladder Function     [x]  Fluid Electrolyte and Nutrition Balance     []  Pain Control      [x]  Assistance with and education on in-room safety with transfers to and from the bed, wheelchair, toilet and shower. 4. Acute rehabilitation plan of care:    [x]  Continue current care and rehab. [x]  Physical Therapy           [x]  Occupational Therapy           []  Speech Therapy     []  Hold Rehab until further notice     5. Medications:    [x]  MAR Reviewed     [x]  Continue Present Medications     6. Chemical DVT Prophylaxis:      []  Enoxaparin     []  Unfractionated Heparin     []  Warfarin     []  NOAC     []  Aspirin     [x]  None     7. Mechanical DVT Prophylaxis:      [x]  CONNIE Stockings     [x]  Sequential Compression Device     []  None     8. GI Prophylaxis:      []  PPI     [x]  H2 Blocker     []  None / Not indicated     9. Code status:    [x]  Full code     []  Partial code     []  Do not intubate     []  Do not resuscitate     10. Diet:  Specifications  Low fat/Low cholesterol   Solids (consistency)  Regular   Liquids (consistency)  Thin   Fluid Restriction  None     11.  Orders:   > Multiple sclerosis exacerbation   > Patient has completed a 5-day course of Methylprednisolone IV on 5/13/2022    > Abnormal thyroid function tests (elevated TSH, elevated free T4) ~ Euthyroid sick syndrome vs Subclinical hypothyroidism   > TSH (5/15/2022) = 4.00   > Free T4 (5/16/2022) = 1.7   > Total T3 (5/16/2022) = 84   > TFTs to be rechecked in 4 to 6 weeks on an outpatient basis   > No need for Levothyroxine at this time      > Vitamin D Deficiency   > Vitamin D 25-Hydroxy (5/9/2022) = 16.6   > On 5/16/2022, patient was given Cholecalciferol 50,000 units PO x 1 dose   > On 5/17/2022, increased Cholecalciferol 5,000 units PO once daily   > Continue Cholecalciferol 5,000 units PO once daily    > Closed fracture of proximal end of left radius with routine healing   > X-ray of the right elbow (5/10/2022) showed a mildly impacted right proximal radius fracture, in the region of the radial neck. No other definite fractures or malalignment   > Nonweightbearing on the right elbow    > Sciatica of right side   > On 5/14/2022, started:    > Acetaminophen 650 mg PO q 6 hr PRN for pain level 4/10 or lesser    > Percocet 5/325 1 tab PO q 8 hr PRN for pain level 5/10 or greater   > On 5/19/2022:    > Started:     > Acetaminophen 650 mg PO BID (8AM, 12PM)      > Gabapentin 100 mg PO BID      > Increased Percocet 5/325 from 1 tab PO q 8 hr PRN for pain level 5/10 or greater to 1 tab PO q 6 hr PRN for pain level 5/10 or greater   > Neurology consult (Dr. Grant Cox) called on 5/19/2022 for evaluation and comanagement   > Continue:    > Acetaminophen 650 mg PO BID (8AM, 12PM)     > Gabapentin 100 mg PO BID (9AM, 6PM)    > Acetaminophen 650 mg PO q 6 hr PRN for pain level 4/10 or lesser (from 4PM to 4AM only)    > Percocet 5/325 1 tab PO q 6 hr PRN for pain level 5/10 or greater    > Rash on back   > Start Hydrocortisone 0.5% cream applied to affected area BID      12. Personal Protective Equipment (N95 face mask and eye goggles) was used while interacting with the patient. Patient was using a surgical mask. 15. Patient's progress in rehabilitation and medical issues discussed with the patient. All questions answered to the best of my ability. Care plan discussed with patient and nurse. 14. Total clinical care time is 30 minutes, including review of chart including all labs, radiology, past medical history, and discussion with patient. Greater than 50% of my time was spent in coordination of care and counseling.       Signed:    Jamia Brewer MD    May 20, 2022

## 2022-05-21 PROCEDURE — 65310000000 HC RM PRIVATE REHAB

## 2022-05-21 PROCEDURE — 74011250637 HC RX REV CODE- 250/637: Performed by: INTERNAL MEDICINE

## 2022-05-21 PROCEDURE — 74011250637 HC RX REV CODE- 250/637: Performed by: FAMILY MEDICINE

## 2022-05-21 RX ORDER — GUAIFENESIN 100 MG/5ML
81 LIQUID (ML) ORAL
Status: DISCONTINUED | OUTPATIENT
Start: 2022-05-21 | End: 2022-05-31 | Stop reason: HOSPADM

## 2022-05-21 RX ADMIN — FAMOTIDINE 20 MG: 20 TABLET ORAL at 08:20

## 2022-05-21 RX ADMIN — MAGNESIUM HYDROXIDE 30 ML: 400 SUSPENSION ORAL at 14:25

## 2022-05-21 RX ADMIN — DIMETHYL FUMARATE 240 MG: 240 CAPSULE ORAL at 21:15

## 2022-05-21 RX ADMIN — GABAPENTIN 100 MG: 100 CAPSULE ORAL at 17:02

## 2022-05-21 RX ADMIN — HYDROCORTISONE: 0.5 CREAM TOPICAL at 17:56

## 2022-05-21 RX ADMIN — GABAPENTIN 100 MG: 100 CAPSULE ORAL at 08:20

## 2022-05-21 RX ADMIN — ASPIRIN 81 MG 81 MG: 81 TABLET ORAL at 21:15

## 2022-05-21 RX ADMIN — CHOLECALCIFEROL TAB 25 MCG (1000 UNIT) 5000 UNITS: 25 TAB at 08:20

## 2022-05-21 RX ADMIN — CETIRIZINE HYDROCHLORIDE 10 MG: 10 TABLET, FILM COATED ORAL at 08:20

## 2022-05-21 RX ADMIN — ACETAMINOPHEN 650 MG: 325 TABLET ORAL at 07:19

## 2022-05-21 RX ADMIN — HYDROCORTISONE: 0.5 CREAM TOPICAL at 09:23

## 2022-05-21 NOTE — PROGRESS NOTES
Problem: Falls - Risk of  Goal: *Absence of Falls  Description: Document Gale  Fall Risk and appropriate interventions in the flowsheet. Outcome: Progressing Towards Goal  Note: Fall Risk Interventions:  Mobility Interventions: Bed/chair exit alarm,Patient to call before getting OOB    Mentation Interventions: Bed/chair exit alarm,Evaluate medications/consider consulting pharmacy    Medication Interventions: Bed/chair exit alarm,Evaluate medications/consider consulting pharmacy,Patient to call before getting OOB    Elimination Interventions: Call light in reach,Bed/chair exit alarm,Patient to call for help with toileting needs    History of Falls Interventions: Bed/chair exit alarm         Problem: Patient Education: Go to Patient Education Activity  Goal: Patient/Family Education  Outcome: Progressing Towards Goal     Problem: Pressure Injury - Risk of  Goal: *Prevention of pressure injury  Description: Document Wagner Scale and appropriate interventions in the flowsheet.   Outcome: Progressing Towards Goal  Note: Pressure Injury Interventions:  Sensory Interventions: Assess changes in LOC    Moisture Interventions: Absorbent underpads    Activity Interventions: Chair cushion,Increase time out of bed,Pressure redistribution bed/mattress(bed type)    Mobility Interventions: Chair cushion,Pressure redistribution bed/mattress (bed type)    Nutrition Interventions: Document food/fluid/supplement intake                     Problem: Pain  Goal: *Control of Pain  Outcome: Progressing Towards Goal     Problem: Patient Education: Go to Patient Education Activity  Goal: Patient/Family Education  Outcome: Progressing Towards Goal     Problem: Patient Education: Go to Patient Education Activity  Goal: Patient/Family Education  Outcome: Progressing Towards Goal     Problem: Patient Education: Go to Patient Education Activity  Goal: Patient/Family Education  Outcome: Progressing Towards Goal

## 2022-05-21 NOTE — REHAB NOTE
SHIFT CHANGE NOTE FOR [facility]    Bedside and Verbal shift change report [facility] (oncoming nurse) by Delicia Padilla RN (offgoing nurse). Report included the following information SBAR, Kardex, MAR and Recent Results.     Situation:   Code Status: Full Code   Hospital Day: 6   Problem List:   Hospital Problems  Date Reviewed: 5/20/2022          Codes Class Noted POA    Sciatica of right side (Chronic) ICD-10-CM: M54.31  ICD-9-CM: 724.3  Unknown Yes        Elevated serum free T4 level ICD-10-CM: R79.89  ICD-9-CM: 794.5  5/16/2022 Yes    Overview Signed 5/16/2022  3:30 PM by Earl Dyer MD     Free T4 (5/16/2022) = 1.7             Multiple sclerosis ([facility] (Chronic) ICD-10-CM: G35  ICD-9-CM: 340  Unknown Yes        Elevated TSH ICD-10-CM: R79.89  ICD-9-CM: 794.5  5/15/2022 Yes    Overview Signed 5/16/2022  3:30 PM by Earl Dyer MD     TSH (5/15/2022) = 4.00             Closed fracture of proximal end of left radius with routine healing ICD-10-CM: S52.102D  ICD-9-CM: V54.12  5/10/2022 Yes        * (Principal) Multiple sclerosis exacerbation ([facility] ICD-10-CM: G35  ICD-9-CM: 340  5/9/2022 Yes        Weakness of both lower extremities ICD-10-CM: R29.898  ICD-9-CM: 729.89  5/9/2022 Yes        Vitamin D deficiency (Chronic) ICD-10-CM: E55.9  ICD-9-CM: 268.9  5/9/2022 Yes    Overview Signed 5/16/2022  3:29 PM by Earl Dyer MD     Vitamin D 25-Hydroxy (5/9/2022) = 16.6              Impaired mobility and ADLs ICD-10-CM: Z74.09, Z78.9  ICD-9-CM: V49.89  5/9/2022 Yes              Background:   Past Medical History:   Past Medical History:   Diagnosis Date    Closed fracture of proximal end of left radius with routine healing 5/10/2022    Elevated serum free T4 level 5/16/2022    Free T4 (5/16/2022) = 1.7    Elevated TSH 5/15/2022    TSH (5/15/2022) = 4.00    MS (multiple sclerosis) ([facility]     Multiple sclerosis ([facility]     Sciatica of right side     Vitamin D deficiency 5/9/2022    Vitamin D 25-Hydroxy (5/9/2022) = 16.6         Assessment:   Changes in Assessment throughout shift: No change to previous assessment     Patient has a central line: no Reasons if yes: n/a  Insertion date: n/a Last dressing date: n/a   Patient has Knight Cath: no Reasons if yes: n/a   Insertion date: n/a  Shift knight care completed: NO, n/a     Last Vitals:     Vitals:    05/19/22 2029 05/20/22 0750 05/20/22 1513 05/20/22 2000   BP: 95/62 116/76 102/67 132/76   Pulse: 77 74 82 93   Resp: 18 16 18 18   Temp: 97.3 °F (36.3 °C) 97.8 °F (36.6 °C) 97.6 °F (36.4 °C) 98.5 °F (36.9 °C)   SpO2: 98% 96% 98% 96%   Weight:       Height:            PAIN    Pain Assessment    Pain Intensity 1: 5 (05/20/22 1320) Pain Intensity 1: 2 (12/29/14 1105)    Pain Location 1: Generalized Pain Location 1: Abdomen    Pain Intervention(s) 1: Medication (see MAR) Pain Intervention(s) 1: Medication (see MAR)  Patient Stated Pain Goal: 0 Patient Stated Pain Goal: 0  o Intervention effective: yes  o Other actions taken for pain: Medication (see MAR)     Skin Assessment  Skin color    Condition/Temperature    Integrity Skin Integrity: Intact  Turgor    Weekly Pressure Ulcer Documentation  Pressure  Injury Documentation: No Pressure Injury Noted-Pressure Ulcer Prevention Initiated  Wound Prevention & Protection Methods  Orientation of wound Orientation of Wound Prevention: Posterior  Location of Prevention Location of Wound Prevention: Buttocks,Sacrum/Coccyx  Dressing Present Dressing Present : No  Dressing Status    Wound Offloading Wound Offloading (Prevention Methods): Bed, pressure reduction mattress,Elevate heels,Walker,Wheelchair,Pillows,Repositioning     INTAKE/OUPUT  Date 05/19/22 1900 - 05/20/22 0659 05/20/22 0700 - 05/21/22 0659   Shift 1771-2914 24 Hour Total 8774-8559 8414-3396 24 Hour Total   INTAKE   P.O.  720 840  840     P. O.  720 840  840   Shift Total(mL/kg)  720(11.5) 840(13.4)  840(13.4)   OUTPUT   Urine(mL/kg/hr) 700 700        Urine Voided 700 700        Urine Occurrence(s) 1 x 6 x 2 x 0 x 2 x   Emesis/NG output          Emesis Occurrence(s) 0 x 0 x      Stool          Stool Occurrence(s) 0 x 0 x 0 x 0 x 0 x   Shift Total(mL/kg) 700(11.2) 700(11.2)      NET -700 20 840  840   Weight (kg) 62.6 62.6 62.6 62.6 62.6       Recommendations:  1. Patient needs and requests: Toileting, repositioning    2. Pending tests/procedures: None at this time. 3. Functional Level/Equipment:   /      Fall Precautions:   Fall risk precautions were reinforced with the patient; she was instructed to call for help prior to getting up. The following fall risk precautions were continued: bed/ chair alarms, door signage, yellow bracelet and socks as well as update of the [employer] tool in the patient's room. Rupesh Score: 4    HEALS Safety Check    A safety check occurred in the patient's room between off going nurse and oncoming nurse listed above. The safety check included the below items  Area Items   H  High Alert Medications - Verify all high alert medication drips (heparin, PCA, etc.)   E  Equipment - Suction is set up for ALL patients (with cyn)  - Red plugs utilized for all equipment (IV pumps, etc.)  - WOWs wiped down at end of shift.  - Room stocked with oxygen, suction, and other unit-specific supplies   A  Alarms - Bed alarm is set for fall risk patients  - Ensure chair alarm is in place and activated if patient is up in a chair   L  Lines - Check IV for any infiltration  - Torres bag is empty if patient has a Torres   - Tubing and IV bags are labeled   S  Safety   - Room is clean, patient is clean, and equipment is clean. - Hallways are clear from equipment besides carts. - Fall bracelet on for fall risk patients  - Ensure room is clear and free of clutter  - Suction is set up for ALL patients (with cyn)  - Hallways are clear from equipment besides carts.    - Isolation precautions followed, supplies available outside room, sign posted     Conchis Arms, RN

## 2022-05-21 NOTE — PROGRESS NOTES
Progress Note    Patient: Gabriele Jordan MRN: [id number]  CSN: [id number]    YOB: 1955  Age: 74 y.o. Sex: female    DOA: 5/14/2022 LOS:  LOS: 7 days                    Subjective:         Primary Rehabilitation Diagnosis: Impaired Mobility and ADLs secondary to Multiple sclerosis exacerbation       No acute pt or nursing concerns    Review of systems  General: No fevers or chills. Cardiovascular: No chest pain or pressure. No palpitations. Pulmonary: No shortness of breath, cough or wheeze. Gastrointestinal: No abdominal pain, nausea, vomiting or diarrhea. Genitourinary: No urinary frequency, urgency, hesitancy or dysuria. Musculoskeletal: pain fairly well controlled on current regimen  Neurologic: generalized weakness. Objective:     Physical Exam:  Visit Vitals  BP 94/69 (BP 1 Location: Left upper arm, BP Patient Position: At rest)   Pulse 82   Temp 97.9 °F (36.6 °C)   Resp 19   Ht 4' 11\" (1.499 m)   Wt 62.6 kg (138 lb)   SpO2 99%   Breastfeeding No   BMI 27.87 kg/m²        General:         Alert, cooperative, no acute distress    HEENT: NC, Atraumatic. PERRLA, anicteric sclerae. Lungs: CTA Bilaterally. No Wheezing/Rhonchi/Rales. Heart:  Regular  rhythm,  No murmur, No Rubs, No Gallops  Abdomen: Soft, Non distended, Non tender.  +Bowel sounds, no HSM  Extremities: No edema  Psych:   Not anxious or agitated. Neurologic:  CN 2-12 grossly intact, Alert and oriented X 3. No acute neurological                                 Deficits. Generalized weakness BUE < BLE    Intake and Output:  Current Shift:  05/21 0701 - 05/21 1900  In: 240 [P.O.:240]  Out: -   Last three shifts:  05/19 1901 - 05/21 0700  In: 840 [P.O.:840]  Out: 700 [Urine:700]    Labs: Results:       Chemistry No results for input(s): GLU, NA, K, CL, CO2, BUN, CREA, CA, AGAP, BUCR, TBIL, AP, TP, ALB, GLOB, AGRAT in the last 72 hours.     No lab exists for component: GPT   CBC w/Diff No results for input(s): WBC, RBC, HGB, HCT, PLT, GRANS, LYMPH, EOS, HGBEXT, HCTEXT, PLTEXT in the last 72 hours. Cardiac Enzymes No results for input(s): CPK, CKND1, GUY in the last 72 hours. No lab exists for component: CKRMB, TROIP   Coagulation No results for input(s): PTP, INR, APTT, INREXT in the last 72 hours. Lipid Panel Lab Results   Component Value Date/Time    Cholesterol, total 248 (H) 09/02/2014 12:00 AM    HDL Cholesterol 64 09/02/2014 12:00 AM    LDL, calculated 143 (H) 09/02/2014 12:00 AM    VLDL, calculated 41 (H) 09/02/2014 12:00 AM    Triglyceride 205 (H) 09/02/2014 12:00 AM      BNP No results for input(s): BNPP in the last 72 hours. Liver Enzymes No results for input(s): TP, ALB, TBIL, AP in the last 72 hours.     No lab exists for component: SGOT, GPT, DBIL   Thyroid Studies Lab Results   Component Value Date/Time    TSH 4.00 (H) 05/15/2022 07:25 AM          Procedures/imaging: see electronic medical records for all procedures/Xrays and details which were not copied into this note but were reviewed prior to creation of Plan    Medications:   Current Facility-Administered Medications   Medication Dose Route Frequency    hydrocortisone (CORTAID) 0.5 % cream   Topical BID    cetirizine (ZYRTEC) tablet 10 mg  10 mg Oral DAILY    dimethyl fumarate DR (TECFIDERA) capsule 240 mg (Patient Supplied)  240 mg Oral BID    gabapentin (NEURONTIN) capsule 100 mg  100 mg Oral BID    acetaminophen (TYLENOL) tablet 650 mg  650 mg Oral BID    acetaminophen (TYLENOL) tablet 650 mg  650 mg Oral Q6H PRN    oxyCODONE-acetaminophen (PERCOCET) 5-325 mg per tablet 1 Tablet  1 Tablet Oral Q6H PRN    cholecalciferol (VITAMIN D3) (1000 Units /25 mcg) tablet 5,000 Units  5,000 Units Oral DAILY    magnesium hydroxide (MILK OF MAGNESIA) 400 mg/5 mL oral suspension 30 mL  30 mL Oral DAILY PRN    bisacodyL (DULCOLAX) tablet 10 mg  10 mg Oral Q48H PRN    famotidine (PEPCID) tablet 20 mg  20 mg Oral DAILY       Assessment/Plan     Principal Problem:    Multiple sclerosis exacerbation ([facility] (5/9/2022)    Active Problems:    Weakness of both lower extremities (5/9/2022)      Vitamin D deficiency (5/9/2022)      Overview: Vitamin D 25-Hydroxy (5/9/2022) = 16.6       Elevated TSH (5/15/2022)      Overview: TSH (5/15/2022) = 4.00      Elevated serum free T4 level (5/16/2022)      Overview: Free T4 (5/16/2022) = 1.7      Impaired mobility and ADLs (5/9/2022)      Multiple sclerosis (HCC) ()      Sciatica of right side ()      Closed fracture of proximal end of left radius with routine healing (5/10/2022)        Multiple sclerosis exacerbation  Patient has completed a 5-day course of Methylprednisolone IV on 5/13/2022     Abnormal thyroid function tests (elevated TSH, elevated free T4) ~ Euthyroid sick syndrome vs Subclinical hypothyroidism              > TSH (5/15/2022) = 4.00              > Free T4 (5/16/2022) = 1.7              > Total T3 (5/16/2022) = 84              > TFTs to be rechecked in 4 to 6 weeks on an outpatient basis              > No need for Levothyroxine at this time                       Vitamin D Deficiency   Vitamin D 25-Hydroxy (5/9/2022) = 16.6   Continue Cholecalciferol 5,000 units PO once daily     Closed fracture of proximal end of left radius with routine healing  X-ray of the right elbow (5/10/2022) showed a mildly impacted right proximal radius fracture, in the region of the radial neck. No other definite fractures or malalignment              > Nonweightbearing on the right elbow     Sciatica of right side  Neurology consulted, seen by Dr. Aliyah Waldron 5/20/22. Recommended restart tecfidera 240mg bid, monitor WBC count. Increase gabapentin to 300mg BID.     Continue:                          > Acetaminophen 650 mg PO BID (8AM, 12PM)                           > Gabapentin 100 mg PO BID (9AM, 6PM) -- discussed with Dr. Viktoriya Mckeon, dose not increased as per pt current dose is helpful                          > Acetaminophen 650 mg PO q 6 hr PRN for pain level 4/10 or lesser (from 4PM to 4AM only)                          > Percocet 5/325 1 tab PO q 6 hr PRN for pain level 5/10 or greater     > Rash on back              > Start Hydrocortisone 0.5% cream applied to affected area BID    Cruzito Molina MD  5/21/2022

## 2022-05-21 NOTE — REHAB NOTE
[facility]    Bedside and Verbal shift change report given to Matt Junior (oncoming nurse) by Boubacar Otoole RN (offgoing nurse). Report included the following information SBAR, Kardex, MAR and Recent Results.     Situation:   Code Status: Full Code   Hospital Day: 7   Problem List:   Hospital Problems  Date Reviewed: 5/20/2022          Codes Class Noted POA    Sciatica of right side (Chronic) ICD-10-CM: M54.31  ICD-9-CM: 724.3  Unknown Yes        Elevated serum free T4 level ICD-10-CM: R79.89  ICD-9-CM: 794.5  5/16/2022 Yes    Overview Signed 5/16/2022  3:30 PM by Ilda Aceves MD     Free T4 (5/16/2022) = 1.7             Multiple sclerosis ([facility] (Chronic) ICD-10-CM: G35  ICD-9-CM: 340  Unknown Yes        Elevated TSH ICD-10-CM: R79.89  ICD-9-CM: 794.5  5/15/2022 Yes    Overview Signed 5/16/2022  3:30 PM by Ilda Aceves MD     TSH (5/15/2022) = 4.00             Closed fracture of proximal end of left radius with routine healing ICD-10-CM: S52.102D  ICD-9-CM: V54.12  5/10/2022 Yes        * (Principal) [facility]ation ([facility] ICD-10-CM: G35  ICD-9-CM: 340  5/9/2022 Yes        Weakness of both lower extremities ICD-10-CM: R29.898  ICD-9-CM: 729.89  5/9/2022 Yes        Vitamin D deficiency (Chronic) ICD-10-CM: E55.9  ICD-9-CM: 268.9  5/9/2022 Yes    Overview Signed 5/16/2022  3:29 PM by Ilda Aceves MD     Vitamin D 25-Hydroxy (5/9/2022) = 16.6              Impaired mobility and ADLs ICD-10-CM: Z74.09, Z78.9  ICD-9-CM: V49.89  5/9/2022 Yes              Background:   Past Medical History:   Past Medical History:   Diagnosis Date    Closed fracture of proximal end of left radius with routine healing 5/10/2022    Elevated serum free T4 level 5/16/2022    Free T4 (5/16/2022) = 1.7    Elevated TSH 5/15/2022    TSH (5/15/2022) = 4.00    MS (multiple sclerosis) ([facility]     Multiple sclerosis ([facility]     Sciatica of right side     Vitamin D deficiency 5/9/2022    Vitamin D 25-Hydroxy (5/9/2022) = 16.6         Assessment:   Changes in Assessment throughout shift: No change to previous assessment     Patient has a central line: no Reasons if yes: n/a  Insertion date: n/a Last dressing date: n/a   Patient has Knight Cath: no Reasons if yes: n/a   Insertion date: n/a  Shift knight care completed: NO, n/a     Last Vitals:     Vitals:    05/20/22 1513 05/20/22 2000 05/21/22 0823 05/21/22 1548   BP: 102/67 132/76 94/69 (!) 149/89   Pulse: 82 93 82 94   Resp: 18 18 19 18   Temp: 97.6 °F (36.4 °C) 98.5 °F (36.9 °C) 97.9 °F (36.6 °C) 97.8 °F (36.6 °C)   SpO2: 98% 96% 99% 98%   Weight:       Height:            PAIN    Pain Assessment    Pain Intensity 1: 0 (05/21/22 1600) Pain Intensity 1: 2 (12/29/14 1105)    Pain Location 1: Leg Pain Location 1: Abdomen    Pain Intervention(s) 1: Medication (see MAR) (scheduled) Pain Intervention(s) 1: Medication (see MAR)  Patient Stated Pain Goal: 0 Patient Stated Pain Goal: 0  o Intervention effective: yes  o Other actions taken for pain: Medication (see MAR) (scheduled)     Skin Assessment  Skin color    Condition/Temperature    Integrity Skin Integrity: Wound (add Wound LDA)  Turgor    Weekly Pressure Ulcer Documentation  Pressure  Injury Documentation: No Pressure Injury Noted-Pressure Ulcer Prevention Initiated  Wound Prevention & Protection Methods  Orientation of wound Orientation of Wound Prevention: Posterior  Location of Prevention Location of Wound Prevention: Sacrum/Coccyx  Dressing Present Dressing Present : No  Dressing Status    Wound Offloading Wound Offloading (Prevention Methods): Bed, pressure reduction mattress     INTAKE/OUPUT  Date 05/20/22 1900 - 05/21/22 0659 05/21/22 0700 - 05/22/22 0659   Shift 0873-9462 24 Hour Total 4667-9132 9111-6454 24 Hour Total   INTAKE   P.O.  840 240  240     P. O.  840 240  240   Shift Total(mL/kg)  840(13.4) 240(3.8)  240(3.8)   OUTPUT   Urine(mL/kg/hr)          Urine Occurrence(s) 0 x 2 x 1 x  1 x Stool          Stool Occurrence(s) 0 x 0 x 2 x  2 x   Shift Total(mL/kg)        NET  840 240  240   Weight (kg) 62.6 62.6 62.6 62.6 62.6       Recommendations:  1. Patient needs and requests: Toileting, repositioning    2. Pending tests/procedures: None at this time. 3. Functional Level/Equipment: Partial (one person) / Bed (comment)    Fall Precautions:   Fall risk precautions were reinforced with the patient; she was instructed to call for help prior to getting up. The following fall risk precautions were continued: bed/ chair alarms, door signage, yellow bracelet and socks as well as update of the [employer] tool in the patient's room. Rupesh Score: 4    HEALS Safety Check    A safety check occurred in the patient's room between off going nurse and oncoming nurse listed above. The safety check included the below items  Area Items   H  High Alert Medications - Verify all high alert medication drips (heparin, PCA, etc.)   E  Equipment - Suction is set up for ALL patients (with cyn)  - Red plugs utilized for all equipment (IV pumps, etc.)  - WOWs wiped down at end of shift.  - Room stocked with oxygen, suction, and other unit-specific supplies   A  Alarms - Bed alarm is set for fall risk patients  - Ensure chair alarm is in place and activated if patient is up in a chair   L  Lines - Check IV for any infiltration  - Torres bag is empty if patient has a Torres   - Tubing and IV bags are labeled   S  Safety   - Room is clean, patient is clean, and equipment is clean. - Hallways are clear from equipment besides carts. - Fall bracelet on for fall risk patients  - Ensure room is clear and free of clutter  - Suction is set up for ALL patients (with cyn)  - Hallways are clear from equipment besides carts.    - Isolation precautions followed, supplies available outside room, sign posted     Miguel Melchor RN

## 2022-05-21 NOTE — REHAB NOTE
SHIFT CHANGE NOTE FOR [facility]    Bedside and Verbal shift change report given to Iesha Aden (oncoming nurse) by Jeovany Womack RN (offgoing nurse). Report included the following information SBAR, Kardex, MAR and Recent Results.     Situation:   Code Status: Full Code   Hospital Day: 7   Problem List:   Hospital Problems  Date Reviewed: 5/20/2022          Codes Class Noted POA    Sciatica of right side (Chronic) ICD-10-CM: M54.31  ICD-9-CM: 724.3  Unknown Yes        Elevated serum free T4 level ICD-10-CM: R79.89  ICD-9-CM: 794.5  5/16/2022 Yes    Overview Signed 5/16/2022  3:30 PM by Richard Crews MD     Free T4 (5/16/2022) = 1.7             Multiple sclerosis ([facility] (Chronic) ICD-10-CM: G35  ICD-9-CM: 340  Unknown Yes        Elevated TSH ICD-10-CM: R79.89  ICD-9-CM: 794.5  5/15/2022 Yes    Overview Signed 5/16/2022  3:30 PM by Richard Crews MD     TSH (5/15/2022) = 4.00             Closed fracture of proximal end of left radius with routine healing ICD-10-CM: S52.102D  ICD-9-CM: V54.12  5/10/2022 Yes        * (Principal) Multiple sclerosis exacerbation ([facility] ICD-10-CM: G35  ICD-9-CM: 340  5/9/2022 Yes        Weakness of both lower extremities ICD-10-CM: R29.898  ICD-9-CM: 729.89  5/9/2022 Yes        Vitamin D deficiency (Chronic) ICD-10-CM: E55.9  ICD-9-CM: 268.9  5/9/2022 Yes    Overview Signed 5/16/2022  3:29 PM by Richard Crews MD     Vitamin D 25-Hydroxy (5/9/2022) = 16.6              Impaired mobility and ADLs ICD-10-CM: Z74.09, Z78.9  ICD-9-CM: V49.89  5/9/2022 Yes              Background:   Past Medical History:   Past Medical History:   Diagnosis Date    Closed fracture of proximal end of left radius with routine healing 5/10/2022    Elevated serum free T4 level 5/16/2022    Free T4 (5/16/2022) = 1.7    Elevated TSH 5/15/2022    TSH (5/15/2022) = 4.00    MS (multiple sclerosis) ([facility]     Multiple sclerosis ([facility]     Sciatica of right side     Vitamin D deficiency 5/9/2022    Vitamin D 25-Hydroxy (5/9/2022) = 16.6         Assessment:   Changes in Assessment throughout shift: No change to previous assessment     Patient has a central line: no Reasons if yes: n/a  Insertion date: n/a Last dressing date: n/a   Patient has Knight Cath: no Reasons if yes: n/a   Insertion date: n/a  Shift knight care completed: NO, n/a     Last Vitals:     Vitals:    05/19/22 2029 05/20/22 0750 05/20/22 1513 05/20/22 2000   BP: 95/62 116/76 102/67 132/76   Pulse: 77 74 82 93   Resp: 18 16 18 18   Temp: 97.3 °F (36.3 °C) 97.8 °F (36.6 °C) 97.6 °F (36.4 °C) 98.5 °F (36.9 °C)   SpO2: 98% 96% 98% 96%   Weight:       Height:            PAIN    Pain Assessment    Pain Intensity 1: 0 (05/21/22 0400) Pain Intensity 1: 2 (12/29/14 1105)    Pain Location 1: Generalized Pain Location 1: Abdomen    Pain Intervention(s) 1: Medication (see MAR) Pain Intervention(s) 1: Medication (see MAR)  Patient Stated Pain Goal: 0 Patient Stated Pain Goal: 0  o Intervention effective: yes  o Other actions taken for pain:       Skin Assessment  Skin color    Condition/Temperature    Integrity Skin Integrity: Other (comment) (rash to back)  Turgor    Weekly Pressure Ulcer Documentation  Pressure  Injury Documentation: No Pressure Injury Noted-Pressure Ulcer Prevention Initiated  Wound Prevention & Protection Methods  Orientation of wound Orientation of Wound Prevention: Posterior  Location of Prevention Location of Wound Prevention: Buttocks,Sacrum/Coccyx  Dressing Present Dressing Present : No  Dressing Status    Wound Offloading Wound Offloading (Prevention Methods): Bed, pressure redistribution/air     INTAKE/OUPUT  Date 05/20/22 0700 - 05/21/22 0659 05/21/22 0700 - 05/22/22 0659   Shift 5396-6475 3795-9921 24 Hour Total 0305-0288 6786-1740 24 Hour Total   INTAKE   P.O. 840  840        P. O. 840  840      Shift Total(mL/kg) 840(13.4)  840(13.4)      OUTPUT   Urine(mL/kg/hr)           Urine Occurrence(s) 2 x 0 x 2 x      Stool           Stool Occurrence(s) 0 x 0 x 0 x      Shift Total(mL/kg)           840      Weight (kg) 62.6 62.6 62.6 62.6 62.6 62.6       Recommendations:  1. Patient needs and requests: Toileting, repositioning    2. Pending tests/procedures: None at this time. 3. Functional Level/Equipment: Partial (one person) /      Fall Precautions:   Fall risk precautions were reinforced with the patient; she was instructed to call for help prior to getting up. The following fall risk precautions were continued: bed/ chair alarms, door signage, yellow bracelet and socks as well as update of the [place] tool in the patient's room. Rupesh Score: 4    HEALS Safety Check    A safety check occurred in the patient's room between off going nurse and oncoming nurse listed above. The safety check included the below items  Area Items   H  High Alert Medications - Verify all high alert medication drips (heparin, PCA, etc.)   E  Equipment - Suction is set up for ALL patients (with cyn)  - Red plugs utilized for all equipment (IV pumps, etc.)  - WOWs wiped down at end of shift.  - Room stocked with oxygen, suction, and other unit-specific supplies   A  Alarms - Bed alarm is set for fall risk patients  - Ensure chair alarm is in place and activated if patient is up in a chair   L  Lines - Check IV for any infiltration  - Torres bag is empty if patient has a Torres   - Tubing and IV bags are labeled   S  Safety   - Room is clean, patient is clean, and equipment is clean. - Hallways are clear from equipment besides carts. - Fall bracelet on for fall risk patients  - Ensure room is clear and free of clutter  - Suction is set up for ALL patients (with cyn)  - Hallways are clear from equipment besides carts.    - Isolation precautions followed, supplies available outside room, sign posted     Jeovany Womack RN

## 2022-05-22 PROCEDURE — 97110 THERAPEUTIC EXERCISES: CPT

## 2022-05-22 PROCEDURE — 65310000000 HC RM PRIVATE REHAB

## 2022-05-22 PROCEDURE — 97530 THERAPEUTIC ACTIVITIES: CPT

## 2022-05-22 PROCEDURE — 74011250637 HC RX REV CODE- 250/637: Performed by: INTERNAL MEDICINE

## 2022-05-22 PROCEDURE — 74011250637 HC RX REV CODE- 250/637: Performed by: FAMILY MEDICINE

## 2022-05-22 RX ADMIN — GABAPENTIN 100 MG: 100 CAPSULE ORAL at 08:34

## 2022-05-22 RX ADMIN — HYDROCORTISONE: 0.5 CREAM TOPICAL at 18:55

## 2022-05-22 RX ADMIN — FAMOTIDINE 20 MG: 20 TABLET ORAL at 08:34

## 2022-05-22 RX ADMIN — CETIRIZINE HYDROCHLORIDE 10 MG: 10 TABLET, FILM COATED ORAL at 08:35

## 2022-05-22 RX ADMIN — HYDROCORTISONE: 0.5 CREAM TOPICAL at 08:39

## 2022-05-22 RX ADMIN — CHOLECALCIFEROL TAB 25 MCG (1000 UNIT) 5000 UNITS: 25 TAB at 08:34

## 2022-05-22 RX ADMIN — GABAPENTIN 100 MG: 100 CAPSULE ORAL at 18:52

## 2022-05-22 RX ADMIN — DIMETHYL FUMARATE 240 MG: 240 CAPSULE ORAL at 08:38

## 2022-05-22 RX ADMIN — ACETAMINOPHEN 650 MG: 325 TABLET ORAL at 08:34

## 2022-05-22 RX ADMIN — DIMETHYL FUMARATE 240 MG: 240 CAPSULE ORAL at 21:22

## 2022-05-22 RX ADMIN — ASPIRIN 81 MG 81 MG: 81 TABLET ORAL at 21:22

## 2022-05-22 NOTE — REHAB NOTE
SHIFT CHANGE NOTE FOR [facility]    Bedside and Verbal shift change report given to Kenzie Day RN (oncoming nurse) by Candido Fair RN (offgoing nurse). Report included the following information SBAR, Kardex, MAR and Recent Results.     Situation:   Code Status: Full Code   Hospital Day: 8   Problem List:   Hospital Problems  Date Reviewed: 5/20/2022          Codes Class Noted POA    Sciatica of right side (Chronic) ICD-10-CM: M54.31  ICD-9-CM: 724.3  Unknown Yes        Elevated serum free T4 level ICD-10-CM: R79.89  ICD-9-CM: 794.5  5/16/2022 Yes    Overview Signed 5/16/2022  3:30 PM by Jai Smart MD     Free T4 (5/16/2022) = 1.7             Multiple sclerosis ([facility] (Chronic) ICD-10-CM: G35  ICD-9-CM: 340  Unknown Yes        Elevated TSH ICD-10-CM: R79.89  ICD-9-CM: 794.5  5/15/2022 Yes    Overview Signed 5/16/2022  3:30 PM by Jai Smart MD     TSH (5/15/2022) = 4.00             Closed fracture of proximal end of left radius with routine healing ICD-10-CM: S52.102D  ICD-9-CM: V54.12  5/10/2022 Yes        * (Principal) Multiple sclerosis exacerbation ([facility] ICD-10-CM: G35  ICD-9-CM: 340  5/9/2022 Yes        Weakness of both lower extremities ICD-10-CM: R29.898  ICD-9-CM: 729.89  5/9/2022 Yes        Vitamin D deficiency (Chronic) ICD-10-CM: E55.9  ICD-9-CM: 268.9  5/9/2022 Yes    Overview Signed 5/16/2022  3:29 PM by Jai Smart MD     Vitamin D 25-Hydroxy (5/9/2022) = 16.6              Impaired mobility and ADLs ICD-10-CM: Z74.09, Z78.9  ICD-9-CM: V49.89  5/9/2022 Yes              Background:   Past Medical History:   Past Medical History:   Diagnosis Date    Closed fracture of proximal end of left radius with routine healing 5/10/2022    Elevated serum free T4 level 5/16/2022    Free T4 (5/16/2022) = 1.7    Elevated TSH 5/15/2022    TSH (5/15/2022) = 4.00    MS (multiple sclerosis) ([facility]     Multiple sclerosis ([facility]     Sciatica of right side     Vitamin D deficiency 5/9/2022    Vitamin D 25-Hydroxy (5/9/2022) = 16.6         Assessment:   Changes in Assessment throughout shift:       Patient has a central line: no Reasons if yes: n/a  Insertion date: n/a Last dressing date: n/a   Patient has Knight Cath: no Reasons if yes: n/a   Insertion date: n/a  Shift knight care completed: NO, n/a     Last Vitals:     Vitals:    05/21/22 1548 05/21/22 2029 05/22/22 0842 05/22/22 1626   BP: (!) 149/89 112/71 101/66 106/69   Pulse: 94 87 87 92   Resp: 18 18 18 16   Temp: 97.8 °F (36.6 °C) 98 °F (36.7 °C) 97.1 °F (36.2 °C) 98.3 °F (36.8 °C)   SpO2: 98% 98% 97% 97%   Weight:       Height:            PAIN    Pain Assessment    Pain Intensity 1: 0 (05/22/22 0810) Pain Intensity 1: 2 (12/29/14 1105)    Pain Location 1: Leg Pain Location 1: Abdomen    Pain Intervention(s) 1: Medication (see MAR) (scheduled) Pain Intervention(s) 1: Medication (see MAR)  Patient Stated Pain Goal: 0 Patient Stated Pain Goal: 0  o Intervention effective: yes  o Other actions taken for pain:       Skin Assessment  Skin color    Condition/Temperature    Integrity Skin Integrity: Intact  Turgor    Weekly Pressure Ulcer Documentation  Pressure  Injury Documentation: No Pressure Injury Noted-Pressure Ulcer Prevention Initiated  Wound Prevention & Protection Methods  Orientation of wound Orientation of Wound Prevention: Posterior  Location of Prevention Location of Wound Prevention: Sacrum/Coccyx,Groin,Buttocks  Dressing Present Dressing Present : No  Dressing Status    Wound Offloading Wound Offloading (Prevention Methods): Bed, pressure redistribution/air,Bed, pressure reduction mattress,Repositioning     INTAKE/OUPUT  Date 05/21/22 0700 - 05/22/22 0659 05/22/22 0700 - 05/23/22 0659   Shift 0700-1859 1900-0659 24 Hour Total 6637-3227 1365-9471 24 Hour Total   INTAKE   P.O. 240  240 640  640     P. O. 240  240 640  640   Shift Total(mL/kg) 240(3.8)  240(3.8) 640(10.2)  640(10.2)   OUTPUT   Urine(mL/kg/hr)    1050  1050     Urine Voided 1050  1050     Urine Occurrence(s) 1 x  1 x 3 x  3 x   Stool           Stool Occurrence(s) 2 x 1 x 3 x 0 x  0 x   Shift Total(mL/kg)    1050(16.8)  1050(16.8)     240 -410  -410   Weight (kg) 62.6 62.6 62.6 62.6 62.6 62.6       Recommendations:  1. Patient needs and requests: Toileting, repositioning    2. Pending tests/procedures: None at this time. 3. Functional Level/Equipment: Partial (one person) / Bed (comment)    Fall Precautions:   Fall risk precautions were reinforced with the patient; she was instructed to call for help prior to getting up. The following fall risk precautions were continued: bed/ chair alarms, door signage, yellow bracelet and socks as well as update of the [place] tool in the patient's room. Rupesh Score: 4    HEALS Safety Check    A safety check occurred in the patient's room between off going nurse and oncoming nurse listed above. The safety check included the below items  Area Items   H  High Alert Medications - Verify all high alert medication drips (heparin, PCA, etc.)   E  Equipment - Suction is set up for ALL patients (with cyn)  - Red plugs utilized for all equipment (IV pumps, etc.)  - WOWs wiped down at end of shift.  - Room stocked with oxygen, suction, and other unit-specific supplies   A  Alarms - Bed alarm is set for fall risk patients  - Ensure chair alarm is in place and activated if patient is up in a chair   L  Lines - Check IV for any infiltration  - Torres bag is empty if patient has a Torres   - Tubing and IV bags are labeled   S  Safety   - Room is clean, patient is clean, and equipment is clean. - Hallways are clear from equipment besides carts. - Fall bracelet on for fall risk patients  - Ensure room is clear and free of clutter  - Suction is set up for ALL patients (with cyn)  - Hallways are clear from equipment besides carts.    - Isolation precautions followed, supplies available outside room, sign posted     Tonya Winter RN BSN

## 2022-05-22 NOTE — PROGRESS NOTES
Progress Note    Patient: Venkata Ruggiero MRN: [id number]  CSN: [id number]    YOB: 1955  Age: 74 y.o. Sex: female    DOA: 5/14/2022 LOS:  LOS: 8 days                    Subjective:         Primary Rehabilitation Diagnosis: Impaired Mobility and ADLs secondary to Multiple sclerosis exacerbation       No acute pt or nursing concerns    Review of systems  General: No fevers or chills. Cardiovascular: No chest pain or pressure. No palpitations. Pulmonary: No shortness of breath, cough or wheeze. Gastrointestinal: No abdominal pain, nausea, vomiting or diarrhea. Genitourinary: No urinary frequency, urgency, hesitancy or dysuria. Musculoskeletal: pain fairly well controlled on current regimen  Neurologic: generalized weakness. Objective:     Physical Exam:  Visit Vitals  /66   Pulse 87   Temp 97.1 °F (36.2 °C)   Resp 18   Ht 4' 11\" (1.499 m)   Wt 62.6 kg (138 lb)   SpO2 97%   Breastfeeding No   BMI 27.87 kg/m²        General:         Alert, cooperative, no acute distress    HEENT: NC, Atraumatic. PERRLA, anicteric sclerae. Lungs: CTA Bilaterally. No Wheezing/Rhonchi/Rales. Heart:  Regular  rhythm,  No murmur, No Rubs, No Gallops  Abdomen: Soft, Non distended, Non tender.  +Bowel sounds, no HSM  Extremities: No edema  Psych:   Not anxious or agitated. Neurologic:  CN 2-12 grossly intact, Alert and oriented X 3. No acute neurological                                 Deficits. Generalized weakness BUE < BLE    Intake and Output:  Current Shift:  05/22 0701 - 05/22 1900  In: 300 [P.O.:300]  Out: 1050 [Urine:1050]  Last three shifts:  05/20 1901 - 05/22 0700  In: 240 [P.O.:240]  Out: -     Labs: Results:       Chemistry No results for input(s): GLU, NA, K, CL, CO2, BUN, CREA, CA, AGAP, BUCR, TBIL, AP, TP, ALB, GLOB, AGRAT in the last 72 hours.     No lab exists for component: GPT   CBC w/Diff No results for input(s): WBC, RBC, HGB, HCT, PLT, GRANS, LYMPH, EOS, HGBEXT, HCTEXT, PLTEXT, HGBEXT, HCTEXT, PLTEXT in the last 72 hours. Cardiac Enzymes No results for input(s): CPK, CKND1, GUY in the last 72 hours. No lab exists for component: CKRMB, TROIP   Coagulation No results for input(s): PTP, INR, APTT, INREXT, INREXT in the last 72 hours. Lipid Panel Lab Results   Component Value Date/Time    Cholesterol, total 248 (H) 09/02/2014 12:00 AM    HDL Cholesterol 64 09/02/2014 12:00 AM    LDL, calculated 143 (H) 09/02/2014 12:00 AM    VLDL, calculated 41 (H) 09/02/2014 12:00 AM    Triglyceride 205 (H) 09/02/2014 12:00 AM      BNP No results for input(s): BNPP in the last 72 hours. Liver Enzymes No results for input(s): TP, ALB, TBIL, AP in the last 72 hours.     No lab exists for component: SGOT, GPT, DBIL   Thyroid Studies Lab Results   Component Value Date/Time    TSH 4.00 (H) 05/15/2022 07:25 AM          Procedures/imaging: see electronic medical records for all procedures/Xrays and details which were not copied into this note but were reviewed prior to creation of Plan    Medications:   Current Facility-Administered Medications   Medication Dose Route Frequency    aspirin chewable tablet 81 mg  81 mg Oral QHS    hydrocortisone (CORTAID) 0.5 % cream   Topical BID    cetirizine (ZYRTEC) tablet 10 mg  10 mg Oral DAILY    dimethyl fumarate DR (TECFIDERA) capsule 240 mg (Patient Supplied)  240 mg Oral BID    gabapentin (NEURONTIN) capsule 100 mg  100 mg Oral BID    acetaminophen (TYLENOL) tablet 650 mg  650 mg Oral BID    acetaminophen (TYLENOL) tablet 650 mg  650 mg Oral Q6H PRN    oxyCODONE-acetaminophen (PERCOCET) 5-325 mg per tablet 1 Tablet  1 Tablet Oral Q6H PRN    cholecalciferol (VITAMIN D3) (1000 Units /25 mcg) tablet 5,000 Units  5,000 Units Oral DAILY    magnesium hydroxide (MILK OF MAGNESIA) 400 mg/5 mL oral suspension 30 mL  30 mL Oral DAILY PRN    bisacodyL (DULCOLAX) tablet 10 mg  10 mg Oral Q48H PRN    famotidine (PEPCID) tablet 20 mg  20 mg Oral DAILY Assessment/Plan     Principal Problem:    Multiple sclerosis exacerbation ([facility] (5/9/2022)    Active Problems:    Weakness of both lower extremities (5/9/2022)      Vitamin D deficiency (5/9/2022)      Overview: Vitamin D 25-Hydroxy (5/9/2022) = 16.6       Elevated TSH (5/15/2022)      Overview: TSH (5/15/2022) = 4.00      Elevated serum free T4 level (5/16/2022)      Overview: Free T4 (5/16/2022) = 1.7      Impaired mobility and ADLs (5/9/2022)      Multiple sclerosis (HCC) ()      Sciatica of right side ()      Closed fracture of proximal end of left radius with routine healing (5/10/2022)        Multiple sclerosis exacerbation  Patient has completed a 5-day course of Methylprednisolone IV on 5/13/2022     Abnormal thyroid function tests (elevated TSH, elevated free T4) ~ Euthyroid sick syndrome vs Subclinical hypothyroidism              > TSH (5/15/2022) = 4.00              > Free T4 (5/16/2022) = 1.7              > Total T3 (5/16/2022) = 84              > TFTs to be rechecked in 4 to 6 weeks on an outpatient basis              > No need for Levothyroxine at this time                       Vitamin D Deficiency   Vitamin D 25-Hydroxy (5/9/2022) = 16.6   Continue Cholecalciferol 5,000 units PO once daily     Closed fracture of proximal end of left radius with routine healing  X-ray of the right elbow (5/10/2022) showed a mildly impacted right proximal radius fracture, in the region of the radial neck. No other definite fractures or malalignment              > Nonweightbearing on the right elbow     Sciatica of right side  Neurology consulted, seen by Dr. Mabel Cherry 5/20/22. Recommended restart tecfidera 240mg bid, monitor WBC count. Increase gabapentin to 300mg BID.     Continue:                          > Acetaminophen 650 mg PO BID (8AM, 12PM)                           > Gabapentin 100 mg PO BID (9AM, 6PM) -- discussed with Dr. Jose Rafael Tom, dose not increased as per pt current dose is helpful > Acetaminophen 650 mg PO q 6 hr PRN for pain level 4/10 or lesser (from 4PM to 4AM only)                          > Percocet 5/325 1 tab PO q 6 hr PRN for pain level 5/10 or greater  Started aspirin 81mg daily at time of taking tecfidera due to side effect flushing, pt requested medication aspirin vs NSAID to relieve this side effect for her, takes at home     Rash on back          continue Hydrocortisone 0.5% cream applied to affected area BID    Jennie Montanez MD  5/22/2022

## 2022-05-22 NOTE — PROGRESS NOTES
Problem: Self Care Deficits Care Plan (Adult)  Goal: *Therapy Goal (Edit Goal, Insert Text)  Description: Occupational Therapy Goals   Long Term Goals  Initiated 5/15 and to be accomplished within 2 week(s)  1. Pt will perform self-feeding with I.  2. Pt will perform grooming with I.  3. Pt will perform UB bathing with Mod I.  4. Pt will perform LB bathing with Mod I.  5. Pt will perform tub/shower transfer with Mod I.   6. Pt will perform UB dressing with I.  7. Pt will perform LB dressing with Mod I.  8. Pt will perform toileting task with Mod I.  9. Pt will perform toilet transfer with Mod I.  10. Pt. Will increase BUE strength to 5/5 in order to increase safety at home during ADLs. Short Term Goals   Initiated 5/15 and to be accomplished within 7 day(s)  1. Pt will perform self-feeding with Mod I.   2. Pt will perform grooming with Mod I.  3. Pt will perform UB bathing with Supervision . 4. Pt will perform LB bathing with Supervision. 5. Pt will perform tub/shower transfer with 4.  6. Pt will perform UB dressing with Mod I.  7. Pt will perform LB dressing with Min A.  8. Pt will perform toileting task with Mod A.  9. Pt will perform toilet transfer with Min A.   10. Pt. Will demonstrate functional mobility with CGA with RW in order to increase I for ADLs, by setting up prior to activities. Outcome: Progressing Towards Goal   Occupational Therapy TREATMENT    Patient: Asia Loo   74 y.o. Patient identified with name and : yes    Date: 2022    First Tx Session  Time In: 1432  Time Out[de-identified] 1883      Diagnosis: Multiple sclerosis ([facility] [G35]   Precautions: Fall,NWB (right UE)  Chart, occupational therapy assessment, plan of care, and goals were reviewed. Pain:  Pt reports no pain or discomfort prior to treatment. Pt reports no pain or discomfort post treatment.    Intervention Provided: NA      SUBJECTIVE:   Patient stated that she was feeling pretty good this date and eager to participate in treatment session. OBJECTIVE DATA SUMMARY:     Pt presented supine in bed with HOB elevated agreeable to participate in skilled OT treatment session. THERAPEUTIC ACTIVITY Daily Assessment    Pt participated in FM/dexterity activity for carryover to self-care tasks. Pt used the game Perfection to manipulate small game pieces and place into game board. Pt used thumb/digit opposition to  pieces, orient correctly and place in board. Pt able to perform activity without dropping pieces. Pt performed activity seated EOB. THERAPEUTIC EXERCISE Daily Assessment    Pt participated in left UE strengthening exercises for carryover to functional transfers. Pt used 2# dumb bell to perform shoulder press, chest press, bicep curls, wrist curls, forearm supination/pronation and internal/external shoulder rotation (2 sets x 10 reps each exercise). Pt performed exercises seated EOB. MOBILITY/TRANSFERS Daily Assessment     Pt performed bed mobility supervision level and HOB elevated. Pt able to sit EOB ~45 minutes. Cuing to maintain NWB status to right UE.       ASSESSMENT:  Pt making slight progress with increasing UE strengthening. Pt able to tolerate sitting EOB for extended problem and no c/o pain during session. Progression toward goals:  []          Improving appropriately and progressing toward goals  [x]          Improving slowly and progressing toward goals  []          Not making progress toward goals and plan of care will be adjusted     PLAN:  Patient continues to benefit from skilled intervention to address the above impairments. Continue treatment per established plan of care. Discharge Recommendations:  Home Health vs [facility]  Further Equipment Recommendations for Discharge:  TBD depending on progress     Activity Tolerance:  Fair+      Estimated LOS:5/31/2022    Please refer to the flowsheet for vital signs taken during this treatment.   After treatment:   []  Patient left in no apparent distress sitting up in chair   [x]  Patient left in no apparent distress in bed  [x]  Call bell left within reach  []  Nursing notified  [x]  Caregiver present (Supportive son)  []  Bed alarm activated    COMMUNICATION/EDUCATION:   [] Home safety education was provided and the patient/caregiver indicated understanding. [] Patient/family have participated as able in goal setting and plan of care. [x] Patient/family agree to work toward stated goals and plan of care. [] Patient understands intent and goals of therapy, but is neutral about his/her participation. [] Patient is unable to participate in goal setting and plan of care.       AMY Pfeiffer/LANG

## 2022-05-22 NOTE — PROGRESS NOTES
Problem: Falls - Risk of  Goal: *Absence of Falls  Description: Document Steffen Lancaster Fall Risk and appropriate interventions in the flowsheet.   Outcome: Progressing Towards Goal  Note: Fall Risk Interventions:  Mobility Interventions: Bed/chair exit alarm,Assess mobility with egress test    Mentation Interventions: Adequate sleep, hydration, pain control,Bed/chair exit alarm    Medication Interventions: Assess postural VS orthostatic hypotension,Bed/chair exit alarm    Elimination Interventions: Call light in reach,Bed/chair exit alarm    History of Falls Interventions: Bed/chair exit alarm,Room close to nurse's station         Problem: Patient Education: Go to Patient Education Activity  Goal: Patient/Family Education  Outcome: Progressing Towards Goal     Problem: Pain  Goal: *Control of Pain  Outcome: Progressing Towards Goal     Problem: Patient Education: Go to Patient Education Activity  Goal: Patient/Family Education  Outcome: Progressing Towards Goal

## 2022-05-22 NOTE — REHAB NOTE
[facility]    Bedside and Verbal shift change report given to Judson Ryan LPN (oncoming nurse) by Abilio López RN (offgoing nurse). Report included the following information SBAR, Kardex, MAR and Recent Results.     Situation:   Code Status: Full Code   Hospital Day: 9   Problem List:   Hospital Problems  Date Reviewed: 5/20/2022          Codes Class Noted POA    Sciatica of right side (Chronic) ICD-10-CM: M54.31  ICD-9-CM: 724.3  Unknown Yes        Elevated serum free T4 level ICD-10-CM: R79.89  ICD-9-CM: 794.5  5/16/2022 Yes    Overview Signed 5/16/2022  3:30 PM by Donavon Murillo MD     Free T4 (5/16/2022) = 1.7             Multiple sclerosis ([facility] (Chronic) ICD-10-CM: G35  ICD-9-CM: 340  Unknown Yes        Elevated TSH ICD-10-CM: R79.89  ICD-9-CM: 794.5  5/15/2022 Yes    Overview Signed 5/16/2022  3:30 PM by Donavon Murillo MD     TSH (5/15/2022) = 4.00             Closed fracture of proximal end of left radius with routine healing ICD-10-CM: S52.102D  ICD-9-CM: V54.12  5/10/2022 Yes        * (Principal) Multiple sclerosis exacerbation ([facility] ICD-10-CM: G35  ICD-9-CM: 340  5/9/2022 Yes        Weakness of both lower extremities ICD-10-CM: R29.898  ICD-9-CM: 729.89  5/9/2022 Yes        Vitamin D deficiency (Chronic) ICD-10-CM: E55.9  ICD-9-CM: 268.9  5/9/2022 Yes    Overview Signed 5/16/2022  3:29 PM by Donavon Murillo MD     Vitamin D 25-Hydroxy (5/9/2022) = 16.6              Impaired mobility and ADLs ICD-10-CM: Z74.09, Z78.9  ICD-9-CM: V49.89  5/9/2022 Yes              Background:   Past Medical History:   Past Medical History:   Diagnosis Date    Closed fracture of proximal end of left radius with routine healing 5/10/2022    Elevated serum free T4 level 5/16/2022    Free T4 (5/16/2022) = 1.7    Elevated TSH 5/15/2022    TSH (5/15/2022) = 4.00    MS (multiple sclerosis) ([facility]     Multiple sclerosis ([facility]     Sciatica of right side     Vitamin D deficiency 5/9/2022    Vitamin D 25-Hydroxy (5/9/2022) = 16.6         Assessment:   Changes in Assessment throughout shift: No change to previous assessment     Patient has a central line: no Reasons if yes: n/a  Insertion date: n/a Last dressing date: n/a   Patient has Knight Cath: no Reasons if yes: n/a   Insertion date: n/a  Shift knight care completed: NO, n/a     Last Vitals:     Vitals:    05/22/22 0842 05/22/22 1626 05/22/22 2044 05/23/22 0713   BP: 101/66 106/69 97/70 113/75   Pulse: 87 92 89 84   Resp: 18 16 16 20   Temp: 97.1 °F (36.2 °C) 98.3 °F (36.8 °C) 97.9 °F (36.6 °C) 98 °F (36.7 °C)   SpO2: 97% 97% 94% 95%   Weight:       Height:            PAIN    Pain Assessment    Pain Intensity 1: 0 (05/23/22 0402) Pain Intensity 1: 2 (12/29/14 1105)    Pain Location 1: Leg Pain Location 1: Abdomen    Pain Intervention(s) 1: Medication (see MAR) (scheduled) Pain Intervention(s) 1: Medication (see MAR)  Patient Stated Pain Goal: 0 Patient Stated Pain Goal: 0  o Intervention effective: yes  o Other actions taken for pain:       Skin Assessment  Skin color    Condition/Temperature    Integrity Skin Integrity: Intact  Turgor    Weekly Pressure Ulcer Documentation  Pressure  Injury Documentation: No Pressure Injury Noted-Pressure Ulcer Prevention Initiated  Wound Prevention & Protection Methods  Orientation of wound Orientation of Wound Prevention: Posterior  Location of Prevention Location of Wound Prevention: Sacrum/Coccyx  Dressing Present Dressing Present : No  Dressing Status    Wound Offloading Wound Offloading (Prevention Methods): Bed, pressure redistribution/air,Bed, pressure reduction mattress,Repositioning,Wheelchair     INTAKE/OUPUT  Date 05/22/22 0700 - 05/23/22 0659 05/23/22 0700 - 05/24/22 0659   Shift 7431-9299 4417-4157 24 Hour Total 9871-4705 0893-1538 24 Hour Total   INTAKE   P.O. 880  880        P. O. 880  880      Shift Total(mL/kg) 880(14.1)  880(14.1)      OUTPUT   Urine(mL/kg/hr) 1050(1.4) 400(0.5) 1450(1)        Urine Voided 5851 964 0468        Urine Occurrence(s) 3 x 2 x 5 x      Stool           Stool Occurrence(s) 1 x 1 x 2 x      Shift Total(mL/kg) 1050(16.8) 400(6.4) 1450(23.2)      NET -170 -400 -570      Weight (kg) 62.6 62.6 62.6 62.6 62.6 62.6       Recommendations:  1. Patient needs and requests: Toileting, repositioning    2. Pending tests/procedures: None at this time. 3. Functional Level/Equipment: Partial (one person) / Bed (comment); Stabilization belt; Wheelchair    Fall Precautions:   Fall risk precautions were reinforced with the patient; she was instructed to call for help prior to getting up. The following fall risk precautions were continued: bed/ chair alarms, door signage, yellow bracelet and socks as well as update of the [employer] Im tool in the patient's room. Rupesh Score: 4    HEALS Safety Check    A safety check occurred in the patient's room between off going nurse and oncoming nurse listed above. The safety check included the below items  Area Items   H  High Alert Medications - Verify all high alert medication drips (heparin, PCA, etc.)   E  Equipment - Suction is set up for ALL patients (with cyn)  - Red plugs utilized for all equipment (IV pumps, etc.)  - WOWs wiped down at end of shift.  - Room stocked with oxygen, suction, and other unit-specific supplies   A  Alarms - Bed alarm is set for fall risk patients  - Ensure chair alarm is in place and activated if patient is up in a chair   L  Lines - Check IV for any infiltration  - Torres bag is empty if patient has a Torres   - Tubing and IV bags are labeled   S  Safety   - Room is clean, patient is clean, and equipment is clean. - Hallways are clear from equipment besides carts. - Fall bracelet on for fall risk patients  - Ensure room is clear and free of clutter  - Suction is set up for ALL patients (with cyn)  - Hallways are clear from equipment besides carts.    - Isolation precautions followed, supplies available outside room, sign posted     Gale Salmeron RN

## 2022-05-22 NOTE — REHAB NOTE
[facility]    Bedside and Verbal shift change report given to Katty Solorzano (oncoming nurse) by Julieta Cordoba RN (offgoing nurse). Report included the following information SBAR, Kardex, MAR and Recent Results.     Situation:   Code Status: Full Code   Hospital Day: 8   Problem List:   Hospital Problems  Date Reviewed: 5/20/2022          Codes Class Noted POA    Sciatica of right side (Chronic) ICD-10-CM: M54.31  ICD-9-CM: 724.3  Unknown Yes        Elevated serum free T4 level ICD-10-CM: R79.89  ICD-9-CM: 794.5  5/16/2022 Yes    Overview Signed 5/16/2022  3:30 PM by Gladys Waite MD     Free T4 (5/16/2022) = 1.7             [facility] (Chronic) ICD-10-CM: G35  ICD-9-CM: 340  Unknown Yes        Elevated TSH ICD-10-CM: R79.89  ICD-9-CM: 794.5  5/15/2022 Yes    Overview Signed 5/16/2022  3:30 PM by Gladys Waite MD     TSH (5/15/2022) = 4.00             Closed fracture of proximal end of left radius with routine healing ICD-10-CM: S52.102D  ICD-9-CM: V54.12  5/10/2022 Yes        * (Principal) Multiple sclerosis exacerbation ([facility] ICD-10-CM: G35  ICD-9-CM: 340  5/9/2022 Yes        Weakness of both lower extremities ICD-10-CM: R29.898  ICD-9-CM: 729.89  5/9/2022 Yes        Vitamin D deficiency (Chronic) ICD-10-CM: E55.9  ICD-9-CM: 268.9  5/9/2022 Yes    Overview Signed 5/16/2022  3:29 PM by Gladys Waite MD     Vitamin D 25-Hydroxy (5/9/2022) = 16.6              Impaired mobility and ADLs ICD-10-CM: Z74.09, Z78.9  ICD-9-CM: V49.89  5/9/2022 Yes              Background:   Past Medical History:   Past Medical History:   Diagnosis Date    Closed fracture of proximal end of left radius with routine healing 5/10/2022    Elevated serum free T4 level 5/16/2022    Free T4 (5/16/2022) = 1.7    Elevated TSH 5/15/2022    TSH (5/15/2022) = 4.00    [facility]) [facility]     Multiple sclerosis ([facility]     Sciatica of right side     Vitamin D deficiency 5/9/2022    Vitamin D 25-Hydroxy (5/9/2022) = 16.6         Assessment:   Changes in Assessment throughout shift: No change to previous assessment     Patient has a central line: no Reasons if yes: n/a  Insertion date: n/a Last dressing date: n/a   Patient has Knight Cath: no Reasons if yes: n/a   Insertion date: n/a  Shift knight care completed: NO, n/a     Last Vitals:     Vitals:    05/20/22 2000 05/21/22 0823 05/21/22 1548 05/21/22 2029   BP: 132/76 94/69 (!) 149/89 112/71   Pulse: 93 82 94 87   Resp: 18 19 18 18   Temp: 98.5 °F (36.9 °C) 97.9 °F (36.6 °C) 97.8 °F (36.6 °C) 98 °F (36.7 °C)   SpO2: 96% 99% 98% 98%   Weight:       Height:            PAIN    Pain Assessment    Pain Intensity 1: 0 (05/22/22 0413) Pain Intensity 1: 2 (12/29/14 1105)    Pain Location 1: Leg Pain Location 1: Abdomen    Pain Intervention(s) 1: Medication (see MAR) (scheduled) Pain Intervention(s) 1: Medication (see MAR)  Patient Stated Pain Goal: 0 Patient Stated Pain Goal: 0  o Intervention effective: yes  o Other actions taken for pain:       Skin Assessment  Skin color    Condition/Temperature    Integrity Skin Integrity: Intact  Turgor    Weekly Pressure Ulcer Documentation  Pressure  Injury Documentation: No Pressure Injury Noted-Pressure Ulcer Prevention Initiated  Wound Prevention & Protection Methods  Orientation of wound Orientation of Wound Prevention: Posterior  Location of Prevention Location of Wound Prevention: Sacrum/Coccyx  Dressing Present Dressing Present : No  Dressing Status    Wound Offloading Wound Offloading (Prevention Methods): Bed, pressure redistribution/air,Bed, pressure reduction mattress,Repositioning,Pillows,Wheelchair     INTAKE/OUPUT  Date 05/21/22 0700 - 05/22/22 0659 05/22/22 0700 - 05/23/22 0659   Shift 5002-0801 4223-3158 24 Hour Total 3769-0248 5814-6470 24 Hour Total   INTAKE   P.O. 240  240        P. O. 240  240      Shift Total(mL/kg) 240(3.8)  240(3.8)      OUTPUT   Urine(mL/kg/hr)    1050  1050     Urine Voided 1050  1050     Urine Occurrence(s) 1 x  1 x 1 x  1 x   Stool           Stool Occurrence(s) 2 x 1 x 3 x 0 x  0 x   Shift Total(mL/kg)    1050(16.8)  1050(16.8)     240 -1050  -1050   Weight (kg) 62.6 62.6 62.6 62.6 62.6 62.6       Recommendations:  1. Patient needs and requests: Toileting, repositioning    2. Pending tests/procedures: None at this time. 3. Functional Level/Equipment: Partial (one person) / Bed (comment); Stabilization belt; Wheelchair    Fall Precautions:   Fall risk precautions were reinforced with the patient; she was instructed to call for help prior to getting up. The following fall risk precautions were continued: bed/ chair alarms, door signage, yellow bracelet and socks as well as update of the Verneice Frock tool in the patient's room. Rupesh Score: 4    HEALS Safety Check    A safety check occurred in the patient's room between off going nurse and oncoming nurse listed above. The safety check included the below items  Area Items   H  High Alert Medications - Verify all high alert medication drips (heparin, PCA, etc.)   E  Equipment - Suction is set up for ALL patients (with cyn)  - Red plugs utilized for all equipment (IV pumps, etc.)  - WOWs wiped down at end of shift.  - Room stocked with oxygen, suction, and other unit-specific supplies   A  Alarms - Bed alarm is set for fall risk patients  - Ensure chair alarm is in place and activated if patient is up in a chair   L  Lines - Check IV for any infiltration  - Torres bag is empty if patient has a Torres   - Tubing and IV bags are labeled   S  Safety   - Room is clean, patient is clean, and equipment is clean. - Hallways are clear from equipment besides carts. - Fall bracelet on for fall risk patients  - Ensure room is clear and free of clutter  - Suction is set up for ALL patients (with cyn)  - Hallways are clear from equipment besides carts.    - Isolation precautions followed, supplies available outside room, sign posted Gale Salmeron, RN

## 2022-05-22 NOTE — PROGRESS NOTES
Nurse went into give the patient her medication Tecfidera that she takes for MS. Patient states that this medication gives her the side effect of facial flushing and that she needs to take Asprin along with it to help with the side effect. Dr. Shaheen Olivarez was called at 2106 and notified of the above. Telephone orders received to administer Asprin 81 mg PO with the Tecfidera medication nightly. Nurse stated understanding and readback was provided.

## 2022-05-23 LAB
ANION GAP SERPL CALC-SCNC: 4 MMOL/L (ref 3–18)
BASOPHILS # BLD: 0 K/UL (ref 0–0.1)
BASOPHILS NFR BLD: 0 % (ref 0–2)
BUN SERPL-MCNC: 11 MG/DL (ref 7–18)
BUN/CREAT SERPL: 17 (ref 12–20)
CALCIUM SERPL-MCNC: 9.2 MG/DL (ref 8.5–10.1)
CHLORIDE SERPL-SCNC: 109 MMOL/L (ref 100–111)
CO2 SERPL-SCNC: 29 MMOL/L (ref 21–32)
CREAT SERPL-MCNC: 0.63 MG/DL (ref 0.6–1.3)
DIFFERENTIAL METHOD BLD: ABNORMAL
EOSINOPHIL # BLD: 0 K/UL (ref 0–0.4)
EOSINOPHIL NFR BLD: 1 % (ref 0–5)
ERYTHROCYTE [DISTWIDTH] IN BLOOD BY AUTOMATED COUNT: 12.5 % (ref 11.6–14.5)
GLUCOSE SERPL-MCNC: 156 MG/DL (ref 74–99)
HCT VFR BLD AUTO: 38.9 % (ref 35–45)
HGB BLD-MCNC: 13.1 G/DL (ref 12–16)
IMM GRANULOCYTES # BLD AUTO: 0 K/UL (ref 0–0.04)
IMM GRANULOCYTES NFR BLD AUTO: 0 % (ref 0–0.5)
LYMPHOCYTES # BLD: 0.9 K/UL (ref 0.9–3.6)
LYMPHOCYTES NFR BLD: 11 % (ref 21–52)
MCH RBC QN AUTO: 30.8 PG (ref 24–34)
MCHC RBC AUTO-ENTMCNC: 33.7 G/DL (ref 31–37)
MCV RBC AUTO: 91.3 FL (ref 78–100)
MONOCYTES # BLD: 0.8 K/UL (ref 0.05–1.2)
MONOCYTES NFR BLD: 9 % (ref 3–10)
NEUTS SEG # BLD: 6.7 K/UL (ref 1.8–8)
NEUTS SEG NFR BLD: 79 % (ref 40–73)
NRBC # BLD: 0 K/UL (ref 0–0.01)
NRBC BLD-RTO: 0 PER 100 WBC
PLATELET # BLD AUTO: 191 K/UL (ref 135–420)
PMV BLD AUTO: 9.7 FL (ref 9.2–11.8)
POTASSIUM SERPL-SCNC: 3.9 MMOL/L (ref 3.5–5.5)
RBC # BLD AUTO: 4.26 M/UL (ref 4.2–5.3)
SODIUM SERPL-SCNC: 142 MMOL/L (ref 136–145)
WBC # BLD AUTO: 8.5 K/UL (ref 4.6–13.2)

## 2022-05-23 PROCEDURE — 74011250637 HC RX REV CODE- 250/637: Performed by: INTERNAL MEDICINE

## 2022-05-23 PROCEDURE — 97530 THERAPEUTIC ACTIVITIES: CPT

## 2022-05-23 PROCEDURE — 85025 COMPLETE CBC W/AUTO DIFF WBC: CPT

## 2022-05-23 PROCEDURE — 99232 SBSQ HOSP IP/OBS MODERATE 35: CPT | Performed by: INTERNAL MEDICINE

## 2022-05-23 PROCEDURE — 97110 THERAPEUTIC EXERCISES: CPT

## 2022-05-23 PROCEDURE — 74011250637 HC RX REV CODE- 250/637: Performed by: FAMILY MEDICINE

## 2022-05-23 PROCEDURE — 97535 SELF CARE MNGMENT TRAINING: CPT

## 2022-05-23 PROCEDURE — 36415 COLL VENOUS BLD VENIPUNCTURE: CPT

## 2022-05-23 PROCEDURE — 80048 BASIC METABOLIC PNL TOTAL CA: CPT

## 2022-05-23 PROCEDURE — 77030038269 HC DRN EXT URIN PURWCK BARD -A

## 2022-05-23 PROCEDURE — 74011250637 HC RX REV CODE- 250/637: Performed by: PSYCHIATRY & NEUROLOGY

## 2022-05-23 PROCEDURE — 2709999900 HC NON-CHARGEABLE SUPPLY

## 2022-05-23 PROCEDURE — 65310000000 HC RM PRIVATE REHAB

## 2022-05-23 RX ORDER — GABAPENTIN 300 MG/1
300 CAPSULE ORAL 2 TIMES DAILY
Status: DISCONTINUED | OUTPATIENT
Start: 2022-05-23 | End: 2022-05-27

## 2022-05-23 RX ORDER — SENNOSIDES 8.6 MG/1
1 TABLET ORAL
Status: DISCONTINUED | OUTPATIENT
Start: 2022-05-24 | End: 2022-05-31 | Stop reason: HOSPADM

## 2022-05-23 RX ORDER — ACETAMINOPHEN 325 MG/1
650 TABLET ORAL
Status: DISCONTINUED | OUTPATIENT
Start: 2022-05-23 | End: 2022-05-31 | Stop reason: HOSPADM

## 2022-05-23 RX ADMIN — CETIRIZINE HYDROCHLORIDE 10 MG: 10 TABLET, FILM COATED ORAL at 09:03

## 2022-05-23 RX ADMIN — DIMETHYL FUMARATE 240 MG: 240 CAPSULE ORAL at 09:10

## 2022-05-23 RX ADMIN — FAMOTIDINE 20 MG: 20 TABLET ORAL at 09:03

## 2022-05-23 RX ADMIN — GABAPENTIN 300 MG: 300 CAPSULE ORAL at 17:50

## 2022-05-23 RX ADMIN — HYDROCORTISONE: 0.5 CREAM TOPICAL at 17:50

## 2022-05-23 RX ADMIN — DIMETHYL FUMARATE 240 MG: 240 CAPSULE ORAL at 21:25

## 2022-05-23 RX ADMIN — ASPIRIN 81 MG 81 MG: 81 TABLET ORAL at 21:21

## 2022-05-23 RX ADMIN — GABAPENTIN 100 MG: 100 CAPSULE ORAL at 09:03

## 2022-05-23 RX ADMIN — HYDROCORTISONE: 0.5 CREAM TOPICAL at 09:11

## 2022-05-23 RX ADMIN — CHOLECALCIFEROL TAB 25 MCG (1000 UNIT) 5000 UNITS: 25 TAB at 09:02

## 2022-05-23 NOTE — PROGRESS NOTES
[address]  [address] [place] [address] [address]     INPATIENT REHABILITATION  DAILY PROGRESS NOTE     Date: 5/23/2022    Name: Renea Saenz Age / Sex: 74 y.o. / female   CSN: [id number] MRN: [id number]   Admit Date: 5/14/2022 Length of Stay: 9 days     Primary Rehabilitation Diagnosis: Impaired Mobility and ADLs secondary to Multiple sclerosis exacerbation      Subjective:     Patient seen and examined. Blood pressure controlled. Patient's Complaint:   No significant medical complaints    Pain Control: stable, mild-to-moderate joint symptoms intermittently, reasonably well controlled by current meds       Objective:     Vital Signs:  Patient Vitals for the past 24 hrs:   BP Temp Pulse Resp SpO2   05/23/22 0713 113/75 98 °F (36.7 °C) 84 20 95 %   05/22/22 2044 97/70 97.9 °F (36.6 °C) 89 16 94 %   05/22/22 1626 106/69 98.3 °F (36.8 °C) 92 16 97 %        Physical Examination:  GENERAL SURVEY: Patient is awake, alert, oriented x 3, laying comfortably on the bed, not in acute respiratory distress. HEENT: pink palpebral conjunctivae, anicteric sclerae, no nasoaural discharge, moist oral mucosa  NECK: supple, no jugular venous distention, no palpable lymph nodes  CHEST/LUNGS: symmetrical chest expansion, good air entry, clear breath sounds  HEART: adynamic precordium, good S1 S2, no S3, regular rhythm, no murmurs  ABDOMEN: flat, bowel sounds appreciated, soft, non-tender  EXTREMITIES: pink nailbeds, no edema, full and equal pulses, no calf tenderness   NEUROLOGICAL EXAM: The patient is awake, alert and oriented x 3, able to answer questions fairly appropriately, able to follow 1 and 2 step commands. Able to tell time from the wall clock. Cranial nerves II-XII are grossly intact. No gross sensory deficit. Motor strength is 4/5 on BUE, 3+ to 4-/5 on BLE.        Current Medications:  Current Facility-Administered Medications   Medication Dose Route Frequency    aspirin chewable tablet 81 mg  81 mg Oral QHS    hydrocortisone (CORTAID) 0.5 % cream   Topical BID    cetirizine (ZYRTEC) tablet 10 mg  10 mg Oral DAILY    dimethyl fumarate DR (TECFIDERA) capsule 240 mg (Patient Supplied)  240 mg Oral BID    gabapentin (NEURONTIN) capsule 100 mg  100 mg Oral BID    acetaminophen (TYLENOL) tablet 650 mg  650 mg Oral BID    acetaminophen (TYLENOL) tablet 650 mg  650 mg Oral Q6H PRN    oxyCODONE-acetaminophen (PERCOCET) 5-325 mg per tablet 1 Tablet  1 Tablet Oral Q6H PRN    cholecalciferol (VITAMIN D3) (1000 Units /25 mcg) tablet 5,000 Units  5,000 Units Oral DAILY    magnesium hydroxide (MILK OF MAGNESIA) 400 mg/5 mL oral suspension 30 mL  30 mL Oral DAILY PRN    bisacodyL (DULCOLAX) tablet 10 mg  10 mg Oral Q48H PRN    famotidine (PEPCID) tablet 20 mg  20 mg Oral DAILY       Allergies: Allergies   Allergen Reactions    Amoxicillin Hives and Rash    Clindamycin Hives and Rash    Metronidazole Hives    Tetracycline Hives and Rash       Lab/Data Review:  Recent Results (from the past 24 hour(s))   CBC WITH AUTOMATED DIFF    Collection Time: 05/23/22  5:47 PM   Result Value Ref Range    WBC 8.5 4.6 - 13.2 K/uL    RBC 4.26 4.20 - 5.30 M/uL    HGB 13.1 12.0 - 16.0 g/dL    HCT 38.9 35.0 - 45.0 %    MCV 91.3 78.0 - 100.0 FL    MCH 30.8 24.0 - 34.0 PG    MCHC 33.7 31.0 - 37.0 g/dL    RDW 12.5 11.6 - 14.5 %    PLATELET 489 807 - 684 K/uL    MPV 9.7 9.2 - 11.8 FL    NRBC 0.0 0  WBC    ABSOLUTE NRBC 0.00 0.00 - 0.01 K/uL    NEUTROPHILS 79 (H) 40 - 73 %    LYMPHOCYTES 11 (L) 21 - 52 %    MONOCYTES 9 3 - 10 %    EOSINOPHILS 1 0 - 5 %    BASOPHILS 0 0 - 2 %    IMMATURE GRANULOCYTES 0 0.0 - 0.5 %    ABS. NEUTROPHILS 6.7 1.8 - 8.0 K/UL    ABS. LYMPHOCYTES 0.9 0.9 - 3.6 K/UL    ABS. MONOCYTES 0.8 0.05 - 1.2 K/UL    ABS. EOSINOPHILS 0.0 0.0 - 0.4 K/UL    ABS. BASOPHILS 0.0 0.0 - 0.1 K/UL    ABS. IMM.  GRANS. 0.0 0.00 - 0.04 K/UL    DF AUTOMATED     METABOLIC PANEL, BASIC    Collection Time: 05/23/22  5:47 PM   Result Value Ref Range    Sodium 142 136 - 145 mmol/L    Potassium 3.9 3.5 - 5.5 mmol/L    Chloride 109 100 - 111 mmol/L    CO2 29 21 - 32 mmol/L    Anion gap 4 3.0 - 18 mmol/L    Glucose 156 (H) 74 - 99 mg/dL    BUN 11 7.0 - 18 MG/DL    Creatinine 0.63 0.6 - 1.3 MG/DL    BUN/Creatinine ratio 17 12 - 20      GFR est AA >60 >60 ml/min/1.73m2    GFR est non-AA >60 >60 ml/min/1.73m2    Calcium 9.2 8.5 - 10.1 MG/DL          Assessment:     Primary Rehabilitation Diagnosis  1. Impaired Mobility and ADLs  2. Multiple sclerosis exacerbation    Comorbidities  Patient Active Problem List   Diagnosis Code    Multiple sclerosis exacerbation (HCC) G35    Weakness of both lower extremities R29.898    Vitamin D deficiency E55.9    Elevated TSH R79.89    Elevated serum free T4 level R79.89    Impaired mobility and ADLs Z74.09, Z78.9    Multiple sclerosis (HCC) G35    Sciatica of right side M54.31    Closed fracture of proximal end of left radius with routine healing S52.102D       Plan:     1. Justification for continued stay: Good progression towards established rehabilitation goals. 2. Medical Issues being followed closely:    [x]  Fall and safety precautions     []  Wound Care     [x]  Bowel and Bladder Function     [x]  Fluid Electrolyte and Nutrition Balance     []  Pain Control      3. Issues that 24 hour rehabilitation nursing is following:    [x]  Fall and safety precautions     []  Wound Care     [x]  Bowel and Bladder Function     [x]  Fluid Electrolyte and Nutrition Balance     []  Pain Control      [x]  Assistance with and education on in-room safety with transfers to and from the bed, wheelchair, toilet and shower. 4. Acute rehabilitation plan of care:    [x]  Continue current care and rehab. [x]  Physical Therapy           [x]  Occupational Therapy           []  Speech Therapy     []  Hold Rehab until further notice     5.  Medications:    [x]  MAR Reviewed     [x] Continue Present Medications     6. Chemical DVT Prophylaxis:      []  Enoxaparin     []  Unfractionated Heparin     []  Warfarin     []  NOAC     []  Aspirin     [x]  None     7. Mechanical DVT Prophylaxis:      [x]  CONNIE Stockings     [x]  Sequential Compression Device     []  None     8. GI Prophylaxis:      []  PPI     [x]  H2 Blocker     []  None / Not indicated     9. Code status:    [x]  Full code     []  Partial code     []  Do not intubate     []  Do not resuscitate     10. Diet:  Specifications  Low fat/Low cholesterol   Solids (consistency)  Regular   Liquids (consistency)  Thin   Fluid Restriction  None     11. Orders:   > Multiple sclerosis exacerbation   > Patient has completed a 5-day course of Methylprednisolone IV on 5/13/2022    > Abnormal thyroid function tests (elevated TSH, elevated free T4) ~ Euthyroid sick syndrome vs Subclinical hypothyroidism   > TSH (5/15/2022) = 4.00   > Free T4 (5/16/2022) = 1.7   > Total T3 (5/16/2022) = 84   > TFTs to be rechecked in 4 to 6 weeks on an outpatient basis   > No need for Levothyroxine at this time      > Vitamin D Deficiency   > Vitamin D 25-Hydroxy (5/9/2022) = 16.6   > On 5/16/2022, patient was given Cholecalciferol 50,000 units PO x 1 dose   > On 5/17/2022, increased Cholecalciferol 5,000 units PO once daily   > Continue Cholecalciferol 5,000 units PO once daily    > Closed fracture of proximal end of left radius with routine healing   > X-ray of the right elbow (5/10/2022) showed a mildly impacted right proximal radius fracture, in the region of the radial neck.  No other definite fractures or malalignment   > Nonweightbearing on the right elbow    > Sciatica of right side   > On 5/14/2022, started:    > Acetaminophen 650 mg PO q 6 hr PRN for pain level 4/10 or lesser    > Percocet 5/325 1 tab PO q 8 hr PRN for pain level 5/10 or greater   > On 5/19/2022:    > Started:     > Acetaminophen 650 mg PO BID (8AM, 12PM)      > Gabapentin 100 mg PO BID      > Increased Percocet 5/325 from 1 tab PO q 8 hr PRN for pain level 5/10 or greater to 1 tab PO q 6 hr PRN for pain level 5/10 or greater   > Neurology consult (Dr. Anthony Murrell) called on 5/19/2022 for evaluation and comanagement   > Discontinue Acetaminophen 650 mg PO BID (8AM, 12PM)    > Continue:    > Neurology increased Gabapentin from 100 mg to 300 mg PO BID (9AM, 6PM)    > Acetaminophen 650 mg PO q 6 hr PRN for pain level 4/10 or lesser     > Percocet 5/325 1 tab PO q 6 hr PRN for pain level 5/10 or greater    > Rash on back   > On 5/20/2022, started Hydrocortisone 0.5% cream applied to affected area BID   > Continue Hydrocortisone 0.5% cream applied to affected area BID    > Constipation   > Senna 1 tab PO daily after breakfast      12. Personal Protective Equipment (N95 face mask and eye goggles) was used while interacting with the patient. Patient was using a surgical mask. 15. Patient's progress in rehabilitation and medical issues discussed with the patient. All questions answered to the best of my ability. Care plan discussed with patient and nurse. 14. Total clinical care time is 30 minutes, including review of chart including all labs, radiology, past medical history, and discussion with patient. Greater than 50% of my time was spent in coordination of care and counseling.       Signed:    Danuta Roy MD    May 23, 2022

## 2022-05-23 NOTE — ROUTINE PROCESS
SHIFT CHANGE NOTE FOR [facility]    Bedside and Verbal shift change report given to Cecilia Dixon RN (oncoming nurse) by Hemalatha Rucker RN (offgoing nurse). Report included the following information SBAR, Kardex, MAR and Recent Results.     Situation:   Code Status: Full Code   Hospital Day: 9   Problem List:   Hospital Problems  Date Reviewed: 5/23/2022          Codes Class Noted POA    Sciatica of right side (Chronic) ICD-10-CM: M54.31  ICD-9-CM: 724.3  Unknown Yes        Elevated serum free T4 level ICD-10-CM: R79.89  ICD-9-CM: 794.5  5/16/2022 Yes    Overview Signed 5/16/2022  3:30 PM by Marco Antonio Meadows MD     Free T4 (5/16/2022) = 1.7             Multiple sclerosis ([facility] (Chronic) ICD-10-CM: G35  ICD-9-CM: 340  Unknown Yes        Elevated TSH ICD-10-CM: R79.89  ICD-9-CM: 794.5  5/15/2022 Yes    Overview Signed 5/16/2022  3:30 PM by Marco Antonio Meadows MD     TSH (5/15/2022) = 4.00             Closed fracture of proximal end of left radius with routine healing ICD-10-CM: S52.102D  ICD-9-CM: V54.12  5/10/2022 Yes        * (Principal) Multiple sclerosis exacerbation ([facility] ICD-10-CM: G35  ICD-9-CM: 340  5/9/2022 Yes        Weakness of both lower extremities ICD-10-CM: R29.898  ICD-9-CM: 729.89  5/9/2022 Yes        Vitamin D deficiency (Chronic) ICD-10-CM: E55.9  ICD-9-CM: 268.9  5/9/2022 Yes    Overview Signed 5/16/2022  3:29 PM by Marco Antonio Meadows MD     Vitamin D 25-Hydroxy (5/9/2022) = 16.6              Impaired mobility and ADLs ICD-10-CM: Z74.09, Z78.9  ICD-9-CM: V49.89  5/9/2022 Yes              Background:   Past Medical History:   Past Medical History:   Diagnosis Date    Closed fracture of proximal end of left radius with routine healing 5/10/2022    Elevated serum free T4 level 5/16/2022    Free T4 (5/16/2022) = 1.7    Elevated TSH 5/15/2022    TSH (5/15/2022) = 4.00    MS (multiple sclerosis) ([facility]     Multiple sclerosis ([facility]     Sciatica of right side     Vitamin D deficiency 5/9/2022    Vitamin D 25-Hydroxy (5/9/2022) = 16.6         Assessment:   Changes in Assessment throughout shift: No change to previous assessment    Patient has a central line: no   Patient has Torres Cath: no      Last Vitals:     Vitals:    05/22/22 1626 05/22/22 2044 05/23/22 0713 05/23/22 1526   BP: 106/69 97/70 113/75 116/76   Pulse: 92 89 84 (!) 101   Resp: 16 16 20 18   Temp: 98.3 °F (36.8 °C) 97.9 °F (36.6 °C) 98 °F (36.7 °C) 98.2 °F (36.8 °C)   SpO2: 97% 94% 95% 96%   Weight:       Height:            PAIN    Pain Assessment    Pain Intensity 1: 0 (05/23/22 1617) Pain Intensity 1: 2 (12/29/14 1105)    Pain Location 1: Leg Pain Location 1: Abdomen    Pain Intervention(s) 1: Medication (see MAR) (scheduled) Pain Intervention(s) 1: Medication (see MAR)  Patient Stated Pain Goal: 0 Patient Stated Pain Goal: 0  o Intervention effective: yes  o Other actions taken for pain:       Skin Assessment  Skin color Skin Color: Appropriate for ethnicity,Ecchymosis (comment) (R. hip ecchymosis)  Condition/Temperature Skin Condition/Temp: Cool  Integrity Skin Integrity: Intact  Turgor Turgor: Non-tenting  Weekly Pressure Ulcer Documentation  Pressure  Injury Documentation: No Pressure Injury Noted-Pressure Ulcer Prevention Initiated  Wound Prevention & Protection Methods  Orientation of wound Orientation of Wound Prevention: Posterior  Location of Prevention Location of Wound Prevention: Sacrum/Coccyx  Dressing Present Dressing Present : No  Dressing Status    Wound Offloading Wound Offloading (Prevention Methods): Adaptive equipment,Bed, pressure redistribution/air,Bed, pressure reduction mattress,Elevate heels,Repositioning,Pillows,Turning,Wheelchair,Chair cushion     INTAKE/OUPUT  Date 05/22/22 1900 - 05/23/22 0659 05/23/22 0700 - 05/24/22 0659   Shift 0101-2815 24 Hour Total 3833-1700 1111-0000 24 Hour Total   INTAKE   P.O.  880 720  720     P. O.  880 720  720   Shift Total(mL/kg)  880(14.1) 720(11.5)  720(11.5)   OUTPUT   Urine(mL/kg/hr) 400 4030 Urine Voided 400 1450        Urine Occurrence(s) 2 x 5 x 5 x  5 x   Stool          Stool Occurrence(s) 1 x 2 x 3 x  3 x   Shift Total(mL/kg) 400(6.4) 1450(23.2)      NET -400 -570 720  720   Weight (kg) 62.6 62.6 62.6 62.6 62.6       Recommendations:  1. Patient needs and requests: pain management, toileting assistance, mobility assistance, meal set-up    2. Pending tests/procedures: no labs ordered for today- asked Jose NP to look into it    3. Functional Level/Equipment: Partial (one person) / Stabilization belt; Wheelchair    Fall Precautions:   Fall risk precautions were reinforced with the patient; she was instructed to call for help prior to getting up. The following fall risk precautions were continued: bed/ chair alarms, door signage, yellow bracelet and socks as well as update of the [employer] Distance tool in the patient's room. Rupesh Score: 4    HEALS Safety Check    A safety check occurred in the patient's room between off going nurse and oncoming nurse listed above. The safety check included the below items  Area Items   H  High Alert Medications - Verify all high alert medication drips (heparin, PCA, etc.)   E  Equipment - Suction is set up for ALL patients (with cyn)  - Red plugs utilized for all equipment (IV pumps, etc.)  - WOWs wiped down at end of shift.  - Room stocked with oxygen, suction, and other unit-specific supplies   A  Alarms - Bed alarm is set for fall risk patients  - Ensure chair alarm is in place and activated if patient is up in a chair   L  Lines - Check IV for any infiltration  - Torres bag is empty if patient has a Torres   - Tubing and IV bags are labeled   S  Safety   - Room is clean, patient is clean, and equipment is clean. - Hallways are clear from equipment besides carts. - Fall bracelet on for fall risk patients  - Ensure room is clear and free of clutter  - Suction is set up for ALL patients (with cyn)  - Hallways are clear from equipment besides carts.    - Isolation precautions followed, supplies available outside room, sign posted     Selvin Wesley RN

## 2022-05-23 NOTE — PROGRESS NOTES
Problem: Self Care Deficits Care Plan (Adult)  Goal: *Therapy Goal (Edit Goal, Insert Text)  Description: Occupational Therapy Goals   Long Term Goals  Initiated 5/15 and to be accomplished within 2 week(s)  1. Pt will perform self-feeding with I.  2. Pt will perform grooming with I.  3. Pt will perform UB bathing with Mod I.  4. Pt will perform LB bathing with Mod I.  5. Pt will perform tub/shower transfer with Mod I.   6. Pt will perform UB dressing with I.  7. Pt will perform LB dressing with Mod I.  8. Pt will perform toileting task with Mod I.  9. Pt will perform toilet transfer with Mod I.  10. Pt. Will increase BUE strength to 5/5 in order to increase safety at home during ADLs. Short Term Goals   Initiated 5/15 and to be accomplished within 7 day(s)  1. Pt will perform self-feeding with Mod I.   2. Pt will perform grooming with Mod I.  3. Pt will perform UB bathing with Supervision . 4. Pt will perform LB bathing with Supervision. 5. Pt will perform tub/shower transfer with maxA. Goal met 22 upgrade to [address]  6. Pt will perform UB dressing with Mod I.   7. Pt will perform LB dressing with Min A. Goal met from bed level 22  8. Pt will perform toileting task with Mod A.  9. Pt will perform toilet transfer with Min A.   10. Pt. Will demonstrate functional mobility with CGA with RW in order to increase I for ADLs, by setting up prior to activities. Outcome: Progressing Towards Goal  Goal: Interventions  Outcome: Progressing Towards Goal   OT WEEKLY PROGRESS NOTE  Patient Name:Makenzie Webster      Time In: 1288  Time Out: 08    Medical Diagnosis:  Multiple sclerosis ([facility] [G35] Multiple sclerosis exacerbation ([facility]     Pain at start of tx:0/10 pain or discomfort. Pain at stop of tx:-/10 pain or discomfort.     Patient identified with name and :yes  Subjective: \"I hate that chair, I hate that chair, I hate that chair\" (following request to sit up in w/c following LB dressing)  Pt reported it hurts my back and I don't get my pain medication til 9 and PT has me sit up in the chair. OTR provided education for sitting upright in w/c and OOB activity however pt demonstrated anxious behaviors and declined sitting in w/c despite recommendation to place pillow behind back for comfort. Objective:       Outcome Measures:      AR[facility]: [facility]      COGNITION/PERCEPTION Initial Assessment Weekly Progress Assessment 5/23/2022   Premorbid Reading Status   Literate    Premorbid Writing Status       Arousal/Alertness   Alert    Orientation Level Oriented X4  Oriented x4   Visual Fields       Praxis       Body Scheme       COMPREHENSION MODE Initial Assessment Weekly Progress Assessment 5/23/2022   Primary Mode of Comprehension Auditory  Auditory   Hearing Aide       Corrective Lenses       Score 6 5      EXPRESSION Initial Assessment Weekly Progress Assessment 5/23/2022   Primary Mode of Expression Verbal Verbal   Score 6 6   Comments         SOCIAL INTERACTION/ PRAGMATICS Initial Assessment Weekly Progress Assessment 5/23/2022   Score 5 5   Comments         PROBLEM SOLVING Initial Assessment Weekly Progress Assessment 5/23/2022   Score 4 4   Comments         MEMORY Initial Assessment Weekly Progress Assessment 5/23/2022   Score 5 5   Comments         EATING Initial Assessment Weekly Progress Assessment 5/23/2022   Functional Level 5 5    Comments right forearm fx       GROOMING Initial Assessment Weekly Progress Assessment 5/23/2022   Functional Level 5  5   Tasks completed by patient Brushed hair,Brushed teeth,Washed face  Combed hair, wash face, wash hands   Comments Pt performed oral hygiene/ brushed her teeth (set-up) level with supplies set-up on tray table. Patient combed her hair (set-up) level after her hair was washed.         BATHING Initial Assessment Weekly Progress Assessment 5/23/2022   Functional Level 4 Functional Level: 4   Body parts patient bathed Abdomen,Arm, right,Buttocks,Chest,Lower leg and foot, left,Lower leg and foot, right,Latanya area,Thigh, left,Thigh, right Body Parts Patient Bathed: Abdomen;Arm, left;Arm, right;Buttocks; Chest;Lower leg and foot, left; Lower leg and foot, right;Latanya area; Thigh, left; Thigh, right   Comments Benefit from long handled sponge Comments: Pt performed UB/LB showering with Alise. Pt required Alise to wash back. Pt performed LB showering using long handled sponge to wash back and lower legs and weight shifting technique to wash buttocks from seated position. TUB/SHOWER TRANSFER INDEPENDENCE Initial Assessment Weekly Progress Assessment 5/23/2022   Score 0 Tub/Shower Transfer Score: 2   Comments  (Pt. with right leg pain limiting activity OOB) Comments: Pt performed tub transfer to transfer bench with maxA (stand pivot)     UPPER BODY DRESSING/UNDRESSING Initial Assessment Weekly Progress Assessment 5/23/2022   Functional Level 5 Functional Level: 5   Items applied/Steps completed Pullover (4 steps) Items Applied/Steps Completed: Pullover (4 steps)   Comments Pt donned pullover dress with Alise to pull over head       LOWER BODY DRESSING/UNDRESSING Initial Assessment Weekly Progress Assessment 5/23/2022   Functional Level 2 Functional Level: 4   Items applied/Steps completed Underpants (3 steps) Items Applied/Steps Completed: Elastic waist pants (3 steps); Sock, left (1 step); Sock, right (1 step); Underpants (3 steps)   Comments Pt required maxA to don pul tab brief from bed level rolling to right with Alise. Comments: Pt performed LB dressing from bed level with Alise to pull up over RLE hip and buttocks. TOILETING Initial Assessment Weekly Progress Assessment 5/23/2022   Functional Level 5  2   Comments  From bed  MaxA using commode     TOILET TRANSFER INDEPENDENCE Initial Assessment Weekly Progress Assessment 5/23/2022   Transfer score 5  2   Comments  From bed                ASSESSMENT:  Pt engaged in UB/LB showering this date with Alise to wash back and BLE feet.  Pt performed UB dressing with setup seated from w/c and LB dressing with Alise from bed level to pull up over RLE hip. Pt donned BLE socks from bed level with supervision. Pt has met 2/10 STG's at this time. Continued OT necessary to increase pt independence with self cares and functional mobility. Progression toward goals:  []          Improving appropriately and progressing toward goals  [x]          Improving slowly and progressing toward goals  []          Not making progress toward goals and plan of care will be adjusted     PLAN:  Patient continues to benefit from skilled intervention to address the above impairments. Continue treatment per established plan of care. Discharge Recommendations:  Travis Hsieh  Further Equipment Recommendations for Discharge:  TBD     Please refer to the flow sheet for vital signs taken during this treatment. After treatment:   []  Patient left in no apparent distress sitting up in chair  [x]  Patient left in no apparent distress in bed  [x]  Call bell left within reach  []  Nursing notified  []  Caregiver present  []  Bed alarm activated    COMMUNICATION/EDUCATION:   [] Home safety education was provided and the patient/caregiver indicated understanding. [] Patient/family have participated as able in goal setting and plan of care. [x] Patient/family agree to work toward stated goals and plan of care. [] Patient understands intent and goals of therapy, but is neutral about his/her participation. [] Patient is unable to participate in goal setting and plan of care. Plan of Care: Please see Care Plan for updated STG/LTGs.    Family Training:    Estimated LOS: ~ DC 5/31/22    Chris Lyons, OT  5/23/2022

## 2022-05-23 NOTE — PROGRESS NOTES
Nutrition Assessment     Type and Reason for Visit: Reassess,Consult    Nutrition Recommendations/Plan:   1. Continue current nutrition interventions. Pt discussed with Foodservice. Nutrition Assessment:  Pt reported good appetite and meal intake. Tolerating diet. Reported sometimes does not receive everything she ordered, but nursing able to assist with obtaining the rest of her meal. Pt reported that she has received eggs and meat sometimes with her meals. Discussed with pt plan to discussed with Foodserivce; discussed report and verified diet order with Jeremie Veliz,5Th  who verbalized understanding. Had followed up with pt regarding offering diet education of general healthy diet, or any other information as she may prefer (following up on previous nutrition consult for diet education, topic not specified); pt declined needing or to receive any education. Malnutrition Assessment:  Malnutrition Status: No malnutrition     Estimated Daily Nutrient Needs:  Energy (kcal):  2897-4177  Protein (g):  50-63       Fluid (ml/day):  9687-9717    Nutrition Related Findings:  BM 5/22    Current Nutrition Therapies:  ADULT DIET Regular; Low Fat (Less than or Equal to 50 gm/day); Low Fat/ Chol. NO fish, poultry, beef, pork, eggs.   Likes nuts/ peanuts (nut butters), beans/ legumes    Anthropometric Measures:  Height:  4' 11\" (149.9 cm)  Current Body Wt:  62.6 kg (138 lb 0.1 oz)  BMI: 27.9    Nutrition Diagnosis:   · No nutrition diagnosis at this time           Nutrition Interventions:   Food and/or Nutrient Delivery: Continue current diet  Nutrition Education/Counseling: No recommendations at this time,Education not indicated  Coordination of Nutrition Care: Continue to monitor while inpatient  Plan of Care discussed with: pt and Foodservice    Goals:  Previous Goal Met: Goal(s) achieved  Goals: Meet at least 75% of estimated needs,PO intake 75% or greater,by next RD assessment       Nutrition Monitoring and Evaluation:   Behavioral-Environmental Outcomes: None identified  Food/Nutrient Intake Outcomes: Food and nutrient intake  Physical Signs/Symptoms Outcomes: Meal time behavior    Discharge Planning:    No discharge needs at this time    Oumou Becker, [address] [place] [address]  Contact: [phone]

## 2022-05-23 NOTE — PROGRESS NOTES
Problem: Falls - Risk of  Goal: *Absence of Falls  Description: Document Avril Grimm Fall Risk and appropriate interventions in the flowsheet. Outcome: Progressing Towards Goal  Note: Fall Risk Interventions:  Mobility Interventions: Bed/chair exit alarm,OT consult for ADLs,Patient to call before getting OOB,PT Consult for mobility concerns,PT Consult for assist device competence,Utilize walker, cane, or other assistive device,Utilize gait belt for transfers/ambulation    Mentation Interventions: Bed/chair exit alarm,Adequate sleep, hydration, pain control,Gait belt with transfers/ambulation,Door open when patient unattended,Toileting rounds,Room close to nurse's station,More frequent rounding,Update white board    Medication Interventions: Teach patient to arise slowly,Utilize gait belt for transfers/ambulation,Bed/chair exit alarm,Patient to call before getting OOB    Elimination Interventions: Bed/chair exit alarm,Call light in reach,Patient to call for help with toileting needs,Stay With Me (per policy),Toilet paper/wipes in reach,Toileting schedule/hourly rounds    History of Falls Interventions: Bed/chair exit alarm,Door open when patient unattended,Utilize gait belt for transfer/ambulation,Room close to nurse's station     Problem: Patient Education: Go to Patient Education Activity  Goal: Patient/Family Education  Outcome: Progressing Towards Goal     Problem: Pressure Injury - Risk of  Goal: *Prevention of pressure injury  Description: Document Wagner Scale and appropriate interventions in the flowsheet.   Outcome: Progressing Towards Goal  Note: Pressure Injury Interventions:  Sensory Interventions: Chair cushion,Minimize linen layers,Keep linens dry and wrinkle-free,Float heels    Moisture Interventions: Minimize layers,Apply protective barrier, creams and emollients    Activity Interventions: PT/OT evaluation,Increase time out of bed,Chair cushion,Pressure redistribution bed/mattress(bed type)    Mobility Interventions: PT/OT evaluation,HOB 30 degrees or less,Float heels,Chair cushion    Nutrition Interventions: Offer support with meals,snacks and hydration,Document food/fluid/supplement intake    Problem: Pain  Goal: *Control of Pain  Outcome: Progressing Towards Goal

## 2022-05-23 NOTE — PROGRESS NOTES
Problem: Mobility Impaired (Adult and Pediatric)  Goal: *Acute Goals and Plan of Care (Insert Text)  Description: Physical Therapy Short Term Goals  Initiated 5/15/2022 and to be accomplished within 7 day(s) (5/22/2022)  1. Patient will move from supine to sit and sit to supine , scoot up and down, and roll side to side in bed with minimal assistance/contact guard assist.  MET 5/23/2022  2. Patient will transfer from bed to chair and chair to bed with moderate assistance  using the least restrictive device. Not Met - Ongoing, pt inconsistent with performance at this time  3. Patient will perform sit to stand with moderate assistance. MET 5/23/2022  4. Patient will ambulate with moderate assistance  for 10 feet with the least restrictive device. Not safe to assess 2/2 functional weakness  5. Patient will propel w/c over level surfaces for 150 feet` with minimal assistance. MET 5/23/2022    Physical Therapy Long Term Goals  Initiated 5/15/2022 and to be accomplished within 3-4 weeks (6/12/2022)  1. Patient will move from supine to sit and sit to supine , scoot up and down, and roll side to side in bed with modified independence. 2.  Patient will transfer from bed to chair and chair to bed with supervision/set-up using the least restrictive device. 3.  Patient will perform sit to stand with supervision/set-up. 4.  Patient will ambulate with supervision/set-up for 50 feet with the least restrictive device. 5.  Patient will ascend/descend 3 stairs with 1 handrail(s) with minimal assistance/contact guard assist.      Outcome: Progressing Towards Goal     PHYSICAL THERAPY WEEKLY PROGRESS NOTE    Patient: Sena Arnold (44 y.o. female)  Date: 5/23/2022  Diagnosis: Multiple sclerosis ([facility] [address] [G35] Multiple sclerosis exacerbation ([facility] [address]  Precautions: Fall,NWB (right UE)  Chart, physical therapy assessment, plan of care and goals were reviewed.       Time in:1007  Time out:1106    Patient seen for: Balance activities; Patient education;Transfer training; Therapeutic exercise; Wheelchair mobility     Time in:1430  Time out:1501    Patient seen for: Patient education; Therapeutic exercise;      Pain:  Pt pain was reported as no c/o pain pre-treatment. Pt pain was reported as no c/o pain post-treatment. Patient identified with name and : yes    SUBJECTIVE:     Pt is pleasant and cooperative but appears frustrated with attempts at stand step transfer as pt demonstrated increased difficulty advancing left LE with pt noting that she felt she was distracted because she had a loose bowel movement earlier in the morning. OBJECTIVE DATA SUMMARY:       GROSS ASSESSMENT Weekly Progress Assessment 2022   AROM Generally decreased, functional   Strength Generally decreased, functional   Coordination Within functional limits   Tone Abnormal   Sensation Impaired   PROM Within functional limits       POSTURE Weekly Progress Assessment 2022   Posture (WDL)  Exceptions to WDL   Posture Assessment  Rounded Shoulders, Forward Flexed Posture       BALANCE Weekly Progress Assessment 2022    Sitting - Static: Good (unsupported)  Sitting - Dynamic: Fair (occasional)  Standing - Static: Poor  Standing - Dynamic : Impaired     BED/CHAIR/WHEELCHAIR TRANSFERS Initial Assessment Weekly Progress Assessment 2022   Rolling Right 3 (Moderate assistance ) 4 (Minimal assistance) at pelvis to complete rotation   Rolling Left 4 (Minimal assistance) 5 (Supervision) for safety with cues to avoid utilizing right UE to pull   Supine to Sit 2 (Maximal assistance) 4 (Minimal assistance)   Sit to Stand Maximum assistance (from edge of [place] bed with B feet on 6\" block )  Moderate assistance for anterior weight shift and lift off with sit to stand and for slow controlled descent with stand to sit with PT assisting with anterior approach guarding pt's left knee.    Sit to Supine 4 (Minimal assistance) (for left LE management) 4 (Minimal assistance)  Pt requires minimal assistance for left LE management from dependent position. Transfer Type  (NT 2/2 safety concerns with environment) Lateral pivot   Transfer Assistance Needed 0 (Not tested) (spoke to nursing for more appropriate height bed for safety) 2 (Maximal assistance)   Comments   Attempted stand step transfer x 3 from left to right bed to w/c. Pt tolerated moderate to maximal assistance for weight shift to left with PT guarding her left knee and was able to advance right LE to step towards chair. However, despite maximal assistance for weight shift to right, pt was not able to successfully un-weight left LE to advance left LE for stand step transfer. Therefor, pt was educated on and assisted to perform squat pivot transfers from left to right with PT guarding left LE. With one attempt, pt un-weighted right LE stepping in the middle of her squat pivot transfer requiring maximal education re: safety concern.    Car Transfer    NT   Car Type    N/A       [facility] MOBILITY/MANAGEMENT Initial Assessment Weekly Progress Assessment 5/23/2022   Able to Propel (dist) 155 feet 155 feet   Assistance Required 0 (didn't attempt out of bed to w/c transfer due to safety) Supervision with verbal cuing with pt utilizing left UE and right LE for w/c propulsion   Curbs/ramps assistance required    NT   Wheelchair set up assistance required 3 (Moderate assistance) (for left foot rest)  Minimal assistance for left foot rest   Wheelchair management Manages left brake,Manages right brake (with left UE) Manages left brake;Manages right brake (with left UE)         WALKING INDEPENDENCE Initial Assessment Weekly Progress Assessment 5/23/2022   Comments   Unable at this time       STEPS/STAIRS Initial Assessment Weekly Progress Assessment 5/23/2022   Comments   Unable at this time   Curbs/Ramps    Unable at this time     Therapeutic Exercise:  Seated LE Strength Training:  3 Sets of 10 Repetitions:  Right and Left LAQ  Right and Left Alternate Hip Flexion    Supine LE Strength Training:  3 Sets of 10 Repetitions:  Bridging with PT providing manual feedback down through left LE for weight bearing and safe positioning  2 Sets of 10 Repetitions:  Right and Left SLR Hip Flexion AAROM    Sidelying LE Strength:  Right and Left Clamshells AAROM    ASSESSMENT:  Pt progressed to achieve 3/5 short term goals but has not achieved goals for functional transfers bed <> w/c nor for ambulation due to ongoing functional weakness, impaired sensation, and postural dysfunction limiting safety and independence. Progression toward goals:  []      Improving appropriately and progressing toward goals  [x]      Improving slowly and progressing toward goals  []      Not making progress toward goals and plan of care will be adjusted     PLAN:  Patient continues to benefit from skilled intervention to address the above impairments. Continue treatment per established plan of care. Emphasize ongoing functional strengthening and NMRE to promote increased safety and independence with mobility. Discharge Recommendations:  Travis Hsieh  Further Equipment Recommendations for Discharge:  wheelchair 18 inch lisa-height     Estimated Discharge Date:5/31/2022    Activity Tolerance:   Fair  Please refer to the flowsheet for vital signs taken during this treatment.   After treatment:   [] Patient left in no apparent distress in bed  [x] Patient left in no apparent distress sitting up in chair  [] Patient left in no apparent distress in w/c mobilizing under own power  [] Patient left in no apparent distress dining area  [] Patient left in no apparent distress mobilizing under own power  [x] Call bell left within reach  [] Nursing notified  [] Caregiver present  [] Bed alarm activated   [x] Chair alarm activated      Neelam Hudson, PT, DPT  5/23/2022

## 2022-05-24 PROCEDURE — 65310000000 HC RM PRIVATE REHAB

## 2022-05-24 PROCEDURE — 97116 GAIT TRAINING THERAPY: CPT

## 2022-05-24 PROCEDURE — 74011250637 HC RX REV CODE- 250/637: Performed by: INTERNAL MEDICINE

## 2022-05-24 PROCEDURE — 97110 THERAPEUTIC EXERCISES: CPT

## 2022-05-24 PROCEDURE — 99232 SBSQ HOSP IP/OBS MODERATE 35: CPT | Performed by: INTERNAL MEDICINE

## 2022-05-24 PROCEDURE — 97150 GROUP THERAPEUTIC PROCEDURES: CPT

## 2022-05-24 PROCEDURE — 74011250637 HC RX REV CODE- 250/637: Performed by: PSYCHIATRY & NEUROLOGY

## 2022-05-24 PROCEDURE — 2709999900 HC NON-CHARGEABLE SUPPLY

## 2022-05-24 PROCEDURE — 74011250637 HC RX REV CODE- 250/637: Performed by: FAMILY MEDICINE

## 2022-05-24 PROCEDURE — 97535 SELF CARE MNGMENT TRAINING: CPT

## 2022-05-24 PROCEDURE — 97530 THERAPEUTIC ACTIVITIES: CPT

## 2022-05-24 RX ADMIN — FAMOTIDINE 20 MG: 20 TABLET ORAL at 08:01

## 2022-05-24 RX ADMIN — DIMETHYL FUMARATE 240 MG: 240 CAPSULE ORAL at 08:01

## 2022-05-24 RX ADMIN — CETIRIZINE HYDROCHLORIDE 10 MG: 10 TABLET, FILM COATED ORAL at 08:01

## 2022-05-24 RX ADMIN — ASPIRIN 81 MG 81 MG: 81 TABLET ORAL at 21:21

## 2022-05-24 RX ADMIN — ACETAMINOPHEN 650 MG: 325 TABLET ORAL at 09:50

## 2022-05-24 RX ADMIN — SENNOSIDES 8.6 MG: 8.6 TABLET, COATED ORAL at 09:50

## 2022-05-24 RX ADMIN — GABAPENTIN 300 MG: 300 CAPSULE ORAL at 17:58

## 2022-05-24 RX ADMIN — HYDROCORTISONE: 0.5 CREAM TOPICAL at 17:59

## 2022-05-24 RX ADMIN — HYDROCORTISONE: 0.5 CREAM TOPICAL at 08:03

## 2022-05-24 RX ADMIN — CHOLECALCIFEROL TAB 25 MCG (1000 UNIT) 5000 UNITS: 25 TAB at 08:00

## 2022-05-24 RX ADMIN — GABAPENTIN 300 MG: 300 CAPSULE ORAL at 08:01

## 2022-05-24 RX ADMIN — DIMETHYL FUMARATE 240 MG: 240 CAPSULE ORAL at 21:22

## 2022-05-24 NOTE — INTERDISCIPLINARY ROUNDS
[facility] PHYSICAL REHABILITATION  [address] [place] [address], [address]    INPATIENT REHABILITATION  PRE-TEAM CONFERENCE SUMMARY     Date of Conference: 5/25/2022    Patient Information:        Name: Bev Triplett Age / Sex: 74 y.o. / female   CSN: [id number] MRN: [id number]   Admit Date: 5/14/2022 Length of Stay: 10 days     Primary Rehabilitation Diagnosis  1. Impaired Mobility and ADLs  2. Multiple sclerosis exacerbation    Comorbidities  Patient Active Problem List   Diagnosis Code    Multiple sclerosis exacerbation (HCC) G35    Weakness of both lower extremities R29.898    Vitamin D deficiency E55.9    Elevated TSH R79.89    Elevated serum free T4 level R79.89    Impaired mobility and ADLs Z74.09, Z78.9    Multiple sclerosis (HCC) G35    Sciatica of right side M54.31    Closed fracture of proximal end of left radius with routine healing S52.102D          Therapy:     FIM SCORES Initial Assessment Weekly Progress Assessment 5/24/2022   Eating Functional Level: 5  Comments: right forearm fx  5   Swallowing     Grooming 5  5   Bathing 4  4   Upper Body Dressing Functional Level: 5  Items Applied/Steps Completed: Pullover (4 steps)  Comments: Pt donned pullover dress with Alise to pull over head  5   Lower Body Dressing Functional Level: 2  Items Applied/Steps Completed: Underpants (3 steps)  Comments: Pt required maxA to don pul tab brief from bed level rolling to right with Alise. Functional Level: 2  Items Applied/Steps Completed: Shoe, left (1 step); Shoe, right (1 step)  Comments: Pt donned BLE shoes with maxA   (4 overall)   Toileting Functional Level: 5  2   Bladder 0 0   Bowel  0 0   Toilet Transfer McKenzie Toilet Transfer Score: 5  2   Tub/Shower Transfer McKenzie Tub/Shower Transfer Score: 0  Comments:  (Pt. with right leg pain limiting activity OOB)    2   Comprehension Primary Mode of Comprehension: Auditory  Score: 6    6   Expression Primary Mode of Expression: Verbal  Score: 6  6   Social Interaction Score: 5  6   Problem Solving Score: 4  5   Memory Score: 5  5     FIM SCORES Initial Assessment Weekly Progress Assessment 5/24/2022   Bed/Chair/Wheelchair Transfers Transfer Type:  (NT 2/2 safety concerns with environment)  Other: stand step without AD  Transfer Assistance : 0 (Not tested) (spoke to nursing for more appropriate height bed for safety)  Sit to Stand Assistance: Maximum assistance (from edge of [place] bed with B feet on 6\" block )  mod A for stand pivot transfers to the right  Sit to stand: mod A  Stand to sit mod A   Bed Mobility Rolling Right 3 (Moderate assistance )   Rolling Left 4 (Minimal assistance)   Supine to Sit 2 (Maximal assistance)   Sit to Stand Maximum assistance (from edge of [place] bed with B feet on 6\" block )   Sit to Supine 4 (Minimal assistance) (for left LE management)    Rolling Right    CGA   Rolling Left    min A   Supine to Sit    min A   Sit to Stand    mod A   Sit to Supine    min A      Locomotion (W/C) Able to Propel (ft): 155 feet  Functional Level: 0 (didn't attempt out of bed to w/c transfer due to safety)  Wheelchair Setup Assist Required : 3 (Moderate assistance) (for left foot rest)  Wheelchair Management: Manages left brake,Manages right brake (with left UE) Function    Setup Assistance    200 feet with min A  Manages left and right brakes     Locomotion (W/C distance)  NT     Locomotion (Walk)  NT  mod/max A with quad cane      Locomotion (Walk dist.)  NT  6 feet   Steps/Stairs  NT  NT         Nursing:     Neuro:   AAA&O x      4      Respiratory:   [x] WNL   [] O2 LPM:   Other:  Peripheral Vascular:   [] TEDS present   [] Edema present ____ Grade   Cardiac:   [x] WNL   [] Other  Genitourinary:   [x] continent   [] incontinent   [] knight  Abdominal _______ LBM  GI: _______ Diet ___thin___ Liquids _____ tube feeds  Musculoskeletal: ____ ROM Transfers _____ Assistive Device Used  _1 person assist___ Level of Assistance  Skin Integumentary:   [x] Intact   [] Not Intact   __________Preventative Measures  Details______________________________________________________________  Pain: [x] Controlled   [] Not Controlled   Pain Meds:   [] Scheduled   [x] PRN        Interdisciplinary Team Goals:     1. Discipline  Physical Therapy    Goal  Patient will perform standing transfers with min A 50% of the time and mod A 50% of the time    Barrier  decreased LE strength, balance, activity tolerance and increased pain    Intervention  transfer training, Therapeutic exercise, therapeutic activities,     Goal written by:   Zayra Torres, PT     2. Discipline  Occupational Therapy    Goal  Pt will perform toilet transfer with AE and [id number] [address]  impaired standing balance, impaired safety, impaired strength    Intervention  ADL training, transfer training    Goal written by:  Arnoldo Wallace MSOTR/L     3. Discipline  Speech Therapy    Goal      Barrier      Intervention      Goal written by:       4. Discipline  Nursing    Goal Patient will be free of pressure injury. Barrier  MS, decreased mobility    Intervention  turn reminders    Goal written by:  Caitlin Davidson MSN RN AGCNS-BC     5. Discipline  Clinical Psychology    Goal  Maximize treatment effort and motivation    Barrier  Stress with demands of acute rehabilitation in relation to injuries    Intervention  Behavioral redirection and positive reinforcement    Goal written by:  Cherelle Ponce, PhD         Disposition / Discharge Planning:      Follow-up services:  [x] Physical Therapy             [x] Occupational Therapy       [] Speech Therapy           [] Skilled Nursing      [] Medical Social Worker   [] Aide        [] Outpatient      [] vs   [] Home Health  [] vs       [] to progress to outpatient       [] with 24-hour supervision       [] with 24-hour assistance   [x] Travis LIVE recommendations:     Estimated discharge date:  5/31/2022   Discharge Location:  [] Home  [] versus    [] [facility]    [] [address]   [] Other:           Electronic Signatures:      Signature Date Signed   Physical Therapist    Cierra Munoz  5/25/2022   Occupational Therapist   Ken Zarco MSOTR/L  5/24/22   Speech Therapist         Nursing   Selvin ESPINO RN AGCNS-BC  5/24/2022   Clinical Psychologist    Jaun Argueta, Phd  5/24/2022   Physician    Tara Luna MD   5/24/2022        ALAN Sheppard 5/24/2022     Opportunity to share with family/caregiver[] YES [] NO    Relationship to patient____________________________________________________      The above information has been reviewed with the patient in a language that they can understand. Opportunity for comments and questions has been provided and a signed attestation has been scanned into the \"media tab\" of the EMR.       Patient Signature: ______________________________________________________    Date Signed: __________________________________________________________

## 2022-05-24 NOTE — PROGRESS NOTES
[address]  [address] [place] [address] [address]     INPATIENT REHABILITATION  DAILY PROGRESS NOTE     Date: 5/24/2022    Name: Luisito Riley Age / Sex: 74 y.o. / female   CSN: [id number] MRN: [id number]   Admit Date: 5/14/2022 Length of Stay: 10 days     Primary Rehabilitation Diagnosis: Impaired Mobility and ADLs secondary to Multiple sclerosis exacerbation      Subjective:     Patient seen and examined. Blood pressure controlled. Patient's Complaint:   No significant medical complaints    Pain Control: stable, mild-to-moderate joint symptoms intermittently, reasonably well controlled by current meds       Objective:     Vital Signs:  Patient Vitals for the past 24 hrs:   BP Temp Pulse Resp SpO2   05/24/22 0747 115/68 97.1 °F (36.2 °C) 81 20 97 %   05/23/22 1941 106/75 97.1 °F (36.2 °C) (!) 102 18 98 %   05/23/22 1526 116/76 98.2 °F (36.8 °C) (!) 101 18 96 %        Physical Examination:  GENERAL SURVEY: Patient is awake, alert, oriented x 3, laying comfortably on the bed, not in acute respiratory distress. HEENT: pink palpebral conjunctivae, anicteric sclerae, no nasoaural discharge, moist oral mucosa  NECK: supple, no jugular venous distention, no palpable lymph nodes  CHEST/LUNGS: symmetrical chest expansion, good air entry, clear breath sounds  HEART: adynamic precordium, good S1 S2, no S3, regular rhythm, no murmurs  ABDOMEN: flat, bowel sounds appreciated, soft, non-tender  EXTREMITIES: pink nailbeds, no edema, full and equal pulses, no calf tenderness   NEUROLOGICAL EXAM: The patient is awake, alert and oriented x 3, able to answer questions fairly appropriately, able to follow 1 and 2 step commands. Able to tell time from the wall clock. Cranial nerves II-XII are grossly intact. No gross sensory deficit. Motor strength is 4/5 on BUE, 3+ to 4-/5 on BLE.        Current Medications:  Current Facility-Administered Medications   Medication Dose Route Frequency    acetaminophen (TYLENOL) tablet 650 mg  650 mg Oral Q6H PRN    gabapentin (NEURONTIN) capsule 300 mg  300 mg Oral BID    senna (SENOKOT) tablet 8.6 mg  1 Tablet Oral DAILY AFTER BREAKFAST    aspirin chewable tablet 81 mg  81 mg Oral QHS    hydrocortisone (CORTAID) 0.5 % cream   Topical BID    cetirizine (ZYRTEC) tablet 10 mg  10 mg Oral DAILY    dimethyl fumarate DR (TECFIDERA) capsule 240 mg (Patient Supplied)  240 mg Oral BID    oxyCODONE-acetaminophen (PERCOCET) 5-325 mg per tablet 1 Tablet  1 Tablet Oral Q6H PRN    cholecalciferol (VITAMIN D3) (1000 Units /25 mcg) tablet 5,000 Units  5,000 Units Oral DAILY    magnesium hydroxide (MILK OF MAGNESIA) 400 mg/5 mL oral suspension 30 mL  30 mL Oral DAILY PRN    bisacodyL (DULCOLAX) tablet 10 mg  10 mg Oral Q48H PRN    famotidine (PEPCID) tablet 20 mg  20 mg Oral DAILY       Allergies:   Allergies   Allergen Reactions    Amoxicillin Hives and Rash    Clindamycin Hives and Rash    Metronidazole Hives    Tetracycline Hives and Rash       Functional Progress:    PHYSICAL THERAPY    ON ADMISSION MOST RECENT   Wheelchair Mobility/Management  Able to Propel (ft): 155 feet  Functional Level: 0 (didn't attempt out of bed to w/c transfer due to safety)  Wheelchair Setup Assist Required : 3 (Moderate assistance) (for left foot rest)  Wheelchair Management: Manages left brake,Manages right brake (with left UE) Wheelchair Mobility/Management  Able to Propel (ft): 155 feet  Functional Level: 4  Wheelchair Setup Assist Required : 3 (Moderate assistance) (for left foot rest)  Wheelchair Management: Manages left brake,Manages right brake (with left UE)             Balance-Sitting/Standing  Sitting - Static: Fair (occasional)  Sitting - Dynamic: Fair (occasional)  Standing - Static: Poor  Standing - Dynamic : Impaired Balance-Sitting/Standing  Sitting - Static: Good (unsupported)  Sitting - Dynamic: Fair (occasional)  Standing - Static: Poor  Standing - Dynamic : Impaired     Bed/Mat Mobility  Rolling Right : 3 (Moderate assistance )  Rolling Left : 4 (Minimal assistance)  Supine to Sit : 2 (Maximal assistance)  Sit to Supine : 4 (Minimal assistance) (for left LE management) Bed/Mat Mobility  Rolling Right : 4 (Minimal assistance)  Rolling Left : 5 (Supervision)  Supine to Sit : 4 (Minimal assistance)  Sit to Supine : 4 (Minimal assistance)     Transfers  Transfer Type:  (NT 2/2 safety concerns with environment)  Other: stand step without AD  Transfer Assistance : 0 (Not tested) (spoke to nursing for more appropriate height bed for safety)  Sit to Stand Assistance: Maximum assistance (from edge of [place] bed with B feet on 6\" block ) Transfers  Transfer Type: Lateral pivot  Other: stand step without AD  Transfer Assistance : 2 (Maximal assistance)  Sit to Stand Assistance: Moderate assistance                 Lab/Data Review:  Recent Results (from the past 24 hour(s))   CBC WITH AUTOMATED DIFF    Collection Time: 05/23/22  5:47 PM   Result Value Ref Range    WBC 8.5 4.6 - 13.2 K/uL    RBC 4.26 4.20 - 5.30 M/uL    HGB 13.1 12.0 - 16.0 g/dL    HCT 38.9 35.0 - 45.0 %    MCV 91.3 78.0 - 100.0 FL    MCH 30.8 24.0 - 34.0 PG    MCHC 33.7 31.0 - 37.0 g/dL    RDW 12.5 11.6 - 14.5 %    PLATELET 255 714 - 080 K/uL    MPV 9.7 9.2 - 11.8 FL    NRBC 0.0 0  WBC    ABSOLUTE NRBC 0.00 0.00 - 0.01 K/uL    NEUTROPHILS 79 (H) 40 - 73 %    LYMPHOCYTES 11 (L) 21 - 52 %    MONOCYTES 9 3 - 10 %    EOSINOPHILS 1 0 - 5 %    BASOPHILS 0 0 - 2 %    IMMATURE GRANULOCYTES 0 0.0 - 0.5 %    ABS. NEUTROPHILS 6.7 1.8 - 8.0 K/UL    ABS. LYMPHOCYTES 0.9 0.9 - 3.6 K/UL    ABS. MONOCYTES 0.8 0.05 - 1.2 K/UL    ABS. EOSINOPHILS 0.0 0.0 - 0.4 K/UL    ABS. BASOPHILS 0.0 0.0 - 0.1 K/UL    ABS. IMM.  GRANS. 0.0 0.00 - 0.04 K/UL    DF AUTOMATED     METABOLIC PANEL, BASIC    Collection Time: 05/23/22  5:47 PM   Result Value Ref Range    Sodium 142 136 - 145 mmol/L    Potassium 3.9 3.5 - 5.5 mmol/L    Chloride 109 100 - 111 mmol/L    CO2 29 21 - 32 mmol/L    Anion gap 4 3.0 - 18 mmol/L    Glucose 156 (H) 74 - 99 mg/dL    BUN 11 7.0 - 18 MG/DL    Creatinine 0.63 0.6 - 1.3 MG/DL    BUN/Creatinine ratio 17 12 - 20      GFR est AA >60 >60 ml/min/1.73m2    GFR est non-AA >60 >60 ml/min/1.73m2    Calcium 9.2 8.5 - 10.1 MG/DL        Assessment:     Primary Rehabilitation Diagnosis  1. Impaired Mobility and ADLs  2. Multiple sclerosis exacerbation    Comorbidities  Patient Active Problem List   Diagnosis Code    Multiple sclerosis exacerbation (HCC) G35    Weakness of both lower extremities R29.898    Vitamin D deficiency E55.9    Elevated TSH R79.89    Elevated serum free T4 level R79.89    Impaired mobility and ADLs Z74.09, Z78.9    Multiple sclerosis (HCC) G35    Sciatica of right side M54.31    Closed fracture of proximal end of left radius with routine healing S52.102D       Plan:     1. Justification for continued stay: Good progression towards established rehabilitation goals. 2. Medical Issues being followed closely:    [x]  Fall and safety precautions     []  Wound Care     [x]  Bowel and Bladder Function     [x]  Fluid Electrolyte and Nutrition Balance     []  Pain Control      3. Issues that 24 hour rehabilitation nursing is following:    [x]  Fall and safety precautions     []  Wound Care     [x]  Bowel and Bladder Function     [x]  Fluid Electrolyte and Nutrition Balance     []  Pain Control      [x]  Assistance with and education on in-room safety with transfers to and from the bed, wheelchair, toilet and shower. 4. Acute rehabilitation plan of care:    [x]  Continue current care and rehab. [x]  Physical Therapy           [x]  Occupational Therapy           []  Speech Therapy     []  Hold Rehab until further notice     5. Medications:    [x]  MAR Reviewed     [x]  Continue Present Medications     6.  Chemical DVT Prophylaxis:      []  Enoxaparin     []  Unfractionated Heparin     []  Warfarin []  NOAC     []  Aspirin     [x]  None     7. Mechanical DVT Prophylaxis:      [x]  CONNIE Stockings     [x]  Sequential Compression Device     []  None     8. GI Prophylaxis:      []  PPI     [x]  H2 Blocker     []  None / Not indicated     9. Code status:    [x]  Full code     []  Partial code     []  Do not intubate     []  Do not resuscitate     10. Diet:  Specifications  Low fat/Low cholesterol   Solids (consistency)  Regular   Liquids (consistency)  Thin   Fluid Restriction  None     11. Orders:   > Multiple sclerosis exacerbation   > Patient has completed a 5-day course of Methylprednisolone IV on 5/13/2022    > Abnormal thyroid function tests (elevated TSH, elevated free T4) ~ Euthyroid sick syndrome vs Subclinical hypothyroidism   > TSH (5/15/2022) = 4.00   > Free T4 (5/16/2022) = 1.7   > Total T3 (5/16/2022) = 84   > TFTs to be rechecked in 4 to 6 weeks on an outpatient basis   > No need for Levothyroxine at this time      > Vitamin D Deficiency   > Vitamin D 25-Hydroxy (5/9/2022) = 16.6   > On 5/16/2022, patient was given Cholecalciferol 50,000 units PO x 1 dose   > On 5/17/2022, increased Cholecalciferol 5,000 units PO once daily   > Continue Cholecalciferol 5,000 units PO once daily    > Closed fracture of proximal end of left radius with routine healing   > X-ray of the right elbow (5/10/2022) showed a mildly impacted right proximal radius fracture, in the region of the radial neck.  No other definite fractures or malalignment   > Nonweightbearing on the right elbow    > Sciatica of right side   > On 5/14/2022, started:    > Acetaminophen 650 mg PO q 6 hr PRN for pain level 4/10 or lesser    > Percocet 5/325 1 tab PO q 8 hr PRN for pain level 5/10 or greater   > On 5/19/2022:    > Started:     > Acetaminophen 650 mg PO BID (8AM, 12PM)      > Gabapentin 100 mg PO BID      > Increased Percocet 5/325 from 1 tab PO q 8 hr PRN for pain level 5/10 or greater to 1 tab PO q 6 hr PRN for pain level 5/10 or greater   > Neurology consult (Dr. Solis Ewing) called on 5/19/2022 for evaluation and comanagement   > On 5/23/2022:    > Discontinued Acetaminophen 650 mg PO BID (8AM, 12PM)     > Neurology increased Gabapentin from 100 mg to 300 mg PO BID (9AM, 6PM)   > Continue:    > Gabapentin 300 mg PO BID (9AM, 6PM)    > Acetaminophen 650 mg PO q 6 hr PRN for pain level 4/10 or lesser     > Percocet 5/325 1 tab PO q 6 hr PRN for pain level 5/10 or greater    > Rash on back   > On 5/20/2022, started Hydrocortisone 0.5% cream applied to affected area BID   > Continue Hydrocortisone 0.5% cream applied to affected area BID    > Constipation   > Start Senna 1 tab PO daily after breakfast      12. Personal Protective Equipment (N95 face mask and eye goggles) was used while interacting with the patient. Patient was using a surgical mask. 15. Patient's progress in rehabilitation and medical issues discussed with the patient. All questions answered to the best of my ability. Care plan discussed with patient and nurse. 14. Total clinical care time is 30 minutes, including review of chart including all labs, radiology, past medical history, and discussion with patient. Greater than 50% of my time was spent in coordination of care and counseling.       Signed:    Tara Luna MD    May 24, 2022

## 2022-05-24 NOTE — PROGRESS NOTES
Problem: Falls - Risk of  Goal: *Absence of Falls  Description: Document Fredi Sites Fall Risk and appropriate interventions in the flowsheet. Outcome: Progressing Towards Goal  Note: Fall Risk Interventions:  Mobility Interventions: Bed/chair exit alarm,OT consult for ADLs,Patient to call before getting OOB,PT Consult for mobility concerns,PT Consult for assist device competence,Utilize walker, cane, or other assistive device,Utilize gait belt for transfers/ambulation    Mentation Interventions: Bed/chair exit alarm,Adequate sleep, hydration, pain control,Gait belt with transfers/ambulation,Door open when patient unattended,Toileting rounds,Room close to nurse's station,More frequent rounding,Update white board    Medication Interventions: Teach patient to arise slowly,Utilize gait belt for transfers/ambulation,Bed/chair exit alarm,Patient to call before getting OOB    Elimination Interventions: Bed/chair exit alarm,Call light in reach,Patient to call for help with toileting needs,Stay With Me (per policy),Toilet paper/wipes in reach,Toileting schedule/hourly rounds    History of Falls Interventions: Bed/chair exit alarm,Door open when patient unattended,Utilize gait belt for transfer/ambulation,Room close to nurse's station         Problem: Patient Education: Go to Patient Education Activity  Goal: Patient/Family Education  Outcome: Progressing Towards Goal     Problem: Pressure Injury - Risk of  Goal: *Prevention of pressure injury  Description: Document Wagner Scale and appropriate interventions in the flowsheet.   Outcome: Progressing Towards Goal  Note: Pressure Injury Interventions:  Sensory Interventions: Chair cushion,Minimize linen layers,Keep linens dry and wrinkle-free,Float heels    Moisture Interventions: Apply protective barrier, creams and emollients,Minimize layers,Moisture barrier    Activity Interventions: PT/OT evaluation,Increase time out of bed,Chair cushion,Pressure redistribution bed/mattress(bed type)    Mobility Interventions: PT/OT evaluation,HOB 30 degrees or less,Float heels,Chair cushion    Nutrition Interventions: Document food/fluid/supplement intake,Offer support with meals,snacks and hydration                     Problem: Pain  Goal: *Control of Pain  Outcome: Progressing Towards Goal

## 2022-05-24 NOTE — ROUTINE PROCESS
[facility]    Bedside and Verbal shift change report given to Carlyle Núñez RN (oncoming nurse) by Selvin Wesley RN (offgoing nurse). Report included the following information SBAR, Kardex, MAR and Recent Results.     Situation:   Code Status: Full Code   Hospital Day: 10   Problem List:   Hospital Problems  Date Reviewed: 5/24/2022          Codes Class Noted POA    Sciatica of right side (Chronic) ICD-10-CM: M54.31  ICD-9-CM: 724.3  Unknown Yes        Elevated serum free T4 level ICD-10-CM: R79.89  ICD-9-CM: 794.5  5/16/2022 Yes    Overview Signed 5/16/2022  3:30 PM by Adela Juarez MD     Free T4 (5/16/2022) = 1.7             [facility] (Chronic) ICD-10-CM: G35  ICD-9-CM: 340  Unknown Yes        Elevated TSH ICD-10-CM: R79.89  ICD-9-CM: 794.5  5/15/2022 Yes    Overview Signed 5/16/2022  3:30 PM by Adela Juarez MD     TSH (5/15/2022) = 4.00             Closed fracture of proximal end of left radius with routine healing ICD-10-CM: S52.102D  ICD-9-CM: V54.12  5/10/2022 Yes        * (Principal) Multiple sclerosis exacerbation ([facility] ICD-10-CM: G35  ICD-9-CM: 340  5/9/2022 Yes        Weakness of both lower extremities ICD-10-CM: R29.898  ICD-9-CM: 729.89  5/9/2022 Yes        Vitamin D deficiency (Chronic) ICD-10-CM: E55.9  ICD-9-CM: 268.9  5/9/2022 Yes    Overview Signed 5/16/2022  3:29 PM by Adela Juarez MD     Vitamin D 25-Hydroxy (5/9/2022) = 16.6              Impaired mobility and ADLs ICD-10-CM: Z74.09, Z78.9  ICD-9-CM: V49.89  5/9/2022 Yes              Background:   Past Medical History:   Past Medical History:   Diagnosis Date    Closed fracture of proximal end of left radius with routine healing 5/10/2022    Elevated serum free T4 level 5/16/2022    Free T4 (5/16/2022) = 1.7    Elevated TSH 5/15/2022    TSH (5/15/2022) = 4.00    MS (multiple sclerosis) ([facility]     Multiple sclerosis ([facility]     Sciatica of right side     Vitamin D deficiency 5/9/2022    Vitamin D 25-Hydroxy (5/9/2022) = 16.6         Assessment:   Changes in Assessment throughout shift: No change to previous assessment    Patient has a central line: no   Patient has Torres Cath: no      Last Vitals:     Vitals:    05/23/22 1526 05/23/22 1941 05/24/22 0747 05/24/22 1550   BP: 116/76 106/75 115/68 101/64   Pulse: (!) 101 (!) 102 81 83   Resp: 18 18 20 16   Temp: 98.2 °F (36.8 °C) 97.1 °F (36.2 °C) 97.1 °F (36.2 °C) 98 °F (36.7 °C)   SpO2: 96% 98% 97% 97%   Weight:       Height:            PAIN    Pain Assessment    Pain Intensity 1: 0 (05/24/22 1548) Pain Intensity 1: 2 (12/29/14 1105)    Pain Location 1: Leg Pain Location 1: Abdomen    Pain Intervention(s) 1: Medication (see MAR) (scheduled) Pain Intervention(s) 1: Medication (see MAR)  Patient Stated Pain Goal: 0 Patient Stated Pain Goal: 0  o Intervention effective: yes  o Other actions taken for pain:       Skin Assessment  Skin color Skin Color: Ecchymosis (comment),Appropriate for ethnicity (R. hip ecchymosis; rectal area with some erythema- barrier c)  Condition/Temperature Skin Condition/Temp: Cool  Integrity Skin Integrity: Intact  Turgor Turgor: Non-tenting  Weekly Pressure Ulcer Documentation  Pressure  Injury Documentation: No Pressure Injury Noted-Pressure Ulcer Prevention Initiated  Wound Prevention & Protection Methods  Orientation of wound Orientation of Wound Prevention: Posterior  Location of Prevention Location of Wound Prevention: Sacrum/Coccyx  Dressing Present Dressing Present : No  Dressing Status    Wound Offloading Wound Offloading (Prevention Methods): Adaptive equipment,Bed, pressure redistribution/air,Bed, pressure reduction mattress,Chair cushion,Elevate heels,Turning,Pillows,Repositioning,Wheelchair     INTAKE/OUPUT  Date 05/23/22 1900 - 05/24/22 0659 05/24/22 0700 - 05/25/22 0659   Shift 8443-6724 24 Hour Total 2132-2504 0566-6046 24 Hour Total   INTAKE   P.O. 120 840 360  360     P. O. 120 840 360  360   Shift Total(mL/kg) 120(1.9) 840(13.4) 360(5.8)  360(5.8)   OUTPUT   Urine(mL/kg/hr) 900 900 950(1.3)  950     Urine Voided 0 0 950  950     Urine Occurrence(s) 2 x 7 x 0 x  0 x     Urine Output (mL) (External Urinary Catheter 05/21/22) 900 900      Stool          Stool Occurrence(s) 1 x 4 x 2 x 1 x 3 x   Shift Total(mL/kg) 900(14.4) 900(14.4) 950(15.2)  950(15.2)   NET -780 -60 -590  -590   Weight (kg) 62.6 62.6 62.6 62.6 62.6       Recommendations:  1. Patient needs and requests: pain management, toileting assistance, mobility assistance, meal set-up    2. Pending tests/procedures: none at this time    3. Functional Level/Equipment: Partial (one person) / Bed (comment)    Fall Precautions:   Fall risk precautions were reinforced with the patient; she was instructed to call for help prior to getting up. The following fall risk precautions were continued: bed/ chair alarms, door signage, yellow bracelet and socks as well as update of the Page Pyo tool in the patient's room. Rupesh Score: 4    HEALS Safety Check    A safety check occurred in the patient's room between off going nurse and oncoming nurse listed above. The safety check included the below items  Area Items   H  High Alert Medications - Verify all high alert medication drips (heparin, PCA, etc.)   E  Equipment - Suction is set up for ALL patients (with yanker)  - Red plugs utilized for all equipment (IV pumps, etc.)  - WOWs wiped down at end of shift.  - Room stocked with oxygen, suction, and other unit-specific supplies   A  Alarms - Bed alarm is set for fall risk patients  - Ensure chair alarm is in place and activated if patient is up in a chair   L  Lines - Check IV for any infiltration  - Torres bag is empty if patient has a Torres   - Tubing and IV bags are labeled   S  Safety   - Room is clean, patient is clean, and equipment is clean. - Hallways are clear from equipment besides carts.    - Fall bracelet on for fall risk patients  - Ensure room is clear and free of clutter  - Suction is set up for ALL patients (with cyn)  - Hallways are clear from equipment besides carts.    - Isolation precautions followed, supplies available outside room, sign posted     [place] RN

## 2022-05-24 NOTE — PROGRESS NOTES
Problem: Self Care Deficits Care Plan (Adult)  Goal: *Therapy Goal (Edit Goal, Insert Text)  Description: Occupational Therapy Goals   Long Term Goals  Initiated 5/15 and to be accomplished within 2 week(s)  1. Pt will perform self-feeding with I.  2. Pt will perform grooming with I.  3. Pt will perform UB bathing with Mod I.  4. Pt will perform LB bathing with Mod I.  5. Pt will perform tub/shower transfer with Mod I.   6. Pt will perform UB dressing with I.  7. Pt will perform LB dressing with Mod I.  8. Pt will perform toileting task with Mod I.  9. Pt will perform toilet transfer with Mod I.  10. Pt. Will increase BUE strength to 5/5 in order to increase safety at home during ADLs. Short Term Goals   Initiated 5/15 and to be accomplished within 7 day(s)  1. Pt will perform self-feeding with Mod I.   2. Pt will perform grooming with Mod I.  3. Pt will perform UB bathing with Supervision . 4. Pt will perform LB bathing with Supervision. 5. Pt will perform tub/shower transfer with maxA. Goal met 22 upgrade to [address]  6. Pt will perform UB dressing with Mod I.   7. Pt will perform LB dressing with Min A. Goal met from bed level 22 upgrade to SBA. 8. Pt will perform toileting task with Mod A.  9. Pt will perform toilet transfer with Min A.   10. Pt. Will demonstrate functional mobility with CGA with RW in order to increase I for ADLs, by setting up prior to activities. Outcome: Progressing Towards Goal  Goal: Interventions  Outcome: Progressing Towards Goal   Occupational Therapy TREATMENT    Patient: Sarah Leiva   74 y.o. Patient identified with name and : yes    Date: 2022    First Tx Session  Time In: 0900  Time Out[de-identified] 1226        Diagnosis: Multiple sclerosis ([address] [G35]   Precautions: Fall,NWB (right UE)  Chart, occupational therapy assessment, plan of care, and goals were reviewed. Pain:  Pt reports 0/10 pain or discomfort prior to treatment.     Pt reports 9/10 pain or discomfort post treatment. (9/10 with movement while seated EOM)  Intervention Provided: RN notified and administered pain medications and pt reported no pain at rest following session      SUBJECTIVE:   Patient stated I really like these shoes, their the most comfortable shoes I've ever had.     OBJECTIVE DATA SUMMARY:     THERAPEUTIC ACTIVITY Daily Assessment    Pt engaged in LUE FM coordination activity which involved manipulating therapy putty to locate and retrieve small manipulatives to increase pt coordination and dexterity in LUE. Pt engaged in functional activity from EOM which involved weight bearing to elbow and pushing up from elbow to upright position x10 reps. Pt demonstrated sitting tolerance of 15 minutes in unsupported sitting seated EOM. Pt engaged in functional reach activity seated in w/c, reaching overhead, and across table top to grasp small tokens to play game of connect four and secure into vertical slot. Pt won 1/3 games and demonstrated good problem solving skills using offensive and defensive strategies. Pt engaged in LUE FM task which involved manipulating therapy putty against mod resistance to locate and retrieve small beads. THERAPEUTIC EXERCISE Daily Assessment    Pt engaged in BUE strengthening with 2# free weight in all planes (bicep curls, chest press, abduction/adduction) 3x20 reps, 2nd set 15 reps with RB's in between sets. LOWER BODY DRESSING Daily Assessment    Functional Level: 2  Items Applied/Steps Completed: Shoe, left (1 step); Shoe, right (1 step)  Comments: Pt donned BLE shoes with maxA           MOBILITY/TRANSFERS Daily Assessment        Pt demonstrated improved performance with functional transfers and modA. Orthotist arrived during session and fitted pt for LLE AFO and reported will be back with adjustments. ASSESSMENT:  Pt encouraged to engage in all tasks as tolerated secondary to pain.  Pt is increasing BUE strength and activity tolerance for self cares as well as improving sitting tolerance. Progression toward goals:  [x]          Improving appropriately and progressing toward goals  []          Improving slowly and progressing toward goals  []          Not making progress toward goals and plan of care will be adjusted     PLAN:  Patient continues to benefit from skilled intervention to address the above impairments. Continue treatment per established plan of care. Discharge Recommendations:  Travis Hsieh  Further Equipment Recommendations for Discharge:  TBD     Activity Tolerance:  fair      Estimated LOS:~ DC 5/31/22    Please refer to the flowsheet for vital signs taken during this treatment. After treatment:   [x]  Patient left in no apparent distress sitting up in chair   []  Patient left in no apparent distress in bed  [x]  Call bell left within reach  [x]  Nursing notified  []  Caregiver present  []  Bed alarm activated    COMMUNICATION/EDUCATION:   [] Home safety education was provided and the patient/caregiver indicated understanding. [] Patient/family have participated as able in goal setting and plan of care. [x] Patient/family agree to work toward stated goals and plan of care. [] Patient understands intent and goals of therapy, but is neutral about his/her participation. [] Patient is unable to participate in goal setting and plan of care.       Chuckie Joshi, OT

## 2022-05-24 NOTE — PROGRESS NOTES
Problem: Mobility Impaired (Adult and Pediatric)  Goal: *Acute Goals and Plan of Care (Insert Text)  Description:  Physical Therapy Short Term Goals  Initiated 5/15/2022, re-assessed 5/23/2022, and to be accomplished by 5/30/2022 - progressed to long term goals  1. Patient will move from supine to sit and sit to supine , scoot up and down, and roll side to side in bed with minimal assistance/contact guard assist.  MET 5/23/2022  2. Patient will transfer from bed to chair and chair to bed with moderate assistance  using the least restrictive device. Not Met - Ongoing, pt inconsistent with performance at this time  3. Patient will perform sit to stand with moderate assistance. MET 5/23/2022  4. Patient will ambulate with moderate assistance  for 10 feet with the least restrictive device. Not safe to assess 2/2 functional weakness  5. Patient will propel w/c over level surfaces for 150 feet with minimal assistance. MET 5/23/2022    Physical Therapy Long Term Goals  Initiated 5/15/2022 and to be accomplished by d/c.  1.  Patient will move from supine to sit and sit to supine , scoot up and down, and roll side to side in bed with modified independence. 2.  Patient will transfer from bed to chair and chair to bed with minimal assistance using the least restrictive device. 3.  Patient will perform sit to stand with minimal assistance. 4.  Patient will propel w/c over level surfaces for 150 feet with modified independence. Outcome: Progressing Towards Goal  PHYSICAL THERAPY TREATMENT    Patient: Torey Clark (70 y.o. female)  Date: 5/24/2022  Diagnosis: Multiple sclerosis ([facility] [facility] [G35] Multiple sclerosis exacerbation ([facility] [address] [facility]  Precautions: Fall,NWB (right UE)  Chart, physical therapy assessment, plan of care and goals were reviewed. Time In:1100  Time Out:1230    Patient seen for: Balance activities;Gait training;Patient education; Therapeutic exercise;Transfer training; Wheelchair mobility    Pain:  Pt pain was reported as 3 pre-treatment. Pt pain was reported as 4 post-treatment. Intervention: rest as needed     Patient identified with name and :yes    SUBJECTIVE:           Pt reports feeling motivated to participate and make functional progress today    OBJECTIVE DATA SUMMARY:    Objective:     GROSS ASSESSMENT Daily Assessment            BED/MAT MOBILITY Daily Assessment     Supine to Sit : 4 (Minimal assistance)  Assistance provided for trunk stabilization and verbal cues for proper technique and utilization of left UE      TRANSFERS Daily Assessment     Transfer Type: SPT without device  Transfer Assistance : 3 (Moderate assistance )  Sit to Stand Assistance:  Moderate assistance  Car Transfers: Not tested  Pt performed multiple sit to stand transfers from the w/c and mainly at a mod A level however 1 or 2 were closer to min A        GAIT Daily Assessment    Gait Description (WDL)      Gait Abnormalities Wide base of support, decreased step length and foot clearance, heavy lean to the right      Assistive device Cane, quad (wide base)    Ambulation assistance - level surface 3 (Moderate assistance)    Distance 6 Feet (ft)    Ambulation assistance- uneven surface NT     Comments         STEPS/STAIRS Daily Assessment     Steps/Stairs ambulated      Assistance Required      Rail Use      Comments  NT    Curbs/Ramps          BALANCE Daily Assessment     Sitting - Static: Good (unsupported)  Sitting - Dynamic: Fair (occasional)  Standing - Static:  (poor+)  Standing - Dynamic : Impaired  Pt with a heavy lean to the right in standing is able to correct some with verbal and tactile cues but not to maintain for prolonged periods        WHEELCHAIR MOBILITY Daily Assessment     Able to Propel (ft): 190 feet  Functional Level: 4 (using only B LEs)  Wheelchair Management: Manages right brake;Manages left brake        THERAPEUTIC EXERCISES Daily Assessment       Seated LE exercises 3x10: LAQ, marching, glute sets  3x5: hip abd/adduction with knees extended      Group activity:  Pt participated in a 30 minute group activity that required sitting without support and reaching forward and sideways outside of her base of support. Pt required occasional CGA for balance when reaching and used the table for UE support approximately 5 times during the activity. ASSESSMENT:  Pt is progressing with PT towards goals. She is starting to demonstrate increased LE strength resulting in decreased assistance needs with transfers. Pt requires encouragement to stay out of bed however was agreeable to being up longer today. Pt will benefit from continued PT services to achieve a higher level of functional mobility prior to d/c. Progression toward goals:  []      Improving appropriately and progressing toward goals  [x]      Improving slowly and progressing toward goals  []      Not making progress toward goals and plan of care will be adjusted      PLAN:  Patient continues to benefit from skilled intervention to address the above impairments. Continue treatment per established plan of care. Discharge Recommendations:  Travis Hsieh  Further Equipment Recommendations for Discharge:  N/A      Estimated Discharge Date:5/31/2022    Activity Tolerance:   Fair-  Please refer to the flowsheet for vital signs taken during this treatment.     After treatment:   [] Patient left in no apparent distress in bed  [x] Patient left in no apparent distress sitting up in chair  [] Patient left in no apparent distress in w/c mobilizing under own power  [] Patient left in no apparent distress dining area  [] Patient left in no apparent distress mobilizing under own power  [x] Call bell left within reach  [x] Nursing notified  [] Caregiver present  [] Bed alarm activated   [x] Chair alarm activated      Evans Spain, PT  5/24/2022

## 2022-05-24 NOTE — PROGRESS NOTES
Problem: Falls - Risk of  Goal: *Absence of Falls  Description: Document Avril Grimm Fall Risk and appropriate interventions in the flowsheet. Outcome: Progressing Towards Goal  Note: Fall Risk Interventions:  Mobility Interventions: Bed/chair exit alarm,OT consult for ADLs,Patient to call before getting OOB,PT Consult for mobility concerns,Utilize gait belt for transfers/ambulation,Utilize walker, cane, or other assistive device    Mentation Interventions: Adequate sleep, hydration, pain control,Bed/chair exit alarm,Door open when patient unattended,Gait belt with transfers/ambulation,More frequent rounding,Update white board,Toileting rounds,Room close to nurse's station    Medication Interventions: Patient to call before getting OOB,Bed/chair exit alarm,Teach patient to arise slowly,Utilize gait belt for transfers/ambulation    Elimination Interventions: Bed/chair exit alarm,Call light in reach,Stay With Me (per policy),Toilet paper/wipes in reach,Toileting schedule/hourly rounds,Patient to call for help with toileting needs    History of Falls Interventions: Bed/chair exit alarm,Door open when patient unattended,Room close to nurse's station,Utilize gait belt for transfer/ambulation    Problem: Patient Education: Go to Patient Education Activity  Goal: Patient/Family Education  Outcome: Progressing Towards Goal     Problem: Pressure Injury - Risk of  Goal: *Prevention of pressure injury  Description: Document Wagner Scale and appropriate interventions in the flowsheet.   Outcome: Progressing Towards Goal  Note: Pressure Injury Interventions:  Sensory Interventions: Float heels,Chair cushion,Minimize linen layers,Keep linens dry and wrinkle-free    Moisture Interventions: Minimize layers,Apply protective barrier, creams and emollients    Activity Interventions: PT/OT evaluation,Pressure redistribution bed/mattress(bed type),Increase time out of bed,Chair cushion    Mobility Interventions: Float heels,Chair cushion,Pressure redistribution bed/mattress (bed type),PT/OT evaluation    Nutrition Interventions: Document food/fluid/supplement intake,Offer support with meals,snacks and hydration    Problem: Pain  Goal: *Control of Pain  Outcome: Progressing Towards Goal

## 2022-05-24 NOTE — PROGRESS NOTES
SHIFT CHANGE NOTE FOR [facility]    Bedside and Verbal shift change report given to Maria T Linton, RN (oncoming nurse) by Mich Ray, RICHY (offgoing nurse). Report included the following information SBAR, Kardex, MAR and Recent Results.     Situation:   Code Status: Full Code   Hospital Day: 10   Problem List:   Hospital Problems  Date Reviewed: 5/23/2022          Codes Class Noted POA    Sciatica of right side (Chronic) ICD-10-CM: M54.31  ICD-9-CM: 724.3  Unknown Yes        Elevated serum free T4 level ICD-10-CM: R79.89  ICD-9-CM: 794.5  5/16/2022 Yes    Overview Signed 5/16/2022  3:30 PM by Hien Washington MD     Free T4 (5/16/2022) = 1.7             Multiple sclerosis ([facility] (Chronic) ICD-10-CM: G35  ICD-9-CM: 340  Unknown Yes        Elevated TSH ICD-10-CM: R79.89  ICD-9-CM: 794.5  5/15/2022 Yes    Overview Signed 5/16/2022  3:30 PM by Hien Washington MD     TSH (5/15/2022) = 4.00             Closed fracture of proximal end of left radius with routine healing ICD-10-CM: S52.102D  ICD-9-CM: V54.12  5/10/2022 Yes        * (Principal) Multiple sclerosis exacerbation ([facility] ICD-10-CM: G35  ICD-9-CM: 340  5/9/2022 Yes        Weakness of both lower extremities ICD-10-CM: R29.898  ICD-9-CM: 729.89  5/9/2022 Yes        Vitamin D deficiency (Chronic) ICD-10-CM: E55.9  ICD-9-CM: 268.9  5/9/2022 Yes    Overview Signed 5/16/2022  3:29 PM by Hien Washington MD     Vitamin D 25-Hydroxy (5/9/2022) = 16.6              Impaired mobility and ADLs ICD-10-CM: Z74.09, Z78.9  ICD-9-CM: V49.89  5/9/2022 Yes              Background:   Past Medical History:   Past Medical History:   Diagnosis Date    Closed fracture of proximal end of left radius with routine healing 5/10/2022    Elevated serum free T4 level 5/16/2022    Free T4 (5/16/2022) = 1.7    Elevated TSH 5/15/2022    TSH (5/15/2022) = 4.00    MS (multiple sclerosis) ([facility] [address]     Multiple sclerosis ([facility] [address]     Sciatica of right side     Vitamin D deficiency 5/9/2022    Vitamin D 25-Hydroxy (5/9/2022) = 16.6         Assessment:   Changes in Assessment throughout shift: No change to previous assessment     Patient has a central line: no Reasons if yes: Insertion date: Last dressing date:   Patient has Knight Cath: NO Reasons if yes: Insertion date:  Shift knight care completed:      Last Vitals:     Vitals:    05/22/22 2044 05/23/22 0713 05/23/22 1526 05/23/22 1941   BP: 97/70 113/75 116/76 106/75   Pulse: 89 84 (!) 101 (!) 102   Resp: 16 20 18 18   Temp: 97.9 °F (36.6 °C) 98 °F (36.7 °C) 98.2 °F (36.8 °C) 97.1 °F (36.2 °C)   SpO2: 94% 95% 96% 98%   Weight:       Height:            PAIN    Pain Assessment    Pain Intensity 1: 0 (05/24/22 0624) Pain Intensity 1: 2 (12/29/14 1105)    Pain Location 1: Leg Pain Location 1: Abdomen    Pain Intervention(s) 1: Medication (see MAR) (scheduled) Pain Intervention(s) 1: Medication (see MAR)  Patient Stated Pain Goal: 0 Patient Stated Pain Goal: 0  o Intervention effective: yes  o Other actions taken for pain:       Skin Assessment  Skin color Skin Color: Ecchymosis (comment)  Condition/Temperature Skin Condition/Temp: Cool  Integrity Skin Integrity: Intact  Turgor Turgor: Non-tenting  Weekly Pressure Ulcer Documentation  Pressure  Injury Documentation: No Pressure Injury Noted-Pressure Ulcer Prevention Initiated  Wound Prevention & Protection Methods  Orientation of wound Orientation of Wound Prevention: Posterior  Location of Prevention Location of Wound Prevention: Sacrum/Coccyx  Dressing Present Dressing Present : No  Dressing Status    Wound Offloading Wound Offloading (Prevention Methods): Bed, pressure reduction mattress     INTAKE/OUPUT  Date 05/23/22 0700 - 05/24/22 0659 05/24/22 0700 - 05/25/22 0659   Shift 9907-6415 3612-5097 24 Hour Total 5262-4828 2097-3654 24 Hour Total   INTAKE   P.O. 720 120 840        P. O. 720 120 840      Shift Total(mL/kg) 720(11.5) 120(1.9) 840(13.4)      OUTPUT   Urine(mL/kg/hr)  900(1.2) 900(0.6) Urine Voided  0 0        Urine Occurrence(s) 5 x 2 x 7 x        Urine Output (mL) (External Urinary Catheter 05/21/22)  900 900      Stool           Stool Occurrence(s) 3 x 1 x 4 x      Shift Total(mL/kg)  900(14.4) 900(14.4)       -780 -60      Weight (kg) 62.6 62.6 62.6 62.6 62.6 62.6       Recommendations:  1. Patient needs and requests: none at this time    2. Pending tests/procedures: none at this time     Functional Level/Equipment: Two person assist; wheelchair  Fall Precautions:   Fall risk precautions were reinforced with the patient; she was instructed to call for help prior to getting up. The following fall risk precautions were continued: bed/ chair alarms, door signage, yellow bracelet and socks as well as update of the Brandon Rodriguez tool in the patient's room. Rupesh Score: 4    HEALS Safety Check    A safety check occurred in the patient's room between off going nurse and oncoming nurse listed above. The safety check included the below items  Area Items   H  High Alert Medications - Verify all high alert medication drips (heparin, PCA, etc.)   E  Equipment - Suction is set up for ALL patients (with ciscoker)  - Red plugs utilized for all equipment (IV pumps, etc.)  - WOWs wiped down at end of shift.  - Room stocked with oxygen, suction, and other unit-specific supplies   A  Alarms - Bed alarm is set for fall risk patients  - Ensure chair alarm is in place and activated if patient is up in a chair   L  Lines - Check IV for any infiltration  - Torres bag is empty if patient has a Torres   - Tubing and IV bags are labeled   S  Safety   - Room is clean, patient is clean, and equipment is clean. - Hallways are clear from equipment besides carts. - Fall bracelet on for fall risk patients  - Ensure room is clear and free of clutter  - Suction is set up for ALL patients (with cyn)  - Hallways are clear from equipment besides carts.    - Isolation precautions followed, supplies available outside room, sign posted     Oz Lam RN

## 2022-05-25 ENCOUNTER — APPOINTMENT (OUTPATIENT)
Dept: GENERAL RADIOLOGY | Age: 67
DRG: 060 | End: 2022-05-25
Attending: INTERNAL MEDICINE
Payer: MEDICARE

## 2022-05-25 PROCEDURE — 77030038269 HC DRN EXT URIN PURWCK BARD -A

## 2022-05-25 PROCEDURE — 74011250637 HC RX REV CODE- 250/637: Performed by: FAMILY MEDICINE

## 2022-05-25 PROCEDURE — 74011250637 HC RX REV CODE- 250/637: Performed by: INTERNAL MEDICINE

## 2022-05-25 PROCEDURE — 97530 THERAPEUTIC ACTIVITIES: CPT

## 2022-05-25 PROCEDURE — BP0GZZZ PLAIN RADIOGRAPHY OF RIGHT ELBOW: ICD-10-PCS | Performed by: INTERNAL MEDICINE

## 2022-05-25 PROCEDURE — 97110 THERAPEUTIC EXERCISES: CPT

## 2022-05-25 PROCEDURE — 99232 SBSQ HOSP IP/OBS MODERATE 35: CPT | Performed by: INTERNAL MEDICINE

## 2022-05-25 PROCEDURE — 65310000000 HC RM PRIVATE REHAB

## 2022-05-25 PROCEDURE — 97150 GROUP THERAPEUTIC PROCEDURES: CPT

## 2022-05-25 PROCEDURE — 73080 X-RAY EXAM OF ELBOW: CPT

## 2022-05-25 PROCEDURE — 74011250637 HC RX REV CODE- 250/637: Performed by: PSYCHIATRY & NEUROLOGY

## 2022-05-25 PROCEDURE — 97112 NEUROMUSCULAR REEDUCATION: CPT

## 2022-05-25 RX ADMIN — CHOLECALCIFEROL TAB 25 MCG (1000 UNIT) 5000 UNITS: 25 TAB at 09:38

## 2022-05-25 RX ADMIN — DIMETHYL FUMARATE 240 MG: 240 CAPSULE ORAL at 09:39

## 2022-05-25 RX ADMIN — DIMETHYL FUMARATE 240 MG: 240 CAPSULE ORAL at 20:40

## 2022-05-25 RX ADMIN — CETIRIZINE HYDROCHLORIDE 10 MG: 10 TABLET, FILM COATED ORAL at 09:37

## 2022-05-25 RX ADMIN — GABAPENTIN 300 MG: 300 CAPSULE ORAL at 17:55

## 2022-05-25 RX ADMIN — FAMOTIDINE 20 MG: 20 TABLET ORAL at 09:37

## 2022-05-25 RX ADMIN — HYDROCORTISONE: 0.5 CREAM TOPICAL at 09:39

## 2022-05-25 RX ADMIN — HYDROCORTISONE: 0.5 CREAM TOPICAL at 17:55

## 2022-05-25 RX ADMIN — GABAPENTIN 300 MG: 300 CAPSULE ORAL at 09:37

## 2022-05-25 RX ADMIN — SENNOSIDES 8.6 MG: 8.6 TABLET, COATED ORAL at 09:38

## 2022-05-25 RX ADMIN — ACETAMINOPHEN 650 MG: 325 TABLET ORAL at 07:04

## 2022-05-25 RX ADMIN — ASPIRIN 81 MG 81 MG: 81 TABLET ORAL at 20:40

## 2022-05-25 NOTE — PROGRESS NOTES
Problem: Falls - Risk of  Goal: *Absence of Falls  Description: Document Morene Hole Fall Risk and appropriate interventions in the flowsheet. Outcome: Progressing Towards Goal  Note: Fall Risk Interventions:  Mobility Interventions: Bed/chair exit alarm,OT consult for ADLs,Patient to call before getting OOB,PT Consult for mobility concerns,PT Consult for assist device competence,Utilize walker, cane, or other assistive device,Utilize gait belt for transfers/ambulation    Mentation Interventions: Bed/chair exit alarm,Adequate sleep, hydration, pain control,Gait belt with transfers/ambulation,Door open when patient unattended,Toileting rounds,Room close to nurse's station,More frequent rounding,Update white board    Medication Interventions: Teach patient to arise slowly,Utilize gait belt for transfers/ambulation,Bed/chair exit alarm,Patient to call before getting OOB    Elimination Interventions: Bed/chair exit alarm,Call light in reach,Patient to call for help with toileting needs,Stay With Me (per policy),Toilet paper/wipes in reach,Toileting schedule/hourly rounds    History of Falls Interventions: Bed/chair exit alarm,Door open when patient unattended,Utilize gait belt for transfer/ambulation,Room close to nurse's station         Problem: Patient Education: Go to Patient Education Activity  Goal: Patient/Family Education  Outcome: Progressing Towards Goal     Problem: Pressure Injury - Risk of  Goal: *Prevention of pressure injury  Description: Document Wagner Scale and appropriate interventions in the flowsheet.   Outcome: Progressing Towards Goal  Note: Pressure Injury Interventions:  Sensory Interventions: Chair cushion,Minimize linen layers,Keep linens dry and wrinkle-free,Float heels    Moisture Interventions: Apply protective barrier, creams and emollients,Minimize layers,Moisture barrier    Activity Interventions: PT/OT evaluation,Increase time out of bed,Chair cushion,Pressure redistribution bed/mattress(bed type)    Mobility Interventions: PT/OT evaluation,HOB 30 degrees or less,Float heels,Chair cushion    Nutrition Interventions: Document food/fluid/supplement intake,Offer support with meals,snacks and hydration                     Problem: Pain  Goal: *Control of Pain  Outcome: Progressing Towards Goal

## 2022-05-25 NOTE — PROGRESS NOTES
Problem: Mobility Impaired (Adult and Pediatric)  Goal: *Acute Goals and Plan of Care (Insert Text)  Description:  Physical Therapy Short Term Goals  Initiated 5/15/2022, re-assessed 5/23/2022, and to be accomplished by 5/30/2022 - progressed to long term goals  1. Patient will move from supine to sit and sit to supine , scoot up and down, and roll side to side in bed with minimal assistance/contact guard assist.  MET 5/23/2022  2. Patient will transfer from bed to chair and chair to bed with moderate assistance  using the least restrictive device. Not Met - Ongoing, pt inconsistent with performance at this time  3. Patient will perform sit to stand with moderate assistance. MET 5/23/2022  4. Patient will ambulate with moderate assistance  for 10 feet with the least restrictive device. Not safe to assess 2/2 functional weakness  5. Patient will propel w/c over level surfaces for 150 feet with minimal assistance. MET 5/23/2022    Physical Therapy Long Term Goals  Initiated 5/15/2022 and to be accomplished by d/c.  1.  Patient will move from supine to sit and sit to supine , scoot up and down, and roll side to side in bed with modified independence. 2.  Patient will transfer from bed to chair and chair to bed with minimal assistance using the least restrictive device. 3.  Patient will perform sit to stand with minimal assistance. 4.  Patient will propel w/c over level surfaces for 150 feet with modified independence. Outcome: Progressing Towards Goal   PHYSICAL THERAPY TREATMENT    Patient: Sena Arnold (88 y.o. female)  Date: 5/25/2022  Diagnosis: Multiple sclerosis ([facility] [address] [G35] Multiple sclerosis exacerbation ([facility] [address]  Precautions: Fall,NWB (right UE)  Chart, physical therapy assessment, plan of care and goals were reviewed. Time In:1130  Time Out:1230    Patient seen for: AM;Balance activities;Gait training;Patient education; Therapeutic exercise;Transfer training; Wheelchair mobility    Pain:  Pt with 7/10 right side LE    Patient identified with name and : Yes  SUBJECTIVE:      I just got the shoes and the new brace for my foot. OBJECTIVE DATA SUMMARY:    Objective:       BED/MAT MOBILITY Daily Assessment     Rolling Right : 0 (Not tested)  Rolling Left : 4 (Contact guard assistance)  Supine to Sit : 4 (Minimal assistance)  Sit to Supine : 4 (Minimal assistance) patient requires verbal cues with supine to sit 2/2 attempting to place right hand on handrails of bed. Patient was educated on the need to maintain [address] of the right UE for adequate healing. TRANSFERS Daily Assessment     Transfer Type:  (stand step with SBQC)  Other: stand step with SBQC  Transfer Assistance : 3 (Moderate assistance )  Sit to Stand Assistance: Moderate assistance  Car Transfers: Not tested Patient was challenged this session with sit to stand transfers using the [facility]. Patient required moderate verbal cues, demonstration, tactile cuing and moderate assistance to maintain balance during sit to stand transfers. Patient had difficulty shifting weight off her LLE for advancement of RLE. Patient was able to shift weight equally with moderate cues, however patient becomes anxious, impulsive and begins to lose her safety awareness practices. Inconsistent with sit to stand transfers from the [facility] to [facility] x 5 attempts with moderate to minimal assistance. Patient requires moderate cues for step up, foot and hand placement with push off and with descending back to . Requires cues 70% of the time to back up to [facility] before beginning to sit down. GAIT Daily Assessment    Gait Description (WDL)      Gait Abnormalities      Assistive device Cane, quad    Ambulation assistance - level surface 3 (Moderate assistance)    Distance  (4 ft)    Ambulation assistance- uneven surface  NT    Comments Attempted ambulation with use of SBQC, however patient had difficulty with functional training as she is just getting used to the AFO.  The AFO has anterior support for the R ankle and knee. BALANCE Daily Assessment     Sitting - Static: Good (unsupported)  Sitting - Dynamic: Fair (occasional)  Standing - Static: Poor  Standing - Dynamic : Impaired        WHEELCHAIR MOBILITY Daily Assessment     Able to Propel (ft):  (150 ft)  Functional Level:  (4)  Curbs/Ramps Assist Required (FIM Score): 0 (Not tested)  Wheelchair Setup Assist Required : 4 (Contact guard assistance) (does not use footrests)  Wheelchair Management: Manages left brake;Manages right brake        THERAPEUTIC EXERCISES Daily Assessment       Seated-BLEs; LAQs, hip flexion x 5 reps each x 3 sets, patient with increased fatigue and required rest between sets. Neuro Re-Education:  Attempted to use the mirror for visual cues and patient asked that the mirror not be used, therapist respected her request.  Patient is able to stand, 3 trials x 45 seconds with minimal assistance and verbal cues. Patient able to shift weight side to side x 12 each side with minimal assistance and verbal cues. Patient required cues to keep buttocks tucked for improved upright posture, balance and stability. Patient demonstrated increased anxiety with standing and without warning, begins to sit down on the mat. Therapist educated patient extensively about the need to communicate the patient's needs to the therapist for safe cuing to seated surfaces. Patient participated in group session to address posture, decreased strength and tolerance. Patient performed forward flexion with small red thera-ball x 15 reps each with use of LUE and with verbal cues to keep spine straight and shoulders back with head forward for improved upright posture. Strength was addressed with seated ball kicks x 8 trials using LLE. Patient was able to manipulate ball for adequate kick by using her RLE to assistance with positioning for an accurate kick through.     ASSESSMENT:  Patient is seen for deficits in strength, mobility, transfers, ambulation safety and balance. Patient is making slow progress, she reports increased fatigue with activities and requires additional rest between activities that she is given. Continue to address current deficits for improved functional independence. Progression toward goals:  []      Improving appropriately and progressing toward goals  [x]      Improving slowly and progressing toward goals  []      Not making progress toward goals and plan of care will be adjusted      PLAN:  Patient continues to benefit from skilled intervention to address the above impairments. Continue treatment per established plan of care. Discharge Recommendations:  SNF,   Further Equipment Recommendations for Discharge:  TBD      Estimated Discharge Date:5/31/2022    Activity Tolerance:   Fair tolerance to session  Please refer to the flowsheet for vital signs taken during this treatment.     After treatment:   [] Patient left in no apparent distress in bed  [x] Patient left in no apparent distress sitting up in chair  [] Patient left in no apparent distress in w/c mobilizing under own power  [] Patient left in no apparent distress dining area  [] Patient left in no apparent distress mobilizing under own power  [x] Call bell left within reach  [x] Nursing notified  [] Caregiver present  [] Bed alarm activated   [x] Chair alarm activated      Henrik Chambers  5/25/2022

## 2022-05-25 NOTE — PROGRESS NOTES
[facility] PHYSICAL REHABILITATION  [address] [place] [address], [address]     INPATIENT REHABILITATION  DAILY PROGRESS NOTE     Date: 5/25/2022    Name: Romulo Johnson Age / Sex: 74 y.o. / female   CSN: [id number] MRN: [id number]   Admit Date: 5/14/2022 Length of Stay: 11 days     Primary Rehabilitation Diagnosis: Impaired Mobility and ADLs secondary to Multiple sclerosis exacerbation      Subjective:     Patient seen and examined. Blood pressure controlled. Team conference was held at bedside this PM.     Patient's Complaint:   No significant medical complaints    Pain Control: stable, mild-to-moderate joint symptoms intermittently, reasonably well controlled by current meds       Objective:     Vital Signs:  Patient Vitals for the past 24 hrs:   BP Temp Pulse Resp SpO2   05/25/22 0809 113/70 97.9 °F (36.6 °C) 79 23 93 %   05/24/22 1930 119/82 99 °F (37.2 °C) 85 16 97 %   05/24/22 1550 101/64 98 °F (36.7 °C) 83 16 97 %        Physical Examination:  GENERAL SURVEY: Patient is awake, alert, oriented x 3, laying comfortably on the bed, not in acute respiratory distress. HEENT: pink palpebral conjunctivae, anicteric sclerae, no nasoaural discharge, moist oral mucosa  NECK: supple, no jugular venous distention, no palpable lymph nodes  CHEST/LUNGS: symmetrical chest expansion, good air entry, clear breath sounds  HEART: adynamic precordium, good S1 S2, no S3, regular rhythm, no murmurs  ABDOMEN: flat, bowel sounds appreciated, soft, non-tender  EXTREMITIES: pink nailbeds, no edema, full and equal pulses, no calf tenderness   NEUROLOGICAL EXAM: The patient is awake, alert and oriented x 3, able to answer questions fairly appropriately, able to follow 1 and 2 step commands. Able to tell time from the wall clock. Cranial nerves II-XII are grossly intact. No gross sensory deficit. Motor strength is 4/5 on BUE, 3+ to 4-/5 on BLE.        Current Medications:  Current Facility-Administered Medications Medication Dose Route Frequency    acetaminophen (TYLENOL) tablet 650 mg  650 mg Oral Q6H PRN    gabapentin (NEURONTIN) capsule 300 mg  300 mg Oral BID    senna (SENOKOT) tablet 8.6 mg  1 Tablet Oral DAILY AFTER BREAKFAST    aspirin chewable tablet 81 mg  81 mg Oral QHS    hydrocortisone (CORTAID) 0.5 % cream   Topical BID    cetirizine (ZYRTEC) tablet 10 mg  10 mg Oral DAILY    dimethyl fumarate DR (TECFIDERA) capsule 240 mg (Patient Supplied)  240 mg Oral BID    oxyCODONE-acetaminophen (PERCOCET) 5-325 mg per tablet 1 Tablet  1 Tablet Oral Q6H PRN    cholecalciferol (VITAMIN D3) (1000 Units /25 mcg) tablet 5,000 Units  5,000 Units Oral DAILY    magnesium hydroxide (MILK OF MAGNESIA) 400 mg/5 mL oral suspension 30 mL  30 mL Oral DAILY PRN    bisacodyL (DULCOLAX) tablet 10 mg  10 mg Oral Q48H PRN    famotidine (PEPCID) tablet 20 mg  20 mg Oral DAILY       Allergies:   Allergies   Allergen Reactions    Amoxicillin Hives and Rash    Clindamycin Hives and Rash    Metronidazole Hives    Tetracycline Hives and Rash       Functional Progress:    OCCUPATIONAL THERAPY    ON ADMISSION MOST RECENT   Eating  Functional Level: 5   Eating  Functional Level: 5     Grooming  Functional Level: 5   Grooming  Functional Level: 5     Bathing  Functional Level: 4   Bathing  Functional Level: 4     Upper Body Dressing  Functional Level: 5   Upper Body Dressing  Functional Level: 5     Lower Body Dressing  Functional Level: 2   Lower Body Dressing  Functional Level: 2     Toileting  Functional Level: 5   Toileting  Functional Level:  (Pt declined.)     Toilet Transfers  Toilet Transfer Score: 5   Toilet Transfers  Toilet Transfer Score: 5     Tub /Shower Transfers  Tub/Shower Transfer Score: 0   Tub/Shower Transfers  Tub/Shower Transfer Score: 2       Legend:   7 - Independent   6 - Modified Independent   5 - Standby Assistance / Supervision / Set-up   4 - Minimum Assistance / Contact Guard Assistance   3 - Moderate Assistance   2 - Maximum Assistance   1 - Total Assistance / Dependent       Lab/Data Review:  No results found for this or any previous visit (from the past 24 hour(s)). Assessment:     Primary Rehabilitation Diagnosis  1. Impaired Mobility and ADLs  2. Multiple sclerosis exacerbation    Comorbidities  Patient Active Problem List   Diagnosis Code    Multiple sclerosis exacerbation (HCC) G35    Weakness of both lower extremities R29.898    Vitamin D deficiency E55.9    Elevated TSH R79.89    Elevated serum free T4 level R79.89    Impaired mobility and ADLs Z74.09, Z78.9    Multiple sclerosis (HCC) G35    Sciatica of right side M54.31    Closed fracture of proximal end of left radius with routine healing S52.102D       Plan:     1. Justification for continued stay: Good progression towards established rehabilitation goals. 2. Medical Issues being followed closely:    [x]  Fall and safety precautions     []  Wound Care     [x]  Bowel and Bladder Function     [x]  Fluid Electrolyte and Nutrition Balance     []  Pain Control      3. Issues that 24 hour rehabilitation nursing is following:    [x]  Fall and safety precautions     []  Wound Care     [x]  Bowel and Bladder Function     [x]  Fluid Electrolyte and Nutrition Balance     []  Pain Control      [x]  Assistance with and education on in-room safety with transfers to and from the bed, wheelchair, toilet and shower. 4. Acute rehabilitation plan of care:    [x]  Continue current care and rehab. [x]  Physical Therapy           [x]  Occupational Therapy           []  Speech Therapy     []  Hold Rehab until further notice     5. Medications:    [x]  MAR Reviewed     [x]  Continue Present Medications     6. Chemical DVT Prophylaxis:      []  Enoxaparin     []  Unfractionated Heparin     []  Warfarin     []  NOAC     []  Aspirin     [x]  None     7.  Mechanical DVT Prophylaxis:      [x]  CONNIE Stockings     [x]  Sequential Compression Device []  None     8. GI Prophylaxis:      []  PPI     [x]  H2 Blocker     []  None / Not indicated     9. Code status:    [x]  Full code     []  Partial code     []  Do not intubate     []  Do not resuscitate     10. Diet:  Specifications  Low fat/Low cholesterol   Solids (consistency)  Regular   Liquids (consistency)  Thin   Fluid Restriction  None     11. Orders:   > Multiple sclerosis exacerbation   > Patient has completed a 5-day course of Methylprednisolone IV on 5/13/2022    > Abnormal thyroid function tests (elevated TSH, elevated free T4) ~ Euthyroid sick syndrome vs Subclinical hypothyroidism   > TSH (5/15/2022) = 4.00   > Free T4 (5/16/2022) = 1.7   > Total T3 (5/16/2022) = 84   > TFTs to be rechecked in 4 to 6 weeks on an outpatient basis   > No need for Levothyroxine at this time      > Vitamin D Deficiency   > Vitamin D 25-Hydroxy (5/9/2022) = 16.6   > On 5/16/2022, patient was given Cholecalciferol 50,000 units PO x 1 dose   > On 5/17/2022, increased Cholecalciferol 5,000 units PO once daily   > Continue Cholecalciferol 5,000 units PO once daily    > Closed fracture of proximal end of left radius with routine healing   > X-ray of the right elbow (5/10/2022) showed a mildly impacted right proximal radius fracture, in the region of the radial neck.  No other definite fractures or malalignment   > X-ray of right elbow (5/25/2022)   > Nonweightbearing on the right elbow    > Sciatica of right side, attributed by Neurology to thoracic cord lesion   > On 5/14/2022, started:    > Acetaminophen 650 mg PO q 6 hr PRN for pain level 4/10 or lesser    > Percocet 5/325 1 tab PO q 8 hr PRN for pain level 5/10 or greater   > On 5/19/2022:    > Started:     > Acetaminophen 650 mg PO BID (8AM, 12PM)      > Gabapentin 100 mg PO BID      > Increased Percocet 5/325 from 1 tab PO q 8 hr PRN for pain level 5/10 or greater to 1 tab PO q 6 hr PRN for pain level 5/10 or greater   > Neurology consult (Dr. Lara) called on 5/19/2022 for evaluation and comanagement   > On 5/23/2022:    > Discontinued Acetaminophen 650 mg PO BID (8AM, 12PM)     > Neurology increased Gabapentin from 100 mg to 300 mg PO BID (9AM, 6PM)   > Continue:    > Gabapentin 300 mg PO BID (9AM, 6PM)    > Acetaminophen 650 mg PO q 6 hr PRN for pain level 4/10 or lesser     > Percocet 5/325 1 tab PO q 6 hr PRN for pain level 5/10 or greater    > Rash on back   > On 5/20/2022, started Hydrocortisone 0.5% cream applied to affected area BID   > Continue Hydrocortisone 0.5% cream applied to affected area BID    > Constipation   > On 5/24/2022, started Senna 1 tab PO daily after breakfast   > Continue Senna 1 tab PO daily after breakfast      12. Personal Protective Equipment (N95 face mask and eye goggles) was used while interacting with the patient. Patient was using a surgical mask. 15. Patient's progress in rehabilitation and medical issues discussed with the patient. All questions answered to the best of my ability. Care plan discussed with patient and nurse. 14. Total clinical care time is 30 minutes, including review of chart including all labs, radiology, past medical history, and discussion with patient. Greater than 50% of my time was spent in coordination of care and counseling.       Signed:    Radha Garcia MD    May 25, 2022

## 2022-05-25 NOTE — INTERDISCIPLINARY ROUNDS
[facility] PHYSICAL REHABILITATION  [address] [place] [address], [address]    INPATIENT REHABILITATION  TEAM CONFERENCE SUMMARY     Date of Conference: 5/25/2022    Patient Information:        Name: Bev Triplett Age / Sex: 74 y.o. / female   CSN: [id number] MRN: [id number]   Admit Date: 5/14/2022 Length of Stay: 11 days     Primary Rehabilitation Diagnosis  1.  Impaired Mobility and ADLs  2. Multiple sclerosis exacerbation    Comorbidities  Patient Active Problem List   Diagnosis Code    Multiple sclerosis exacerbation (HCC) G35    Weakness of both lower extremities R29.898    Vitamin D deficiency E55.9    Elevated TSH R79.89    Elevated serum free T4 level R79.89    Impaired mobility and ADLs Z74.09, Z78.9    Multiple sclerosis (HCC) G35    Sciatica of right side M54.31    Closed fracture of proximal end of left radius with routine healing S52.102D          Therapy:     FIM SCORES Initial Assessment Weekly Progress Assessment 5/25/2022   Eating Functional Level: 5  Comments: right forearm fx  5   Swallowing     Grooming 5  5   Bathing 4  4   Upper Body Dressing Functional Level: 5  Items Applied/Steps Completed: Pullover (4 steps)  Comments: Pt donned pullover dress with Alise to pull over head  5   Lower Body Dressing Functional Level: 2  Items Applied/Steps Completed: Underpants (3 steps)  Comments: Pt required maxA to don pul tab brief from bed level rolling to right with Alise.     (4 overall)   Toileting Functional Level: 5  2   Bladder 0 1   Bowel  0 0   Toilet Transfer [place] Toilet Transfer Score: 5  2   Tub/Shower Transfer [place] Tub/Shower Transfer Score: 0  Comments:  (Pt. with right leg pain limiting activity OOB)    2   Comprehension Primary Mode of Comprehension: Auditory  Score: 6    6   Expression Primary Mode of Expression: Verbal  Score: 6  6   Social Interaction Score: 5  6   Problem Solving Score: 4  5   Memory Score: 5  5     FIM SCORES Initial Assessment Weekly Progress Assessment 5/25/2022   Bed/Chair/Wheelchair Transfers Transfer Type:  (NT 2/2 safety concerns with environment)  Other: stand step without AD  Transfer Assistance : 0 (Not tested) (spoke to nursing for more appropriate height bed for safety)  Sit to Stand Assistance: Maximum assistance (from edge of [place] bed with B feet on 6\" block )  Car Transfers: Not tested  mod A for stand pivot transfers to the right  Sit to stand: mod A  Stand to sit mod A   Bed Mobility Rolling Right 3 (Moderate assistance )   Rolling Left 4 (Minimal assistance)   Supine to Sit 2 (Maximal assistance)   Sit to Stand Maximum assistance (from edge of [place] bed with B feet on 6\" block )   Sit to Supine 4 (Minimal assistance) (for left LE management)    Rolling Right    CGA   Rolling Left    min A   Supine to Sit    min A   Sit to Stand    mod A   Sit to Supine    min A      Locomotion (W/C) Able to Propel (ft): 155 feet  Functional Level: 0 (didn't attempt out of bed to w/c transfer due to safety)  Wheelchair Setup Assist Required : 3 (Moderate assistance) (for left foot rest)  Wheelchair Management: Manages left brake,Manages right brake (with left UE) Function    Setup Assistance    200 feet with min A  Manages left and right brakes     Locomotion (W/C distance)  NT     Locomotion (Walk) 3 (Moderate assistance)NT  mod/max A with quad cane      Locomotion (Walk dist.) 6 Feet (ft)NT  6 feet   Steps/Stairs  NT  NT         Nursing:     Neuro:   AAA&O x      4      Respiratory:   [x] WNL   [] O2 LPM:   Other:  Peripheral Vascular:   [] TEDS present   [] Edema present ____ Grade   Cardiac:   [x] WNL   [] Other  Genitourinary:   [x] continent   [] incontinent   [] knight  Abdominal _______ LBM  GI: _______ Diet ___thin___ Liquids _____ tube feeds  Musculoskeletal: ____ ROM Transfers _____ Assistive Device Used  _1 person assist___ Level of Assistance  Skin Integumentary:   [x] Intact   [] Not Intact   __________Preventative Measures  Details______________________________________________________________  Pain: [x] Controlled   [] Not Controlled   Pain Meds:   [] Scheduled   [x] PRN        Interdisciplinary Team Goals:     1. Discipline  Physical Therapy    Goal  Patient will perform standing transfers with min A 50% of the time and mod A 50% of the time    Barrier  decreased LE strength, balance, activity tolerance and increased pain    Intervention  transfer training, Therapeutic exercise, therapeutic activities,     Goal written by:   Lupe Lin, PT     2. Discipline  Occupational Therapy    Goal  Pt will perform toilet transfer with AE and [address]  impaired standing balance, impaired safety, impaired strength    Intervention  ADL training, transfer training    Goal written by:  Dorothea Chatman MSOTR/L     3. Discipline  Speech Therapy    Goal      Barrier      Intervention      Goal written by:       4. Discipline  Nursing    Goal Patient will be free of pressure injury. Barrier  MS, decreased mobility    Intervention  turn reminders    Goal written by:  Lee Mora MSN RN AGCNS-BC     5. Discipline  Clinical Psychology    Goal  Maximize treatment effort and motivation    Barrier  Stress with demands of acute rehabilitation in relation to injuries    Intervention  Behavioral redirection and positive reinforcement    Goal written by:  Espinoza Tripp, PhD         Disposition / Discharge Planning:      Follow-up services:  [x] Physical Therapy             [x] Occupational Therapy       [] Speech Therapy           [] Skilled Nursing      [] Medical Social Worker   [] Aide        [] Outpatient      [] vs   [] Home Health  [] vs       [] to progress to outpatient       [] with 24-hour supervision       [] with 24-hour assistance   [x] Travis LIVE recommendations:  tbd   Estimated discharge date:  5/31/2022   Discharge Location:  [x] Home  [] versus    [] Merged with [facility]    [] Assisted Living Facility   [] Other:           Interdisciplinary team rounds were held this PM with the following team members:       Name   Physical Therapist    Vinny Michel, MICAH     Occupational Therapist    Nettie Moise OTR/L   Nursing    Hayes Lamb, RICHY   Physician    Danuta Roy MD        Karrie Kathleen MSW          Signed:  Danuta Roy MD    May 25, 2022

## 2022-05-25 NOTE — PROGRESS NOTES
SHIFT CHANGE NOTE FOR [facility]    Bedside and Verbal shift change report given to Chace Teague, RN (oncoming nurse) by Liam Prieto RN (offgoing nurse). Report included the following information SBAR, Kardex, MAR and Recent Results.     Situation:   Code Status: Full Code   Hospital Day: 11   Problem List:   Hospital Problems  Date Reviewed: 5/24/2022          Codes Class Noted POA    Sciatica of right side (Chronic) ICD-10-CM: M54.31  ICD-9-CM: 724.3  Unknown Yes        Elevated serum free T4 level ICD-10-CM: R79.89  ICD-9-CM: 794.5  5/16/2022 Yes    Overview Signed 5/16/2022  3:30 PM by Brittany Hopper MD     Free T4 (5/16/2022) = 1.7             Multiple sclerosis ([facility] (Chronic) ICD-10-CM: G35  ICD-9-CM: 340  Unknown Yes        Elevated TSH ICD-10-CM: R79.89  ICD-9-CM: 794.5  5/15/2022 Yes    Overview Signed 5/16/2022  3:30 PM by Brittany Hopper MD     TSH (5/15/2022) = 4.00             Closed fracture of proximal end of left radius with routine healing ICD-10-CM: S52.102D  ICD-9-CM: V54.12  5/10/2022 Yes        * (Principal) Multiple sclerosis exacerbation ([facility] ICD-10-CM: G35  ICD-9-CM: 340  5/9/2022 Yes        Weakness of both lower extremities ICD-10-CM: R29.898  ICD-9-CM: 729.89  5/9/2022 Yes        Vitamin D deficiency (Chronic) ICD-10-CM: E55.9  ICD-9-CM: 268.9  5/9/2022 Yes    Overview Signed 5/16/2022  3:29 PM by Brittany Hopper MD     Vitamin D 25-Hydroxy (5/9/2022) = 16.6              Impaired mobility and ADLs ICD-10-CM: Z74.09, Z78.9  ICD-9-CM: V49.89  5/9/2022 Yes              Background:   Past Medical History:   Past Medical History:   Diagnosis Date    Closed fracture of proximal end of left radius with routine healing 5/10/2022    Elevated serum free T4 level 5/16/2022    Free T4 (5/16/2022) = 1.7    Elevated TSH 5/15/2022    TSH (5/15/2022) = 4.00    MS ([facility]) [facility]     Multiple sclerosis ([facility]     Sciatica of right side     Vitamin D deficiency 5/9/2022    Vitamin D 25-Hydroxy (5/9/2022) = 16.6         Assessment:   Changes in Assessment throughout shift: No change to previous assessment     Patient has a central line: no Reasons if yes: Insertion date: Last dressing date:   Patient has Knight Cath: NO Reasons if yes: Insertion date:  Shift knight care completed:      Last Vitals:     Vitals:    05/23/22 1941 05/24/22 0747 05/24/22 1550 05/24/22 1930   BP: 106/75 115/68 101/64 119/82   Pulse: (!) 102 81 83 85   Resp: 18 20 16 16   Temp:  97.1 °F (36.2 °C) 98 °F (36.7 °C) 99 °F (37.2 °C)   SpO2: 98% 97% 97% 97%   Weight:       Height:            PAIN    Pain Assessment    Pain Intensity 1: 7 (05/25/22 0704) Pain Intensity 1: 2 (12/29/14 1105)    Pain Location 1: Leg Pain Location 1: Abdomen    Pain Intervention(s) 1: Medication (see MAR),Refused (refused narcotic) Pain Intervention(s) 1: Medication (see MAR)  Patient Stated Pain Goal: 0 Patient Stated Pain Goal: 0  o Intervention effective: yes  o Other actions taken for pain: Medication (see MAR); Refused (refused narcotic)     Skin Assessment  Skin color Skin Color: Ecchymosis (comment) (R hip)  Condition/Temperature Skin Condition/Temp: Cool  Integrity Skin Integrity: Intact  Turgor Turgor: Non-tenting  Weekly Pressure Ulcer Documentation  Pressure  Injury Documentation: No Pressure Injury Noted-Pressure Ulcer Prevention Initiated  Wound Prevention & Protection Methods  Orientation of wound Orientation of Wound Prevention: Posterior  Location of Prevention Location of Wound Prevention: Sacrum/Coccyx  Dressing Present Dressing Present : No  Dressing Status    Wound Offloading Wound Offloading (Prevention Methods): Bed, pressure reduction mattress     INTAKE/OUPUT  Date 05/24/22 0700 - 05/25/22 0659 05/25/22 0700 - 05/26/22 0659   Shift 0700-1859 1900-0659 24 Hour Total 9837-4912 9841-8581 24 Hour Total   INTAKE   P.O. 360 120 480 120  120     P. O. 360 120 480 120  120   Shift Total(mL/kg) 360(5.8) 120(1.9) 480(7.7) 120(1.9) 120(1.9)   OUTPUT   Urine(mL/kg/hr) 950(1.3) 900(1.2) 1850(1.2) 200  200     Urine Voided 950 0 950        Urine Occurrence(s) 0 x 1 x 1 x        Urine Output (mL) (External Urinary Catheter 05/21/22)  900 900 200  200   Stool           Stool Occurrence(s) 2 x 2 x 4 x      Shift Total(mL/kg) 950(15.2) 900(14.4) 1850(29.6) 200(3.2)  200(3.2)   NET -590 -780 -1370 -80  -80   Weight (kg) 62.6 62.6 62.6 62.6 62.6 62.6       Recommendations:  1. Patient needs and requests: none at this time    2. Pending tests/procedures: none at this time     Functional Level/Equipment: Two person assist; wheelchair  Fall Precautions:   Fall risk precautions were reinforced with the patient; she was instructed to call for help prior to getting up. The following fall risk precautions were continued: bed/ chair alarms, door signage, yellow bracelet and socks as well as update of the [employer] tool in the patient's room. Rupesh Score: 4    HEALS Safety Check    A safety check occurred in the patient's room between off going nurse and oncoming nurse listed above. The safety check included the below items  Area Items   H  High Alert Medications - Verify all high alert medication drips (heparin, PCA, etc.)   E  Equipment - Suction is set up for ALL patients (with yanker)  - Red plugs utilized for all equipment (IV pumps, etc.)  - WOWs wiped down at end of shift.  - Room stocked with oxygen, suction, and other unit-specific supplies   A  Alarms - Bed alarm is set for fall risk patients  - Ensure chair alarm is in place and activated if patient is up in a chair   L  Lines - Check IV for any infiltration  - Torres bag is empty if patient has a Torres   - Tubing and IV bags are labeled   S  Safety   - Room is clean, patient is clean, and equipment is clean. - Hallways are clear from equipment besides carts.    - Fall bracelet on for fall risk patients  - Ensure room is clear and free of clutter  - Suction is set up for ALL patients (with cyn)  - Hallways are clear from equipment besides carts.    - Isolation precautions followed, supplies available outside room, sign posted     Herbert Cheema RN

## 2022-05-25 NOTE — ROUTINE PROCESS
SHIFT CHANGE NOTE FOR [facility]    Bedside and Verbal shift change report given to Gretta Ivey, RN (oncoming nurse) by Brigitte Zaragoza RN (offgoing nurse). Report included the following information SBAR, Kardex, MAR and Recent Results.     Situation:   Code Status: Full Code   Hospital Day: 11   Problem List:   Hospital Problems  Date Reviewed: 5/25/2022          Codes Class Noted POA    Sciatica of right side (Chronic) ICD-10-CM: M54.31  ICD-9-CM: 724.3  Unknown Yes        Elevated serum free T4 level ICD-10-CM: R79.89  ICD-9-CM: 794.5  5/16/2022 Yes    Overview Signed 5/16/2022  3:30 PM by Tatyana Lynn MD     Free T4 (5/16/2022) = 1.7             [facility] (Chronic) ICD-10-CM: G35  ICD-9-CM: 340  Unknown Yes        Elevated TSH ICD-10-CM: R79.89  ICD-9-CM: 794.5  5/15/2022 Yes    Overview Signed 5/16/2022  3:30 PM by Tatyana Lynn MD     TSH (5/15/2022) = 4.00             Closed fracture of proximal end of left radius with routine healing ICD-10-CM: S52.102D  ICD-9-CM: V54.12  5/10/2022 Yes        * (Principal) Multiple sclerosis exacerbation ([facility] ICD-10-CM: G35  ICD-9-CM: 340  5/9/2022 Yes        Weakness of both lower extremities ICD-10-CM: R29.898  ICD-9-CM: 729.89  5/9/2022 Yes        Vitamin D deficiency (Chronic) ICD-10-CM: E55.9  ICD-9-CM: 268.9  5/9/2022 Yes    Overview Signed 5/16/2022  3:29 PM by Tatyana Lynn MD     Vitamin D 25-Hydroxy (5/9/2022) = 16.6              Impaired mobility and ADLs ICD-10-CM: Z74.09, Z78.9  ICD-9-CM: V49.89  5/9/2022 Yes              Background:   Past Medical History:   Past Medical History:   Diagnosis Date    Closed fracture of proximal end of left radius with routine healing 5/10/2022    Elevated serum free T4 level 5/16/2022    Free T4 (5/16/2022) = 1.7    Elevated TSH 5/15/2022    TSH (5/15/2022) = 4.00    MS ([facility]) ([facility]     [facility]     Sciatica of right side     Vitamin D deficiency 5/9/2022    Vitamin D 25-Hydroxy (5/9/2022) = 16.6         Assessment:   Changes in Assessment throughout shift: No change to previous assessment     Patient has a central line: no Reasons if yes: na  Insertion date:na Last dressing date:na   Patient has Knight Cath: no Reasons if yes: na   Insertion date:na  Shift knight care completed: NO     Last Vitals:     Vitals:    05/24/22 1550 05/24/22 1930 05/25/22 0809 05/25/22 1635   BP: 101/64 119/82 113/70 113/67   Pulse: 83 85 79 77   Resp: 16 16 23 22   Temp: 98 °F (36.7 °C) 99 °F (37.2 °C) 97.9 °F (36.6 °C) 97.8 °F (36.6 °C)   SpO2: 97% 97% 93% 99%   Weight:       Height:            PAIN    Pain Assessment    Pain Intensity 1: 0 (05/25/22 1635) Pain Intensity 1: 2 (12/29/14 1105)    Pain Location 1: Leg Pain Location 1: Abdomen    Pain Intervention(s) 1: Medication (see MAR),Refused (refused narcotic) Pain Intervention(s) 1: Medication (see MAR)  Patient Stated Pain Goal: 0 Patient Stated Pain Goal: 0  o Intervention effective: yes  o Other actions taken for pain:       Skin Assessment  Skin color Skin Color: Ecchymosis (comment)  Condition/Temperature Skin Condition/Temp: Cool  Integrity Skin Integrity: Intact  Turgor Turgor: Non-tenting  Weekly Pressure Ulcer Documentation  Pressure  Injury Documentation: No Pressure Injury Noted-Pressure Ulcer Prevention Initiated  Wound Prevention & Protection Methods  Orientation of wound Orientation of Wound Prevention: Posterior  Location of Prevention Location of Wound Prevention: Sacrum/Coccyx  Dressing Present Dressing Present : No  Dressing Status    Wound Offloading Wound Offloading (Prevention Methods): Bed, pressure reduction mattress     INTAKE/OUPUT  Date 05/24/22 1900 - 05/25/22 0659 05/25/22 0700 - 05/26/22 0659   Shift 2532-3495 24 Hour Total 6381-3467 7393-3691 24 Hour Total   INTAKE   P.O.   1320     P. O.   1320   Shift Total(mL/kg) 120(1.9) 480(7.7) 1320(21.1)  1320(21.1)   OUTPUT   Urine(mL/kg/hr) 900 1850 600(0.8)  600     Urine Voided 0 950 400  400     Urine Occurrence(s) 1 x 1 x 3 x  3 x     Urine Output (mL) (External Urinary Catheter 05/21/22) 900 900 200  200   Stool          Stool Occurrence(s) 2 x 4 x 1 x  1 x   Shift Total(mL/kg) 900(14.4) 1850(29.6) 600(9.6)  600(9.6)   NET -780 -1370 720  720   Weight (kg) 62.6 62.6 62.6 62.6 62.6       Recommendations:  1. Patient needs and requests: na    2. Pending tests/procedures: na     3. Functional Level/Equipment: Partial (one person) / Bed (comment)    Fall Precautions:   Fall risk precautions were reinforced with the patient; she was instructed to call for help prior to getting up. The following fall risk precautions were continued: bed/ chair alarms, door signage, yellow bracelet and socks as well as update of the [employer] tool in the patient's room. Rupesh Score: 4    HEALS Safety Check    A safety check occurred in the patient's room between off going nurse and oncoming nurse listed above. The safety check included the below items  Area Items   H  High Alert Medications - Verify all high alert medication drips (heparin, PCA, etc.)   E  Equipment - Suction is set up for ALL patients (with cyn)  - Red plugs utilized for all equipment (IV pumps, etc.)  - WOWs wiped down at end of shift.  - Room stocked with oxygen, suction, and other unit-specific supplies   A  Alarms - Bed alarm is set for fall risk patients  - Ensure chair alarm is in place and activated if patient is up in a chair   L  Lines - Check IV for any infiltration  - Torres bag is empty if patient has a Torres   - Tubing and IV bags are labeled   S  Safety   - Room is clean, patient is clean, and equipment is clean. - Hallways are clear from equipment besides carts. - Fall bracelet on for fall risk patients  - Ensure room is clear and free of clutter  - Suction is set up for ALL patients (with cyn)  - Hallways are clear from equipment besides carts.    - Isolation precautions followed, supplies available outside room, sign posted     Tracy Barry RN

## 2022-05-25 NOTE — PROGRESS NOTES
Problem: Self Care Deficits Care Plan (Adult)  Goal: *Therapy Goal (Edit Goal, Insert Text)  Description: Occupational Therapy Goals   Long Term Goals  Initiated 5/15 and to be accomplished within 2 week(s)  1. Pt will perform self-feeding with I.  2. Pt will perform grooming with I.  3. Pt will perform UB bathing with Mod I.  4. Pt will perform LB bathing with Mod I.  5. Pt will perform tub/shower transfer with Mod I.   6. Pt will perform UB dressing with I.  7. Pt will perform LB dressing with Mod I.  8. Pt will perform toileting task with Mod I.  9. Pt will perform toilet transfer with Mod I.  10. Pt. Will increase BUE strength to 5/5 in order to increase safety at home during ADLs. Short Term Goals   Initiated 5/15 and to be accomplished within 7 day(s)  1. Pt will perform self-feeding with Mod I.   2. Pt will perform grooming with Mod I.  3. Pt will perform UB bathing with Supervision . 4. Pt will perform LB bathing with Supervision. 5. Pt will perform tub/shower transfer with maxA. Goal met 22 upgrade to [address]  6. Pt will perform UB dressing with Mod I.   7. Pt will perform LB dressing with Min A. Goal met from bed level 22 upgrade to SBA. 8. Pt will perform toileting task with Mod A.  9. Pt will perform toilet transfer with Min A.   10. Pt. Will demonstrate functional mobility with CGA with RW in order to increase I for ADLs, by setting up prior to activities. Outcome: Progressing Towards Goal  Goal: Interventions  Outcome: Progressing Towards Goal     Occupational Therapy TREATMENT    Patient: Richi Navarrete   74 y.o. Patient identified with name and : yes    Date: 2022    First Tx Session  Time In: 0930  Time Out[de-identified] 1100        Diagnosis: Multiple sclerosis ([address] [G35]   Precautions: Fall,NWB (right UE)  Chart, occupational therapy assessment, plan of care, and goals were reviewed. Pain:  Pt reports 9/10 pain or discomfort prior to treatment.     Pt reports -/10 pain or discomfort post treatment. Intervention Provided: nursing aware and administering meds upon arrival       SUBJECTIVE:   Patient stated i'd really like to get my pain medication before I work with you.     OBJECTIVE DATA SUMMARY:     THERAPEUTIC ACTIVITY Daily Assessment    Pt engaged in LUE FM activity which involved manipulating therapy putty against max resistance to retrieve and remove small beads to improve LUE coordination and dexterity. THERAPEUTIC EXERCISE Daily Assessment    Pt engaged in RUE AAROM/AROM at table top as tolerated to lubricate joints and decrease stiffness (elbow curl, wrist flexion/extension, palmar squeeze, supination/pronation, supported shoulder flexion/extension) 10 reps each, 1 set. Pt reported no pain with activity however soreness/tightness in RUE elbow. MOBILITY/TRANSFERS Daily Assessment     Pt required increased time for functional mobility through hallways using LUE and RLE to maneuver. ASSESSMENT:  Pt demonstrated ability to verbalize donning LLE orthotic with BLE sneakers and fastening straps. Pt performed RUE AAROM/AROM as tolerated. Pt increasing LUE strength and coordination for carryover for self cares. Progression toward goals:  [x]          Improving appropriately and progressing toward goals  []          Improving slowly and progressing toward goals  []          Not making progress toward goals and plan of care will be adjusted     PLAN:  Patient continues to benefit from skilled intervention to address the above impairments.  Continue treatment per established plan of care. Discharge Recommendations:  Skilled Nursing Facility  Further Equipment Recommendations for Discharge:  TBD     Activity Tolerance:  good      Estimated LOS:~ DC 5/31/22    Please refer to the flowsheet for vital signs taken during this treatment.   After treatment:   [x]  Patient left in no apparent distress sitting up in chair   []  Patient left in no apparent distress in bed  [x]  Call bell left within reach  []  Nursing notified  []  Caregiver present  []  Bed alarm activated    COMMUNICATION/EDUCATION:   [] Home safety education was provided and the patient/caregiver indicated understanding. [] Patient/family have participated as able in goal setting and plan of care. [x] Patient/family agree to work toward stated goals and plan of care. [] Patient understands intent and goals of therapy, but is neutral about his/her participation. [] Patient is unable to participate in goal setting and plan of care.       Caitlin Kaba, OT

## 2022-05-26 PROCEDURE — 97535 SELF CARE MNGMENT TRAINING: CPT

## 2022-05-26 PROCEDURE — 99232 SBSQ HOSP IP/OBS MODERATE 35: CPT | Performed by: INTERNAL MEDICINE

## 2022-05-26 PROCEDURE — 97530 THERAPEUTIC ACTIVITIES: CPT

## 2022-05-26 PROCEDURE — 65310000000 HC RM PRIVATE REHAB

## 2022-05-26 PROCEDURE — 74011250637 HC RX REV CODE- 250/637: Performed by: FAMILY MEDICINE

## 2022-05-26 PROCEDURE — 97110 THERAPEUTIC EXERCISES: CPT

## 2022-05-26 PROCEDURE — 97116 GAIT TRAINING THERAPY: CPT

## 2022-05-26 PROCEDURE — 74011250637 HC RX REV CODE- 250/637: Performed by: PSYCHIATRY & NEUROLOGY

## 2022-05-26 PROCEDURE — 74011250637 HC RX REV CODE- 250/637: Performed by: INTERNAL MEDICINE

## 2022-05-26 PROCEDURE — 2709999900 HC NON-CHARGEABLE SUPPLY

## 2022-05-26 RX ADMIN — HYDROCORTISONE: 0.5 CREAM TOPICAL at 08:36

## 2022-05-26 RX ADMIN — FAMOTIDINE 20 MG: 20 TABLET ORAL at 08:27

## 2022-05-26 RX ADMIN — CHOLECALCIFEROL TAB 25 MCG (1000 UNIT) 5000 UNITS: 25 TAB at 08:27

## 2022-05-26 RX ADMIN — DIMETHYL FUMARATE 240 MG: 240 CAPSULE ORAL at 21:11

## 2022-05-26 RX ADMIN — CETIRIZINE HYDROCHLORIDE 10 MG: 10 TABLET, FILM COATED ORAL at 08:27

## 2022-05-26 RX ADMIN — HYDROCORTISONE: 0.5 CREAM TOPICAL at 18:01

## 2022-05-26 RX ADMIN — ASPIRIN 81 MG 81 MG: 81 TABLET ORAL at 21:10

## 2022-05-26 RX ADMIN — GABAPENTIN 300 MG: 300 CAPSULE ORAL at 08:27

## 2022-05-26 RX ADMIN — SENNOSIDES 8.6 MG: 8.6 TABLET, COATED ORAL at 08:27

## 2022-05-26 RX ADMIN — GABAPENTIN 300 MG: 300 CAPSULE ORAL at 18:00

## 2022-05-26 RX ADMIN — DIMETHYL FUMARATE 240 MG: 240 CAPSULE ORAL at 08:33

## 2022-05-26 NOTE — PROGRESS NOTES
Problem: Mobility Impaired (Adult and Pediatric)  Goal: *Acute Goals and Plan of Care (Insert Text)  Description:  Physical Therapy Short Term Goals  Initiated 5/15/2022, re-assessed 5/23/2022, and to be accomplished by 5/30/2022 - progressed to long term goals  1. Patient will move from supine to sit and sit to supine , scoot up and down, and roll side to side in bed with minimal assistance/contact guard assist.  MET 5/23/2022  2. Patient will transfer from bed to chair and chair to bed with moderate assistance  using the least restrictive device. Not Met - Ongoing, pt inconsistent with performance at this time  3. Patient will perform sit to stand with moderate assistance. MET 5/23/2022  4. Patient will ambulate with moderate assistance  for 10 feet with the least restrictive device. Not safe to assess 2/2 functional weakness  5. Patient will propel w/c over level surfaces for 150 feet with minimal assistance. MET 5/23/2022    Physical Therapy Long Term Goals  Initiated 5/15/2022 and to be accomplished by d/c.  1.  Patient will move from supine to sit and sit to supine , scoot up and down, and roll side to side in bed with modified independence. 2.  Patient will transfer from bed to chair and chair to bed with minimal assistance using the least restrictive device. 3.  Patient will perform sit to stand with minimal assistance. 4.  Patient will propel w/c over level surfaces for 150 feet with modified independence. Outcome: Progressing Towards Goal   PHYSICAL THERAPY TREATMENT    Patient: Marielos Guaman (88 y.o. female)  Date: 5/26/2022  Diagnosis: Multiple sclerosis ([facility] [G35] Multiple sclerosis exacerbation ([facility]  Precautions: Fall,NWB (right UE)  Chart, physical therapy assessment, plan of care and goals were reviewed. Time In:1418  Time [id number]    Patient seen for: Transfer training; Wheelchair mobility;Gait training; Therapeutic exercise (bed mobility)    Pain:  Pt pain was reported as not reported  pre-treatment. Pt pain was reported as not reported  post-treatment. Intervention: NA     Patient identified with name and : yes     SUBJECTIVE:      Pt reports she is getting tired. OBJECTIVE DATA SUMMARY:    Objective:     BED/MAT MOBILITY Daily Assessment     Rolling Left : 4 (Contact guard assistance)  Supine to Sit : 4 (Minimal assistance)  Sit to Supine : 4 (Minimal assistance)  Pt presents supine in bed and performed supine to sit on left side of bed with SBA and use of bed rail. Pt performed bed mobility on left side of 23\" mat table and required 8\" step to scoot back on mat. Pt perform sit to supine and required CGA with left LE due to pt had shoe and brace on increasing wt of LE. Pt required CGA at right hip to assist with rolling to left sidelying and min assist at trunk for left side to sitting. Pt returned to bed at end of session and performed sit to supine using bed rail and scooted up in bed with Supervision and use of bed rail. TRANSFERS Daily Assessment     Transfer Type: Other  Other: stand step txfr with [facility]  Transfer Assistance : 4 (Minimal assistance)  Sit to Stand Assistance: Minimal assistance  Car Transfers: Minimum assistance  Car Type: car txfr simulator  Pt performed stand step txfr bed<->w/c with no AD with left hand held assistance and use of gait belt. Pt performed sit to stand from w/c pushing up with left UE and min assist. Pt required initial v/c for widened feet placement for first sit to stand. Pt performed stand step txfr with [facility] for car txfr and for w/c<->mat table with min assist for balance. GAIT Daily Assessment    Gait Description (WDL) Exceptions to WDL    Gait Abnormalities Decreased step clearance; Step to gait    Assistive device Cane, quad;Gait belt [facility] DONALD)    Ambulation assistance - level surface 3 (Moderate assistance)    Distance 12 Feet (ft) (6ft)    Ambulation assistance- uneven surface      Comments Pt ambulated 2 trials with SBQC on left and min/mod assist for balance. Pt performed step to pattern, during first trial leading with right LE after [employer] [place] but during second trial leading with left LE after [employer] [place] and pt able to stabilize better with this sequencing. STEPS/STAIRS Daily Assessment     Steps/Stairs ambulated 3 (4\" steps)    Assistance Required 3 (Moderate assistance)    Rail Use Left     Comments  Pt negotiated up and down 3 4\" steps with left HR and mod assist for balance, leading with right LE to ascend and left LE to descend with step to pattern. Pt required mod assist for turning around at top and bottom of steps. Curbs/Ramps  NT        BALANCE Daily Assessment     Sitting - Static: Good (unsupported)  Sitting - Dynamic: Fair (occasional)  Standing - Static: Poor  Standing - Dynamic : Impaired        WHEELCHAIR MOBILITY Daily Assessment     Able to Propel (ft): 186 feet  Functional Level:  (CGA)  Wheelchair Setup Assist Required : 4 (Minimal assistance)  Wheelchair Management: Manages left brake;Manages right brake  Pt propelled w/c from room to gym with BLE reciprocal stepping and occasional use of left UE. Pt required several rest breaks due to left LE fatiguing. THERAPEUTIC EXERCISES Daily Assessment       Supine bridging 2x10, BLE SAQ 2x15, heel slides and hip abd 2x10        ASSESSMENT:  Pt demo'd significant improvement with balance and gait with new AFO. Pt fatigued quickly with BLE exercises and ended session 6 minutes early due to fatigue and knowing her family would be coming soon. Progression toward goals:  []      Improving appropriately and progressing toward goals  [x]      Improving slowly and progressing toward goals  []      Not making progress toward goals and plan of care will be adjusted      PLAN:  Patient continues to benefit from skilled intervention to address the above impairments. Continue treatment per established plan of care.   Discharge Recommendations:  Home Health  Further Equipment Recommendations for Discharge:  quad cane and wheelchair 18 inch lisa height      Estimated Discharge Date: 5/31/2022    Activity Tolerance:   fair  Please refer to the flowsheet for vital signs taken during this treatment.     After treatment:   [x] Patient left in no apparent distress in bed  [] Patient left in no apparent distress sitting up in chair  [] Patient left in no apparent distress in w/c mobilizing under own power  [] Patient left in no apparent distress dining area  [] Patient left in no apparent distress mobilizing under own power  [x] Call bell left within reach  [] Nursing notified  [] Caregiver present  [] Bed alarm activated   [] Chair alarm activated      Nicole Solorzano, PTA  5/26/2022

## 2022-05-26 NOTE — PROGRESS NOTES
Problem: Falls - Risk of  Goal: *Absence of Falls  Description: Document Francheska White Fall Risk and appropriate interventions in the flowsheet. Outcome: Progressing Towards Goal  Note: Fall Risk Interventions:  Mobility Interventions: Bed/chair exit alarm,Patient to call before getting OOB,Utilize walker, cane, or other assistive device    Mentation Interventions: Bed/chair exit alarm,Door open when patient unattended,Room close to nurse's station    Medication Interventions: Bed/chair exit alarm,Patient to call before getting OOB,Teach patient to arise slowly    Elimination Interventions: Bed/chair exit alarm,Call light in reach,Patient to call for help with toileting needs,Stay With Me (per policy)    History of Falls Interventions: Bed/chair exit alarm,Door open when patient unattended,Room close to nurse's station         Problem: Patient Education: Go to Patient Education Activity  Goal: Patient/Family Education  Outcome: Progressing Towards Goal     Problem: Pressure Injury - Risk of  Goal: *Prevention of pressure injury  Description: Document Wagner Scale and appropriate interventions in the flowsheet.   Outcome: Progressing Towards Goal  Note: Pressure Injury Interventions:  Sensory Interventions: Chair cushion    Moisture Interventions: Absorbent underpads,Apply protective barrier, creams and emollients,Minimize layers,Moisture barrier    Activity Interventions: Increase time out of bed,Pressure redistribution bed/mattress(bed type)    Mobility Interventions: HOB 30 degrees or less,Pressure redistribution bed/mattress (bed type)    Nutrition Interventions: Document food/fluid/supplement intake                     Problem: Pain  Goal: *Control of Pain  Outcome: Progressing Towards Goal

## 2022-05-26 NOTE — PROGRESS NOTES
Problem: Self Care Deficits Care Plan (Adult)  Goal: *Therapy Goal (Edit Goal, Insert Text)  Description: Occupational Therapy Goals   Long Term Goals  Initiated 5/15 and to be accomplished within 2 week(s)  1. Pt will perform self-feeding with I.  2. Pt will perform grooming with I.  3. Pt will perform UB bathing with Mod I.  4. Pt will perform LB bathing with Mod I.  5. Pt will perform tub/shower transfer with Mod I.   6. Pt will perform UB dressing with I.  7. Pt will perform LB dressing with Mod I.  8. Pt will perform toileting task with Mod I.  9. Pt will perform toilet transfer with Mod I.  10. Pt. Will increase BUE strength to 5/5 in order to increase safety at home during ADLs. Short Term Goals   Initiated 5/15 and to be accomplished within 7 day(s)  1. Pt will perform self-feeding with Mod I.   2. Pt will perform grooming with Mod I.  3. Pt will perform UB bathing with Supervision . 4. Pt will perform LB bathing with Supervision. 5. Pt will perform tub/shower transfer with maxA. Goal met 22 upgrade to [address]  6. Pt will perform UB dressing with Mod I.   7. Pt will perform LB dressing with Min A. Goal met from bed level 22 upgrade to SBA. 8. Pt will perform toileting task with Mod A.  9. Pt will perform toilet transfer with Min A.   10. Pt. Will demonstrate functional mobility with CGA with RW in order to increase I for ADLs, by setting up prior to activities. Outcome: Progressing Towards Goal  Goal: Interventions  Outcome: Progressing Towards Goal   Occupational Therapy TREATMENT    Patient: Lydia Fischer   74 y.o. Patient identified with name and : yes    Date: 2022    First Tx Session  Time In: 0703  Time Out[de-identified] 0820        Diagnosis: Multiple sclerosis ([facility] [address] [G35]   Precautions: Fall,NWB (right UE)  Chart, occupational therapy assessment, plan of care, and goals were reviewed. Pain:  Pt reports 0/10 pain or discomfort prior to treatment.     Pt reports -/10 pain or discomfort post treatment. Intervention Provided: Following shower pt reported pain in back and right hip starting up, reporting shooting pain 8-9/10 seated upright in w/c and requesting to go back to bed. Once back to bed pt reported no pain at rest. Pt reported receives pain medication at 0900 and requested if pt could receive pain medication prior to morning self cares at 0600, reported to nursing, (RN). SUBJECTIVE:   Patient stated it's just shooting.     OBJECTIVE DATA SUMMARY:     GROOMING Daily Assessment     Pt performed hygiene combing hair and oral care as well as applying lotion at side table with supervision and setup. BATHING Daily Assessment    Functional Level: 4  Body Parts Patient Bathed: Abdomen;Arm, left;Arm, right;Buttocks; Chest;Lower leg and foot, left; Lower leg and foot, right;Nona area; Thigh, left; Thigh, right  Comments: Pt perforemd UB/LB bathing  with Alise to wash back and feet with enough pressure to wash thoroughly. Pt demonstrated ability to use long handled sponge to wash back and BLE feet however demosntrated difficulty washing thoroughly due to  decreased strength. Pt demosntrated ability to wash BUE chest, arms, BLE legs and nona area and buttocks in seated position with supervision using LUE      TOILETING Daily Assessment    Functional Level:  (Pt declined.)        UPPER BODY DRESSING Daily Assessment    Functional Level: 5  Items Applied/Steps Completed: Pullover (4 steps)  Comments: Pt performed UB dressing with supervision from seated position     LOWER BODY DRESSING Daily Assessment    Functional Level: 3  Items Applied/Steps Completed: Elastic waist pants (3 steps); Sock, left (1 step); Sock, right (1 step); Underpants (3 steps)  Comments: Pt performed LB dressing with min-modA to pullbrief and pants over BLE hips and buttocks  Pt performed LB dressing with wide base quad cane for stability and support with LB dressing.         MOBILITY/TRANSFERS Daily Assessment Pt performed bed transfer with wide base quad cane and Alise. Tub/Shower Transfer Score: 4          ASSESSMENT:  Pt is improving with functional transfers and LB self cares using wide base quad cane for stability and UE support. Pt continues to require Alise for functional mobility and self cares. Pt demonstrates fair activity tolerance due to pain. Pt progress is negatively impacted by pain, consulted with RN re: pain medication schedule per pt request.   Progression toward goals:  [x]          Improving appropriately and progressing toward goals  []          Improving slowly and progressing toward goals  []          Not making progress toward goals and plan of care will be adjusted     PLAN:  Patient continues to benefit from skilled intervention to address the above impairments. Continue treatment per established plan of care. Discharge Recommendations:  Travis Hsieh  Further Equipment Recommendations for Discharge:  TBD     Activity Tolerance:  fair      Estimated LOS: ~ DC 5/31/22    Please refer to the flowsheet for vital signs taken during this treatment. After treatment:   []  Patient left in no apparent distress sitting up in chair   [x]  Patient left in no apparent distress in bed  [x]  Call bell left within reach  [x]  Nursing notified  []  Caregiver present  []  Bed alarm activated    COMMUNICATION/EDUCATION:   [] Home safety education was provided and the patient/caregiver indicated understanding. [] Patient/family have participated as able in goal setting and plan of care. [x] Patient/family agree to work toward stated goals and plan of care. [] Patient understands intent and goals of therapy, but is neutral about his/her participation. [] Patient is unable to participate in goal setting and plan of care.       Charlotte Krishnan, OT

## 2022-05-26 NOTE — PROGRESS NOTES
[address]  [address] [place] [address], [address]     INPATIENT REHABILITATION  DAILY PROGRESS NOTE     Date: 5/26/2022    Name: Lydia Fischer Age / Sex: 74 y.o. / female   CSN: [id number] MRN: [id number]   Admit Date: 5/14/2022 Length of Stay: 12 days     Primary Rehabilitation Diagnosis: Impaired Mobility and ADLs secondary to Multiple sclerosis exacerbation      Subjective:     Patient seen and examined. Blood pressure controlled. Patient's Complaint:   No significant medical complaints    Pain Control: stable, mild-to-moderate joint symptoms intermittently, reasonably well controlled by current meds       Objective:     Vital Signs:  Patient Vitals for the past 24 hrs:   BP Temp Pulse Resp SpO2   05/26/22 0746 120/76 97.8 °F (36.6 °C) 80 18 97 %   05/25/22 1954 107/68 98 °F (36.7 °C) 90 20 94 %   05/25/22 1635 113/67 97.8 °F (36.6 °C) 77 22 99 %        Physical Examination:  GENERAL SURVEY: Patient is awake, alert, oriented x 3, laying comfortably on the bed, not in acute respiratory distress. HEENT: pink palpebral conjunctivae, anicteric sclerae, no nasoaural discharge, moist oral mucosa  NECK: supple, no jugular venous distention, no palpable lymph nodes  CHEST/LUNGS: symmetrical chest expansion, good air entry, clear breath sounds  HEART: adynamic precordium, good S1 S2, no S3, regular rhythm, no murmurs  ABDOMEN: flat, bowel sounds appreciated, soft, non-tender  EXTREMITIES: pink nailbeds, no edema, full and equal pulses, no calf tenderness   NEUROLOGICAL EXAM: The patient is awake, alert and oriented x 3, able to answer questions fairly appropriately, able to follow 1 and 2 step commands. Able to tell time from the wall clock. Cranial nerves II-XII are grossly intact. No gross sensory deficit. Motor strength is 4/5 on BUE, 3+ to 4-/5 on BLE.        Current Medications:  Current Facility-Administered Medications   Medication Dose Route Frequency    acetaminophen (TYLENOL) tablet 650 mg  650 mg Oral Q6H PRN    gabapentin (NEURONTIN) capsule 300 mg  300 mg Oral BID    senna (SENOKOT) tablet 8.6 mg  1 Tablet Oral DAILY AFTER BREAKFAST    aspirin chewable tablet 81 mg  81 mg Oral QHS    hydrocortisone (CORTAID) 0.5 % cream   Topical BID    cetirizine (ZYRTEC) tablet 10 mg  10 mg Oral DAILY    dimethyl fumarate DR (TECFIDERA) capsule 240 mg (Patient Supplied)  240 mg Oral BID    oxyCODONE-acetaminophen (PERCOCET) 5-325 mg per tablet 1 Tablet  1 Tablet Oral Q6H PRN    cholecalciferol (VITAMIN D3) (1000 Units /25 mcg) tablet 5,000 Units  5,000 Units Oral DAILY    magnesium hydroxide (MILK OF MAGNESIA) 400 mg/5 mL oral suspension 30 mL  30 mL Oral DAILY PRN    bisacodyL (DULCOLAX) tablet 10 mg  10 mg Oral Q48H PRN    famotidine (PEPCID) tablet 20 mg  20 mg Oral DAILY       Allergies:   Allergies   Allergen Reactions    Amoxicillin Hives and Rash    Clindamycin Hives and Rash    Metronidazole Hives    Tetracycline Hives and Rash       Functional Progress:    PHYSICAL THERAPY    ON ADMISSION MOST RECENT   Wheelchair Mobility/Management  Able to Propel (ft): 155 feet  Functional Level: 0 (didn't attempt out of bed to w/c transfer due to safety)  Curbs/Ramps Assist Required (FIM Score): 0 (Not tested)  Wheelchair Setup Assist Required : 3 (Moderate assistance) (for left foot rest)  Wheelchair Management: Manages left brake,Manages right brake (with left UE) Wheelchair Mobility/Management  Able to Propel (ft):  (150 ft)  Functional Level:  (4)  Curbs/Ramps Assist Required (FIM Score): 0 (Not tested)  Wheelchair Setup Assist Required : 4 (Contact guard assistance) (does not use footrests)  Wheelchair Management: Manages left brake,Manages right brake     Gait  Amount of Assistance: 3 (Moderate assistance)  Distance (ft): 6 Feet (ft)  Assistive Device: Cane, quad (wide base) Gait  Amount of Assistance: 3 (Moderate assistance)  Distance (ft):  (4 ft)  Assistive Device: Cane, quad     Balance-Sitting/Standing  Sitting - Static: Fair (occasional)  Sitting - Dynamic: Fair (occasional)  Standing - Static: Poor  Standing - Dynamic : Impaired Balance-Sitting/Standing  Sitting - Static: Good (unsupported)  Sitting - Dynamic: Fair (occasional)  Standing - Static: Poor  Standing - Dynamic : Impaired     Bed/Mat Mobility  Rolling Right : 3 (Moderate assistance )  Rolling Left : 4 (Minimal assistance)  Supine to Sit : 2 (Maximal assistance)  Sit to Supine : 4 (Minimal assistance) (for left LE management) Bed/Mat Mobility  Rolling Right : 0 (Not tested)  Rolling Left : 4 (Contact guard assistance)  Supine to Sit : 4 (Minimal assistance)  Sit to Supine : 4 (Minimal assistance)     Transfers  Transfer Type:  (NT 2/2 safety concerns with environment)  Other: stand step without AD  Transfer Assistance : 0 (Not tested) (spoke to nursing for more appropriate height bed for safety)  Sit to Stand Assistance: Maximum assistance (from edge of [place] bed with B feet on 6\" block )  Car Transfers: Not tested Transfers  Transfer Type:  (stand step with [facility])  Other: stand step with [facility]  Transfer Assistance : 3 (Moderate assistance )  Sit to Stand Assistance: Moderate assistance  Car Transfers: Not tested     Steps or Stairs  Steps/Stairs Ambulated (#):  (NT) Steps or Stairs  Steps/Stairs Ambulated (#):  (NT)         Lab/Data Review:  No results found for this or any previous visit (from the past 24 hour(s)). Assessment:     Primary Rehabilitation Diagnosis  1.  Impaired Mobility and ADLs  2. Multiple sclerosis exacerbation    Comorbidities  Patient Active Problem List   Diagnosis Code    Multiple sclerosis exacerbation (HCC) G35    Weakness of both lower extremities R29.898    Vitamin D deficiency E55.9    Elevated TSH R79.89    Elevated serum free T4 level R79.89    Impaired mobility and ADLs Z74.09, Z78.9    Multiple sclerosis (HCC) G35    Sciatica of right side M54.31    Closed fracture of proximal end of left radius with routine healing S52.102D       Plan:     1. Justification for continued stay: Good progression towards established rehabilitation goals. 2. Medical Issues being followed closely:    [x]  Fall and safety precautions     []  Wound Care     [x]  Bowel and Bladder Function     [x]  Fluid Electrolyte and Nutrition Balance     []  Pain Control      3. Issues that 24 hour rehabilitation nursing is following:    [x]  Fall and safety precautions     []  Wound Care     [x]  Bowel and Bladder Function     [x]  Fluid Electrolyte and Nutrition Balance     []  Pain Control      [x]  Assistance with and education on in-room safety with transfers to and from the bed, wheelchair, toilet and shower. 4. Acute rehabilitation plan of care:    [x]  Continue current care and rehab. [x]  Physical Therapy           [x]  Occupational Therapy           []  Speech Therapy     []  Hold Rehab until further notice     5. Medications:    [x]  MAR Reviewed     [x]  Continue Present Medications     6. Chemical DVT Prophylaxis:      []  Enoxaparin     []  Unfractionated Heparin     []  Warfarin     []  NOAC     []  Aspirin     [x]  None     7. Mechanical DVT Prophylaxis:      [x]  CONNIE Stockings     [x]  Sequential Compression Device     []  None     8. GI Prophylaxis:      []  PPI     [x]  H2 Blocker     []  None / Not indicated     9. Code status:    [x]  Full code     []  Partial code     []  Do not intubate     []  Do not resuscitate     10. Diet:  Specifications  Low fat/Low cholesterol   Solids (consistency)  Regular   Liquids (consistency)  Thin   Fluid Restriction  None     11.  Orders:   > Multiple sclerosis exacerbation   > Patient has completed a 5-day course of Methylprednisolone IV on 5/13/2022    > Abnormal thyroid function tests (elevated TSH, elevated free T4) ~ Euthyroid sick syndrome vs Subclinical hypothyroidism   > TSH (5/15/2022) = 4.00   > Free T4 (5/16/2022) = 1.7   > Total T3 (5/16/2022) = 84   > TFTs to be rechecked in 4 to 6 weeks on an outpatient basis   > No need for Levothyroxine at this time      > Vitamin D Deficiency   > Vitamin D 25-Hydroxy (5/9/2022) = 16.6   > On 5/16/2022, patient was given Cholecalciferol 50,000 units PO x 1 dose   > On 5/17/2022, increased Cholecalciferol 5,000 units PO once daily   > Continue Cholecalciferol 5,000 units PO once daily    > Closed fracture of proximal end of left radius with routine healing   > X-ray of the right elbow (5/10/2022) showed a mildly impacted right proximal radius fracture, in the region of the radial neck. No other definite fractures or malalignment   > X-ray of right elbow (5/25/2022) showed an unchanged complex comminuted impacted fracture of the right radial head with associated small right elbow effusion and soft tissue swelling.  Follow-up with plain imaging.   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 Elen Sy) called on 5/19/2022 for evaluation and comanagement   > On 5/23/2022:    > Discontinued Acetaminophen 650 mg PO BID (8AM, 12PM)     > Neurology increased Gabapentin from 100 mg to 300 mg PO BID (9AM, 6PM)   > Continue:    > Gabapentin 300 mg PO BID (9AM, 6PM)    > Acetaminophen 650 mg PO q 6 hr PRN for pain level 4/10 or lesser     > Percocet 5/325 1 tab PO q 6 hr PRN for pain level 5/10 or greater    > Rash on back   > On 5/20/2022, started Hydrocortisone 0.5% cream applied to affected area BID   > Continue Hydrocortisone 0.5% cream applied to affected area BID    > Constipation   > On 5/24/2022, started Senna 1 tab PO daily after breakfast   > Continue Senna 1 tab PO daily after breakfast      12. Personal Protective Equipment (N95 face mask and eye goggles) was used while interacting with the patient. Patient was using a surgical mask. 15. Patient's progress in rehabilitation and medical issues discussed with the patient. All questions answered to the best of my ability. Care plan discussed with patient and nurse. 14. Total clinical care time is 30 minutes, including review of chart including all labs, radiology, past medical history, and discussion with patient. Greater than 50% of my time was spent in coordination of care and counseling.       Signed:    Fidel Nguyen MD    May 26, 2022

## 2022-05-26 NOTE — PROGRESS NOTES
[facility]    Bedside and Verbal shift change report given to Delia Suazo RN (oncoming nurse) by Lucía Krishnamurthy RN (offgoing nurse). Report included the following information SBAR, Kardex, MAR and Recent Results.     Situation:   Code Status: Full Code   Hospital Day: 12   Problem List:   Hospital Problems  Date Reviewed: 5/25/2022          Codes Class Noted POA    Sciatica of right side (Chronic) ICD-10-CM: M54.31  ICD-9-CM: 724.3  Unknown Yes        Elevated serum free T4 level ICD-10-CM: R79.89  ICD-9-CM: 794.5  5/16/2022 Yes    Overview Signed 5/16/2022  3:30 PM by Belinda Ray MD     Free T4 (5/16/2022) = 1.7             Multiple sclerosis ([facility] (Chronic) ICD-10-CM: G35  ICD-9-CM: 340  Unknown Yes        Elevated TSH ICD-10-CM: R79.89  ICD-9-CM: 794.5  5/15/2022 Yes    Overview Signed 5/16/2022  3:30 PM by Belinda Ray MD     TSH (5/15/2022) = 4.00             Closed fracture of proximal end of left radius with routine healing ICD-10-CM: S52.102D  ICD-9-CM: V54.12  5/10/2022 Yes        * (Principal) Multiple [facility] exacerbation ([facility] ICD-10-CM: G35  ICD-9-CM: 340  5/9/2022 Yes        Weakness of both lower extremities ICD-10-CM: R29.898  ICD-9-CM: 729.89  5/9/2022 Yes        Vitamin D deficiency (Chronic) ICD-10-CM: E55.9  ICD-9-CM: 268.9  5/9/2022 Yes    Overview Signed 5/16/2022  3:29 PM by Belinda Ray MD     Vitamin D 25-Hydroxy (5/9/2022) = 16.6              Impaired mobility and ADLs ICD-10-CM: Z74.09, Z78.9  ICD-9-CM: V49.89  5/9/2022 Yes              Background:   Past Medical History:   Past Medical History:   Diagnosis Date    Closed fracture of proximal end of left radius with routine healing 5/10/2022    Elevated serum free T4 level 5/16/2022    Free T4 (5/16/2022) = 1.7    Elevated TSH 5/15/2022    TSH (5/15/2022) = 4.00    MS (multiple sclerosis) ([facility]     Multiple sclerosis ([facility]     Sciatica of right side     Vitamin D deficiency 5/9/2022    Vitamin D 25-Hydroxy (5/9/2022) = 16.6         Assessment:   Changes in Assessment throughout shift: No change to previous assessment     Patient has a central line: no Reasons if yes: Insertion date: Last dressing date:   Patient has Knight Cath: NO Reasons if yes: Insertion date:  Shift knight care completed:      Last Vitals:     Vitals:    05/24/22 1930 05/25/22 0809 05/25/22 1635 05/25/22 1954   BP: 119/82 113/70 113/67 107/68   Pulse: 85 79 77 90   Resp: 16 23 22 20   Temp: 99 °F (37.2 °C) 97.9 °F (36.6 °C) 97.8 °F (36.6 °C) 98 °F (36.7 °C)   SpO2: 97% 93% 99% 94%   Weight:       Height:            PAIN    Pain Assessment    Pain Intensity 1: 0 (05/26/22 0554) Pain Intensity 1: 2 (12/29/14 1105)    Pain Location 1: Leg Pain Location 1: Abdomen    Pain Intervention(s) 1: Medication (see MAR),Refused (refused narcotic) Pain Intervention(s) 1: Medication (see MAR)  Patient Stated Pain Goal: 0 Patient Stated Pain Goal: 0  o Intervention effective: yes  o Other actions taken for pain:       Skin Assessment  Skin color Skin Color: Ecchymosis (comment) (Rhip)  Condition/Temperature Skin Condition/Temp: Cool  Integrity Skin Integrity: Intact  Turgor Turgor: Non-tenting  Weekly Pressure Ulcer Documentation  Pressure  Injury Documentation: No Pressure Injury Noted-Pressure Ulcer Prevention Initiated  Wound Prevention & Protection Methods  Orientation of wound Orientation of Wound Prevention: Posterior  Location of Prevention Location of Wound Prevention: Sacrum/Coccyx  Dressing Present Dressing Present : No  Dressing Status    Wound Offloading Wound Offloading (Prevention Methods): Bed, pressure reduction mattress     INTAKE/OUPUT  Date 05/25/22 0700 - 05/26/22 0659 05/26/22 0700 - 05/27/22 0659   Shift 6687-5710 3211-5043 24 Hour Total 8759-0068 3837-8063 24 Hour Total   INTAKE   P.O. 2582 849 9060        P. O. 2963 413 9509      Shift Total(mL/kg) 1320(21.1) 120(1.9) 1440(23)      OUTPUT   Urine(mL/kg/hr) 600(0.8) 900(1.2) 1500(1)        Urine Voided 400 0 400        Urine Occurrence(s) 3 x 0 x 3 x        Urine Output (mL) (External Urinary Catheter 05/21/22)       Stool           Stool Occurrence(s) 1 x 0 x 1 x      Shift Total(mL/kg) 600(9.6) 900(14.4) 1500(24)       -780 -60      Weight (kg) 62.6 62.6 62.6 62.6 62.6 62.6       Recommendations:  1. Patient needs and requests: none at this time    2. Pending tests/procedures: none at this time     Functional Level/Equipment: Two person assist; wheelchair  Fall Precautions:   Fall risk precautions were reinforced with the patient; she was instructed to call for help prior to getting up. The following fall risk precautions were continued: bed/ chair alarms, door signage, yellow bracelet and socks as well as update of the Theodore Monreal tool in the patient's room. Rupesh Score: 4    HEALS Safety Check    A safety check occurred in the patient's room between off going nurse and oncoming nurse listed above. The safety check included the below items  Area Items   H  High Alert Medications - Verify all high alert medication drips (heparin, PCA, etc.)   E  Equipment - Suction is set up for ALL patients (with cyn)  - Red plugs utilized for all equipment (IV pumps, etc.)  - WOWs wiped down at end of shift.  - Room stocked with oxygen, suction, and other unit-specific supplies   A  Alarms - Bed alarm is set for fall risk patients  - Ensure chair alarm is in place and activated if patient is up in a chair   L  Lines - Check IV for any infiltration  - Torres bag is empty if patient has a Torres   - Tubing and IV bags are labeled   S  Safety   - Room is clean, patient is clean, and equipment is clean. - Hallways are clear from equipment besides carts. - Fall bracelet on for fall risk patients  - Ensure room is clear and free of clutter  - Suction is set up for ALL patients (with cyn)  - Hallways are clear from equipment besides carts.    - Isolation precautions followed, supplies available outside room, sign posted     Helen Sequeira RN

## 2022-05-26 NOTE — PROGRESS NOTES
Problem: Falls - Risk of  Goal: *Absence of Falls  Description: Document [place] Fall Risk and appropriate interventions in the flowsheet. Outcome: Progressing Towards Goal  Note: Fall Risk Interventions:  Mobility Interventions: Bed/chair exit alarm    Mentation Interventions: Bed/chair exit alarm    Medication Interventions: Bed/chair exit alarm,Patient to call before getting OOB    Elimination Interventions: Bed/chair exit alarm,Call light in reach,Patient to call for help with toileting needs    History of Falls Interventions: Bed/chair exit alarm         Problem: Patient Education: Go to Patient Education Activity  Goal: Patient/Family Education  Outcome: Progressing Towards Goal     Problem: Pressure Injury - Risk of  Goal: *Prevention of pressure injury  Description: Document Wagner Scale and appropriate interventions in the flowsheet.   Outcome: Progressing Towards Goal  Note: Pressure Injury Interventions:  Sensory Interventions: Assess changes in LOC,Minimize linen layers    Moisture Interventions: Absorbent underpads    Activity Interventions: Increase time out of bed,Pressure redistribution bed/mattress(bed type)    Mobility Interventions: HOB 30 degrees or less    Nutrition Interventions: Document food/fluid/supplement intake                     Problem: Pain  Goal: *Control of Pain  Outcome: Progressing Towards Goal     Problem: Patient Education: Go to Patient Education Activity  Goal: Patient/Family Education  Outcome: Progressing Towards Goal     Problem: Patient Education: Go to Patient Education Activity  Goal: Patient/Family Education  Outcome: Progressing Towards Goal     Problem: Patient Education: Go to Patient Education Activity  Goal: Patient/Family Education  Outcome: Progressing Towards Goal

## 2022-05-26 NOTE — PROGRESS NOTES
[facility]    Bedside and Verbal shift change report given to Niya Bennett RN (oncoming nurse) by Ayah Oliveira LPN (offgoing nurse). Report included the following information SBAR, Kardex, MAR and Recent Results.     Situation:   Code Status: Full Code   Hospital Day: 12   Problem List:   Hospital Problems  Date Reviewed: 5/25/2022          Codes Class Noted POA    Sciatica of right side (Chronic) ICD-10-CM: M54.31  ICD-9-CM: 724.3  Unknown Yes        Elevated serum free T4 level ICD-10-CM: R79.89  ICD-9-CM: 794.5  5/16/2022 Yes    Overview Signed 5/16/2022  3:30 PM by Marco Antonio Meadows MD     Free T4 (5/16/2022) = 1.7             Multiple sclerosis ([facility] (Chronic) ICD-10-CM: G35  ICD-9-CM: 340  Unknown Yes        Elevated TSH ICD-10-CM: R79.89  ICD-9-CM: 794.5  5/15/2022 Yes    Overview Signed 5/16/2022  3:30 PM by Marco Antonio Meadows MD     TSH (5/15/2022) = 4.00             Closed fracture of proximal end of left radius with routine healing ICD-10-CM: S52.102D  ICD-9-CM: V54.12  5/10/2022 Yes        * (Principal) [facility]ation [facility] ICD-10-CM: G35  ICD-9-CM: 340  5/9/2022 Yes        Weakness of both lower extremities ICD-10-CM: R29.898  ICD-9-CM: 729.89  5/9/2022 Yes        Vitamin D deficiency (Chronic) ICD-10-CM: E55.9  ICD-9-CM: 268.9  5/9/2022 Yes    Overview Signed 5/16/2022  3:29 PM by Marco Antonio Meadows MD     Vitamin D 25-Hydroxy (5/9/2022) = 16.6              Impaired mobility and ADLs ICD-10-CM: Z74.09, Z78.9  ICD-9-CM: V49.89  5/9/2022 Yes              Background:   Past Medical History:   Past Medical History:   Diagnosis Date    Closed fracture of proximal end of left radius with routine healing 5/10/2022    Elevated serum free T4 level 5/16/2022    Free T4 (5/16/2022) = 1.7    Elevated TSH 5/15/2022    TSH (5/15/2022) = 4.00    [facility]) ([facility]     Multiple sclerosis ([facility]     Sciatica of right side     Vitamin D deficiency 5/9/2022    Vitamin D 25-Hydroxy (5/9/2022) = 16.6         Assessment:   Changes in Assessment throughout shift: No change to previous assessment     Patient has a central line: no Reasons if yes: Insertion date: Last dressing date:   Patient has Knight Cath: NO Reasons if yes: Insertion date:  Shift knight care completed:      Last Vitals:     Vitals:    05/25/22 0809 05/25/22 1635 05/25/22 1954 05/26/22 0746   BP: 113/70 113/67 107/68 120/76   Pulse: 79 77 90 80   Resp: 23 22 20 18   Temp: 97.9 °F (36.6 °C) 97.8 °F (36.6 °C) 98 °F (36.7 °C) 97.8 °F (36.6 °C)   SpO2: 93% 99% 94% 97%   Weight:       Height:            PAIN    Pain Assessment    Pain Intensity 1: 3 (pt requested neurontin) (05/26/22 0800) Pain Intensity 1: 2 (12/29/14 1105)    Pain Location 1: Leg Pain Location 1: Abdomen    Pain Intervention(s) 1: Medication (see MAR),Refused (refused narcotic) Pain Intervention(s) 1: Medication (see MAR)  Patient Stated Pain Goal: 0 Patient Stated Pain Goal: 0  o Intervention effective: yes  o Other actions taken for pain:       Skin Assessment  Skin color Skin Color: Ecchymosis (comment)  Condition/Temperature Skin Condition/Temp: Cool  Integrity Skin Integrity: Intact  Turgor Turgor: Non-tenting  Weekly Pressure Ulcer Documentation  Pressure  Injury Documentation: No Pressure Injury Noted-Pressure Ulcer Prevention Initiated  Wound Prevention & Protection Methods  Orientation of wound Orientation of Wound Prevention: Posterior  Location of Prevention Location of Wound Prevention: Sacrum/Coccyx  Dressing Present Dressing Present : No  Dressing Status    Wound Offloading Wound Offloading (Prevention Methods): Bed, pressure reduction mattress     INTAKE/OUPUT  Date 05/25/22 0700 - 05/26/22 0659 05/26/22 0700 - 05/27/22 0659   Shift 0942-5251 2545-6551 24 Hour Total 0786-3212 5558-8950 24 Hour Total   INTAKE   P.O. 5151 299 2629        P. O. 1500 535 0581      Shift Total(mL/kg) 1320(21.1) 120(1.9) 1440(23)      OUTPUT Urine(mL/kg/hr) 600(0.8) 900(1.2) 1500(1)        Urine Voided 400 0 400        Urine Occurrence(s) 3 x 0 x 3 x 1 x  1 x     Urine Output (mL) (External Urinary Catheter 05/21/22)       Stool           Stool Occurrence(s) 1 x 0 x 1 x 1 x  1 x   Shift Total(mL/kg) 600(9.6) 900(14.4) 1500(24)       -780 -60      Weight (kg) 62.6 62.6 62.6 62.6 62.6 62.6       Recommendations:  1. Patient needs and requests: none at this time    2. Pending tests/procedures: none at this time     Functional Level/Equipment: Two person assist; wheelchair  Fall Precautions:   Fall risk precautions were reinforced with the patient; she was instructed to call for help prior to getting up. The following fall risk precautions were continued: bed/ chair alarms, door signage, yellow bracelet and socks as well as update of the Nany Locket tool in the patient's room. Rupesh Score: 4    HEALS Safety Check    A safety check occurred in the patient's room between off going nurse and oncoming nurse listed above. The safety check included the below items  Area Items   H  High Alert Medications - Verify all high alert medication drips (heparin, PCA, etc.)   E  Equipment - Suction is set up for ALL patients (with cyn)  - Red plugs utilized for all equipment (IV pumps, etc.)  - WOWs wiped down at end of shift.  - Room stocked with oxygen, suction, and other unit-specific supplies   A  Alarms - Bed alarm is set for fall risk patients  - Ensure chair alarm is in place and activated if patient is up in a chair   L  Lines - Check IV for any infiltration  - Torres bag is empty if patient has a Torres   - Tubing and IV bags are labeled   S  Safety   - Room is clean, patient is clean, and equipment is clean. - Hallways are clear from equipment besides carts.    - Fall bracelet on for fall risk patients  - Ensure room is clear and free of clutter  - Suction is set up for ALL patients (with cyn)  - Hallways are clear from equipment besides carts.    - Isolation precautions followed, supplies available outside room, sign posted     Roxie Hodgson LPN

## 2022-05-27 PROCEDURE — 97112 NEUROMUSCULAR REEDUCATION: CPT

## 2022-05-27 PROCEDURE — 97535 SELF CARE MNGMENT TRAINING: CPT

## 2022-05-27 PROCEDURE — 74011250637 HC RX REV CODE- 250/637: Performed by: FAMILY MEDICINE

## 2022-05-27 PROCEDURE — 97150 GROUP THERAPEUTIC PROCEDURES: CPT

## 2022-05-27 PROCEDURE — 97110 THERAPEUTIC EXERCISES: CPT

## 2022-05-27 PROCEDURE — 99232 SBSQ HOSP IP/OBS MODERATE 35: CPT | Performed by: INTERNAL MEDICINE

## 2022-05-27 PROCEDURE — 65310000000 HC RM PRIVATE REHAB

## 2022-05-27 PROCEDURE — 74011250637 HC RX REV CODE- 250/637: Performed by: PSYCHIATRY & NEUROLOGY

## 2022-05-27 PROCEDURE — 97116 GAIT TRAINING THERAPY: CPT

## 2022-05-27 PROCEDURE — 74011250637 HC RX REV CODE- 250/637: Performed by: INTERNAL MEDICINE

## 2022-05-27 PROCEDURE — 97530 THERAPEUTIC ACTIVITIES: CPT

## 2022-05-27 RX ORDER — GABAPENTIN 300 MG/1
300 CAPSULE ORAL EVERY 12 HOURS
Status: DISCONTINUED | OUTPATIENT
Start: 2022-05-28 | End: 2022-05-31 | Stop reason: HOSPADM

## 2022-05-27 RX ADMIN — ASPIRIN 81 MG 81 MG: 81 TABLET ORAL at 21:15

## 2022-05-27 RX ADMIN — HYDROCORTISONE: 0.5 CREAM TOPICAL at 17:52

## 2022-05-27 RX ADMIN — CETIRIZINE HYDROCHLORIDE 10 MG: 10 TABLET, FILM COATED ORAL at 08:26

## 2022-05-27 RX ADMIN — HYDROCORTISONE: 0.5 CREAM TOPICAL at 08:26

## 2022-05-27 RX ADMIN — CHOLECALCIFEROL TAB 25 MCG (1000 UNIT) 5000 UNITS: 25 TAB at 08:25

## 2022-05-27 RX ADMIN — SENNOSIDES 8.6 MG: 8.6 TABLET, COATED ORAL at 08:25

## 2022-05-27 RX ADMIN — GABAPENTIN 300 MG: 300 CAPSULE ORAL at 17:52

## 2022-05-27 RX ADMIN — GABAPENTIN 300 MG: 300 CAPSULE ORAL at 08:26

## 2022-05-27 RX ADMIN — DIMETHYL FUMARATE 240 MG: 240 CAPSULE ORAL at 08:26

## 2022-05-27 RX ADMIN — DIMETHYL FUMARATE 240 MG: 240 CAPSULE ORAL at 21:15

## 2022-05-27 RX ADMIN — FAMOTIDINE 20 MG: 20 TABLET ORAL at 08:26

## 2022-05-27 NOTE — PROGRESS NOTES
Sw spoke with pt regarding dc plans. Pt is open to SNF and states that she will discuss preferences with her spouse. Sw will follow.

## 2022-05-27 NOTE — PROGRESS NOTES
SHIFT CHANGE NOTE FOR [facility]    Bedside and Verbal shift change report given to MITA Riddle (oncoming nurse) by Cortes Gibbs, RN (offgoing nurse). Report included the following information SBAR, Kardex, MAR and Recent Results.     Situation:   Code Status: Full Code   Hospital Day: 13   Problem List:   Hospital Problems  Date Reviewed: 5/26/2022          Codes Class Noted POA    Sciatica of right side (Chronic) ICD-10-CM: M54.31  ICD-9-CM: 724.3  Unknown Yes        Elevated serum free T4 level ICD-10-CM: R79.89  ICD-9-CM: 794.5  5/16/2022 Yes    Overview Signed 5/16/2022  3:30 PM by rBittany Hopper MD     Free T4 (5/16/2022) = 1.7             Multiple sclerosis ([facility] (Chronic) ICD-10-CM: G35  ICD-9-CM: 340  Unknown Yes        Elevated TSH ICD-10-CM: R79.89  ICD-9-CM: 794.5  5/15/2022 Yes    Overview Signed 5/16/2022  3:30 PM by Brittany Hopper MD     TSH (5/15/2022) = 4.00             Closed fracture of proximal end of left radius with routine healing ICD-10-CM: S52.102D  ICD-9-CM: V54.12  5/10/2022 Yes        * (Principal) Multiple sclerosis exacerbation ([facility] ICD-10-CM: G35  ICD-9-CM: 340  5/9/2022 Yes        Weakness of both lower extremities ICD-10-CM: R29.898  ICD-9-CM: 729.89  5/9/2022 Yes        Vitamin D deficiency (Chronic) ICD-10-CM: E55.9  ICD-9-CM: 268.9  5/9/2022 Yes    Overview Signed 5/16/2022  3:29 PM by Brittany Hopper MD     Vitamin D 25-Hydroxy (5/9/2022) = 16.6              Impaired mobility and ADLs ICD-10-CM: Z74.09, Z78.9  ICD-9-CM: V49.89  5/9/2022 Yes              Background:   Past Medical History:   Past Medical History:   Diagnosis Date    Closed fracture of proximal end of left radius with routine healing 5/10/2022    Elevated serum free T4 level 5/16/2022    Free T4 (5/16/2022) = 1.7    Elevated TSH 5/15/2022    TSH (5/15/2022) = 4.00    [facility]) [facility]     Multiple sclerosis ([facility]     Sciatica of right side     Vitamin D deficiency 5/9/2022    Vitamin D 25-Hydroxy (5/9/2022) = 16.6         Assessment:   Changes in Assessment throughout shift: No change to previous assessment     Patient has a central line: no Reasons if yes: Insertion date: Last dressing date:   Patient has Knight Cath: NO Reasons if yes: Insertion date:  Shift knight care completed:      Last Vitals:     Vitals:    05/25/22 1954 05/26/22 0746 05/26/22 1557 05/26/22 2100   BP: 107/68 120/76 105/65 102/60   Pulse: 90 80 99 91   Resp: 20 18 18 18   Temp: 98 °F (36.7 °C) 97.8 °F (36.6 °C) 97.9 °F (36.6 °C) 98.8 °F (37.1 °C)   SpO2: 94% 97% 98% 97%   Weight:       Height:            PAIN    Pain Assessment    Pain Intensity 1: 0 (05/27/22 0400) Pain Intensity 1: 2 (12/29/14 1105)    Pain Location 1: Leg Pain Location 1: Abdomen    Pain Intervention(s) 1: Medication (see MAR),Refused (refused narcotic) Pain Intervention(s) 1: Medication (see MAR)  Patient Stated Pain Goal: 0 Patient Stated Pain Goal: 0  o Intervention effective: yes  o Other actions taken for pain:       Skin Assessment  Skin color Skin Color: Ecchymosis (comment)  Condition/Temperature Skin Condition/Temp: Cool  Integrity Skin Integrity: Intact  Turgor Turgor: Non-tenting  Weekly Pressure Ulcer Documentation  Pressure  Injury Documentation: No Pressure Injury Noted-Pressure Ulcer Prevention Initiated  Wound Prevention & Protection Methods  Orientation of wound Orientation of Wound Prevention: Posterior  Location of Prevention Location of Wound Prevention: Buttocks,Sacrum/Coccyx  Dressing Present Dressing Present : No  Dressing Status    Wound Offloading Wound Offloading (Prevention Methods): Bed, pressure redistribution/air     INTAKE/OUPUT  Date 05/26/22 0700 - 05/27/22 0659 05/27/22 0700 - 05/28/22 0659   Shift 5811-6887 3476-8330 24 Hour Total 7414-1648 4550-8651 24 Hour Total   INTAKE   P.O. 480  480        P. O. 480  480      Shift Total(mL/kg) 480(7.7)  480(7.7)      OUTPUT   Urine(mL/kg/hr)  450(0.6) 450(0.3) Urine Voided  450 450        Urine Occurrence(s) 3 x 0 x 3 x      Stool           Stool Occurrence(s) 1 x 0 x 1 x      Shift Total(mL/kg)  450(7.2) 450(7.2)       -450 30      Weight (kg) 62.6 62.6 62.6 62.6 62.6 62.6       Recommendations:  1. Patient needs and requests: none at this time    2. Pending tests/procedures: none at this time     Functional Level/Equipment: Two person assist; wheelchair  Fall Precautions:   Fall risk precautions were reinforced with the patient; she was instructed to call for help prior to getting up. The following fall risk precautions were continued: bed/ chair alarms, door signage, yellow bracelet and socks as well as update of the Néstor Boston tool in the patient's room. Rupesh Score: 4    HEALS Safety Check    A safety check occurred in the patient's room between off going nurse and oncoming nurse listed above. The safety check included the below items  Area Items   H  High Alert Medications - Verify all high alert medication drips (heparin, PCA, etc.)   E  Equipment - Suction is set up for ALL patients (with cyn)  - Red plugs utilized for all equipment (IV pumps, etc.)  - WOWs wiped down at end of shift.  - Room stocked with oxygen, suction, and other unit-specific supplies   A  Alarms - Bed alarm is set for fall risk patients  - Ensure chair alarm is in place and activated if patient is up in a chair   L  Lines - Check IV for any infiltration  - Torres bag is empty if patient has a Torres   - Tubing and IV bags are labeled   S  Safety   - Room is clean, patient is clean, and equipment is clean. - Hallways are clear from equipment besides carts. - Fall bracelet on for fall risk patients  - Ensure room is clear and free of clutter  - Suction is set up for ALL patients (with cyn)  - Hallways are clear from equipment besides carts.    - Isolation precautions followed, supplies available outside room, sign posted     Tremayne Lei RN

## 2022-05-27 NOTE — PROGRESS NOTES
[id number]  [address] [place] [address], [address]     INPATIENT REHABILITATION  DAILY PROGRESS NOTE     Date: 5/27/2022    Name: Toya Jasso Age / Sex: 74 y.o. / female   CSN: [id number] MRN: [id number]   Admit Date: 5/14/2022 Length of Stay: 13 days     Primary Rehabilitation Diagnosis: Impaired Mobility and ADLs secondary to Multiple sclerosis exacerbation      Subjective:     Patient seen and examined. Blood pressure controlled. Patient's Complaint:   No significant medical complaints    Pain Control: stable, mild-to-moderate joint symptoms intermittently, reasonably well controlled by current meds       Objective:     Vital Signs:  Patient Vitals for the past 24 hrs:   BP Temp Pulse Resp SpO2   05/27/22 0746 121/85 98 °F (36.7 °C) 78 18 98 %   05/26/22 2100 102/60 98.8 °F (37.1 °C) 91 18 97 %   05/26/22 1557 105/65 97.9 °F (36.6 °C) 99 18 98 %        Physical Examination:  GENERAL SURVEY: Patient is awake, alert, oriented x 3, laying comfortably on the bed, not in acute respiratory distress. HEENT: pink palpebral conjunctivae, anicteric sclerae, no nasoaural discharge, moist oral mucosa  NECK: supple, no jugular venous distention, no palpable lymph nodes  CHEST/LUNGS: symmetrical chest expansion, good air entry, clear breath sounds  HEART: adynamic precordium, good S1 S2, no S3, regular rhythm, no murmurs  ABDOMEN: flat, bowel sounds appreciated, soft, non-tender  EXTREMITIES: pink nailbeds, no edema, full and equal pulses, no calf tenderness   NEUROLOGICAL EXAM: The patient is awake, alert and oriented x 3, able to answer questions fairly appropriately, able to follow 1 and 2 step commands. Able to tell time from the wall clock. Cranial nerves II-XII are grossly intact. No gross sensory deficit. Motor strength is 4/5 on BUE, 3+ to 4-/5 on BLE.        Current Medications:  Current Facility-Administered Medications   Medication Dose Route Frequency    acetaminophen (TYLENOL) tablet 650 mg  650 mg Oral Q6H PRN    gabapentin (NEURONTIN) capsule 300 mg  300 mg Oral BID    senna (SENOKOT) tablet 8.6 mg  1 Tablet Oral DAILY AFTER BREAKFAST    aspirin chewable tablet 81 mg  81 mg Oral QHS    hydrocortisone (CORTAID) 0.5 % cream   Topical BID    cetirizine (ZYRTEC) tablet 10 mg  10 mg Oral DAILY    dimethyl fumarate DR (TECFIDERA) capsule 240 mg (Patient Supplied)  240 mg Oral BID    oxyCODONE-acetaminophen (PERCOCET) 5-325 mg per tablet 1 Tablet  1 Tablet Oral Q6H PRN    cholecalciferol (VITAMIN D3) (1000 Units /25 mcg) tablet 5,000 Units  5,000 Units Oral DAILY    magnesium hydroxide (MILK OF MAGNESIA) 400 mg/5 mL oral suspension 30 mL  30 mL Oral DAILY PRN    bisacodyL (DULCOLAX) tablet 10 mg  10 mg Oral Q48H PRN    famotidine (PEPCID) tablet 20 mg  20 mg Oral DAILY       Allergies: Allergies   Allergen Reactions    Amoxicillin Hives and Rash    Clindamycin Hives and Rash    Metronidazole Hives    Tetracycline Hives and Rash       Lab/Data Review:  No results found for this or any previous visit (from the past 24 hour(s)). Assessment:     Primary Rehabilitation Diagnosis  1. Impaired Mobility and ADLs  2. Multiple sclerosis exacerbation    Comorbidities  Patient Active Problem List   Diagnosis Code    Multiple sclerosis exacerbation (HCC) G35    Weakness of both lower extremities R29.898    Vitamin D deficiency E55.9    Elevated TSH R79.89    Elevated serum free T4 level R79.89    Impaired mobility and ADLs Z74.09, Z78.9    Multiple sclerosis (HCC) G35    Sciatica of right side M54.31    Closed fracture of proximal end of left radius with routine healing S52.102D       Plan:     1. Justification for continued stay: Good progression towards established rehabilitation goals.     2. Medical Issues being followed closely:    [x]  Fall and safety precautions     []  Wound Care     [x]  Bowel and Bladder Function     [x]  Fluid Electrolyte and Nutrition Balance     []  Pain Control      3. Issues that 24 hour rehabilitation nursing is following:    [x]  Fall and safety precautions     []  Wound Care     [x]  Bowel and Bladder Function     [x]  Fluid Electrolyte and Nutrition Balance     []  Pain Control      [x]  Assistance with and education on in-room safety with transfers to and from the bed, wheelchair, toilet and shower. 4. Acute rehabilitation plan of care:    [x]  Continue current care and rehab. [x]  Physical Therapy           [x]  Occupational Therapy           []  Speech Therapy     []  Hold Rehab until further notice     5. Medications:    [x]  MAR Reviewed     [x]  Continue Present Medications     6. Chemical DVT Prophylaxis:      []  Enoxaparin     []  Unfractionated Heparin     []  Warfarin     []  NOAC     []  Aspirin     [x]  None     7. Mechanical DVT Prophylaxis:      [x]  CONNIE Stockings     [x]  Sequential Compression Device     []  None     8. GI Prophylaxis:      []  PPI     [x]  H2 Blocker     []  None / Not indicated     9. Code status:    [x]  Full code     []  Partial code     []  Do not intubate     []  Do not resuscitate     10. Diet:  Specifications  Low fat/Low cholesterol   Solids (consistency)  Regular   Liquids (consistency)  Thin   Fluid Restriction  None     11.  Orders:   > Multiple sclerosis exacerbation   > Patient has completed a 5-day course of Methylprednisolone IV on 5/13/2022    > Abnormal thyroid function tests (elevated TSH, elevated free T4) ~ Euthyroid sick syndrome vs Subclinical hypothyroidism   > TSH (5/15/2022) = 4.00   > Free T4 (5/16/2022) = 1.7   > Total T3 (5/16/2022) = 84   > TFTs to be rechecked in 4 to 6 weeks on an outpatient basis   > No need for Levothyroxine at this time      > Vitamin D Deficiency   > Vitamin D 25-Hydroxy (5/9/2022) = 16.6   > On 5/16/2022, patient was given Cholecalciferol 50,000 units PO x 1 dose   > On 5/17/2022, increased Cholecalciferol 5,000 units PO once daily   > Continue Cholecalciferol 5,000 units PO once daily    > Closed fracture of proximal end of left radius with routine healing   > X-ray of the right elbow (5/10/2022) showed a mildly impacted right proximal radius fracture, in the region of the radial neck. No other definite fractures or malalignment   > X-ray of right elbow (5/25/2022) showed an unchanged complex comminuted impacted fracture of the right radial head with associated small right elbow effusion and soft tissue swelling. Follow-up with plain imaging.   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 Aliyah Waldron) called on 5/19/2022 for evaluation and comanagement   > On 5/23/2022:    > Discontinued Acetaminophen 650 mg PO BID (8AM, 12PM)     > Neurology increased Gabapentin from 100 mg to 300 mg PO BID (9AM, 6PM)   > Continue:    > Gabapentin 300 mg PO BID (9AM, 6PM)    > Acetaminophen 650 mg PO q 6 hr PRN for pain level 4/10 or lesser     > Percocet 5/325 1 tab PO q 6 hr PRN for pain level 5/10 or greater    > Rash on back   > On 5/20/2022, started Hydrocortisone 0.5% cream applied to affected area BID   > Continue Hydrocortisone 0.5% cream applied to affected area BID    > Constipation   > On 5/24/2022, started Senna 1 tab PO daily after breakfast   > Continue Senna 1 tab PO daily after breakfast      12. Personal Protective Equipment (N95 face mask and eye goggles) was used while interacting with the patient.  Patient was using a surgical mask.    13. Patient's progress in rehabilitation and medical issues discussed with the patient and . All questions answered to the best of my ability. Care plan discussed with patient and nurse. 14. Total clinical care time is 30 minutes, including review of chart including all labs, radiology, past medical history, and discussion with patient. Greater than 50% of my time was spent in coordination of care and counseling.       Signed:    Jonathan Dunbar MD    May 27, 2022

## 2022-05-27 NOTE — PROGRESS NOTES
Problem: Mobility Impaired (Adult and Pediatric)  Goal: *Acute Goals and Plan of Care (Insert Text)  Description:  Physical Therapy Short Term Goals  Initiated 5/15/2022, re-assessed 5/23/2022, and to be accomplished by 5/30/2022 - progressed to long term goals  1. Patient will move from supine to sit and sit to supine , scoot up and down, and roll side to side in bed with minimal assistance/contact guard assist.  MET 5/23/2022  2. Patient will transfer from bed to chair and chair to bed with moderate assistance  using the least restrictive device. Not Met - Ongoing, pt inconsistent with performance at this time  3. Patient will perform sit to stand with moderate assistance. MET 5/23/2022  4. Patient will ambulate with moderate assistance  for 10 feet with the least restrictive device. Not safe to assess 2/2 functional weakness  5. Patient will propel w/c over level surfaces for 150 feet with minimal assistance. MET 5/23/2022    Physical Therapy Long Term Goals  Initiated 5/15/2022 and to be accomplished by d/c.  1.  Patient will move from supine to sit and sit to supine , scoot up and down, and roll side to side in bed with modified independence. 2.  Patient will transfer from bed to chair and chair to bed with minimal assistance using the least restrictive device. 3.  Patient will perform sit to stand with minimal assistance. 4.  Patient will propel w/c over level surfaces for 150 feet with modified independence. Outcome: Progressing Towards Goal    PHYSICAL THERAPY TREATMENT    Patient: Montrell Jurado (22 y.o. female)  Date: 5/27/2022  Diagnosis: Multiple sclerosis ([facility] [G35] Multiple sclerosis exacerbation ([facility]  Precautions: Fall,NWB (right UE)  Chart, physical therapy assessment, plan of care and goals were reviewed. Time In:1104  Time Out:1205    Patient seen for: Balance activities; Patient education;Transfer training;Gait training    Pain:  Pt pain was reported as no c/o pain pre-treatment. Pt pain was reported as no c/o pain post-treatment. Intervention: N/A    Patient identified with name and : yes    SUBJECTIVE:      Pt is pleasant and cooperative throughout treatment session noting she feels her left ankle brace makes her feel more secure with standing. Pt states, \"I've been using this one,\" re: LBQC. However, pt's notes reflect pt has been utilizing a SBQC with PT.    OBJECTIVE DATA SUMMARY:    Objective:       TRANSFERS Daily Assessment     Transfer Type: Other  Other: stand step with [facility]  Transfer Assistance : 4 (Minimal assistance)  Sit to Stand Assistance: Minimal assistance  Pt requires minimal assistance for balance with anterior weight shift for sit to stand and for slow controlled descent with stand to sit. Pt requires minimal assistance for balance in standing with weight shift and verbal cues for safe foot placement. Pt demonstrates different sequencing of cane advancing LBQC with each step noting she feels more stable as opposed to advancing with reciprocal pattern. GAIT Daily Assessment    Gait Description (WDL) Exceptions to WDL    Gait Abnormalities Decreased step clearance    Assistive device Cane, quad [facility])    Ambulation assistance - level surface 4 (Minimal assistance)    Distance 10 Feet (ft) x 2 trials    Ambulation assistance- uneven surface NT     Comments Pt requires minimal assistance for balance and safety with ambulation advancing LBQC with decreased left stance time and weight shift and forward flexed posture. Pt demonstrates improved trunk extension to neutral posture with verbal and manual cues.         BALANCE Daily Assessment     Sitting - Static: Good (unsupported)  Sitting - Dynamic: Good (unsupported)  Standing - Static: Poor  Standing - Dynamic : Impaired        THERAPEUTIC EXERCISES Daily Assessment     NMRE/Theraeputic Exercise:  SLS in front of mirror for visual feedback with left UE support on SBQC:  2 Sets of 10 Repetitions right SLS + left foot tap ups on 4\" step with minimal to moderate manual cues for weight shift and minimal assistance for balance. 2 sets of 10 repetitions left SLS + right foot tap ups on 4\" step with moderate manual cuing for weight shift and moderate assistance for balance and safety    Seated LE Strength Trainin Sets of 10 Repetitions:  Right and Left LAQ  Alternate Hip Flexion        ASSESSMENT:  Pt demonstrates improved quality of transfers and gait this treatment session requiring decreased physical assistance. Pt continues to demonstrate functional balance and strength impairments limiting safety and independence with mobility. Progression toward goals:  [x]      Improving appropriately and progressing toward goals  []      Improving slowly and progressing toward goals  []      Not making progress toward goals and plan of care will be adjusted      PLAN:  Patient continues to benefit from skilled intervention to address the above impairments. Continue treatment per established plan of care. Emphasize functional strength and balance training to promote return to PLOF  Discharge Recommendations:  [facility]   Further Equipment Recommendations for Discharge:  [facility], wheelchair      Estimated Discharge Date: 2022    Activity Tolerance:   Good  Please refer to the flowsheet for vital signs taken during this treatment.     After treatment:   [] Patient left in no apparent distress in bed  [x] Patient left in no apparent distress sitting up in chair  [] Patient left in no apparent distress in w/c mobilizing under own power  [] Patient left in no apparent distress dining area  [] Patient left in no apparent distress mobilizing under own power  [x] Call bell left within reach  [] Nursing notified  [] Caregiver present  [] Bed alarm activated   [x] Chair alarm activated      Joni Woods PT, DPT  2022

## 2022-05-27 NOTE — PROGRESS NOTES
Problem: Self Care Deficits Care Plan (Adult)  Goal: *Therapy Goal (Edit Goal, Insert Text)  Description: Occupational Therapy Goals   Long Term Goals  Initiated 5/15 and to be accomplished within 2 week(s)  1. Pt will perform self-feeding with I.  2. Pt will perform grooming with I.  3. Pt will perform UB bathing with Mod I.  4. Pt will perform LB bathing with Mod I.  5. Pt will perform tub/shower transfer with Mod I.   6. Pt will perform UB dressing with I.  7. Pt will perform LB dressing with Mod I.  8. Pt will perform toileting task with Mod I.  9. Pt will perform toilet transfer with Mod I.  10. Pt. Will increase BUE strength to 5/5 in order to increase safety at home during ADLs. Short Term Goals   Initiated 5/15 and to be accomplished within 7 day(s)  1. Pt will perform self-feeding with Mod I.   2. Pt will perform grooming with Mod I.  3. Pt will perform UB bathing with Supervision . 4. Pt will perform LB bathing with Supervision. 5. Pt will perform tub/shower transfer with maxA. Goal met 22 upgrade to [address]  6. Pt will perform UB dressing with Mod I.   7. Pt will perform LB dressing with Min A. Goal met from bed level 22 upgrade to SBA. 8. Pt will perform toileting task with Mod A.  9. Pt will perform toilet transfer with Min A.   10. Pt. Will demonstrate functional mobility with CGA with RW in order to increase I for ADLs, by setting up prior to activities. Outcome: Progressing Towards Goal  Goal: Interventions  Outcome: Progressing Towards Goal   Occupational Therapy TREATMENT    Patient: Carmella Lauren   74 y.o. Patient identified with name and : yes    Date: 2022    First Tx Session  Time In: 1003  Time Out[de-identified] 3698    Second Tx Session  Time In: 1330  Time Out[de-identified] 5372    Diagnosis: Multiple sclerosis ([address] [G35]   Precautions: Fall,NWB (right UE)  Chart, occupational therapy assessment, plan of care, and goals were reviewed.      Pain:  Pt reports 0/10 pain or discomfort prior to treatment. Pt reports -/10 pain or discomfort post treatment. Intervention Provided:       SUBJECTIVE:   Patient stated I already washed and got dressed this morning.     OBJECTIVE DATA SUMMARY:     THERAPEUTIC ACTIVITY Daily Assessment    Pt engaged in LUE FM task which involved grasping small manipulatives with clothespin against max resistance to retrieve foam pieces and transfer to cup. Pt required RB secondary to LUE digits cramping. Pt engaged in LUE FM task which involved fastening and unfastening nuts from bolts and transferring to corresponding bolt using trial and error method. Pt engaged in LUE functional reach activity which involved reaching overhead with 2# wrist weights grasping medium pegs and securing to vertical peg board. Pt demonstrated ability to secure 36 pegs to make rainbow arch with setup. THERAPEUTIC EXERCISE Daily Assessment    Pt completed LUE strengthening with 2# free weight in all planes (shoulder flexion/extension, abduction/adduction, bicep curls, supination/pronation, chest press) 7x15 reps, 1 set with RB's in between sets From seated position in w/c. LOWER BODY DRESSING Daily Assessment    Functional Level: 5  Items Applied/Steps Completed: Sock, left (1 step); Sock, right (1 step)  Comments: Pt donned BLE socks from bed level with supervision  Pt donned BLE shoes with modA to push heels into shoe and tie laces. MOBILITY/TRANSFERS Daily Assessment     Pt engaged in transfer training with repetition to increase independence with toileting using quad cane. Pt performed toilet transfer from w/c to commode over toilet using large base quad cane for stability and Alise. ASSESSMENT:  Pt is increasing LUE strength and coordination for self cares and therac. Pt is improving safety and stability with large base quad cane for toilet transfers and Alise.    Progression toward goals:  [x]          Improving appropriately and progressing toward goals  []          Improving slowly and progressing toward goals  []          Not making progress toward goals and plan of care will be adjusted     PLAN:  Patient continues to benefit from skilled intervention to address the above impairments.  Continue treatment per established plan of care. Discharge Recommendations:  Skilled Nursing Facility  Further Equipment Recommendations for Discharge:  NA (TBD following next level of treatment)     Activity Tolerance:  good      Estimated LOS:    Please refer to the flowsheet for vital signs taken during this treatment. After treatment:   [x]  Patient left in no apparent distress sitting up in chair   []  Patient left in no apparent distress in bed  [x]  Call bell left within reach  []  Nursing notified  []  Caregiver present  []  Bed alarm activated    COMMUNICATION/EDUCATION:   [] Home safety education was provided and the patient/caregiver indicated understanding. [] Patient/family have participated as able in goal setting and plan of care. [x] Patient/family agree to work toward stated goals and plan of care. [] Patient understands intent and goals of therapy, but is neutral about his/her participation. [] Patient is unable to participate in goal setting and plan of care.       Regi Davis, OT

## 2022-05-27 NOTE — PROGRESS NOTES
[x] Psychology  [] Social Work [] Recreational Therapy    INTERVENTION  UNITS/TIME OF SERVICE   Assessment    Supportive Counseling  May 26, 2022   Orientation    Discharge Planning    Resource Linkage              Progress/Current Status    Patient seen for individual support  and follow up this date on ARU and found lying supine in bed but responsive to me on contact. Patient described that treatment team is encouraging her to have more experience sitting upright, but this it is also very tiring. But, she also recognizes that sitting is better for her than always lying in bed. At this time, patient is aware of pending date for discharge next week and is accepting of same. She reported that her spouse is now beginning to identify a possible SNF placement for her as a transition between ARU and returning to home. She voiced concerns about her 's health and problems this creates in his support of her at home; and, as well, she reported that her son, also in home, Vinicio Lo a bad back. \"  Overall, patient is much more animated and less self absorbed and consumed with ill health feelings and discomfort, as was observed on initial contact with her and before she had really into integrated into the treatment milieu. Patient encouraged to persevere in her continued therapy effort and maintain active dialogue with all treatment team members for maximum understanding of how she might best proceed.       Mateus Mattson, THE [facility] 5/27/2022 9:08 AM

## 2022-05-27 NOTE — PROGRESS NOTES
Problem: Mobility Impaired (Adult and Pediatric)  Goal: *Acute Goals and Plan of Care (Insert Text)  Description:  Physical Therapy Short Term Goals  Initiated 5/15/2022, re-assessed 5/23/2022, and to be accomplished by 5/30/2022 - progressed to long term goals  1. Patient will move from supine to sit and sit to supine , scoot up and down, and roll side to side in bed with minimal assistance/contact guard assist.  MET 5/23/2022  2. Patient will transfer from bed to chair and chair to bed with moderate assistance  using the least restrictive device. Not Met - Ongoing, pt inconsistent with performance at this time  3. Patient will perform sit to stand with moderate assistance. MET 5/23/2022  4. Patient will ambulate with moderate assistance  for 10 feet with the least restrictive device. Not safe to assess 2/2 functional weakness  5. Patient will propel w/c over level surfaces for 150 feet with minimal assistance. MET 5/23/2022    Physical Therapy Long Term Goals  Initiated 5/15/2022 and to be accomplished by d/c.  1.  Patient will move from supine to sit and sit to supine , scoot up and down, and roll side to side in bed with modified independence. 2.  Patient will transfer from bed to chair and chair to bed with minimal assistance using the least restrictive device. 3.  Patient will perform sit to stand with minimal assistance. 4.  Patient will propel w/c over level surfaces for 150 feet with modified independence. Outcome: Progressing Towards Goal   PHYSICAL THERAPY TREATMENT    Patient: Sena Arnold (67 y.o. female)  Date: 5/27/2022  Diagnosis: Multiple sclerosis ([facility] [G35] Multiple sclerosis exacerbation ([facility]  Precautions: Fall,NWB (right UE)  Chart, physical therapy assessment, plan of care and goals were reviewed. Time In:1400  Time Out:1445    Patient seen for: Balance activities;Transfer training;Patient education    Pain:  Pt pain was reported as 3 pre-treatment.   Pt pain was reported as 3 post-treatment. Intervention: rest as needed     Patient identified with name and :yes    SUBJECTIVE:      \"I enjoyed the game. \"    OBJECTIVE DATA SUMMARY:    Objective:     GROSS ASSESSMENT Daily Assessment            BED/MAT MOBILITY Daily Assessment     Sit to Supine :  (CGA)      TRANSFERS Daily Assessment     Transfer Type: Other  Other: stand step with [facility]  Transfer Assistance : 4 (Minimal assistance)  Sit to Stand Assistance: Minimal assistance        GAIT Daily Assessment    Gait Description (WDL) Exceptions to WDL    Gait Abnormalities Decreased step clearance    Assistive device Cane, quad;Gait belt    Ambulation assistance - level surface 4 (Minimal assistance)    Distance 2 Feet (ft)    Ambulation assistance- uneven surface      Comments         BALANCE Daily Assessment     Sitting - Static: Good (unsupported)  Sitting - Dynamic: Good (unsupported)  Standing - Static:  (fair-)  Standing - Dynamic : Impaired        group:  Pt participated in a 30 minute group activity that required standing and reaching with her right UE while holding the cane with her left, CGA for balance when standing. Pt performed 2 standing trials, one for approximately 3.5 minutes and one for 1 minute. When not standing patient was sitting without back support and reaching forward outside of her base of support with left LE to increase trunk control. ASSESSMENT:  Pt is participating well and demonstrating progress with standing and transfers. Progression toward goals:  [x]      Improving appropriately and progressing toward goals  []      Improving slowly and progressing toward goals  []      Not making progress toward goals and plan of care will be adjusted      PLAN:  Patient continues to benefit from skilled intervention to address the above impairments. Continue treatment per established plan of care.   Discharge Recommendations:  Travis Hsieh  Further Equipment Recommendations for Discharge: N/A      Estimated Discharge Date:5/31/22    Activity Tolerance:   Fair-  Please refer to the flowsheet for vital signs taken during this treatment.     After treatment:   [x] Patient left in no apparent distress in bed  [] Patient left in no apparent distress sitting up in chair  [] Patient left in no apparent distress in w/c mobilizing under own power  [] Patient left in no apparent distress dining area  [] Patient left in no apparent distress mobilizing under own power  [x] Call bell left within reach  [x] Nursing notified  [] Caregiver present  [x] Bed alarm activated   [] Chair alarm activated      Monalisa Trammell, PT  5/27/2022

## 2022-05-27 NOTE — PROGRESS NOTES
SHIFT CHANGE NOTE FOR [facility]    Bedside and Verbal shift change report given to [facility] (oncoming nurse) by Darryle Hoose, LPN (offgoing nurse). Report included the following information SBAR, Kardex, MAR and Recent Results.     Situation:   Code Status: Full Code   Hospital Day: 13   Problem List:   Hospital Problems  Date Reviewed: 5/27/2022          Codes Class Noted POA    Sciatica of right side (Chronic) ICD-10-CM: M54.31  ICD-9-CM: 724.3  Unknown Yes        Elevated serum free T4 level ICD-10-CM: R79.89  ICD-9-CM: 794.5  5/16/2022 Yes    Overview Signed 5/16/2022  3:30 PM by Gladys Waite MD     Free T4 (5/16/2022) = 1.7             Multiple sclerosis ([facility] (Chronic) ICD-10-CM: G35  ICD-9-CM: 340  Unknown Yes        Elevated TSH ICD-10-CM: R79.89  ICD-9-CM: 794.5  5/15/2022 Yes    Overview Signed 5/16/2022  3:30 PM by Gladys Waite MD     TSH (5/15/2022) = 4.00             Closed fracture of proximal end of left radius with routine healing ICD-10-CM: S52.102D  ICD-9-CM: V54.12  5/10/2022 Yes        * (Principal) Multiple sclerosis exacerbation ([facility] ICD-10-CM: G35  ICD-9-CM: 340  5/9/2022 Yes        Weakness of both lower extremities ICD-10-CM: R29.898  ICD-9-CM: 729.89  5/9/2022 Yes        Vitamin D deficiency (Chronic) ICD-10-CM: E55.9  ICD-9-CM: 268.9  5/9/2022 Yes    Overview Signed 5/16/2022  3:29 PM by Gladys Waite MD     Vitamin D 25-Hydroxy (5/9/2022) = 16.6              Impaired mobility and ADLs ICD-10-CM: Z74.09, Z78.9  ICD-9-CM: V49.89  5/9/2022 Yes              Background:   Past Medical History:   Past Medical History:   Diagnosis Date    Closed fracture of proximal end of left radius with routine healing 5/10/2022    Elevated serum free T4 level 5/16/2022    Free T4 (5/16/2022) = 1.7    Elevated TSH 5/15/2022    TSH (5/15/2022) = 4.00    [facility] (multiple sclerosis) ([facility]     Multiple sclerosis ([facility]     Sciatica of right side     Vitamin D deficiency 5/9/2022    Vitamin D 25-Hydroxy (5/9/2022) = 16.6         Assessment:   Changes in Assessment throughout shift: No change to previous assessment     Patient has a central line: no Reasons if yes: Insertion date: Last dressing date:   Patient has Knight Cath: NO Reasons if yes: Insertion date:  Shift knight care completed:      Last Vitals:     Vitals:    05/26/22 1557 05/26/22 2100 05/27/22 0746 05/27/22 1531   BP: 105/65 102/60 121/85 102/72   Pulse: 99 91 78 95   Resp: 18 18 18 18   Temp: 97.9 °F (36.6 °C) 98.8 °F (37.1 °C) 98 °F (36.7 °C) 98.6 °F (37 °C)   SpO2: 98% 97% 98% 97%   Weight:       Height:            PAIN    Pain Assessment    Pain Intensity 1: 0 (05/27/22 1600) Pain Intensity 1: 2 (12/29/14 1105)    Pain Location 1: Leg Pain Location 1: Abdomen    Pain Intervention(s) 1: Medication (see MAR),Refused (refused narcotic) Pain Intervention(s) 1: Medication (see MAR)  Patient Stated Pain Goal: 0 Patient Stated Pain Goal: 0  o Intervention effective: yes  o Other actions taken for pain:       Skin Assessment  Skin color Skin Color: Ecchymosis (comment)  Condition/Temperature Skin Condition/Temp: Cool  Integrity Skin Integrity: Intact  Turgor Turgor: Non-tenting  Weekly Pressure Ulcer Documentation  Pressure  Injury Documentation: No Pressure Injury Noted-Pressure Ulcer Prevention Initiated  Wound Prevention & Protection Methods  Orientation of wound Orientation of Wound Prevention: Posterior  Location of Prevention Location of Wound Prevention: Buttocks,Sacrum/Coccyx  Dressing Present Dressing Present : No  Dressing Status    Wound Offloading Wound Offloading (Prevention Methods): Bed, pressure redistribution/air     INTAKE/OUPUT  Date 05/26/22 0700 - 05/27/22 0659 05/27/22 0700 - 05/28/22 0659   Shift [id number] 1900-0659 24 Hour Total 9266-1812 0185-1121 24 Hour Total   INTAKE   P.O. 480  480 240  240     P. O. 480  480 240  240   Shift Total(mL/kg) 480(7.7)  480(7.7) 240(3.8)  240(3.8)   OUTPUT   Urine(mL/kg/hr) 450(0.6) 450(0.3)        Urine Voided  450 450        Urine Occurrence(s) 3 x 0 x 3 x 5 x  5 x   Stool           Stool Occurrence(s) 1 x 0 x 1 x 1 x  1 x   Shift Total(mL/kg)  450(7.2) 450(7.2)       -450 30 240  240   Weight (kg) 62.6 62.6 62.6 62.6 62.6 62.6       Recommendations:  1. Patient needs and requests: none at this time    2. Pending tests/procedures: none at this time     Functional Level/Equipment: Two person assist; wheelchair  Fall Precautions:   Fall risk precautions were reinforced with the patient; she was instructed to call for help prior to getting up. The following fall risk precautions were continued: bed/ chair alarms, door signage, yellow bracelet and socks as well as update of the [place] tool in the patient's room. Rupesh Score: 4    HEALS Safety Check    A safety check occurred in the patient's room between off going nurse and oncoming nurse listed above. The safety check included the below items  Area Items   H  High Alert Medications - Verify all high alert medication drips (heparin, PCA, etc.)   E  Equipment - Suction is set up for ALL patients (with cyn)  - Red plugs utilized for all equipment (IV pumps, etc.)  - WOWs wiped down at end of shift.  - Room stocked with oxygen, suction, and other unit-specific supplies   A  Alarms - Bed alarm is set for fall risk patients  - Ensure chair alarm is in place and activated if patient is up in a chair   L  Lines - Check IV for any infiltration  - Torres bag is empty if patient has a Torres   - Tubing and IV bags are labeled   S  Safety   - Room is clean, patient is clean, and equipment is clean. - Hallways are clear from equipment besides carts. - Fall bracelet on for fall risk patients  - Ensure room is clear and free of clutter  - Suction is set up for ALL patients (with cyn)  - Hallways are clear from equipment besides carts.    - Isolation precautions followed, supplies available outside room, sign posted     Clerance Cooler Mj Pae, LPN

## 2022-05-28 PROCEDURE — 74011250637 HC RX REV CODE- 250/637: Performed by: FAMILY MEDICINE

## 2022-05-28 PROCEDURE — 65310000000 HC RM PRIVATE REHAB

## 2022-05-28 PROCEDURE — 74011250637 HC RX REV CODE- 250/637: Performed by: INTERNAL MEDICINE

## 2022-05-28 RX ADMIN — DIMETHYL FUMARATE 240 MG: 240 CAPSULE ORAL at 20:32

## 2022-05-28 RX ADMIN — DIMETHYL FUMARATE 240 MG: 240 CAPSULE ORAL at 08:38

## 2022-05-28 RX ADMIN — ASPIRIN 81 MG 81 MG: 81 TABLET ORAL at 20:27

## 2022-05-28 RX ADMIN — HYDROCORTISONE: 0.5 CREAM TOPICAL at 08:38

## 2022-05-28 RX ADMIN — GABAPENTIN 300 MG: 300 CAPSULE ORAL at 18:48

## 2022-05-28 RX ADMIN — FAMOTIDINE 20 MG: 20 TABLET ORAL at 08:38

## 2022-05-28 RX ADMIN — CHOLECALCIFEROL TAB 25 MCG (1000 UNIT) 5000 UNITS: 25 TAB at 08:37

## 2022-05-28 RX ADMIN — CETIRIZINE HYDROCHLORIDE 10 MG: 10 TABLET, FILM COATED ORAL at 08:37

## 2022-05-28 RX ADMIN — SENNOSIDES 8.6 MG: 8.6 TABLET, COATED ORAL at 08:38

## 2022-05-28 RX ADMIN — GABAPENTIN 300 MG: 300 CAPSULE ORAL at 06:13

## 2022-05-28 NOTE — PROGRESS NOTES
Problem: Falls - Risk of  Goal: *Absence of Falls  Description: Document Terry Jacob Fall Risk and appropriate interventions in the flowsheet.   Outcome: Progressing Towards Goal  Note: Fall Risk Interventions:  Mobility Interventions: Bed/chair exit alarm,Patient to call before getting OOB    Mentation Interventions: Bed/chair exit alarm    Medication Interventions: Bed/chair exit alarm,Patient to call before getting OOB    Elimination Interventions: Call light in reach    History of Falls Interventions: Bed/chair exit alarm

## 2022-05-28 NOTE — PROGRESS NOTES
SHIFT CHANGE NOTE FOR [facility]    Bedside and Verbal shift change report given to Keith Mckinley RN (oncoming nurse) by Edgar Angel LPN (offgoing nurse). Report included the following information SBAR, Kardex, MAR and Recent Results.     Situation:   Code Status: Full Code   Hospital Day: 14   Problem List:   Hospital Problems  Date Reviewed: 5/27/2022          Codes Class Noted POA    Sciatica of right side (Chronic) ICD-10-CM: M54.31  ICD-9-CM: 724.3  Unknown Yes        Elevated serum free T4 level ICD-10-CM: R79.89  ICD-9-CM: 794.5  5/16/2022 Yes    Overview Signed 5/16/2022  3:30 PM by Earl Dyer MD     Free T4 (5/16/2022) = 1.7             Multiple sclerosis ([facility] (Chronic) ICD-10-CM: G35  ICD-9-CM: 340  Unknown Yes        Elevated TSH ICD-10-CM: R79.89  ICD-9-CM: 794.5  5/15/2022 Yes    Overview Signed 5/16/2022  3:30 PM by Earl Dyer MD     TSH (5/15/2022) = 4.00             Closed fracture of proximal end of left radius with routine healing ICD-10-CM: S52.102D  ICD-9-CM: V54.12  5/10/2022 Yes        * (Principal) Multiple sclerosis exacerbation ([facility] ICD-10-CM: G35  ICD-9-CM: 340  5/9/2022 Yes        Weakness of both lower extremities ICD-10-CM: R29.898  ICD-9-CM: 729.89  5/9/2022 Yes        Vitamin D deficiency (Chronic) ICD-10-CM: E55.9  ICD-9-CM: 268.9  5/9/2022 Yes    Overview Signed 5/16/2022  3:29 PM by Earl Dyer MD     Vitamin D 25-Hydroxy (5/9/2022) = 16.6              Impaired mobility and ADLs ICD-10-CM: Z74.09, Z78.9  ICD-9-CM: V49.89  5/9/2022 Yes              Background:   Past Medical History:   Past Medical History:   Diagnosis Date    Closed fracture of proximal end of left radius with routine healing 5/10/2022    Elevated serum free T4 level 5/16/2022    Free T4 (5/16/2022) = 1.7    Elevated TSH 5/15/2022    TSH (5/15/2022) = 4.00    [facility] ([facility]) [facility]     Multiple sclerosis ([facility]     Sciatica of right side     Vitamin D deficiency 5/9/2022    Vitamin D 25-Hydroxy (5/9/2022) = 16.6         Assessment:   Changes in Assessment throughout shift:       Patient has a central line: no Reasons if yes: Insertion date: Last dressing date:   Patient has Knight Cath: NO Reasons if yes: Insertion date:  Shift knight care completed:      Last Vitals:     Vitals:    05/27/22 1531 05/27/22 2022 05/28/22 0736 05/28/22 1638   BP: 102/72 (!) 96/58 123/78 113/79   Pulse: 95 97 80 83   Resp: 18 18 18 18   Temp: 98.6 °F (37 °C) 98.9 °F (37.2 °C) 97.7 °F (36.5 °C) 98.9 °F (37.2 °C)   SpO2: 97% 91% 98% 96%   Weight:       Height:            PAIN    Pain Assessment    Pain Intensity 1: 0 (05/28/22 1200) Pain Intensity 1: 2 (12/29/14 1105)    Pain Location 1: Leg Pain Location 1: Abdomen    Pain Intervention(s) 1: Medication (see MAR),Refused (refused narcotic) Pain Intervention(s) 1: Medication (see MAR)  Patient Stated Pain Goal: 0 Patient Stated Pain Goal: 0  o Intervention effective: yes  o Other actions taken for pain:       Skin Assessment  Skin color Skin Color: Appropriate for ethnicity  Condition/Temperature Skin Condition/Temp: Dry,Warm  Integrity Skin Integrity: Intact  Turgor Turgor: Non-tenting  Weekly Pressure Ulcer Documentation  Pressure  Injury Documentation: No Pressure Injury Noted-Pressure Ulcer Prevention Initiated  Wound Prevention & Protection Methods  Orientation of wound Orientation of Wound Prevention: Posterior  Location of Prevention Location of Wound Prevention: Sacrum/Coccyx  Dressing Present Dressing Present : No  Dressing Status    Wound Offloading Wound Offloading (Prevention Methods): Bed, pressure redistribution/air,Bed, pressure reduction mattress,Repositioning,Wheelchair     INTAKE/OUPUT  Date 05/27/22 1900 - 05/28/22 0659 05/28/22 0700 - 05/29/22 0659   Shift 0385-1017 24 Hour Total 1378-9733 8928-2951 24 Hour Total   INTAKE   P.O.  240 1080  1080     P. O.  240 1080  1080   Shift Total(mL/kg)  240(3.8) 1887(01.6)  8663(68.4)   OUTPUT Urine(mL/kg/hr) 800 800 200(0.3)  200     Urine Voided 800 800 200  200     Urine Occurrence(s) 1 x 6 x 6 x  6 x   Stool          Stool Occurrence(s) 0 x 1 x 1 x  1 x   Shift Total(mL/kg) 800(12.8) 800(12.8) 200(3.2)  200(3.2)   NET -800 560 880  880   Weight (kg) 62.6 62.6 62.6 62.6 62.6       Recommendations:  1. Patient needs and requests: none at this time    2. Pending tests/procedures: none at this time     Functional Level/Equipment: Two person assist; wheelchair  Fall Precautions:   Fall risk precautions were reinforced with the patient; she was instructed to call for help prior to getting up. The following fall risk precautions were continued: bed/ chair alarms, door signage, yellow bracelet and socks as well as update of the [employer] [place] tool in the patient's room. Rupesh Score: 4    HEALS Safety Check    A safety check occurred in the patient's room between off going nurse and oncoming nurse listed above. The safety check included the below items  Area Items   H  High Alert Medications - Verify all high alert medication drips (heparin, PCA, etc.)   E  Equipment - Suction is set up for ALL patients (with cyn)  - Red plugs utilized for all equipment (IV pumps, etc.)  - WOWs wiped down at end of shift.  - Room stocked with oxygen, suction, and other unit-specific supplies   A  Alarms - Bed alarm is set for fall risk patients  - Ensure chair alarm is in place and activated if patient is up in a chair   L  Lines - Check IV for any infiltration  - Torres bag is empty if patient has a Torres   - Tubing and IV bags are labeled   S  Safety   - Room is clean, patient is clean, and equipment is clean. - Hallways are clear from equipment besides carts. - Fall bracelet on for fall risk patients  - Ensure room is clear and free of clutter  - Suction is set up for ALL patients (with cyn)  - Hallways are clear from equipment besides carts.    - Isolation precautions followed, supplies available outside room, sign posted     Troy Graves LPN

## 2022-05-28 NOTE — PROGRESS NOTES
Progress Note    Patient: Sena Arnold MRN: [id number]  CSN: [id number]    YOB: 1955  Age: 74 y.o. Sex: female    DOA: 5/14/2022 LOS:  LOS: 14 days                    Subjective:         Primary Rehabilitation Diagnosis: Impaired Mobility and ADLs secondary to Multiple sclerosis exacerbation     Pt seen and examined review chart dioscussed with patient and nursing staff   Review of systems  General: No fevers or chills. Cardiovascular: No chest pain or pressure. No palpitations. Pulmonary: No shortness of breath, cough or wheeze. Gastrointestinal: No abdominal pain, nausea, vomiting or diarrhea. Genitourinary: No urinary frequency, urgency, hesitancy or dysuria. Musculoskeletal: pain fairly well controlled on current regimen  Neurologic: generalized weakness. Objective:     Physical Exam:  Visit Vitals  /78 (BP 1 Location: Left upper arm, BP Patient Position: Supine)   Pulse 80   Temp 97.7 °F (36.5 °C)   Resp 18   Ht 4' 11\" (1.499 m)   Wt 62.6 kg (138 lb)   SpO2 98%   Breastfeeding No   BMI 27.87 kg/m²        General:         Alert, cooperative, no acute distress    HEENT: NC, Atraumatic. PERRLA, anicteric sclerae. Lungs: CTA Bilaterally. No Wheezing/Rhonchi/Rales. Heart:  Regular  rhythm,  No murmur, No Rubs, No Gallops  Abdomen: Soft, Non distended, Non tender.  +Bowel sounds, no HSM  Extremities: No edema  Psych:   Not anxious or agitated. Neurologic:  CN 2-12 grossly intact, Alert and oriented X 3. No acute neurological                                 Deficits. Generalized weakness BUE < BLE    Intake and Output:  Current Shift:  05/28 0701 - 05/28 1900  In: 240 [P.O.:240]  Out: -   Last three shifts:  05/26 1901 - 05/28 0700  In: 240 [P.O.:240]  Out: 1250 [Urine:1250]    Labs: Results:       Chemistry No results for input(s): GLU, NA, K, CL, CO2, BUN, CREA, CA, AGAP, BUCR, TBIL, AP, TP, ALB, GLOB, AGRAT in the last 72 hours.     No lab exists for component: GPT   CBC w/Diff No results for input(s): WBC, RBC, HGB, HCT, PLT, GRANS, LYMPH, EOS, HGBEXT, HCTEXT, PLTEXT, HGBEXT, HCTEXT, PLTEXT in the last 72 hours. Cardiac Enzymes No results for input(s): CPK, CKND1, GUY in the last 72 hours. No lab exists for component: CKRMB, TROIP   Coagulation No results for input(s): PTP, INR, APTT, INREXT, INREXT in the last 72 hours. Lipid Panel Lab Results   Component Value Date/Time    Cholesterol, total 248 (H) 09/02/2014 12:00 AM    HDL Cholesterol 64 09/02/2014 12:00 AM    LDL, calculated 143 (H) 09/02/2014 12:00 AM    VLDL, calculated 41 (H) 09/02/2014 12:00 AM    Triglyceride 205 (H) 09/02/2014 12:00 AM      BNP No results for input(s): BNPP in the last 72 hours. Liver Enzymes No results for input(s): TP, ALB, TBIL, AP in the last 72 hours.     No lab exists for component: SGOT, GPT, DBIL   Thyroid Studies Lab Results   Component Value Date/Time    TSH 4.00 (H) 05/15/2022 07:25 AM          Procedures/imaging: see electronic medical records for all procedures/Xrays and details which were not copied into this note but were reviewed prior to creation of Plan    Medications:   Current Facility-Administered Medications   Medication Dose Route Frequency    gabapentin (NEURONTIN) capsule 300 mg  300 mg Oral Q12H    COVID-19 mRNA Vacc (MODERNA) injection 0.5 mL  0.5 mL IntraMUSCular PRIOR TO DISCHARGE    acetaminophen (TYLENOL) tablet 650 mg  650 mg Oral Q6H PRN    senna (SENOKOT) tablet 8.6 mg  1 Tablet Oral DAILY AFTER BREAKFAST    aspirin chewable tablet 81 mg  81 mg Oral QHS    hydrocortisone (CORTAID) 0.5 % cream   Topical BID    cetirizine (ZYRTEC) tablet 10 mg  10 mg Oral DAILY    dimethyl fumarate DR (TECFIDERA) capsule 240 mg (Patient Supplied)  240 mg Oral BID    oxyCODONE-acetaminophen (PERCOCET) 5-325 mg per tablet 1 Tablet  1 Tablet Oral Q6H PRN    cholecalciferol (VITAMIN D3) (1000 Units /25 mcg) tablet 5,000 Units  5,000 Units Oral DAILY    magnesium hydroxide (MILK OF MAGNESIA) 400 mg/5 mL oral suspension 30 mL  30 mL Oral DAILY PRN    bisacodyL (DULCOLAX) tablet 10 mg  10 mg Oral Q48H PRN    famotidine (PEPCID) tablet 20 mg  20 mg Oral DAILY       Assessment/Plan     Principal Problem:    Multiple sclerosis exacerbation (HCC) (5/9/2022)    Active Problems:    Weakness of both lower extremities (5/9/2022)      Vitamin D deficiency (5/9/2022)      Overview: Vitamin D 25-Hydroxy (5/9/2022) = 16.6       Elevated TSH (5/15/2022)      Overview: TSH (5/15/2022) = 4.00      Elevated serum free T4 level (5/16/2022)      Overview: Free T4 (5/16/2022) = 1.7      Impaired mobility and ADLs (5/9/2022)      Multiple sclerosis (HCC) ()      Sciatica of right side ()      Closed fracture of proximal end of left radius with routine healing (5/10/2022)        Multiple sclerosis exacerbation/Sciatica of right side  Patient has completed a 5-day course of Methylprednisolone IV on 5/13/2022   Neurology consulted, seen by Dr. Antoinette Zeng 5/20/22. Recommended restart tecfidera 240mg bid, monitor WBC Gabapentin to 300mg BID. Acetaminophen 650 mg PO q 6 hr PRN for pain level 4/10 or lesser (from 4PM to 4AM only)   Percocet 5/325 1 tab PO q 6 hr PRN for pain level 5/10 or greater  ASA 81 mg daily    Abnormal thyroid function tests (elevated TSH, elevated free T4) ~ Euthyroid sick syndrome vs Subclinical hypothyroidism  TSH (5/15/2022) = 4.00   Free T4 (5/16/2022) = 1.7   Total T3 (5/16/2022) = 84  TFTs to be rechecked in 4 to 6 weeks on an outpatient basis   No need for Levothyroxine at this time                       Vitamin D Deficiency   Vitamin D 25-Hydroxy (5/9/2022) = 16.6   Continue Cholecalciferol 5,000 units PO once daily     Closed fracture of proximal end of left radius with routine healing  X-ray of the right elbow (5/10/2022) showed a mildly impacted right proximal radius fracture, in the region of the radial neck.  No other definite fractures or malalignment  Nonweightbearing on the right elbow      Santiago Vasquez MD  5/28/2022  3:22 PM

## 2022-05-28 NOTE — PROGRESS NOTES
SHIFT CHANGE NOTE FOR [facility]    Bedside and Verbal shift change report given to Cholo Ingram RN (oncoming nurse) by Mayo Lombardo RN (offgoing nurse). Report included the following information SBAR, Kardex, MAR and Recent Results.     Situation:   Code Status: Full Code   Hospital Day: 14   Problem List:   Hospital Problems  Date Reviewed: 5/27/2022          Codes Class Noted POA    Sciatica of right side (Chronic) ICD-10-CM: M54.31  ICD-9-CM: 724.3  Unknown Yes        Elevated serum free T4 level ICD-10-CM: R79.89  ICD-9-CM: 794.5  5/16/2022 Yes    Overview Signed 5/16/2022  3:30 PM by Dot Barnes MD     Free T4 (5/16/2022) = 1.7             Multiple sclerosis ([facility] (Chronic) ICD-10-CM: G35  ICD-9-CM: 340  Unknown Yes        Elevated TSH ICD-10-CM: R79.89  ICD-9-CM: 794.5  5/15/2022 Yes    Overview Signed 5/16/2022  3:30 PM by Dot Barnes MD     TSH (5/15/2022) = 4.00             Closed fracture of proximal end of left radius with routine healing ICD-10-CM: S52.102D  ICD-9-CM: V54.12  5/10/2022 Yes        * (Principal) Multiple sclerosis exacerbation ([facility] ICD-10-CM: G35  ICD-9-CM: 340  5/9/2022 Yes        Weakness of both lower extremities ICD-10-CM: R29.898  ICD-9-CM: 729.89  5/9/2022 Yes        Vitamin D deficiency (Chronic) ICD-10-CM: E55.9  ICD-9-CM: 268.9  5/9/2022 Yes    Overview Signed 5/16/2022  3:29 PM by Dot Barnes MD     Vitamin D 25-Hydroxy (5/9/2022) = 16.6              Impaired mobility and ADLs ICD-10-CM: Z74.09, Z78.9  ICD-9-CM: V49.89  5/9/2022 Yes              Background:   Past Medical History:   Past Medical History:   Diagnosis Date    Closed fracture of proximal end of left radius with routine healing 5/10/2022    Elevated serum free T4 level 5/16/2022    Free T4 (5/16/2022) = 1.7    Elevated TSH 5/15/2022    TSH (5/15/2022) = 4.00    MS ([facility]) [facility]     [facility] ([facility]     Sciatica of right side     Vitamin D deficiency 5/9/2022    Vitamin D 25-Hydroxy (5/9/2022) = 16.6         Assessment:   Changes in Assessment throughout shift: No change to previous assessment     Patient has a central line: no Reasons if yes: Insertion date: Last dressing date:   Patient has Knight Cath: NO Reasons if yes: Insertion date:  Shift knight care completed:      Last Vitals:     Vitals:    05/26/22 2100 05/27/22 0746 05/27/22 1531 05/27/22 2022   BP: 102/60 121/85 102/72 (!) 96/58   Pulse: 91 78 95 97   Resp: 18 18 18 18   Temp: 98.8 °F (37.1 °C) 98 °F (36.7 °C) 98.6 °F (37 °C) 98.9 °F (37.2 °C)   SpO2: 97% 98% 97% 91%   Weight:       Height:            PAIN    Pain Assessment    Pain Intensity 1: 0 (05/28/22 0405) Pain Intensity 1: 2 (12/29/14 1105)    Pain Location 1: Leg Pain Location 1: Abdomen    Pain Intervention(s) 1: Medication (see MAR),Refused (refused narcotic) Pain Intervention(s) 1: Medication (see MAR)  Patient Stated Pain Goal: 0 Patient Stated Pain Goal: 0  o Intervention effective: yes  o Other actions taken for pain:       Skin Assessment  Skin color Skin Color: Appropriate for ethnicity  Condition/Temperature Skin Condition/Temp: Dry,Warm  Integrity Skin Integrity: Intact  Turgor Turgor: Non-tenting  Weekly Pressure Ulcer Documentation  Pressure  Injury Documentation: No Pressure Injury Noted-Pressure Ulcer Prevention Initiated  Wound Prevention & Protection Methods  Orientation of wound Orientation of Wound Prevention: Posterior  Location of Prevention Location of Wound Prevention: Sacrum/Coccyx  Dressing Present Dressing Present : No  Dressing Status    Wound Offloading Wound Offloading (Prevention Methods): Bed, pressure redistribution/air,Bed, pressure reduction mattress,Repositioning,Wheelchair     INTAKE/OUPUT  Date 05/27/22 0700 - 05/28/22 0659 05/28/22 0700 - 05/29/22 0659   Shift 2706-5248 4870-3159 24 Hour Total 7664-6140 0584-1516 24 Hour Total   INTAKE   P.O. 240  240        P. O. 240  240      Shift Total(mL/kg) 240(3.8)  240(3.8) OUTPUT   Urine(mL/kg/hr)  800(1.1) 800(0.5)        Urine Voided  800 800        Urine Occurrence(s) 5 x 1 x 6 x      Stool           Stool Occurrence(s) 1 x 0 x 1 x      Shift Total(mL/kg)  800(12.8) 800(12.8)       -800 -560      Weight (kg) 62.6 62.6 62.6 62.6 62.6 62.6       Recommendations:  1. Patient needs and requests: none at this time    2. Pending tests/procedures: none at this time     Functional Level/Equipment: Two person assist; wheelchair  Fall Precautions:   Fall risk precautions were reinforced with the patient; she was instructed to call for help prior to getting up. The following fall risk precautions were continued: bed/ chair alarms, door signage, yellow bracelet and socks as well as update of the Doctor kineticll Im tool in the patient's room. Rupesh Score: 4    HEALS Safety Check    A safety check occurred in the patient's room between off going nurse and oncoming nurse listed above. The safety check included the below items  Area Items   H  High Alert Medications - Verify all high alert medication drips (heparin, PCA, etc.)   E  Equipment - Suction is set up for ALL patients (with cyn)  - Red plugs utilized for all equipment (IV pumps, etc.)  - WOWs wiped down at end of shift.  - Room stocked with oxygen, suction, and other unit-specific supplies   A  Alarms - Bed alarm is set for fall risk patients  - Ensure chair alarm is in place and activated if patient is up in a chair   L  Lines - Check IV for any infiltration  - Torres bag is empty if patient has a Torres   - Tubing and IV bags are labeled   S  Safety   - Room is clean, patient is clean, and equipment is clean. - Hallways are clear from equipment besides carts. - Fall bracelet on for fall risk patients  - Ensure room is clear and free of clutter  - Suction is set up for ALL patients (with cyn)  - Hallways are clear from equipment besides carts.    - Isolation precautions followed, supplies available outside room, sign posted Declan Neslon RN

## 2022-05-28 NOTE — ROUTINE PROCESS
0800 Pt awake sitting up in bed no change in assessment pt. Reported to be feeling fine. 0930 Pt. Sitting up in chair eating breakfast.  1200 no complaints. 1330 able to reposition with no difficulty. 1500 no change in assessment. 1800 Pt. Sitting up in chair eating dinner.

## 2022-05-29 PROCEDURE — 65310000000 HC RM PRIVATE REHAB

## 2022-05-29 PROCEDURE — 74011250637 HC RX REV CODE- 250/637: Performed by: FAMILY MEDICINE

## 2022-05-29 PROCEDURE — 74011250637 HC RX REV CODE- 250/637: Performed by: INTERNAL MEDICINE

## 2022-05-29 RX ADMIN — SENNOSIDES 8.6 MG: 8.6 TABLET, COATED ORAL at 09:51

## 2022-05-29 RX ADMIN — CHOLECALCIFEROL TAB 25 MCG (1000 UNIT) 5000 UNITS: 25 TAB at 09:51

## 2022-05-29 RX ADMIN — DIMETHYL FUMARATE 240 MG: 240 CAPSULE ORAL at 20:42

## 2022-05-29 RX ADMIN — DIMETHYL FUMARATE 240 MG: 240 CAPSULE ORAL at 09:51

## 2022-05-29 RX ADMIN — CETIRIZINE HYDROCHLORIDE 10 MG: 10 TABLET, FILM COATED ORAL at 09:51

## 2022-05-29 RX ADMIN — GABAPENTIN 300 MG: 300 CAPSULE ORAL at 18:41

## 2022-05-29 RX ADMIN — FAMOTIDINE 20 MG: 20 TABLET ORAL at 09:51

## 2022-05-29 RX ADMIN — ASPIRIN 81 MG 81 MG: 81 TABLET ORAL at 20:42

## 2022-05-29 RX ADMIN — GABAPENTIN 300 MG: 300 CAPSULE ORAL at 06:58

## 2022-05-29 NOTE — PROGRESS NOTES
Progress Note    Patient: Aries Lowery MRN: [id number]  CSN: [id number]    YOB: 1955  Age: 74 y.o. Sex: female    DOA: 5/14/2022 LOS:  LOS: 15 days                    Subjective:         Primary Rehabilitation Diagnosis: Impaired Mobility and ADLs secondary to Multiple sclerosis exacerbation        Review of systems  General: No fevers or chills. Cardiovascular: No chest pain or pressure. Pulmonary: No shortness of breath, cough or wheeze. Gastrointestinal: No abdominal pain, nausea, vomiting or diarrhea. Genitourinary: No   dysuria. Musculoskeletal: pain fairly well controlled on current regimen  Neurologic: generalized weakness. Objective:     Physical Exam:  Visit Vitals  /84 (BP 1 Location: Left upper arm, BP Patient Position: Supine)   Pulse 78   Temp 97.5 °F (36.4 °C)   Resp 18   Ht 4' 11\" (1.499 m)   Wt 62.6 kg (138 lb)   SpO2 98%   Breastfeeding No   BMI 27.87 kg/m²        General:         Alert,  no acute distress    HEENT: NC, Atraumatic. PERRLA, anicteric sclerae. Lungs: CTA Bilaterally. No Wheezing/Rhonchi/Rales. Heart:  Regular  rhythm,  No murmur, No Rubs, No Gallops  Abdomen: Soft, Non distended, Non tender.  +Bowel sounds, no HSM  Extremities: No edema  Psych:   Not anxious or agitated. Neurologic:  CN 2-12 grossly intact, Alert and oriented X 3. No acute neurological                                 Deficits. Generalized weakness BUE < BLE    Intake and Output:  Current Shift:  05/29 0701 - 05/29 1900  In: 240 [P.O.:240]  Out: -   Last three shifts:  05/27 1901 - 05/29 0700  In: 1200 [P.O.:1200]  Out: 1000 [Urine:1000]    Labs: Results:       Chemistry No results for input(s): GLU, NA, K, CL, CO2, BUN, CREA, CA, AGAP, BUCR, TBIL, AP, TP, ALB, GLOB, AGRAT in the last 72 hours.     No lab exists for component: GPT   CBC w/Diff No results for input(s): WBC, RBC, HGB, HCT, PLT, GRANS, LYMPH, EOS, HGBEXT, HCTEXT, PLTEXT, HGBEXT, HCTEXT, PLTEXT in the last 72 hours. Cardiac Enzymes No results for input(s): CPK, CKND1, GUY in the last 72 hours. No lab exists for component: CKRMB, TROIP   Coagulation No results for input(s): PTP, INR, APTT, INREXT, INREXT in the last 72 hours. Lipid Panel Lab Results   Component Value Date/Time    Cholesterol, total 248 (H) 09/02/2014 12:00 AM    HDL Cholesterol 64 09/02/2014 12:00 AM    LDL, calculated 143 (H) 09/02/2014 12:00 AM    VLDL, calculated 41 (H) 09/02/2014 12:00 AM    Triglyceride 205 (H) 09/02/2014 12:00 AM      BNP No results for input(s): BNPP in the last 72 hours. Liver Enzymes No results for input(s): TP, ALB, TBIL, AP in the last 72 hours.     No lab exists for component: SGOT, GPT, DBIL   Thyroid Studies Lab Results   Component Value Date/Time    TSH 4.00 (H) 05/15/2022 07:25 AM          Procedures/imaging: see electronic medical records for all procedures/Xrays and details which were not copied into this note but were reviewed prior to creation of Plan    Medications:   Current Facility-Administered Medications   Medication Dose Route Frequency    gabapentin (NEURONTIN) capsule 300 mg  300 mg Oral Q12H    COVID-19 mRNA Vacc (MODERNA) injection 0.5 mL  0.5 mL IntraMUSCular PRIOR TO DISCHARGE    acetaminophen (TYLENOL) tablet 650 mg  650 mg Oral Q6H PRN    senna (SENOKOT) tablet 8.6 mg  1 Tablet Oral DAILY AFTER BREAKFAST    aspirin chewable tablet 81 mg  81 mg Oral QHS    hydrocortisone (CORTAID) 0.5 % cream   Topical BID    cetirizine (ZYRTEC) tablet 10 mg  10 mg Oral DAILY    dimethyl fumarate DR (TECFIDERA) capsule 240 mg (Patient Supplied)  240 mg Oral BID    oxyCODONE-acetaminophen (PERCOCET) 5-325 mg per tablet 1 Tablet  1 Tablet Oral Q6H PRN    cholecalciferol (VITAMIN D3) (1000 Units /25 mcg) tablet 5,000 Units  5,000 Units Oral DAILY    magnesium hydroxide (MILK OF MAGNESIA) 400 mg/5 mL oral suspension 30 mL  30 mL Oral DAILY PRN    bisacodyL (DULCOLAX) tablet 10 mg  10 mg Oral Q48H PRN    famotidine (PEPCID) tablet 20 mg  20 mg Oral DAILY       Assessment/Plan     Principal Problem:    Multiple sclerosis exacerbation ([address] (5/9/2022)    Active Problems:    Weakness of both lower extremities (5/9/2022)      Vitamin D deficiency (5/9/2022)      Overview: Vitamin D 25-Hydroxy (5/9/2022) = 16.6       Elevated TSH (5/15/2022)      Overview: TSH (5/15/2022) = 4.00      Elevated serum free T4 level (5/16/2022)      Overview: Free T4 (5/16/2022) = 1.7      Impaired mobility and ADLs (5/9/2022)      Multiple sclerosis (HCC) ()      Sciatica of right side ()      Closed fracture of proximal end of left radius with routine healing (5/10/2022)        Multiple sclerosis exacerbation/Sciatica of right side  Patient has completed a 5-day course of Methylprednisolone IV on 5/13/2022   Neurology consulted, seen by Dr. Jorge Luis Jose 5/20/22. Recommended restart tecfidera 240mg bid, monitor WBC Gabapentin to 300mg BID. Acetaminophen 650 mg PO q 6 hr PRN for pain level 4/10 or lesser (from 4PM to 4AM only)   Percocet 5/325 1 tab PO q 6 hr PRN for pain level 5/10 or greater  ASA 81 mg daily  Discussed with pt to keep SCD while in bed     Abnormal thyroid function tests (elevated TSH, elevated free T4) ~ Euthyroid sick syndrome vs Subclinical hypothyroidism  TSH (5/15/2022) = 4.00   Free T4 (5/16/2022) = 1.7   Total T3 (5/16/2022) = 84  TFTs to be rechecked in 4 to 6 weeks on an outpatient basis   No need for Levothyroxine at this time                       Vitamin D Deficiency   Vitamin D 25-Hydroxy (5/9/2022) = 16.6   Continue Cholecalciferol 5,000 units PO once daily     Closed fracture of proximal end of left radius with routine healing  X-ray of the right elbow (5/10/2022) showed a mildly impacted right proximal radius fracture, in the region of the radial neck.  No other definite fractures or malalignment  Nonweightbearing on the right elbow      Randee Yan MD  5/29/2022  1:22 PM

## 2022-05-29 NOTE — ROUTINE PROCESS
[facility]    Bedside and Verbal shift change report given to Eugene Smith LPN (oncoming nurse) by Elver Rodriguez RN (offgoing nurse). Report included the following information SBAR, Kardex, MAR and Recent Results.     Situation:   Code Status: Full Code   Hospital Day: 14   Problem List:   Hospital Problems  Date Reviewed: 5/27/2022          Codes Class Noted POA    Sciatica of right side (Chronic) ICD-10-CM: M54.31  ICD-9-CM: 724.3  Unknown Yes        Elevated serum free T4 level ICD-10-CM: R79.89  ICD-9-CM: 794.5  5/16/2022 Yes    Overview Signed 5/16/2022  3:30 PM by Iesha Taylor MD     Free T4 (5/16/2022) = 1.7             Multiple sclerosis ([facility] (Chronic) ICD-10-CM: G35  ICD-9-CM: 340  Unknown Yes        Elevated TSH ICD-10-CM: R79.89  ICD-9-CM: 794.5  5/15/2022 Yes    Overview Signed 5/16/2022  3:30 PM by Iesha Taylor MD     TSH (5/15/2022) = 4.00             Closed fracture of proximal end of left radius with routine healing ICD-10-CM: S52.102D  ICD-9-CM: V54.12  5/10/2022 Yes        * (Principal) [facility] exacerbation ([facility] ICD-10-CM: G35  ICD-9-CM: 340  5/9/2022 Yes        Weakness of both lower extremities ICD-10-CM: R29.898  ICD-9-CM: 729.89  5/9/2022 Yes        Vitamin D deficiency (Chronic) ICD-10-CM: E55.9  ICD-9-CM: 268.9  5/9/2022 Yes    Overview Signed 5/16/2022  3:29 PM by Iesha Taylor MD     Vitamin D 25-Hydroxy (5/9/2022) = 16.6              Impaired mobility and ADLs ICD-10-CM: Z74.09, Z78.9  ICD-9-CM: V49.89  5/9/2022 Yes              Background:   Past Medical History:   Past Medical History:   Diagnosis Date    Closed fracture of proximal end of left radius with routine healing 5/10/2022    Elevated serum free T4 level 5/16/2022    Free T4 (5/16/2022) = 1.7    Elevated TSH 5/15/2022    TSH (5/15/2022) = 4.00    [facility] (multiple sclerosis) ([facility]     Multiple sclerosis ([facility]     Sciatica of right side     Vitamin D deficiency 5/9/2022    Vitamin D 25-Hydroxy (5/9/2022) = 16.6         Assessment:   Changes in Assessment throughout shift: No change to previous assessment     Patient has a central line: no Reasons if yes: na  Insertion date:na Last dressing date:na   Patient has Knight Cath: no Reasons if yes: na   Insertion date:na  Shift knight care completed: NO     Last Vitals:     Vitals:    05/27/22 1531 05/27/22 2022 05/28/22 0736 05/28/22 1638   BP: 102/72 (!) 96/58 123/78 113/79   Pulse: 95 97 80 83   Resp: 18 18 18 18   Temp: 98.6 °F (37 °C) 98.9 °F (37.2 °C) 97.7 °F (36.5 °C) 98.9 °F (37.2 °C)   SpO2: 97% 91% 98% 96%   Weight:       Height:            PAIN    Pain Assessment    Pain Intensity 1: 0 (05/28/22 1638) Pain Intensity 1: 2 (12/29/14 1105)    Pain Location 1: Leg Pain Location 1: Abdomen    Pain Intervention(s) 1: Medication (see MAR),Refused (refused narcotic) Pain Intervention(s) 1: Medication (see MAR)  Patient Stated Pain Goal: 0 Patient Stated Pain Goal: 0  o Intervention effective: yes  o Other actions taken for pain:       Skin Assessment  Skin color Skin Color: Appropriate for ethnicity  Condition/Temperature Skin Condition/Temp: Dry,Warm  Integrity Skin Integrity: Intact  Turgor Turgor: Non-tenting  Weekly Pressure Ulcer Documentation  Pressure  Injury Documentation: No Pressure Injury Noted-Pressure Ulcer Prevention Initiated  Wound Prevention & Protection Methods  Orientation of wound Orientation of Wound Prevention: Posterior  Location of Prevention Location of Wound Prevention: Sacrum/Coccyx  Dressing Present Dressing Present : No  Dressing Status    Wound Offloading Wound Offloading (Prevention Methods): Bed, pressure redistribution/air,Bed, pressure reduction mattress,Repositioning,Wheelchair     INTAKE/OUPUT  Date 05/27/22 1900 - 05/28/22 0659 05/28/22 0700 - 05/29/22 0659   Shift 8601-9827 24 Hour Total 2593-4633 0454-4123 24 Hour Total   INTAKE   P.O.  240 1200  1200     P. O.  240 1200  1200   Shift Total(mL/kg) 240(3.8) 1200(19.2)  1200(19.2)   OUTPUT   Urine(mL/kg/hr) 800 800 200(0.3)  200     Urine Voided 800 800 200  200     Urine Occurrence(s) 1 x 6 x 7 x  7 x   Stool          Stool Occurrence(s) 0 x 1 x 1 x  1 x   Shift Total(mL/kg) 800(12.8) 800(12.8) 200(3.2)  200(3.2)   NET -800 -560 1000  1000   Weight (kg) 62.6 62.6 62.6 62.6 62.6       Recommendations:  1. Patient needs and requests: na    2. Pending tests/procedures: na     3. Functional Level/Equipment: Partial (one person) /      Fall Precautions:   Fall risk precautions were reinforced with the patient; she was instructed to call for help prior to getting up. The following fall risk precautions were continued: bed/ chair alarms, door signage, yellow bracelet and socks as well as update of the Kizzy Aubrey tool in the patient's room. Rupesh Score: 4    HEALS Safety Check    A safety check occurred in the patient's room between off going nurse and oncoming nurse listed above. The safety check included the below items  Area Items   H  High Alert Medications - Verify all high alert medication drips (heparin, PCA, etc.)   E  Equipment - Suction is set up for ALL patients (with ciscoker)  - Red plugs utilized for all equipment (IV pumps, etc.)  - WOWs wiped down at end of shift.  - Room stocked with oxygen, suction, and other unit-specific supplies   A  Alarms - Bed alarm is set for fall risk patients  - Ensure chair alarm is in place and activated if patient is up in a chair   L  Lines - Check IV for any infiltration  - Torres bag is empty if patient has a Torres   - Tubing and IV bags are labeled   S  Safety   - Room is clean, patient is clean, and equipment is clean. - Hallways are clear from equipment besides carts. - Fall bracelet on for fall risk patients  - Ensure room is clear and free of clutter  - Suction is set up for ALL patients (with cyn)  - Hallways are clear from equipment besides carts.    - Isolation precautions followed, supplies available outside room, sign posted     Eleazar Posey RN

## 2022-05-29 NOTE — ROUTINE PROCESS
0800 Pt. Awake sitting up in bed no change in assessment pt. Reported to be feeling fine. 0930 Pt. Sitting up in chair eating breakfast.  1200 no complaints. 1330 able to transfer from bed to chair with assist.  1500 no change in assessment. 1800 pt sitting up in chair eating dinner.

## 2022-05-30 LAB
ANION GAP SERPL CALC-SCNC: 4 MMOL/L (ref 3–18)
BUN SERPL-MCNC: 7 MG/DL (ref 7–18)
BUN/CREAT SERPL: 14 (ref 12–20)
CALCIUM SERPL-MCNC: 9.2 MG/DL (ref 8.5–10.1)
CHLORIDE SERPL-SCNC: 109 MMOL/L (ref 100–111)
CO2 SERPL-SCNC: 30 MMOL/L (ref 21–32)
CREAT SERPL-MCNC: 0.51 MG/DL (ref 0.6–1.3)
GLUCOSE SERPL-MCNC: 90 MG/DL (ref 74–99)
HCT VFR BLD AUTO: 33.9 % (ref 35–45)
HGB BLD-MCNC: 11.3 G/DL (ref 12–16)
POTASSIUM SERPL-SCNC: 4.2 MMOL/L (ref 3.5–5.5)
SODIUM SERPL-SCNC: 143 MMOL/L (ref 136–145)

## 2022-05-30 PROCEDURE — 74011250637 HC RX REV CODE- 250/637: Performed by: INTERNAL MEDICINE

## 2022-05-30 PROCEDURE — 74011250637 HC RX REV CODE- 250/637: Performed by: FAMILY MEDICINE

## 2022-05-30 PROCEDURE — 80048 BASIC METABOLIC PNL TOTAL CA: CPT

## 2022-05-30 PROCEDURE — 97150 GROUP THERAPEUTIC PROCEDURES: CPT

## 2022-05-30 PROCEDURE — 65310000000 HC RM PRIVATE REHAB

## 2022-05-30 PROCEDURE — 97116 GAIT TRAINING THERAPY: CPT

## 2022-05-30 PROCEDURE — 36415 COLL VENOUS BLD VENIPUNCTURE: CPT

## 2022-05-30 PROCEDURE — 97535 SELF CARE MNGMENT TRAINING: CPT

## 2022-05-30 PROCEDURE — 97530 THERAPEUTIC ACTIVITIES: CPT

## 2022-05-30 PROCEDURE — 85018 HEMOGLOBIN: CPT

## 2022-05-30 RX ADMIN — DIMETHYL FUMARATE 240 MG: 240 CAPSULE ORAL at 09:04

## 2022-05-30 RX ADMIN — CHOLECALCIFEROL TAB 25 MCG (1000 UNIT) 5000 UNITS: 25 TAB at 09:04

## 2022-05-30 RX ADMIN — FAMOTIDINE 20 MG: 20 TABLET ORAL at 09:04

## 2022-05-30 RX ADMIN — GABAPENTIN 300 MG: 300 CAPSULE ORAL at 18:20

## 2022-05-30 RX ADMIN — GABAPENTIN 300 MG: 300 CAPSULE ORAL at 06:19

## 2022-05-30 RX ADMIN — DIMETHYL FUMARATE 240 MG: 240 CAPSULE ORAL at 20:37

## 2022-05-30 RX ADMIN — ASPIRIN 81 MG 81 MG: 81 TABLET ORAL at 20:37

## 2022-05-30 RX ADMIN — HYDROCORTISONE: 0.5 CREAM TOPICAL at 09:08

## 2022-05-30 RX ADMIN — CETIRIZINE HYDROCHLORIDE 10 MG: 10 TABLET, FILM COATED ORAL at 09:04

## 2022-05-30 RX ADMIN — SENNOSIDES 8.6 MG: 8.6 TABLET, COATED ORAL at 09:04

## 2022-05-30 NOTE — ROUTINE PROCESS
SHIFT CHANGE NOTE FOR [facility]    Bedside and Verbal shift change report given to Noé Leach RN (oncoming nurse) by Santosh Dacosta RN (offgoing nurse). Report included the following information SBAR, Kardex, MAR and Recent Results.     Situation:   Code Status: Full Code   Hospital Day: 16   Problem List:   Hospital Problems  Date Reviewed: 5/27/2022          Codes Class Noted POA    Sciatica of right side (Chronic) ICD-10-CM: M54.31  ICD-9-CM: 724.3  Unknown Yes        Elevated serum free T4 level ICD-10-CM: R79.89  ICD-9-CM: 794.5  5/16/2022 Yes    Overview Signed 5/16/2022  3:30 PM by Jessica Medina MD     Free T4 (5/16/2022) = 1.7             Multiple sclerosis ([facility] (Chronic) ICD-10-CM: G35  ICD-9-CM: 340  Unknown Yes        Elevated TSH ICD-10-CM: R79.89  ICD-9-CM: 794.5  5/15/2022 Yes    Overview Signed 5/16/2022  3:30 PM by Jessica Medina MD     TSH (5/15/2022) = 4.00             Closed fracture of proximal end of left radius with routine healing ICD-10-CM: S52.102D  ICD-9-CM: V54.12  5/10/2022 Yes        * (Principal) Multiple sclerosis exacerbation ([facility] ICD-10-CM: G35  ICD-9-CM: 340  5/9/2022 Yes        Weakness of both lower extremities ICD-10-CM: R29.898  ICD-9-CM: 729.89  5/9/2022 Yes        Vitamin D deficiency (Chronic) ICD-10-CM: E55.9  ICD-9-CM: 268.9  5/9/2022 Yes    Overview Signed 5/16/2022  3:29 PM by Jessica Medina MD     Vitamin D 25-Hydroxy (5/9/2022) = 16.6              Impaired mobility and ADLs ICD-10-CM: Z74.09, Z78.9  ICD-9-CM: V49.89  5/9/2022 Yes              Background:   Past Medical History:   Past Medical History:   Diagnosis Date    Closed fracture of proximal end of left radius with routine healing 5/10/2022    Elevated serum free T4 level 5/16/2022    Free T4 (5/16/2022) = 1.7    Elevated TSH 5/15/2022    TSH (5/15/2022) = 4.00    MS (multiple sclerosis) ([facility]     Multiple sclerosis ([facility]     Sciatica of right side     Vitamin D deficiency 5/9/2022    Vitamin D 25-Hydroxy (5/9/2022) = 16.6         Assessment:   Changes in Assessment throughout shift: No change to previous assessment     Patient has a central line: no Reasons if yes: na  Insertion date: Last dressing date:na   Patient has Knight Cath: no Reasons if yes: na   Insertion date:na  Shift knight care completed: NO     Last Vitals:     Vitals:    05/29/22 1626 05/29/22 2041 05/30/22 0709 05/30/22 1512   BP: 130/77 106/66 127/73 109/75   Pulse: 92 83 75 96   Resp: 18 18 16 18   Temp: 97.9 °F (36.6 °C) 97.2 °F (36.2 °C) 97 °F (36.1 °C) 97 °F (36.1 °C)   SpO2: 96% 95% 95% 100%   Weight:       Height:            PAIN    Pain Assessment    Pain Intensity 1: 0 (05/30/22 1512) Pain Intensity 1: 2 (12/29/14 1105)    Pain Location 1: Leg Pain Location 1: Abdomen    Pain Intervention(s) 1: Medication (see MAR),Refused (refused narcotic) Pain Intervention(s) 1: Medication (see MAR)  Patient Stated Pain Goal: 0 Patient Stated Pain Goal: 0  o Intervention effective: yes  o Other actions taken for pain:       Skin Assessment  Skin color Skin Color: Appropriate for ethnicity  Condition/Temperature Skin Condition/Temp: Dry,Warm  Integrity Skin Integrity: Intact  Turgor Turgor: Non-tenting  Weekly Pressure Ulcer Documentation  Pressure  Injury Documentation: No Pressure Injury Noted-Pressure Ulcer Prevention Initiated  Wound Prevention & Protection Methods  Orientation of wound Orientation of Wound Prevention: Posterior  Location of Prevention Location of Wound Prevention: Sacrum/Coccyx  Dressing Present Dressing Present : No  Dressing Status    Wound Offloading Wound Offloading (Prevention Methods): Bed, pressure redistribution/air,Bed, pressure reduction mattress,Repositioning,Wheelchair     INTAKE/OUPUT  Date 05/29/22 1900 - 05/30/22 0659 05/30/22 0700 - 05/31/22 0659   Shift 9771-8465 24 Hour Total 9779-1572 6091-6877 24 Hour Total   INTAKE   P.O.  1320 1320  1320     P. O.  1320 1320  1320   Shift Total(mL/kg)  1320(21.1) 1320(21.1) 1320(21.1)   OUTPUT   Urine(mL/kg/hr) 400 400        Urine Voided 400 400        Urine Occurrence(s) 1 x 6 x 5 x  5 x   Stool          Stool Occurrence(s) 0 x 1 x 0 x  0 x   Shift Total(mL/kg) 400(6.4) 400(6.4)      NET -  1320   Weight (kg) 62.6 62.6 62.6 62.6 62.6       Recommendations:  1. Patient needs and requests: na    2. Pending tests/procedures: na     3. Functional Level/Equipment: Partial (one person) /      Fall Precautions:   Fall risk precautions were reinforced with the patient; she was instructed to call for help prior to getting up. The following fall risk precautions were continued: bed/ chair alarms, door signage, yellow bracelet and socks as well as update of the Soledad Betancourt tool in the patient's room. Rupesh Score: 4    HEALS Safety Check    A safety check occurred in the patient's room between off going nurse and oncoming nurse listed above. The safety check included the below items  Area Items   H  High Alert Medications - Verify all high alert medication drips (heparin, PCA, etc.)   E  Equipment - Suction is set up for ALL patients (with cyn)  - Red plugs utilized for all equipment (IV pumps, etc.)  - WOWs wiped down at end of shift.  - Room stocked with oxygen, suction, and other unit-specific supplies   A  Alarms - Bed alarm is set for fall risk patients  - Ensure chair alarm is in place and activated if patient is up in a chair   L  Lines - Check IV for any infiltration  - Torres bag is empty if patient has a Torres   - Tubing and IV bags are labeled   S  Safety   - Room is clean, patient is clean, and equipment is clean. - Hallways are clear from equipment besides carts. - Fall bracelet on for fall risk patients  - Ensure room is clear and free of clutter  - Suction is set up for ALL patients (with cyn)  - Hallways are clear from equipment besides carts.    - Isolation precautions followed, supplies available outside room, sign posted     Sonia Gomes RN

## 2022-05-30 NOTE — PROGRESS NOTES
Progress Note    Patient: Caden Harper MRN: [id number]  CSN: [id number]    YOB: 1955  Age: 74 y.o. Sex: female    DOA: 5/14/2022 LOS:  LOS: 16 days                    Subjective:         Primary Rehabilitation Diagnosis: Impaired Mobility and ADLs secondary to Multiple sclerosis exacerbation        Review of systems  General: No fevers or chills. Cardiovascular: No chest pain or pressure. Pulmonary: No shortness of breath, cough or wheeze. Gastrointestinal: No abdominal pain, nausea, vomiting or diarrhea. Genitourinary: No   dysuria. Musculoskeletal: pain fairly well controlled on current regimen  Neurologic: generalized weakness. Objective:     Physical Exam:  Visit Vitals  /75 (BP 1 Location: Right upper arm, BP Patient Position: Sitting)   Pulse 96   Temp 97 °F (36.1 °C)   Resp 18   Ht 4' 11\" (1.499 m)   Wt 62.6 kg (138 lb)   SpO2 100%   Breastfeeding No   BMI 27.87 kg/m²        General:         Alert,  no acute distress    HEENT: NC, anicteric sclerae. Lungs: CTA Bilaterally. No Wheezing/Rhonchi/Rales. Heart:  Regular  rhythm,  No murmur, No Rubs, No Gallops  Abdomen: Soft, Non distended, Non tender.    Extremities: No edema  Psych:   Not anxious or agitated. Neurologic:  CN 2-12 grossly intact, Alert and oriented X 3. No acute neurological                                 Deficits. Generalized weakness BUE < BLE    Intake and Output:  Current Shift:  05/30 0701 - 05/30 1900  In: 840 [P.O.:840]  Out: -   Last three shifts:  05/28 1901 - 05/30 0700  In: 1320 [P.O.:1320]  Out: 400 [Urine:400]    Labs: Results:       Chemistry Recent Labs     05/30/22  0609   GLU 90      K 4.2      CO2 30   BUN 7   CREA 0.51*   CA 9.2   AGAP 4   BUCR 14      CBC w/Diff Recent Labs     05/30/22  0609   HGB 11.3*   HCT 33.9*      Cardiac Enzymes No results for input(s): CPK, CKND1, GUY in the last 72 hours.     No lab exists for component: CKRMB, TROIP   Coagulation No results for input(s): PTP, INR, APTT, INREXT, INREXT in the last 72 hours. Lipid Panel Lab Results   Component Value Date/Time    Cholesterol, total 248 (H) 09/02/2014 12:00 AM    HDL Cholesterol 64 09/02/2014 12:00 AM    LDL, calculated 143 (H) 09/02/2014 12:00 AM    VLDL, calculated 41 (H) 09/02/2014 12:00 AM    Triglyceride 205 (H) 09/02/2014 12:00 AM      BNP No results for input(s): BNPP in the last 72 hours. Liver Enzymes No results for input(s): TP, ALB, TBIL, AP in the last 72 hours.     No lab exists for component: SGOT, GPT, DBIL   Thyroid Studies Lab Results   Component Value Date/Time    TSH 4.00 (H) 05/15/2022 07:25 AM          Procedures/imaging: see electronic medical records for all procedures/Xrays and details which were not copied into this note but were reviewed prior to creation of Plan    Medications:   Current Facility-Administered Medications   Medication Dose Route Frequency    gabapentin (NEURONTIN) capsule 300 mg  300 mg Oral Q12H    COVID-19 mRNA Vacc (MODERNA) injection 0.5 mL  0.5 mL IntraMUSCular PRIOR TO DISCHARGE    acetaminophen (TYLENOL) tablet 650 mg  650 mg Oral Q6H PRN    senna (SENOKOT) tablet 8.6 mg  1 Tablet Oral DAILY AFTER BREAKFAST    aspirin chewable tablet 81 mg  81 mg Oral QHS    hydrocortisone (CORTAID) 0.5 % cream   Topical BID    cetirizine (ZYRTEC) tablet 10 mg  10 mg Oral DAILY    dimethyl fumarate DR (TECFIDERA) capsule 240 mg (Patient Supplied)  240 mg Oral BID    oxyCODONE-acetaminophen (PERCOCET) 5-325 mg per tablet 1 Tablet  1 Tablet Oral Q6H PRN    cholecalciferol (VITAMIN D3) (1000 Units /25 mcg) tablet 5,000 Units  5,000 Units Oral DAILY    magnesium hydroxide (MILK OF MAGNESIA) 400 mg/5 mL oral suspension 30 mL  30 mL Oral DAILY PRN    bisacodyL (DULCOLAX) tablet 10 mg  10 mg Oral Q48H PRN    famotidine (PEPCID) tablet 20 mg  20 mg Oral DAILY       Assessment/Plan     Principal Problem:    Multiple sclerosis exacerbation ([facility] (5/9/2022)    Active Problems:    Weakness of both lower extremities (5/9/2022)      Vitamin D deficiency (5/9/2022)      Overview: Vitamin D 25-Hydroxy (5/9/2022) = 16.6       Elevated TSH (5/15/2022)      Overview: TSH (5/15/2022) = 4.00      Elevated serum free T4 level (5/16/2022)      Overview: Free T4 (5/16/2022) = 1.7      Impaired mobility and ADLs (5/9/2022)      Multiple sclerosis (HCC) ()      Sciatica of right side ()      Closed fracture of proximal end of left radius with routine healing (5/10/2022)        Multiple sclerosis exacerbation/Sciatica of right side  Patient has completed a 5-day course of Methylprednisolone IV on 5/13/2022   Neurology consulted, seen by Dr. Lux Brennan 5/20/22. Recommended restart tecfidera 240mg bid, monitor WBC Gabapentin to 300mg BID. Acetaminophen 650 mg PO q 6 hr PRN for pain level 4/10 or lesser (from 4PM to 4AM only)   Percocet 5/325 1 tab PO q 6 hr PRN for pain level 5/10 or greater  ASA 81 mg daily  Discussed with pt to keep SCD while in bed     Abnormal thyroid function tests (elevated TSH, elevated free T4) ~ Euthyroid sick syndrome vs Subclinical hypothyroidism  TSH (5/15/2022) = 4.00   Free T4 (5/16/2022) = 1.7   Total T3 (5/16/2022) = 84  TFTs to be rechecked in 4 to 6 weeks on an outpatient basis   No need for Levothyroxine at this time                       Vitamin D Deficiency   Vitamin D 25-Hydroxy (5/9/2022) = 16.6   Continue Cholecalciferol 5,000 units PO once daily     Closed fracture of proximal end of left radius with routine healing  X-ray of the right elbow (5/10/2022) showed a mildly impacted right proximal radius fracture, in the region of the radial neck.  No other definite fractures or malalignment  Nonweightbearing on the right elbow      Christofer Schreiber MD  5/30/2022  5:19  PM

## 2022-05-30 NOTE — PROGRESS NOTES
Problem: Mobility Impaired (Adult and Pediatric)  Goal: *Acute Goals and Plan of Care (Insert Text)  Description:  Physical Therapy Short Term Goals  Initiated 5/15/2022, re-assessed 5/30/2022, for d/c 5/31/2022 - progressed to long term goals  1. Patient will move from supine to sit and sit to supine , scoot up and down, and roll side to side in bed with minimal assistance/contact guard assist.  MET 5/23/2022  2. Patient will transfer from bed to chair and chair to bed with moderate assistance  using the least restrictive device. (MET 5/30/2022)  3. Patient will perform sit to stand with moderate assistance. MET 5/23/2022  4. Patient will ambulate with moderate assistance  for 10 feet with the least restrictive device. (MET 5/30/2022)  5. Patient will propel w/c over level surfaces for 150 feet with minimal assistance. MET 5/23/2022    Physical Therapy Long Term Goals  Initiated 5/15/2022 and to be accomplished by d/c.  1.  Patient will move from supine to sit and sit to supine , scoot up and down, and roll side to side in bed with modified independence.  (S 5/30/2022)  2. Patient will transfer from bed to chair and chair to bed with minimal assistance using the least restrictive device. (MET with [facility] 5/30/2022)  3. Patient will perform sit to stand with minimal assistance. (MET 5/30/2022)  4. Patient will propel w/c over level surfaces for 150 feet with modified independence. (ongoing 5/30/2022)  Outcome: Progressing Towards Goal   PHYSICAL THERAPY DISCHARGE NOTE    Patient: Alexei Solorzano (21 y.o. female)  Date: 5/30/2022  Diagnosis: Multiple sclerosis ([address] [G35] Multiple sclerosis exacerbation ([address]  Precautions: Fall,NWB (right UE)  Chart, physical therapy assessment, plan of care and goals were reviewed.     Time in:1105  Time out:1200    Patient seen for: Wheelchair mobility;Transfer training;Gait training (stair training)    Time in: 1540  Time out: 1615  Pt seen for: group therapy session    Pain:  Pt pain was reported as  no c/o pre-treatment. Pt pain was reported as no c/o post-treatment. Intervention: NA     Patient identified with name and : yes     SUBJECTIVE:     Patient stated:\"I'm leaving tomorrow. \"     OBJECTIVE DATA SUMMARY:     GROSS ASSESSMENT Discharge Assessment 2022   AROM Generally decreased, functional   Strength Generally decreased, functional   Coordination Within functional limits   Tone Abnormal   Sensation Impaired   PROM Within functional limits       POSTURE Discharge Assessment 2022   Posture (WDL) Exceptions to [facility]   Posture Assessment Forward head;Rounded shoulders;Trunk flexion           BALANCE Discharge Assessment 2022    Sitting - Static: Good (unsupported)  Sitting - Dynamic: Good (unsupported)  Standing - Static: Fair  Standing - Dynamic : Impaired       BED/CHAIR/WHEELCHAIR TRANSFERS Initial Assessment Discharge Assessment   Rolling Right 3 (Moderate assistance ) 5 (Supervision)   Rolling Left 4 (Minimal assistance) 5 (Supervision)   Supine to Sit 2 (Maximal assistance) 5 (Supervision) with use of left bed rail   Sit to Stand Maximum assistance (from edge of [place] bed with B feet on 6\" block ) Minimal assistance   Sit to Supine 4 (Minimal assistance) (for left LE management) 5 (Supervision) with use of left bed rail   Transfer Assistance Level 0 (Not tested) (spoke to nursing for more appropriate height bed for safety) 4 (Minimal assistance)   Transfer Type  (NT 2/2 safety concerns with environment) Other   Comments    Pt performed stand step txfr with [facility] and min assist for balance and safety.     Car Transfer Not tested Minimum assistance with [facility]    Car Type car txfr simulator car txfr simulator       WHEELCHAIR MOBILITY/MANAGEMENT Initial Assessment Discharge Assessment   Able to Propel 155 feet 186 feet   Assistance Level 0 (didn't attempt out of bed to w/c transfer due to safety) CGA with BLE and left UE   Curbs/ramps assistance required 0 (Not tested) 0 (Not tested)   Wheelchair set up assistance required 3 (Moderate assistance) (for left foot rest) 4 (Minimal assistance)   Wheelchair management Manages left brake,Manages right brake (with left UE) Manages left brake;Manages right brake       GAIT Discharge Assessment 5/30/2022   Gait Description (WDL) Exceptions to WDL   Gait Abnormalities Decreased step clearance; Step to gait       WALKING INDEPENDENCE Initial Assessment Discharge Assessment   Assistive device Cane, quad (wide base) Cane, quad;Gait belt   Ambulation assistance - level surface 3 (Moderate assistance) 4 (Minimal assistance)   Distance 6 Feet (ft) 32 Feet (ft) (30ft)   Comments    Pt ambulated 2 trials, 32ft and 30ft, with LBQC on left, performing step to pattern with CGA/min assist for safety and balance    Ambulation assistance - unlevel surface    NT       STEPS/STAIRS Initial Assessment Discharge Assessment   Steps/Stairs ambulated 3 (Moderate assistance) 2 (6\" steps x2 trials)   Rail Use Left  None (LBQC on left)   Assistance Level   3 (Moderate assistance)   Comments    Pt negotiated up and down 2 6\" steps with [facility] turned to side on left and performing step to pattern with mod assist for safety and balance due to pt's fear of falling. Curbs/Ramps    NT         ASSESSMENT:  Pt has made progress and met all STGs and 2/4 LTGs. Pt continues to be limited with gait distance and requires increased assistance for stair negotiation. Pt's activity tolerance has improved over the last week and pt has been willing to stay out of bed more throughout the day. AFO on left LE has improved pt's stability and positioning for gait training. LTGs: met 2/4 LTGs     PLAN:  Pt would benefit from continued skilled physical therapy in order to improve independent functional mobility at SNF level.  Interventions may include range of motion (AROM, PROM B LE/trunk), motor function (B LE/trunk strengthening/coordination), activity tolerance (vitals, oxygen saturation levels), bed mobility training, balance activities, gait training (progressive ambulation program), and functional transfer training. Discharge Recommendations:  Travis Hsieh  Further Equipment Recommendations for Discharge:  quad cane and wheelchair 18 inch lisa height       Activity Tolerance:   fair  Please refer to the flowsheet for vital signs taken during this treatment.   After treatment:   [x] Patient left in no apparent distress in bed after AM  [x] Patient left in no apparent distress sitting up in chair after PM  [] Patient left in no apparent distress in w/c mobilizing under own power  [] Patient left in no apparent distress dining area  [] Patient left in no apparent distress mobilizing under own power  [x] Call bell left within reach  [] Nursing notified  [] Caregiver present  [x] Bed alarm activated   [] Chair alarm activated      Gilberto Mckay, PTA  5/30/2022

## 2022-05-30 NOTE — PROGRESS NOTES
Problem: Self Care Deficits Care Plan (Adult)  Goal: *Therapy Goal (Edit Goal, Insert Text)  Description: Occupational Therapy Goals   Long Term Goals  Initiated 5/15 and to be accomplished within 2 week(s)  1. Pt will perform self-feeding with I.  2. Pt will perform grooming with I.  3. Pt will perform UB bathing with Mod I.  4. Pt will perform LB bathing with Mod I.  5. Pt will perform tub/shower transfer with Mod I.   6. Pt will perform UB dressing with I.  7. Pt will perform LB dressing with Mod I.  8. Pt will perform toileting task with Mod I.  9. Pt will perform toilet transfer with Mod I.  10. Pt. Will increase BUE strength to 5/5 in order to increase safety at home during ADLs. Short Term Goals   Initiated 5/15 and to be accomplished within 7 day(s)  1. Pt will perform self-feeding with Mod I.   2. Pt will perform grooming with Mod I. Goal met 5/30/22  3. Pt will perform UB bathing with Supervision . Goal met 5/30/22  4. Pt will perform LB bathing with Supervision. Goal met 5/30/22  5. Pt will perform tub/shower transfer with maxA. Goal met 5/23/22 upgrade to [address]  6. Pt will perform UB dressing with Mod I.   7. Pt will perform LB dressing with Min A. Goal met from bed level 5/23/22 upgrade to SBA. 8. Pt will perform toileting task with Mod A. Goal met 5/30/22  9. Pt will perform toilet transfer with Min A.  Goal met 5/30/22  10. Pt. Will demonstrate functional mobility with CGA with RW in order to increase I for ADLs, by setting up prior to activities.  Goal met 5/30/22  Outcome: Progressing Towards Goal  Goal: Interventions  Outcome: Progressing Towards Goal   OCCUPATIONAL THERAPY DISCHARGE    Patient: Lydia Fischer (37 y.o. female)  Date: 5/30/2022    First Tx Session  Time In: 0700  Time Out[de-identified] 4545        Primary Diagnosis: Multiple sclerosis ([address] [G35] Multiple sclerosis exacerbation ([address]    Precautions:   Fall,NWB (right UE)    Barriers to Learning/Limitations: None  Compensate with: visual, verbal, tactile, kinesthetic cues/model     Patient identified with name and :yes    SUBJECTIVE:   Patient stated Lazarus Mais got new sneakers over there.     OBJECTIVE DATA SUMMARY:     Past Medical History:   Diagnosis Date    Closed fracture of proximal end of left radius with routine healing 5/10/2022    Elevated serum free T4 level 2022    Free T4 (2022) = 1.7    Elevated TSH 5/15/2022    TSH (5/15/2022) = 4.00    MS (multiple sclerosis) (HCC)     Multiple sclerosis (HCC)     Sciatica of right side     Vitamin D deficiency 2022    Vitamin D 25-Hydroxy (2022) = 16.6      Past Surgical History:   Procedure Laterality Date    AR BIOPSY OF BREAST, INCISIONAL  RT BENIGN    OVER 10 YRS. AGO PER PT. Prior Level of Function/Home Situation: \"Pt Is a right hand dominant 73 yo female who was admitted due to a fall at her house, secondary to a MS exacerbation. She claims that she rolled her ankle and fell on the floor. Patient was previously Mod I in all ADLs/IADLs, and used a cane at home. She lives with her , however her son moved in recently in order to assist with home duties for support. Pt. States she has a proximal forearm fracture, however said there was nothing that could be done for it, however is under non weight bearing instructions from MD. She also stated that she does use her right arm with weight bearing activities even though she knows she is not supposed to. Pt. Presents with decreased UB strength, decreased transfers, and decreased ADLs due to pain in right leg. Pt.  Would benefit from skilled OT services in order to increase strength and regain skills required for ADL I.\" per OT evaluation   Home Situation  Home Environment: Private residence  # Steps to Enter: 3  One/Two Story Residence: One story  Living Alone: No  Support Systems: Spouse/Significant Other,Child(demetria)  Patient Expects to be Discharged to[de-identified] Home  Current DME Used/Available at Home: [address] [facility] [place], quad  [x]     Right hand dominant   []     Left hand dominant        Pain:  Pt reports 0/10 pain or discomfort prior to treatment. Pt reports -/10 pain or discomfort post treatment.    Following shower pt reported w/c being so uncomfortable and needing to transfer to EOB for self feeding breakfast.  Problem List:    Decreased strength B UE  []     Decreased strength trunk/core  []     Decreased AROM   []     Decreased PROM  []     Decreased balance sitting  []     Decreased balance standing  []     Decreased endurance  []     Pain  []       Functional Limitations:   Decreased independence with ADL  [x]     Decreased independence with functional transfers  [x]     Decreased independence with ambulation  []     Decreased independence with IADL  []       Outcome Measures:      MMT Initial Assessment   Right Upper Extremity  Left Upper Extremity    UE AROM WNL WNL   Shoulder flexion 4 4   Shoulder extension 4 4   Shoulder ABDuction 4 4   Shoulder ADDUction 4 4   Elbow Flexion 4 4   Elbow Extension 4 4   Wrist Extension/Flexion 4 4    4 4            MMT Discharge Assessment   Right Upper Extremity  Left Upper Extremity    UE AROM WFL, RUE NWB, MMT NT WFL   Shoulder flexion  3+/5   Shoulder extension  3+/5   Shoulder ABDuction     Shoulder ADDUction     Elbow Flexion  4-/5   Elbow Extension  4-/5   Wrist Extension/Flexion              0/5 No palpable muscle contraction  1/5 Palpable muscle contraction, no joint movement  2-/5 Less than full range of motion in gravity eliminated position  2/5 Able to complete full range of motion in gravity eliminated position  2+/5 Able to initiate movement against gravity  3-/5 More than half but not full range of motion against gravity  3/5 Able to complete full range of motion against gravity  3+/5 Completes full range of motion against gravity with minimal resistance  4-/5 Completes full range of motion against gravity with minimal resistance  4/5 Completes full range of motion against gravity with moderate resistance  5/5 Completes full range of motion against gravity with maximum resistance    Coordination: WFL  Sensation: Intact    FIM SCORES Initial Assessment Discharge Assessment   Eating 5 Functional Level: 5   Grooming 5 Functional Level: 6    Oral Hygiene FIM: 6    Bathing 4 Functional Level: 5  Body Parts Patient Bathed: Abdomen;Arm, left;Arm, right;Buttocks; Chest;Lower leg and foot, left; Lower leg and foot, right;Latanya area; Thigh, left; Thigh, right  Comments: Pt performed UB/LB showering with supervision in seated position using compnesatory strategies and AE. Pt perforemd tub transfer with SBA   Upper Body Dressing 5 Functional Level: 5  Items Applied/Steps Completed: Pullover (4 steps)  Comments: Pt performed UB dressing with supervision and setup seated from w/c level    Lower Body Dressing 2 Functional Level: 4     Comments: Pt donned brief with Alise to tighten pull tabs in standing using quad cane for stability. Pt donned pants with SBA using quad cane in standing to pull up over buttocks. Pt donned BLE socks with supervision from seated position. Pt donned BLE shoes with min-modA to slide BLE heels in shoes and fasten laces following setup. Pt recieved new sneakers and required setup of orthotic. Toileting 5 Functional Level: 4  Comments: Pt performed toileting x2 with quad cane for stability and Alise for toileting hygiene following BM. Tub/Shower Transfer 0 Tub/Shower Transfer Score: 5 (SBA)  Comments: Pt perfomed tub transfer with quad cane and SBA with good safety     Toilet Transfer 5 Toilet Transfer Score: 4  Comments: Pt performed toilet transfer with large base quad cane and SBA/Alise for stability. Comprehension 6 Score: 6   Expression 6 Score: 6   Social Interaction 5 Score: 6   Problem Solving 4 Score: 5   Memory 5 Score: 6   Please see [facility] Interdisciplinary Eval: Coordination/Balance Section for details regarding FIM score description.     Activity Tolerance: good    ASSESSMENT:  Pt now demonstrates increased independence and safety with self cares. Pt demonstrates improved stability for functional mobility for self cares. Pt is increasing LUE strength and activity tolerance for self care routine. Pt has made great gains in OT and has met 8/10 STG's at this time. Pt requires assistance for safety and stability and maintaining RUE NWB status. Progression toward goals:  []      Improving appropriately and progressing toward goals  []      Improving slowly and progressing toward goals  []      Not making progress toward goals and plan of care will be adjusted     PLAN:  Pt would benefit from continued skilled occupational therapy in order to improve ADL function and IADL with use of least restrictive device. Interventions may include range of motion (AROM, PROM B UE/trunk), motor function (B UE/trunk strengthening/coordination), activity tolerance (vitals, oxygen saturation levels), ADL, balance activities, IADL, and functional transfer training. Discharge Recommendations: [address] with 24 hour assistance (per pt  reports cannot assist and does not have support at home)  Further Equipment Recommendations for Discharge: TBD following next level of treatment       Please refer to the flow sheet for vital signs taken during this treatment. After treatment:   []  Patient left in no apparent distress sitting up in chair  [x]  Patient left in no apparent distress in bed  [x]  Call bell left within reach  []  Nursing notified  []  Caregiver present  []  Bed alarm activated    COMMUNICATION/EDUCATION:   Communication/Collaboration:  [x]      Home safety education was provided and the patient/caregiver indicated understanding. [x]      Patient/family have participated as able and agree with findings and recommendations. []      Patient is unable to participate in plan of care at this time.     Ciera Rosenthal, OT  5/30/2022

## 2022-05-30 NOTE — ROUTINE PROCESS
SHIFT CHANGE NOTE FOR [facility]    Bedside and Verbal shift change report given to Damian Ferguson, RN (oncoming nurse) by Nhi Orozco RN (offgoing nurse). Report included the following information SBAR, Kardex, MAR and Recent Results.     Situation:   Code Status: Full Code   Hospital Day: 16   Problem List:   Hospital Problems  Date Reviewed: 5/27/2022          Codes Class Noted POA    Sciatica of right side (Chronic) ICD-10-CM: M54.31  ICD-9-CM: 724.3  Unknown Yes        Elevated serum free T4 level ICD-10-CM: R79.89  ICD-9-CM: 794.5  5/16/2022 Yes    Overview Signed 5/16/2022  3:30 PM by Hien Washington MD     Free T4 (5/16/2022) = 1.7             Multiple sclerosis ([facility] (Chronic) ICD-10-CM: G35  ICD-9-CM: 340  Unknown Yes        Elevated TSH ICD-10-CM: R79.89  ICD-9-CM: 794.5  5/15/2022 Yes    Overview Signed 5/16/2022  3:30 PM by Hien Washington MD     TSH (5/15/2022) = 4.00             Closed fracture of proximal end of left radius with routine healing ICD-10-CM: S52.102D  ICD-9-CM: V54.12  5/10/2022 Yes        * (Principal) Multiple sclerosis exacerbation ([facility] ICD-10-CM: G35  ICD-9-CM: 340  5/9/2022 Yes        Weakness of both lower extremities ICD-10-CM: R29.898  ICD-9-CM: 729.89  5/9/2022 Yes        Vitamin D deficiency (Chronic) ICD-10-CM: E55.9  ICD-9-CM: 268.9  5/9/2022 Yes    Overview Signed 5/16/2022  3:29 PM by Hien Washington MD     Vitamin D 25-Hydroxy (5/9/2022) = 16.6              Impaired mobility and ADLs ICD-10-CM: Z74.09, Z78.9  ICD-9-CM: V49.89  5/9/2022 Yes              Background:   Past Medical History:   Past Medical History:   Diagnosis Date    Closed fracture of proximal end of left radius with routine healing 5/10/2022    Elevated serum free T4 level 5/16/2022    Free T4 (5/16/2022) = 1.7    Elevated TSH 5/15/2022    TSH (5/15/2022) = 4.00    MS (multiple sclerosis) [facility]     Multiple sclerosis ([facility]     Sciatica of right side     Vitamin D deficiency 5/9/2022    Vitamin D 25-Hydroxy (5/9/2022) = 16.6         Assessment:   Changes in Assessment throughout shift: No change to previous assessment     Patient has a central line: no Reasons if yes: na  Insertion date:na Last dressing date:na   Patient has Knight Cath: no Reasons if yes: na   Insertion date:na  Shift knight care completed: NO     Last Vitals:     Vitals:    05/29/22 0745 05/29/22 1626 05/29/22 2041 05/30/22 0709   BP: 124/84 130/77 106/66 127/73   Pulse: 78 92 83 75   Resp: 18 18 18 16   Temp: 97.5 °F (36.4 °C) 97.9 °F (36.6 °C) 97.2 °F (36.2 °C) 97 °F (36.1 °C)   SpO2: 98% 96% 95% 95%   Weight:       Height:            PAIN    Pain Assessment    Pain Intensity 1: 0 (05/30/22 0403) Pain Intensity 1: 2 (12/29/14 1105)    Pain Location 1: Leg Pain Location 1: Abdomen    Pain Intervention(s) 1: Medication (see MAR),Refused (refused narcotic) Pain Intervention(s) 1: Medication (see MAR)  Patient Stated Pain Goal: 0 Patient Stated Pain Goal: 0  o Intervention effective: yes  o Other actions taken for pain:       Skin Assessment  Skin color Skin Color: Appropriate for ethnicity  Condition/Temperature Skin Condition/Temp: Dry,Warm  Integrity Skin Integrity: Intact  Turgor Turgor: Non-tenting  Weekly Pressure Ulcer Documentation  Pressure  Injury Documentation: No Pressure Injury Noted-Pressure Ulcer Prevention Initiated  Wound Prevention & Protection Methods  Orientation of wound Orientation of Wound Prevention: Posterior  Location of Prevention Location of Wound Prevention: Sacrum/Coccyx  Dressing Present Dressing Present : No  Dressing Status    Wound Offloading Wound Offloading (Prevention Methods): Bed, pressure redistribution/air,Bed, pressure reduction mattress,Repositioning,Wheelchair     INTAKE/OUPUT  Date 05/29/22 0700 - 05/30/22 0659 05/30/22 0700 - 05/31/22 0659   Shift 2234-1520 1993-9050 24 Hour Total 8310-2739 7933-1807 24 Hour Total   INTAKE   P.O. 1320  1320        P. O. 1320  1320      Shift Total(mL/kg) 1320(21.1)  1320(21.1)      OUTPUT   Urine(mL/kg/hr)  400(0.5) 400(0.3)        Urine Voided  400 400        Urine Occurrence(s) 5 x 1 x 6 x      Stool           Stool Occurrence(s) 1 x 0 x 1 x      Shift Total(mL/kg)  400(6.4) 400(6.4)      NET 1320 -400 920      Weight (kg) 62.6 62.6 62.6 62.6 62.6 62.6       Recommendations:  1. Patient needs and requests: na    2. Pending tests/procedures: na     3. Functional Level/Equipment: Partial (one person) / Bed (comment); Stabilization belt; Wheelchair    Fall Precautions:   Fall risk precautions were reinforced with the patient; she was instructed to call for help prior to getting up. The following fall risk precautions were continued: bed/ chair alarms, door signage, yellow bracelet and socks as well as update of the Yumiko Carter tool in the patient's room. Rupesh Score: 4    HEALS Safety Check    A safety check occurred in the patient's room between off going nurse and oncoming nurse listed above. The safety check included the below items  Area Items   H  High Alert Medications - Verify all high alert medication drips (heparin, PCA, etc.)   E  Equipment - Suction is set up for ALL patients (with ciscoker)  - Red plugs utilized for all equipment (IV pumps, etc.)  - WOWs wiped down at end of shift.  - Room stocked with oxygen, suction, and other unit-specific supplies   A  Alarms - Bed alarm is set for fall risk patients  - Ensure chair alarm is in place and activated if patient is up in a chair   L  Lines - Check IV for any infiltration  - Torres bag is empty if patient has a Torres   - Tubing and IV bags are labeled   S  Safety   - Room is clean, patient is clean, and equipment is clean. - Hallways are clear from equipment besides carts. - Fall bracelet on for fall risk patients  - Ensure room is clear and free of clutter  - Suction is set up for ALL patients (with cyn)  - Hallways are clear from equipment besides carts.    - Isolation precautions followed, supplies available outside room, sign posted     Rosaline Ji RN

## 2022-05-30 NOTE — ROUTINE PROCESS
[facility]    Bedside and Verbal shift change report given to Cheryl Wilson RN (oncoming nurse) by Susana Gray, RICHY (offgoing nurse). Report included the following information SBAR, Kardex, MAR and Recent Results.     Situation:   Code Status: Full Code   Hospital Day: 15   Problem List:   Hospital Problems  Date Reviewed: 5/27/2022          Codes Class Noted POA    Sciatica of right side (Chronic) ICD-10-CM: M54.31  ICD-9-CM: 724.3  Unknown Yes        Elevated serum free T4 level ICD-10-CM: R79.89  ICD-9-CM: 794.5  5/16/2022 Yes    Overview Signed 5/16/2022  3:30 PM by Natasha Mauricio MD     Free T4 (5/16/2022) = 1.7             Multiple sclerosis ([facility] (Chronic) ICD-10-CM: G35  ICD-9-CM: 340  Unknown Yes        Elevated TSH ICD-10-CM: R79.89  ICD-9-CM: 794.5  5/15/2022 Yes    Overview Signed 5/16/2022  3:30 PM by Natasha Mauricio MD     TSH (5/15/2022) = 4.00             Closed fracture of proximal end of left radius with routine healing ICD-10-CM: S52.102D  ICD-9-CM: V54.12  5/10/2022 Yes        * (Principal) Multiple sclerosis exacerbation ([facility] ICD-10-CM: G35  ICD-9-CM: 340  5/9/2022 Yes        Weakness of both lower extremities ICD-10-CM: R29.898  ICD-9-CM: 729.89  5/9/2022 Yes        Vitamin D deficiency (Chronic) ICD-10-CM: E55.9  ICD-9-CM: 268.9  5/9/2022 Yes    Overview Signed 5/16/2022  3:29 PM by Natasha Mauricio MD     Vitamin D 25-Hydroxy (5/9/2022) = 16.6              Impaired mobility and ADLs ICD-10-CM: Z74.09, Z78.9  ICD-9-CM: V49.89  5/9/2022 Yes              Background:   Past Medical History:   Past Medical History:   Diagnosis Date    Closed fracture of proximal end of left radius with routine healing 5/10/2022    Elevated serum free T4 level 5/16/2022    Free T4 (5/16/2022) = 1.7    Elevated TSH 5/15/2022    TSH (5/15/2022) = 4.00    [facility] (multiple sclerosis) [facility]     [facility]     Sciatica of right side     Vitamin D deficiency 5/9/2022    Vitamin D 25-Hydroxy (5/9/2022) = 16.6         Assessment:   Changes in Assessment throughout shift: No change to previous assessment     Patient has a central line: no Reasons if yes: na  Insertion date:na Last dressing date:na   Patient has Knight Cath: no Reasons if yes: na   Insertion date:na  Shift knight care completed: NO     Last Vitals:     Vitals:    05/28/22 1638 05/28/22 2033 05/29/22 0745 05/29/22 1626   BP: 113/79 119/79 124/84 130/77   Pulse: 83 87 78 92   Resp: 18 16 18 18   Temp: 98.9 °F (37.2 °C) 97.9 °F (36.6 °C) 97.5 °F (36.4 °C) 97.9 °F (36.6 °C)   SpO2: 96% 98% 98% 96%   Weight:       Height:            PAIN    Pain Assessment    Pain Intensity 1: 0 (05/29/22 1947) Pain Intensity 1: 2 (12/29/14 1105)    Pain Location 1: Leg Pain Location 1: Abdomen    Pain Intervention(s) 1: Medication (see MAR),Refused (refused narcotic) Pain Intervention(s) 1: Medication (see MAR)  Patient Stated Pain Goal: 0 Patient Stated Pain Goal: 0  o Intervention effective: yes  o Other actions taken for pain:       Skin Assessment  Skin color Skin Color: Appropriate for ethnicity  Condition/Temperature Skin Condition/Temp: Dry,Warm  Integrity Skin Integrity: Intact  Turgor Turgor: Non-tenting  Weekly Pressure Ulcer Documentation  Pressure  Injury Documentation: No Pressure Injury Noted-Pressure Ulcer Prevention Initiated  Wound Prevention & Protection Methods  Orientation of wound Orientation of Wound Prevention: Posterior  Location of Prevention Location of Wound Prevention: Sacrum/Coccyx  Dressing Present Dressing Present : No  Dressing Status    Wound Offloading Wound Offloading (Prevention Methods): Bed, pressure redistribution/air,Bed, pressure reduction mattress,Repositioning,Wheelchair     INTAKE/OUPUT  Date 05/28/22 1900 - 05/29/22 0659 05/29/22 0700 - 05/30/22 0659   Shift 5466-3408 24 Hour Total 2340-6871 4066-7009 24 Hour Total   INTAKE   P.O.  1200 1320  1320     P. O.  1200 1320  1320   Shift Total(mL/kg) 8745(09.5) 1320(21.1)  1320(21.1)   OUTPUT   Urine(mL/kg/hr)  200        Urine Voided  200        Urine Occurrence(s) 1 x 8 x 5 x  5 x   Stool          Stool Occurrence(s) 1 x 2 x 1 x 0 x 1 x   Shift Total(mL/kg)  200(3.2)      NET  1000 1320  1320   Weight (kg) 62.6 62.6 62.6 62.6 62.6       Recommendations:  1. Patient needs and requests: na    2. Pending tests/procedures: na     3. Functional Level/Equipment:   /      Fall Precautions:   Fall risk precautions were reinforced with the patient; she was instructed to call for help prior to getting up. The following fall risk precautions were continued: bed/ chair alarms, door signage, yellow bracelet and socks as well as update of the Kylie Izquierdo tool in the patient's room. Rupesh Score: 4    HEALS Safety Check    A safety check occurred in the patient's room between off going nurse and oncoming nurse listed above. The safety check included the below items  Area Items   H  High Alert Medications - Verify all high alert medication drips (heparin, PCA, etc.)   E  Equipment - Suction is set up for ALL patients (with cyn)  - Red plugs utilized for all equipment (IV pumps, etc.)  - WOWs wiped down at end of shift.  - Room stocked with oxygen, suction, and other unit-specific supplies   A  Alarms - Bed alarm is set for fall risk patients  - Ensure chair alarm is in place and activated if patient is up in a chair   L  Lines - Check IV for any infiltration  - Torres bag is empty if patient has a Torres   - Tubing and IV bags are labeled   S  Safety   - Room is clean, patient is clean, and equipment is clean. - Hallways are clear from equipment besides carts. - Fall bracelet on for fall risk patients  - Ensure room is clear and free of clutter  - Suction is set up for ALL patients (with cyn)  - Hallways are clear from equipment besides carts.    - Isolation precautions followed, supplies available outside room, sign posted     Elver Rodriguez RN

## 2022-05-30 NOTE — ROUTINE PROCESS
0800 Pt. Awake sitting up in bed no change in assessment pt. Reported to be feeling fine. 0930 Pt. Sitting up in chair eating breakfast.  1200 with therapy  1330 able to transfer from bed to chair with assist.  1500 no change in assessment. 1800 Pt. Sitting up in chair eating dinner. yes

## 2022-05-31 VITALS
BODY MASS INDEX: 27.82 KG/M2 | HEIGHT: 59 IN | OXYGEN SATURATION: 94 % | TEMPERATURE: 98.4 F | WEIGHT: 138 LBS | HEART RATE: 90 BPM | SYSTOLIC BLOOD PRESSURE: 113 MMHG | DIASTOLIC BLOOD PRESSURE: 76 MMHG | RESPIRATION RATE: 16 BRPM

## 2022-05-31 PROCEDURE — 74011250637 HC RX REV CODE- 250/637: Performed by: INTERNAL MEDICINE

## 2022-05-31 PROCEDURE — 99239 HOSP IP/OBS DSCHRG MGMT >30: CPT | Performed by: INTERNAL MEDICINE

## 2022-05-31 PROCEDURE — 74011250637 HC RX REV CODE- 250/637: Performed by: FAMILY MEDICINE

## 2022-05-31 PROCEDURE — 2709999900 HC NON-CHARGEABLE SUPPLY

## 2022-05-31 PROCEDURE — 3E0134Z INTRODUCTION OF SERUM, TOXOID AND VACCINE INTO SUBCUTANEOUS TISSUE, PERCUTANEOUS APPROACH: ICD-10-PCS | Performed by: INTERNAL MEDICINE

## 2022-05-31 RX ORDER — GABAPENTIN 300 MG/1
300 CAPSULE ORAL EVERY 12 HOURS
Qty: 60 CAPSULE | Refills: 0 | Status: SHIPPED | OUTPATIENT
Start: 2022-05-31 | End: 2022-06-22 | Stop reason: SDUPTHER

## 2022-05-31 RX ORDER — SENNOSIDES 8.6 MG/1
1 TABLET ORAL
Qty: 30 TABLET | Refills: 0 | Status: SHIPPED
Start: 2022-06-01 | End: 2022-06-22

## 2022-05-31 RX ORDER — DIAPER,BRIEF,INFANT-TODD,DISP
EACH MISCELLANEOUS 2 TIMES DAILY
Qty: 30 G | Refills: 0 | Status: SHIPPED
Start: 2022-05-31 | End: 2022-06-22

## 2022-05-31 RX ORDER — CETIRIZINE HCL 10 MG
10 TABLET ORAL DAILY
Qty: 30 TABLET | Refills: 0 | Status: SHIPPED
Start: 2022-06-01 | End: 2022-06-21

## 2022-05-31 RX ORDER — DIMETHYL FUMARATE 240 MG/1
240 CAPSULE ORAL 2 TIMES DAILY
Qty: 60 CAPSULE | Refills: 0 | Status: SHIPPED
Start: 2022-05-31 | End: 2022-06-22

## 2022-05-31 RX ORDER — GUAIFENESIN 100 MG/5ML
81 LIQUID (ML) ORAL
Qty: 30 TABLET | Refills: 0 | Status: SHIPPED
Start: 2022-05-31 | End: 2022-07-15

## 2022-05-31 RX ORDER — CHOLECALCIFEROL TAB 125 MCG (5000 UNIT) 125 MCG
5000 TAB ORAL DAILY
Qty: 30 TABLET | Refills: 0 | Status: SHIPPED
Start: 2022-06-01 | End: 2022-06-21

## 2022-05-31 RX ORDER — OXYCODONE AND ACETAMINOPHEN 5; 325 MG/1; MG/1
1 TABLET ORAL
Qty: 20 TABLET | Refills: 0 | Status: SHIPPED | OUTPATIENT
Start: 2022-05-31 | End: 2022-06-05

## 2022-05-31 RX ORDER — ACETAMINOPHEN 325 MG/1
650 TABLET ORAL
Qty: 10 TABLET | Refills: 0 | Status: SHIPPED
Start: 2022-05-31

## 2022-05-31 RX ADMIN — GABAPENTIN 300 MG: 300 CAPSULE ORAL at 17:17

## 2022-05-31 RX ADMIN — CETIRIZINE HYDROCHLORIDE 10 MG: 10 TABLET, FILM COATED ORAL at 09:48

## 2022-05-31 RX ADMIN — CHOLECALCIFEROL TAB 25 MCG (1000 UNIT) 5000 UNITS: 25 TAB at 09:48

## 2022-05-31 RX ADMIN — DIMETHYL FUMARATE 240 MG: 240 CAPSULE ORAL at 09:49

## 2022-05-31 RX ADMIN — HYDROCORTISONE: 0.5 CREAM TOPICAL at 09:49

## 2022-05-31 RX ADMIN — GABAPENTIN 300 MG: 300 CAPSULE ORAL at 06:11

## 2022-05-31 RX ADMIN — FAMOTIDINE 20 MG: 20 TABLET ORAL at 09:48

## 2022-05-31 NOTE — PROGRESS NOTES
TRANSFER - OUT REPORT:    Verbal report given to MITA Buchanan  (Name) on [facility] being transferred to Patient transferred to [place] [address] facility. Report consisted of patient's Situation, Background, Assessment and   Recommendations(SBAR). Information from the following report(s) SBAR, Kardex and MAR was reviewed with the receiving nurse. Opportunity to ask question given. Expected  time is 1730. All patients belongings have been given to  including patients supply med's Tecfidera, 6 pm dose of Gabapentin given. Hard scripts for Gabapantin amd Percocet placed on transport envelop. Caden Harper 74 y.o. female was discharged going  to [place] [address] on 05/31/22. Patient's armband removed and shredded. Discharge instructions were reviewed with patient, and she verbalized understanding. The patient was wheeled out to transportation vehicle. Transportation was provided by ambulance. Carter Berry RN           .

## 2022-05-31 NOTE — DISCHARGE SUMMARY
[facility] PHYSICAL REHABILITATION  [address] [place] [address], [address]     INPATIENT REHABILITATION  DISCHARGE SUMMARY    Name: Lyssa Kaur MRN: [id number]   Age / Sex: 74 y.o. / female CSN: [id number]   YOB: 1955 Length of Stay: 17 days   Admit Date: 5/14/2022 Discharge Date: 5/31/2022       PRIMARY CARE PHYSICIAN: Beulah Ravi MD      DISCHARGE DIAGNOSES:    Primary Rehabilitation Diagnosis  1. Impaired Mobility and ADLs  2. Multiple sclerosis exacerbation    Comorbidities  Patient Active Problem List   Diagnosis Code    Multiple sclerosis exacerbation (HCC) G35    Weakness of both lower extremities R29.898    Vitamin D deficiency E55.9    Elevated TSH R79.89    Elevated serum free T4 level R79.89    Impaired mobility and ADLs Z74.09, Z78.9    Multiple sclerosis (HCC) G35    Sciatica of right side M54.31    Closed fracture of proximal end of left radius with routine healing S52.102D       CONSULTS CALLED:Neurology (Dr. Jorge Luis Jose)      DIAGNOSTICS DONE: X-ray of right elbow (5/25/2022) showed an unchanged complex comminuted impacted fracture of the right radial head with associated small right elbow effusion and soft tissue swelling. PROCEDURES DONE: None      BRIEF HISTORY (from the history and physical dictated by Dr. Randee Yan:  Lyssa Kaur is a 74 y.o.  female who has history of multiple sclerosis presented to the ER with difficulty walking and weakness in bilateral legs for 3 days.     Patient had fallen 3 days before admission was pain left arm.   Patient also had pain to the right leg and foot drop bilateral leg     Patient has been following up with neurology Dr. Antonia Snyder but not seen lately.     Patient was admitted to the hospital had neurology consult with Dr. Milena Whitman started with IV Solu-Medrol     Patient has been complaining of pain in the right elbow and right hip and thigh after she fell down before her admission     X-ray of the right elbow nondisplaced radial head fracture with some comminution. Patient had orthopedic consult due to the injury has been more than 1 week patient had active range of motion neurosurgery recommended patient was advised to avoid lifting pushing or pulling with her arm nothing heavier than a glass of water and follow-up with x-ray in 1 to 2 weeks and follow-up orthopedic     Patient had telemetry neurology consult was Dr. Nory Toney finish course of a steroid 1 g IV for 5 days. Patient was told to hold on Tecfidera until further instruction.  July Craig 62: Upon admission to the [facility], the patient underwent physical therapy, occupational therapy and speech therapy. The patient was able to actively participate in the rehabilitation activities and progressed well. On discharge, the patient was able to perform the following activities:    1. Occupational Therapy    ON ADMISSION ON DISCHARGE   Eating  Functional Level: 5   Eating  Functional Level: 5     Grooming  Functional Level: 5   Grooming  Functional Level: 6     Bathing  Functional Level: 4   Bathing  Functional Level: 5     Upper Body Dressing  Functional Level: 5   Upper Body Dressing  Functional Level: 5     Lower Body Dressing  Functional Level: 2   Lower Body Dressing  Functional Level: 4     Toileting  Functional Level: 5   Toileting  Functional Level: 4     Toilet Transfers  Toilet Transfer Score: 5   Toilet Transfers  Toilet Transfer Score: 4     Tub /Shower Transfers  Tub/Shower Transfer Score: 0   Tub/Shower Transfers  Tub/Shower Transfer Score: 5 (SBA)       2.  Physical Therapy    ON ADMISSION ON DISCHARGE   Wheelchair Mobility/Management  Able to Propel (ft): 155 feet  Functional Level: 0 (didn't attempt out of bed to w/c transfer due to safety)  Curbs/Ramps Assist Required (FIM Score): 0 (Not tested)  Wheelchair Setup Assist Required : 3 (Moderate assistance) (for left foot rest)  Wheelchair Management: Manages left brake,Manages right brake (with left UE) Wheelchair Mobility/Management  Able to Propel (ft): 186 feet  Functional Level:  (CGA)  Curbs/Ramps Assist Required (FIM Score): 0 (Not tested)  Wheelchair Setup Assist Required : 4 (Minimal assistance)  Wheelchair Management: Manages left brake,Manages right brake     Gait  Amount of Assistance: 3 (Moderate assistance)  Distance (ft): 6 Feet (ft)  Assistive Device: Cane, quad (wide base) Gait  Amount of Assistance: 4 (Minimal assistance)  Distance (ft): 32 Feet (ft) (30ft)  Assistive Device: Cane, quad,Gait belt     Balance-Sitting/Standing  Sitting - Static: Fair (occasional)  Sitting - Dynamic: Fair (occasional)  Standing - Static: Poor  Standing - Dynamic : Impaired Balance-Sitting/Standing  Sitting - Static: Good (unsupported)  Sitting - Dynamic: Good (unsupported)  Standing - Static: Fair  Standing - Dynamic : Impaired     Bed/Mat Mobility  Rolling Right : 3 (Moderate assistance )  Rolling Left : 4 (Minimal assistance)  Supine to Sit : 2 (Maximal assistance)  Sit to Supine : 4 (Minimal assistance) (for left LE management) Bed/Mat Mobility  Rolling Right : 5 (Supervision)  Rolling Left : 5 (Supervision)  Supine to Sit : 5 (Supervision)  Sit to Supine : 5 (Supervision)     Transfers  Transfer Type:  (NT 2/2 safety concerns with environment)  Other: stand step without AD  Transfer Assistance : 0 (Not tested) (spoke to nursing for more appropriate height bed for safety)  Sit to Stand Assistance: Maximum assistance (from edge of [place] bed with B feet on 6\" block )  Car Transfers: Not tested  Car Type: car txfr simulator Transfers  Transfer Type:  Other  Other: stand step with [facility]  Transfer Assistance : 4 (Minimal assistance)  Sit to Stand Assistance: Minimal assistance  Car Transfers: Minimum assistance  Car Type: car txfr simulator     Steps or Stairs  Steps/Stairs Ambulated (#):  (NT)  Level of Assist : 3 (Moderate assistance)  Rail Use: Left  Steps or Stairs  Steps/Stairs Ambulated (#): 2 (6\" steps x2 trials)  Level of Assist : 3 (Moderate assistance)  Rail Use: None (LBQC on left)       3.  Speech and Language Pathology    ON ADMISSION ON DISCHARGE   Comprehension (Native Language)  Primary Mode of Comprehension: Auditory  Score: 6 Comprehension (Native Language)  Primary Mode of Comprehension: Auditory  Score: 6     Expression (Native Language)  Primary Mode of Expression: Verbal  Score: 6   Expression (Native Language)  Primary Mode of Expression: Verbal  Score: 6     Social Interaction/Pragmatics  Score: 5 Social Interaction/Pragmatics  Score: 6     Problem Solving  Score: 4   Problem Solving  Score: 5     Memory  Score: 5 Memory  Score: 6       Legend:   7 - Independent   6 - Modified Independent   5 - Standby Assistance / Supervision / Set-up   4 - Minimum Assistance / Contact Guard Assistance   3 - Moderate Assistance   2 - Maximum Assistance   1 - Total Assistance / Dependent       ACUTE MEDICAL ISSUES ADDRESSED IN INPATIENT REHABILITATION FACILITY:     > Multiple sclerosis exacerbation              > Patient has completed a 5-day course of Methylprednisolone IV on 5/13/2022     > Abnormal thyroid function tests (elevated TSH, elevated free T4) ~ Euthyroid sick syndrome vs Subclinical hypothyroidism              > TSH (5/15/2022) = 4.00              > Free T4 (5/16/2022) = 1.7              > Total T3 (5/16/2022) = 84              > TFTs to be rechecked in 4 to 6 weeks on an outpatient basis              > No need for Levothyroxine at this time                       > Vitamin D Deficiency              > Vitamin D 25-Hydroxy (5/9/2022) = 16.6              > On 5/16/2022, patient was given Cholecalciferol 50,000 units PO x 1 dose              > On 5/17/2022, increased Cholecalciferol 5,000 units PO once daily              > Continue Cholecalciferol 5,000 units PO once daily     > Closed fracture of proximal end of left radius with routine healing              > X-ray of the right elbow (5/10/2022) showed a mildly impacted right proximal radius fracture, in the region of the radial neck. No other definite fractures or malalignment              > X-ray of right elbow (5/25/2022) showed an unchanged complex comminuted impacted fracture of the right radial head with associated small right elbow effusion and soft tissue swelling. Follow-up with plain imaging.              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              > On 5/14/2022, started:                          > Acetaminophen 650 mg PO q 6 hr PRN for pain level 4/10 or lesser                          > Percocet 5/325 1 tab PO q 8 hr PRN for pain level 5/10 or greater              > On 5/19/2022:                          > Started:                                      > Acetaminophen 650 mg PO BID (8AM, 12PM)                                       > Gabapentin 100 mg PO BID                                       > Increased Percocet 5/325 from 1 tab PO q 8 hr PRN for pain level 5/10 or greater to 1 tab PO q 6 hr PRN for pain level 5/10 or greater              > Neurology consult (Dr. Fabiola Hebert) called on 5/19/2022 for evaluation and comanagement              > On 5/23/2022:                          > Discontinued Acetaminophen 650 mg PO BID (8AM, 12PM)                           > Neurology increased Gabapentin from 100 mg to 300 mg PO BID (9AM, 6PM)   > During the patient's stay at the ARU, the patient was given Percocet 5/325 1 tab PO q 6 hr PRN for pain level 5/10 or greater.                > Continue:                          > Gabapentin 300 mg PO BID (9AM, 6PM)                          > Acetaminophen 650 mg PO q 6 hr PRN for pain level 4/10 or lesser                           > Percocet 5/325 1 tab PO q 6 hr PRN for pain level 5/10 or greater for up to 5 days.      > Rash on back              > On 5/20/2022, started Hydrocortisone 0.5% cream applied to affected area BID              > Continue Hydrocortisone 0.5% cream applied to affected area BID     > Constipation              > On 5/24/2022, started Senna 1 tab PO daily after breakfast              > Continue Senna 1 tab PO daily after breakfast      MEDICATIONS ON DISCHARGE:    Current Discharge Medication List      START taking these medications    Details   aspirin 81 mg chewable tablet Take 1 Tablet by mouth nightly. Qty: 30 Tablet, Refills: 0  Start date: 5/31/2022      cetirizine (ZYRTEC) 10 mg tablet Take 1 Tablet by mouth daily. Indications: hives  Qty: 30 Tablet, Refills: 0  Start date: 6/1/2022      gabapentin (NEURONTIN) 300 mg capsule Take 1 Capsule by mouth every twelve (12) hours. Max Daily Amount: 600 mg. Indications: neuropathic pain  Qty: 60 Capsule, Refills: 0  Start date: 5/31/2022    Associated Diagnoses: Sciatica of right side      hydrocortisone (CORTAID) 0.5 % topical cream Apply  to affected area two (2) times a day. use thin layer  Indications: contact dermatitis, a type of skin rash that occurs from contact with an offending substance  Qty: 30 g, Refills: 0  Start date: 5/31/2022      senna (SENOKOT) 8.6 mg tablet Take 1 Tablet by mouth daily (after breakfast). Indications: constipation  Qty: 30 Tablet, Refills: 0  Start date: 6/1/2022         CONTINUE these medications which have CHANGED    Details   cholecalciferol (VITAMIN D3) (5000 Units/125 mcg) tab tablet Take 1 Tablet by mouth daily. Indications: low vitamin D levels  Qty: 30 Tablet, Refills: 0  Start date: 6/1/2022    Associated Diagnoses: Vitamin D deficiency      dimethyl fumarate DR (TECFIDERA) 240 mg capsule Take 1 Capsule by mouth two (2) times a day.   Qty: 60 Capsule, Refills: 0  Start date: 5/31/2022    Associated Diagnoses: Multiple sclerosis ([address]      oxyCODONE-acetaminophen (PERCOCET) 5-325 mg per tablet Take 1 Tablet by mouth every six (6) hours as needed (for pain level 5/10 or greater) for up to 5 days. Max Daily Amount: 4 Tablets. Qty: 20 Tablet, Refills: 0  Start date: 5/31/2022, End date: 6/5/2022    Associated Diagnoses: Sciatica of right side      acetaminophen (TYLENOL) 325 mg tablet Take 2 Tablets by mouth every six (6) hours as needed for Pain. Qty: 10 Tablet, Refills: 0  Start date: 5/31/2022         STOP taking these medications       ibuprofen (AdviL) 200 mg tablet Comments:   Reason for Stopping:         cholecalciferol (VITAMIN D3) (1000 Units /25 mcg) tablet Comments:   Reason for Stopping:         famotidine (PEPCID) 20 mg tablet Comments:   Reason for Stopping:         OTHER Comments:   Reason for Stopping:               DISCHARGE VITAL SIGNS:  Visit Vitals  /78 (BP 1 Location: Right upper arm, BP Patient Position: At rest)   Pulse 81   Temp 97 °F (36.1 °C)   Resp 16   Ht 4' 11\" (1.499 m)   Wt 62.6 kg (138 lb)   SpO2 96%   Breastfeeding No   BMI 27.87 kg/m²       DISCHARGE PHYSICAL EXAMINATION:  GENERAL SURVEY: Patient is awake, alert, oriented x 3, laying comfortably on the bed, not in acute respiratory distress. HEENT: pink palpebral conjunctivae, anicteric sclerae, no nasoaural discharge, moist oral mucosa  NECK: supple, no jugular venous distention, no palpable lymph nodes  CHEST/LUNGS: symmetrical chest expansion, good air entry, clear breath sounds  HEART: adynamic precordium, good S1 S2, no S3, regular rhythm, no murmurs  ABDOMEN: flat, bowel sounds appreciated, soft, non-tender  EXTREMITIES: pink nailbeds, no edema, full and equal pulses, no calf tenderness   NEUROLOGICAL EXAM: The patient is awake, alert and oriented x 3, able to answer questions fairly appropriately, able to follow 1 and 2 step commands. Able to tell time from the wall clock. Cranial nerves II-XII are grossly intact. No gross sensory deficit. Motor strength is 4/5 on BUE, 3+ to 4-/5 on BLE. CONDITION ON DISCHARGE: Stable.       DISPOSITION: Patient clinically improved and was discharged/transferred to subacute/rehabiliation skill nursing facility [facility]) to continue her rehabilitative efforts. FOLLOW-UP RECOMMENDATIONS:   Follow-up Information     Follow up With Specialties Details Why Contact Info    Lalo Cano MD Family Medicine  In 1 to 2 weeks after discharge from skilled nursing facility [address] [place] [id number] [phone]      Adore Mendiola MD Neurology  In 1 to 2 weeks after discharge from skilled nursing facility [facility], [place] [address] [facility]  [phone]          OTHER INSTRUCTIONS:  1. Diet. Specifications  Low fat/Low cholesterol   Solids (consistency)  Regular   Liquids (consistency)  Thin   Fluid Restriction  None     2. Activity. As tolerated. 3. Safety / fall precautions. TIME SPENT ON DISCHARGE ACTIVITIES: 38 minutes. Signed:  Edgar Angel NP    5/31/2022      Patient was seen and examined by Edgar Angel NP earlier this PM. The patient was seen and examined by me independently later this PM. I agree with the APC note by Edgar Angel NP. Note was reviewed and appropriate changes were made. Discussed my assessment and plan with Edgar Angel NP as outlined in the above note.      Signed:  Shayne Bautista MD    May 31, 2022

## 2022-05-31 NOTE — PROGRESS NOTES
Problem: Falls - Risk of  Goal: *Absence of Falls  Description: Document Elpidio Vance Fall Risk and appropriate interventions in the flowsheet. Outcome: Progressing Towards Goal  Note: Fall Risk Interventions:  Mobility Interventions: Bed/chair exit alarm,Communicate number of staff needed for ambulation/transfer,Patient to call before getting OOB,Utilize walker, cane, or other assistive device,Utilize gait belt for transfers/ambulation    Mentation Interventions: Adequate sleep, hydration, pain control,Bed/chair exit alarm,Door open when patient unattended,Familiar objects from home,Gait belt with transfers/ambulation,Room close to nurse's station,Update white board    Medication Interventions: Bed/chair exit alarm,Patient to call before getting OOB,Teach patient to arise slowly,Utilize gait belt for transfers/ambulation    Elimination Interventions: Bed/chair exit alarm,Call light in reach,Patient to call for help with toileting needs    History of Falls Interventions: Bed/chair exit alarm,Door open when patient unattended,Room close to nurse's station,Utilize gait belt for transfer/ambulation         Problem: Pressure Injury - Risk of  Goal: *Prevention of pressure injury  Description: Document Wagner Scale and appropriate interventions in the flowsheet. Outcome: Progressing Towards Goal  Note: Pressure Injury Interventions:  Sensory Interventions: Assess changes in LOC,Assess need for specialty bed,Chair cushion,Keep linens dry and wrinkle-free,Maintain/enhance activity level,Minimize linen layers,Pressure redistribution bed/mattress (bed type),Turn and reposition approx.  every two hours (pillows and wedges if needed)    Moisture Interventions: Absorbent underpads,Assess need for specialty bed,Check for incontinence Q2 hours and as needed,Internal/External urinary devices,Maintain skin hydration (lotion/cream),Minimize layers    Activity Interventions: Assess need for specialty bed,Chair cushion,Increase time out of bed,Pressure redistribution bed/mattress(bed type)    Mobility Interventions: Assess need for specialty bed,Chair cushion,HOB 30 degrees or less,Pressure redistribution bed/mattress (bed type),Turn and reposition approx.  every two hours(pillow and wedges)    Nutrition Interventions: Document food/fluid/supplement intake                     Problem: Pain  Goal: *Control of Pain  Outcome: Progressing Towards Goal

## 2022-05-31 NOTE — ROUTINE PROCESS
SHIFT CHANGE NOTE FOR [facility]    Bedside and Verbal shift change report given to Jose Mcdaniel, RN (oncoming nurse) by Elena Crook RN (offgoing nurse). Report included the following information SBAR, Kardex, MAR and Recent Results.     Situation:   Code Status: Full Code   Hospital Day: 17   Problem List:   Hospital Problems  Date Reviewed: 5/27/2022          Codes Class Noted POA    Sciatica of right side (Chronic) ICD-10-CM: M54.31  ICD-9-CM: 724.3  Unknown Yes        Elevated serum free T4 level ICD-10-CM: R79.89  ICD-9-CM: 794.5  5/16/2022 Yes    Overview Signed 5/16/2022  3:30 PM by Jessica Linares MD     Free T4 (5/16/2022) = 1.7             Multiple sclerosis ([facility] (Chronic) ICD-10-CM: G35  ICD-9-CM: 340  Unknown Yes        Elevated TSH ICD-10-CM: R79.89  ICD-9-CM: 794.5  5/15/2022 Yes    Overview Signed 5/16/2022  3:30 PM by Jessica Linares MD     TSH (5/15/2022) = 4.00             Closed fracture of proximal end of left radius with routine healing ICD-10-CM: S52.102D  ICD-9-CM: V54.12  5/10/2022 Yes        * (Principal) Multiple sclerosis exacerbation ([facility] ICD-10-CM: G35  ICD-9-CM: 340  5/9/2022 Yes        Weakness of both lower extremities ICD-10-CM: R29.898  ICD-9-CM: 729.89  5/9/2022 Yes        Vitamin D deficiency (Chronic) ICD-10-CM: E55.9  ICD-9-CM: 268.9  5/9/2022 Yes    Overview Signed 5/16/2022  3:29 PM by Jessica Linares MD     Vitamin D 25-Hydroxy (5/9/2022) = 16.6              Impaired mobility and ADLs ICD-10-CM: Z74.09, Z78.9  ICD-9-CM: V49.89  5/9/2022 Yes              Background:   Past Medical History:   Past Medical History:   Diagnosis Date    Closed fracture of proximal end of left radius with routine healing 5/10/2022    Elevated serum free T4 level 5/16/2022    Free T4 (5/16/2022) = 1.7    Elevated TSH 5/15/2022    TSH (5/15/2022) = 4.00    [facility] ([facility]) [facility]     Multiple sclerosis ([facility]     Sciatica of right side     Vitamin D deficiency 5/9/2022    Vitamin D 25-Hydroxy (5/9/2022) = 16.6         Assessment:   Changes in Assessment throughout shift: No change to previous assessment     Patient has a central line: no Reasons if yes: na  Insertion date: Last dressing date:na   Patient has Knight Cath: no Reasons if yes: na   Insertion date:na  Shift knight care completed: NO     Last Vitals:     Vitals:    05/29/22 2041 05/30/22 0709 05/30/22 1512 05/30/22 2003   BP: 106/66 127/73 109/75 124/68   Pulse: 83 75 96 94   Resp: 18 16 18 18   Temp: 97.2 °F (36.2 °C) 97 °F (36.1 °C) 97 °F (36.1 °C) 97.2 °F (36.2 °C)   SpO2: 95% 95% 100% 97%   Weight:       Height:            PAIN    Pain Assessment    Pain Intensity 1: 0 (05/31/22 0413) Pain Intensity 1: 2 (12/29/14 1105)    Pain Location 1: Leg Pain Location 1: Abdomen    Pain Intervention(s) 1: Medication (see MAR),Refused (refused narcotic) Pain Intervention(s) 1: Medication (see MAR)  Patient Stated Pain Goal: 0 Patient Stated Pain Goal: 0  o Intervention effective: yes  o Other actions taken for pain:       Skin Assessment  Skin color Skin Color: Appropriate for ethnicity  Condition/Temperature Skin Condition/Temp: Dry,Warm  Integrity Skin Integrity: Intact  Turgor Turgor: Non-tenting  Weekly Pressure Ulcer Documentation  Pressure  Injury Documentation: No Pressure Injury Noted-Pressure Ulcer Prevention Initiated  Wound Prevention & Protection Methods  Orientation of wound Orientation of Wound Prevention: Posterior  Location of Prevention Location of Wound Prevention: Buttocks,Sacrum/Coccyx  Dressing Present Dressing Present : No  Dressing Status    Wound Offloading Wound Offloading (Prevention Methods): Bed, pressure redistribution/air,Bed, pressure reduction mattress,Wheelchair     INTAKE/OUPUT  Date 05/30/22 0700 - 05/31/22 0659 05/31/22 0700 - 06/01/22 0659   Shift 2768-5487 9872-2829 24 Hour Total 5552-9046 5797-5704 24 Hour Total   INTAKE   P.O. 1320  1320        P. O. 1320  1320      Shift Total(mL/kg) 1320(21.1) 1320(21.1)      OUTPUT   Urine(mL/kg/hr)  650(0.9) 650(0.4)        Urine Voided  650 650        Urine Occurrence(s) 5 x 1 x 6 x      Stool           Stool Occurrence(s) 0 x 0 x 0 x      Shift Total(mL/kg)  650(10.4) 650(10.4)      NET 1320 -650 670      Weight (kg) 62.6 62.6 62.6 62.6 62.6 62.6       Recommendations:  1. Patient needs and requests: None at this time. 2. Pending tests/procedures: Pending DC, 5/31.      3. Functional Level/Equipment: Partial (one person) / Bed (comment); Wheelchair    Fall Precautions:   Fall risk precautions were reinforced with the patient; she was instructed to call for help prior to getting up. The following fall risk precautions were continued: bed/ chair alarms, door signage, yellow bracelet and socks as well as update of the [employer] [place] tool in the patient's room. Rupesh Score: 4    HEALS Safety Check    A safety check occurred in the patient's room between off going nurse and oncoming nurse listed above. The safety check included the below items  Area Items   H  High Alert Medications - Verify all high alert medication drips (heparin, PCA, etc.)   E  Equipment - Suction is set up for ALL patients (with yanker)  - Red plugs utilized for all equipment (IV pumps, etc.)  - WOWs wiped down at end of shift.  - Room stocked with oxygen, suction, and other unit-specific supplies   A  Alarms - Bed alarm is set for fall risk patients  - Ensure chair alarm is in place and activated if patient is up in a chair   L  Lines - Check IV for any infiltration  - Torres bag is empty if patient has a Torres   - Tubing and IV bags are labeled   S  Safety   - Room is clean, patient is clean, and equipment is clean. - Hallways are clear from equipment besides carts. - Fall bracelet on for fall risk patients  - Ensure room is clear and free of clutter  - Suction is set up for ALL patients (with yanker)  - Hallways are clear from equipment besides carts.    - Isolation precautions followed, supplies available outside room, sign posted     Lucía Tenorio RN

## 2022-05-31 NOTE — PROGRESS NOTES
Problem: Falls - Risk of  Goal: *Absence of Falls  Description: Document Chadd Locke Fall Risk and appropriate interventions in the flowsheet. Outcome: Progressing Towards Goal  Note: Fall Risk Interventions:  Mobility Interventions: Bed/chair exit alarm,Communicate number of staff needed for ambulation/transfer,Patient to call before getting OOB,Utilize walker, cane, or other assistive device,Utilize gait belt for transfers/ambulation    Mentation Interventions: Adequate sleep, hydration, pain control,Bed/chair exit alarm,Door open when patient unattended,Familiar objects from home,Gait belt with transfers/ambulation,Room close to nurse's station,Update white board    Medication Interventions: Bed/chair exit alarm,Patient to call before getting OOB,Teach patient to arise slowly,Utilize gait belt for transfers/ambulation    Elimination Interventions: Bed/chair exit alarm,Call light in reach,Patient to call for help with toileting needs    History of Falls Interventions: Bed/chair exit alarm,Door open when patient unattended,Room close to nurse's station,Utilize gait belt for transfer/ambulation         Problem: Patient Education: Go to Patient Education Activity  Goal: Patient/Family Education  Outcome: Progressing Towards Goal     Problem: Pressure Injury - Risk of  Goal: *Prevention of pressure injury  Description: Document Wagner Scale and appropriate interventions in the flowsheet. Outcome: Progressing Towards Goal  Note: Pressure Injury Interventions:  Sensory Interventions: Assess changes in LOC,Assess need for specialty bed,Chair cushion,Keep linens dry and wrinkle-free,Maintain/enhance activity level,Minimize linen layers,Pressure redistribution bed/mattress (bed type),Turn and reposition approx.  every two hours (pillows and wedges if needed)    Moisture Interventions: Absorbent underpads,Assess need for specialty bed,Check for incontinence Q2 hours and as needed,Internal/External urinary devices,Maintain skin hydration (lotion/cream),Minimize layers    Activity Interventions: Assess need for specialty bed,Chair cushion,Increase time out of bed,Pressure redistribution bed/mattress(bed type)    Mobility Interventions: Assess need for specialty bed,Chair cushion,HOB 30 degrees or less,Pressure redistribution bed/mattress (bed type),Turn and reposition approx.  every two hours(pillow and wedges)    Nutrition Interventions: Document food/fluid/supplement intake                     Problem: Pain  Goal: *Control of Pain  Outcome: Progressing Towards Goal

## 2022-05-31 NOTE — PROGRESS NOTES
Problem: Falls - Risk of  Goal: *Absence of Falls  Description: Document Kizzy Burgos Fall Risk and appropriate interventions in the flowsheet. 5/31/2022 1409 by Abi Armstrong RN  Outcome: Resolved/Met  5/31/2022 1105 by Abi Armstrong RN  Outcome: Progressing Towards Goal  Note: Fall Risk Interventions:  Mobility Interventions: Bed/chair exit alarm,Communicate number of staff needed for ambulation/transfer,Patient to call before getting OOB,Utilize walker, cane, or other assistive device,Utilize gait belt for transfers/ambulation    Mentation Interventions: Adequate sleep, hydration, pain control,Bed/chair exit alarm,Door open when patient unattended,Familiar objects from home,Gait belt with transfers/ambulation,Room close to nurse's station,Update white board    Medication Interventions: Bed/chair exit alarm,Patient to call before getting OOB,Teach patient to arise slowly,Utilize gait belt for transfers/ambulation    Elimination Interventions: Bed/chair exit alarm,Call light in reach,Patient to call for help with toileting needs    History of Falls Interventions: Bed/chair exit alarm,Door open when patient unattended,Room close to nurse's station,Utilize gait belt for transfer/ambulation         Problem: Pressure Injury - Risk of  Goal: *Prevention of pressure injury  Description: Document Wagner Scale and appropriate interventions in the flowsheet. 5/31/2022 1409 by Abi Armstrong RN  Outcome: Resolved/Met  5/31/2022 1105 by Abi Armstrong RN  Outcome: Progressing Towards Goal  Note: Pressure Injury Interventions:  Sensory Interventions: Assess changes in LOC,Assess need for specialty bed,Chair cushion,Keep linens dry and wrinkle-free,Maintain/enhance activity level,Minimize linen layers,Pressure redistribution bed/mattress (bed type),Turn and reposition approx.  every two hours (pillows and wedges if needed)    Moisture Interventions: Absorbent underpads,Assess need for specialty bed,Check for incontinence Q2 hours and as needed,Internal/External urinary devices,Maintain skin hydration (lotion/cream),Minimize layers    Activity Interventions: Assess need for specialty bed,Chair cushion,Increase time out of bed,Pressure redistribution bed/mattress(bed type)    Mobility Interventions: Assess need for specialty bed,Chair cushion,HOB 30 degrees or less,Pressure redistribution bed/mattress (bed type),Turn and reposition approx.  every two hours(pillow and wedges)    Nutrition Interventions: Document food/fluid/supplement intake                     Problem: Pain  Goal: *Control of Pain  5/31/2022 1409 by Atif De La Torre RN  Outcome: Resolved/Met  5/31/2022 1105 by Atif De La Torre RN  Outcome: Progressing Towards Goal

## 2022-06-15 ENCOUNTER — HOME HEALTH ADMISSION (OUTPATIENT)
Dept: HOME HEALTH SERVICES | Facility: HOME HEALTH | Age: 67
End: 2022-06-15
Payer: MEDICARE

## 2022-06-18 ENCOUNTER — HOME CARE VISIT (OUTPATIENT)
Dept: SCHEDULING | Facility: HOME HEALTH | Age: 67
End: 2022-06-18
Payer: MEDICARE

## 2022-06-18 VITALS
SYSTOLIC BLOOD PRESSURE: 120 MMHG | OXYGEN SATURATION: 97 % | RESPIRATION RATE: 18 BRPM | TEMPERATURE: 97.1 F | DIASTOLIC BLOOD PRESSURE: 70 MMHG | HEART RATE: 77 BPM

## 2022-06-18 PROCEDURE — 3331090001 HH PPS REVENUE CREDIT

## 2022-06-18 PROCEDURE — 400013 HH SOC

## 2022-06-18 PROCEDURE — G0151 HHCP-SERV OF PT,EA 15 MIN: HCPCS

## 2022-06-18 PROCEDURE — 3331090002 HH PPS REVENUE DEBIT

## 2022-06-18 PROCEDURE — 400018 HH-NO PAY CLAIM PROCEDURE

## 2022-06-18 PROCEDURE — G0299 HHS/HOSPICE OF RN EA 15 MIN: HCPCS

## 2022-06-18 NOTE — Clinical Note
Thanks ma'am :)  ----- Message -----  From: Danielito Joshi PT  Sent: 6/18/2022   7:09 PM EDT  To: Idalia Son        Therapy Functional Score Assessment  Question   Score   Grooming  2   Upper Dressing 2   Lower Dressing 3   Bathing  5   Toilet Transfer  2   Transfer  2        Ambulation  3   Dyspnea                     2   Pain Interfering with activity 3  Est number therapy visits      10

## 2022-06-18 NOTE — HOME HEALTH
Skilled services/Home bound verification:     Skilled Reason for admission/summary of clinical condition:  Jerad Marte is a 74 y.o.  female who has history of multiple sclerosis presented to the ER with difficulty walking and weakness in bilateral legs for 3 days. Patient had fallen 3 days before admission was pain left ar m. Patient also had pain to the right leg and foot drop bilateral leg Patient has been following up with neurology Dr. Sanchez Hoover but not seen lately. Patient was admitted to the hospital had neurology consult with Dr. Nancy Fontana started with IV Solu-Medrol Patient has been complaining of pain in the right elbow and right hip and thigh after she fell down before her admission X-ray of the right elbow nondisplaced radi al head fracture with some comminution. Patient had orthopedic consult due to the injury has been more than 1 week patient had active range of motion neurosurgery recommended patient was advised to avoid lifting pushing or pulling with her arm nothing heavier than a glass of water and follow-up with x-ray in 1 to 2 weeks and follow-up orthopedic Patient had telemetry neurology consult was Dr. Neeraj Sexton finish course of a stero id 1 g IV for 5 days. Patient was told to hold on T[employer] until further instruction. .  This patient is homebound for the following reasons Requires considerable and taxing effort to leave the home . Caregiver: spouse. Caregiver assists with IADL's. Medications reconciled and all medications are available in the home this visit. Home health supplies by type and quantity ordered/delivered this visit include: none    Patient education provided this visit to include: HEP, fall prevention, dc planning .       Patient level of understanding of education provided: Patient was able to partially teach back HEP     Sharps Education Provided: none  Patient response to procedure performed:  Patient needed visual cues for HEP    Pt/Caregiver instructed on plan of care and are agreeable to plan of care at this time. PMHx: Multiple sclerosis exacerbation (HCC) G35    Weakness of both lower extremities R29.898    Vitamin D deficiency E55.9    Elevated TSH R79.89    Elevated serum free T4 level R79.89    Impaired mobility and ADLs Z74.09, Z78.9    Multiple sclerosis (HCC) G35    Sciatica of right side M54.31    Closed fracture of proximal end of left radius with routine healing S52.102D   S: pain on RLE 9/10 that is managed by rest   O:Patients Goals= Be able to go back to PLOF  Wound/Incision: location, description, drainage: none  PLOF: Lives in a 2 level house with spouse, stays on 1st floor of house, prior to referral, she was independent with ADL's and IADL's and was using [facility] for mobility. STRENGTH R hip flexor, extensor, hip abductors, adductors, R knee flexor and extensor 3+/5, L hip flexor, extensor, hip abductors, adductors, L knee flexor and extensor 3-/5  FTSTS 36 seconds  TUG 81 seconds   BALANCE Tinetti 8/28 high fall risk due to reduced strength on BLE, gait deviation and decreased efficiency of balance reactions. Patient demonstrated poor use of hip and ankle strategy during standing balance activity. Patient requires support from AD at all times for safety and stability. GAIT Patient was able to ambulate with wide based quad cane x 30 feet with slow paced, decreased L foot stride length and step height. forward stooped posture and unsteady gait with presence of L foot orthosis. Patient needed Min A   BED MOBILITY Patient needed verbal cues for safety and technique with bed, chair and toilet   TRANSFERS Patient needed Mod A x1 with verbal cues to and from bed, chair and toilet using QC  A: PT evaluation completed with the presence of spouse  who is the primary CG, POC established, Med rec done, HEP established  P:Home Safety eval/recommendations: Home health physical therapy initial evaluation performed.  Patient demonstrates decreased strength, balance, and endurance which increases patient's overall fall risk and burden of care. Patient would benefit from home health physical therapy to improve balance, strength, and endurance which would decrease fall risk and allow patient to return to prior level of function once all functional goals or full rehab potential is met. patient educated with HEP including seated hip flex, knee extension, hip abduction, hip adduction, ankle PF and DF and ball squeeze x 20 reps x 3 sets daily to improve MMT on BLE to facilitate with improved bed mobility, transfers and gait with AD. patient also educated with deep breathing exercises to be done daily x 10 reps x 3 sets to prevent SOB during activity.  Patient educated with fall prevention technique by decluttering space, proper use of AD and footwear

## 2022-06-18 NOTE — Clinical Note
Therapy Functional Score Assessment  Question   Score   Grooming  2   Upper Dressing 2   Lower Dressing 3   Bathing  5   Toilet Transfer  2   Transfer  2        Ambulation  3   Dyspnea                     2   Pain Interfering with activity 3  Est number therapy visits      10

## 2022-06-19 PROCEDURE — 3331090001 HH PPS REVENUE CREDIT

## 2022-06-19 PROCEDURE — 3331090002 HH PPS REVENUE DEBIT

## 2022-06-19 NOTE — HOME HEALTH
Skilled Reason for admission/summary of clinical condition:  80-year old female who is being admitted to home health care due to multiple sclerosis and closed fracture of right radius. Patient does not require skilled nursing services at this time as she is knowledgeable regarding her disease processes and medications. She has accepted A services until she gains some of her strength back with therapies, PT/OT to evaluate and treat. Patient not currently on her MS medication due to it being discontinued about 2 weeks ago, will discuss at her neurology appointment on 6/21/22. This patient is homebound for the following reasons Requires the assistance of 1 or more persons to leave the home  and Only leaves the home for medical reasons or Worship services and are infrequent and of short duration for other reasons . Caregiver: spouse. Caregiver assists with ADL's, medications, meal preparation, transportation to MD appointments. Medications reconciled and all medications are available in the home this visit. The following education was provided regarding medications: side effects, dosages, purposes, frequencies  Medications are somewhat effective at this time. Skilled observation and assessment of all body systems, vital signs,  response to medications,  response to care provided and home safety     Assess and teach patient and or caregiver about medications  including SE  complications  purpose and administration. Assess fall risks and instruct re preventive measures as needed. Assess pain every visit and initiate measures to mitigate pain as appropriate. Measures may include prescribed medications, relaxation, visualization, massage, heat, cold, etc.  Teach disease process and self care management.   If patient has or develops increased risk for pressure ulcers, implement preventive measures including use of pressure relief devices, education regarding turning and positioning, improved hygiene, improved nutrition, etc.  Prn visits to evaluate changes in patients status. If patient has or develops indicators of possible depression, notify MD for treatment orders or referral and continue to monitor as necessary. If patient is has or develops diabetes, monitor for lower extremity skin lesions and instruct regarding proper foot care. PRN visits to evaluate changes in patients status. Patient may be discharged prior to end of certification period when goals are met or at patient request.  Patient education provided this visit to include:   INSTRUCTED PATIENT AND CG THAT SHOULD ANY NEEDS OR CONCERNS ARISE TO FIRST CALL OUR OFFICE, OR THE DR'S OFFICE  OR GO TO AN URGENT CARE CENTER AND NOT TO THE ED FOR NON-LIFE THREATENING EVENTS. IF IT IS LIFE THREATENING THEN CALL 911 OR GO TO THE CLOSEST ER. Patient is a fall risk. Educated pateint to sit on the side of the chair/bed, take a slow deep breaths, have feet firmly planted before standing up, use cane/walker if available, or have someone to assist.  patient instructed to maintain clear pathways in home and to minimize clutter to prevent falls from occurring/minimize fall potential.  Pt/cg aware of disease process, s/s to monitor for, who to report to/when.  deep breathing exercises, use inhaler, relaxation techniques, use incentive spirometer. Patient level of understanding of education provided: Good, verbalized understanding. Sharps Education Provided: Not applicable. Patient response to procedure performed:  No procedure performed. Home exercise program/Homework provided: Take medications as prescribed, use assistive device and supervision while ambulating, keep all MD appointments. Next MD appointment 6/21/22 with neurologistJuliana Velasuqez encouraged/instructed to keep appointment as lack of follow through with physician appointment could result in discontinuation of home care services for non-compliance.

## 2022-06-20 ENCOUNTER — HOME CARE VISIT (OUTPATIENT)
Dept: HOME HEALTH SERVICES | Facility: HOME HEALTH | Age: 67
End: 2022-06-20
Payer: MEDICARE

## 2022-06-20 PROCEDURE — 3331090001 HH PPS REVENUE CREDIT

## 2022-06-20 PROCEDURE — 3331090002 HH PPS REVENUE DEBIT

## 2022-06-21 ENCOUNTER — OFFICE VISIT (OUTPATIENT)
Dept: NEUROLOGY | Age: 67
End: 2022-06-21
Payer: MEDICARE

## 2022-06-21 ENCOUNTER — HOME CARE VISIT (OUTPATIENT)
Dept: HOME HEALTH SERVICES | Facility: HOME HEALTH | Age: 67
End: 2022-06-21
Payer: MEDICARE

## 2022-06-21 VITALS
RESPIRATION RATE: 20 BRPM | HEIGHT: 59 IN | BODY MASS INDEX: 27.87 KG/M2 | HEART RATE: 110 BPM | OXYGEN SATURATION: 97 % | SYSTOLIC BLOOD PRESSURE: 100 MMHG | DIASTOLIC BLOOD PRESSURE: 60 MMHG

## 2022-06-21 VITALS
SYSTOLIC BLOOD PRESSURE: 112 MMHG | OXYGEN SATURATION: 98 % | HEART RATE: 80 BPM | DIASTOLIC BLOOD PRESSURE: 68 MMHG | TEMPERATURE: 98.1 F | RESPIRATION RATE: 16 BRPM

## 2022-06-21 DIAGNOSIS — G35 MULTIPLE SCLEROSIS EXACERBATION (HCC): Primary | ICD-10-CM

## 2022-06-21 DIAGNOSIS — Z09 HOSPITAL DISCHARGE FOLLOW-UP: ICD-10-CM

## 2022-06-21 DIAGNOSIS — M54.31 SCIATICA OF RIGHT SIDE: Chronic | ICD-10-CM

## 2022-06-21 PROCEDURE — 3017F COLORECTAL CA SCREEN DOC REV: CPT | Performed by: NURSE PRACTITIONER

## 2022-06-21 PROCEDURE — G8399 PT W/DXA RESULTS DOCUMENT: HCPCS | Performed by: NURSE PRACTITIONER

## 2022-06-21 PROCEDURE — G8427 DOCREV CUR MEDS BY ELIG CLIN: HCPCS | Performed by: NURSE PRACTITIONER

## 2022-06-21 PROCEDURE — G8536 NO DOC ELDER MAL SCRN: HCPCS | Performed by: NURSE PRACTITIONER

## 2022-06-21 PROCEDURE — 1090F PRES/ABSN URINE INCON ASSESS: CPT | Performed by: NURSE PRACTITIONER

## 2022-06-21 PROCEDURE — G0463 HOSPITAL OUTPT CLINIC VISIT: HCPCS | Performed by: NURSE PRACTITIONER

## 2022-06-21 PROCEDURE — 1123F ACP DISCUSS/DSCN MKR DOCD: CPT | Performed by: NURSE PRACTITIONER

## 2022-06-21 PROCEDURE — 1111F DSCHRG MED/CURRENT MED MERGE: CPT | Performed by: NURSE PRACTITIONER

## 2022-06-21 PROCEDURE — G8417 CALC BMI ABV UP PARAM F/U: HCPCS | Performed by: NURSE PRACTITIONER

## 2022-06-21 PROCEDURE — 3331090001 HH PPS REVENUE CREDIT

## 2022-06-21 PROCEDURE — 3331090002 HH PPS REVENUE DEBIT

## 2022-06-21 PROCEDURE — 1101F PT FALLS ASSESS-DOCD LE1/YR: CPT | Performed by: NURSE PRACTITIONER

## 2022-06-21 PROCEDURE — G8432 DEP SCR NOT DOC, RNG: HCPCS | Performed by: NURSE PRACTITIONER

## 2022-06-21 PROCEDURE — 99214 OFFICE O/P EST MOD 30 MIN: CPT | Performed by: NURSE PRACTITIONER

## 2022-06-21 NOTE — CASE COMMUNICATION
Naseem Cordova,     I just wanted to make you aware that Mrs. Hemalatha Hodges has been admitted to home health services. Patient does not require skilled nursing services at this time-knowledgeable about disease processes and medications. Patient accepted HHA services and PT/OT to evaluate and treat. Thank you for allowing [facility] services to care for your patient.     Thank you,  Carla Conrad, MARISSAN, RN

## 2022-06-21 NOTE — PROGRESS NOTES
[facility]  [address], [address], [place], [address]  [address] Reji Pru[address], [address]  Office:  [phone]  Fax: [phone]  Chief Complaint   Patient presents with   [facility] Follow Up     [facility]       This is a 74year old female who presents for hospital follow up MS exacerbation. Presents in office today with her son. Presented to the hospital with in May. Presented with concerns of lower extremity weakness. Had been on Tecfidera less than prescribed for years. MRI of the brain with no lesions. Active lesions found in the cervical spine. Neurology consulted and recommended solumetrol. She completed this course and Tecfidera was resumed per patient. She tells me at some point in inpatient rehab the Tecfidera was stopped. She is not currently on a disease modifying medication. She has never been on another treatment. Tells me she was started on gabapentin 300 mg twice daily for leg pains. Said to be sciatica. Was having significant, pulsating right leg pain in the hospital. The medicine is helping. Tells me she received her MS diagnosis around 20 years ago. No concerns for new weakness, visual changes, or focal deficits. No other concerns at this time. Past Medical History:   Diagnosis Date    Closed fracture of proximal end of left radius with routine healing 5/10/2022    Elevated serum free T4 level 5/16/2022    Free T4 (5/16/2022) = 1.7    Elevated TSH 5/15/2022    TSH (5/15/2022) = 4.00    MS (multiple sclerosis) (HCC)     Multiple sclerosis (HCC)     Sciatica of right side     Vitamin D deficiency 5/9/2022    Vitamin D 25-Hydroxy (5/9/2022) = 16.6        Past Surgical History:   Procedure Laterality Date    ND BIOPSY OF BREAST, INCISIONAL  RT BENIGN    OVER 10 YRS. AGO PER PT.       Current Outpatient Medications   Medication Sig Dispense Refill    gabapentin (NEURONTIN) 300 mg capsule Take 1 Capsule by mouth every twelve (12) hours.  Max Daily Amount: 600 mg. Indications: neuropathic pain 60 Capsule 5    acetaminophen (TYLENOL) 325 mg tablet Take 2 Tablets by mouth every six (6) hours as needed for Pain. 10 Tablet 0    dimethyl fumarate DR (TECFIDERA) 240 mg capsule Take 1 Capsule by mouth two (2) times a day. (Patient not taking: Reported on 2022) 60 Capsule 0    aspirin 81 mg chewable tablet Take 1 Tablet by mouth nightly. (Patient not taking: Reported on 2022) 30 Tablet 0    hydrocortisone (CORTAID) 0.5 % topical cream Apply  to affected area two (2) times a day. use thin layer  Indications: contact dermatitis, a type of skin rash that occurs from contact with an offending substance (Patient not taking: Reported on 2022) 30 g 0    senna (SENOKOT) 8.6 mg tablet Take 1 Tablet by mouth daily (after breakfast). Indications: constipation (Patient not taking: Reported on 2022) 30 Tablet 0        Allergies   Allergen Reactions    Amoxicillin Hives and Rash    Clindamycin Hives and Rash    Metronidazole Hives    Tetracycline Hives and Rash       Social History     Tobacco Use    Smoking status: Former Smoker     Types: Cigarettes     Quit date: 1980     Years since quittin.0    Smokeless tobacco: Never Used   Substance Use Topics    Alcohol use: Not Currently    Drug use: No       No family history on file. Review of Systems  GENERAL: Denies fever or fatigue  CARDIAC: No CP or SOB  PULMONARY: No cough of SOB  MUSCULOSKELETAL: No new joint pain  NEURO: SEE HPI    Examination  Visit Vitals  /60 (BP 1 Location: Left upper arm, BP Patient Position: Sitting, BP Cuff Size: Adult)   Pulse (!) 110   Resp 20   Ht 4' 11\" (1.499 m)   SpO2 97%   BMI 27.87 kg/m²       This is a very pleasant 74-year-old female. She is alert and in no apparent distress. Full fund of knowledge. Speech is clear. No facial asymmetry. Extraocular movements intact. Pupils equal round reactive to light. Tongue midline.   Equal shoulder shrug. Finger-nose-finger with no signs of dysmetria. Resists fully in the upper extremities. Does have closed fracture to right arm and guarding some. No cast in place. Weakness noted to lower extremity. Ambulates with use of cane with noted left lower extremity weakness. Reflexes are hyperreflexive. Results  MRI BRAIN W WO CONT (Accession [id number]) (Order [id number])    Allergies       Not Specified: Amoxicillin;  Clindamycin;  Metronidazole; Tetracycline     Exam Information    Status Exam Begun  Exam Ended    Final [99] 5/09/2022 17:54 5/09/2022  8:19 PM [id number]  8:19 PM     Result Information    Status: Final result (Exam End: 5/9/2022 20:19) Provider Status: Open       MRI BRAIN W WO CONT: Patient Communication     Add Comments   Not seen       Study Result    Narrative & Impression   MRI BRAIN WITH AND WITHOUT CONTRAST     Provided Reason for Exam: hx of MS, fall with weakness right hand, left leg  Additional History: None  Comparison Studies: Most recent comparison brain MRI is 11/24/2017     Imaging Technique:     Sequences:  Sagittal and axial T1 weighted, Diffusion Weighted (DWI), Axial T2  weighted, Axial and sagittal T2 FLAIR, and GRE/SWI MRI images of the brain. Post  contrast axial and coronal T1 images.     Limiting Factors/Major Artifacts: None.     FINDINGS:     Brain Parenchyma: The subtle lesion in the dorsal body of the corpus callosum slightly right of  midline is less conspicuous than on the comparison MRI from 2017. Stable  appearance of the small T2 FLAIR hyperintense lesions within periventricular and  juxtacortical white matter when compared with 11/24/2017 MRI. Small lesion  adjacent to the atrium of the right lateral ventricle, juxtacortical lesions in  the left and right posterior lateral frontal lobes appear unchanged.  There are  small lesions in the brainstem including focus of abnormal increased T2 signal  in the left of midline dorsal lulu that extends into the left superior  cerebellar peduncle, lesion in the left of midline ventral medulla just below  the level of the lulu junction. The upper cervical spinal cord is better  evaluated on dedicated cervical spine MRI performed concurrently and dictated  separately.     The axial T1 postcontrast images are significantly degraded by motion related  artifacts. Repeated images with improvement. The coronal postcontrast images are  better quality, no findings of enhancing intracranial brain lesions.     No white matter lesions suggestive of progressive multifocal  leukoencephalopathy.     Diffusion sequence negative for cytotoxic edema. No findings of acute  infarction.     CSF Spaces:  Mild global cerebral and cerebellar volume loss. No hydrocephalus. Vascular System:  Grossly patent flow in basilar and internal cerebral arteries. No findings of dural sinus thrombosis.      Hemorrhage:  No acute hemorrhage. No chronic microhemorrhage.     Other Structures:  Calvarium: No suspicious marrow signal.  Sella: Normal.  Visualized Upper Cervical Spine: Normal.  Orbits: Normal for non-dedicated exam.  Paranasal Sinuses: No significant paranasal sinus disease. Mastoid Air Cells:  Clear.     IMPRESSION     1. Redemonstrated cerebral and brainstem white matter lesions consistent with  the history of multiple sclerosis. Compared with 11/2017 MRI brain no new or  enhancing lesions. No findings of PML.     2. Mild global cerebral and cerebellar volume loss. No hydrocephalus.     3. No acute intracranial abnormality.             Results  MRI CERV SPINE W WO CONT (Accession [id number]) (Order [id number])    Allergies       Not Specified: Amoxicillin;  Clindamycin;  Metronidazole;   Tetracycline     Exam Information    Status Exam Begun  Exam Ended    Final [99] 5/09/2022 19:05 5/09/2022  8:19 PM [id number]  8:19 PM     Result Information    Status: Final result (Exam End: 5/9/2022 20:19) Provider Status: Open       MRI CERV SPINE W WO CONT: Patient Communication     Add Comments   Not seen       Study Result    Narrative & Impression   PROCEDURE: MRI of the cervical spine with and without contrast.     CPT CODE: 73200     INDICATIONS: hx of MS, fall with weakness right hand, left leg     COMPARISONS: MRI cervical spine 9/7/2016.     TECHNIQUE: T1 weighted, T2 FSE, and FSE inversion recovery sagittal images are  supplemented by T2 weighted axial images. Post-contrast T1 weighted with fat  saturation sagittal and post contrast T1 weighted axial images were  supplemented. Dotarem 12 mL IV.     FINDINGS:     Sagittal images reveal normal vertebral body morphology. No fractures  noted. Trace anterolisthesis of C7 on T1 is likely degenerative in origin. No suspicious bone lesions. No marrow edema. Overall the disc heights are maintained except for mild loss of disc height at  C6/C7    Visualized prevertebral soft tissues are unremarkable. Atlanto-dens interval normal.    No Chiari 1 malformation.     Abnormal signal in the cervical spinal cord appears similar to the 11/2016 MRI. The largest lesion is in the left side of the 3 level spinal cord, extends  approximately 11 mm craniocaudal extent. A smaller similar left lateral cord  lesion at the C4/C5 disc level is unchanged. There may be a subtle lesion in the  right lateral cord at C5. The dorsal cord lesion is visible at the T1 level. On  the sagittal T1 postcontrast images some loss of details in the mid cervical  spine due to some motion artifacts. There is some abnormal enhancement of the  dorsal cord lesion at T1 (sagittal T1 postcontrast image 8). Also evaluated on  dedicated thoracic spine MRI.     Correlation of axial and sagittal images reveals the following:     At C2-C3: Tiny posterior disc bulge or protrusion, no central stenosis. No  foraminal narrowing     At C3-C4: No significant disc pathology or proliferative changes.  No central  canal or foraminal stenosis.     At C4-C5: No significant disc pathology or proliferative changes. No central  canal or foraminal stenosis.     At C5-C6: Small posterior central disc protrusion. Slight indentation of the  ventral CSF space. No cord compression or central stenosis. Mild left foraminal  narrowing.     At C6-C7: Posterior disc and osteophyte complex. Slight indentation of the  ventral CSF space. Mild central canal encroachment, no significant stenosis. Moderate bilateral neural foraminal narrowing.     At C7-T1: No significant disc pathology or proliferative changes. No central  canal or foraminal stenosis.     Included upper thoracic disc levels appear normal.           IMPRESSION     1. Similar appearance of the cord lesions at C3 and C4/5 compared with 11/2016  MRI. May be a subtle small lesion in the right side of the cord at C5.  2. The dorsal cord lesion at the T1 level has abnormal postcontrast enhancement  suggesting active demyelination related to history of multiple sclerosis. 3. Relatively mild degenerative changes mostly at the C6/C7 level. Moderate  bilateral neural foraminal narrowing. Other levels as described. Results  MRI [facility] SPINE W WO CONT (Accession [id number]) (Order [id number])    Allergies       Not Specified: Amoxicillin;  Clindamycin;  Metronidazole; Tetracycline     Exam Information    Status Exam Begun  Exam Ended    Final [99] 5/09/2022 19:05 5/09/2022  8:04 PM [id number]  8:04 PM     Result Information    Status: Final result (Exam End: 5/9/2022 20:04) Provider Status: Open       MRI [facility] SPINE W WO CONT: Patient Communication     Add Comments   Not seen       Study Result    Narrative & Impression   MRI Thoracic With And Without Contrast     CPT CODE: 68726     HISTORY: Multiple sclerosis, status post fall. New onset of bilateral lower  extremity weakness and numbness     COMPARISON: No prior MRI thoracic spine.  MRI cervical spine 9/7/2016.     TECHNIQUE: Sagittal T2 FSE and FSEIR, axial T2, sagittal T1 without contrast,  and post gadolinium sagittal and axial T1 MRI images through the thoracic spine. Patient received 12 mL Dotarem IV contrast     FINDINGS:   Sagittal T2 STIR images through the thoracic spinal cord shows a segment of  abnormal increased T2 signal in the dorsal cord at the T1, also seen and  described on the cervical spine MRI. Additional T2 hyperintense lesion in the  cord at T6 level. There is a similar but smaller T2 hyperintense focus in the  central cord at the T10 level with some associated cord volume loss. Lesions at  the T1 and T6 level and associated postcontrast enhancement. The T6 lesion is  best seen on the sagittal T1 postcontrast image 14. The T1 enhancement is better  seen on the cervical spine MRI performed concurrently and dictated separately. No enhancement seen at the T10 level.     Normal AP alignment of the thoracic spine. Vertebral body heights are normal.  Disc heights are maintained throughout. No focal abnormal thoracic spine marrow  signal. No marrow edema or abnormal marrow enhancement.     The conus medullaris terminates at the lower T12 level.     The paraspinous soft tissues are remarkable for a T2 hyperintense cystic lesion  in the right hepatic lobe measuring 2.7 cm. Not well characterized on thoracic  spine MRI, does not appear to have any contrast enhancement.     IMPRESSION     1. T2 hyperintense lesions in the thoracic spinal cord at T1 and T6 have  postcontrast enhancement consistent with active demyelination related to  multiple sclerosis. 2. Additional nonenhancing chronic appearing cord lesion at T10.  3. No significant degenerative disc disease in the thoracic spine. No masses. Results  MRI LUMB SPINE W WO CONT (Accession [id number]) (Order [id number])    Allergies       Not Specified: Amoxicillin;  Clindamycin;  Metronidazole;   Tetracycline     Exam Information    Status Exam Begun  Exam Ended    Final [99] 5/09/2022 19:05 5/09/2022  8:19 PM [id number]  8:19 PM     Result Information    Status: Final result (Exam End: 5/9/2022 20:19) Provider Status: Open       MRI LUMB SPINE W WO CONT: Patient Communication     Add Comments   Not seen       Study Result    Narrative & Impression   MR lumbar spine with and without contrast     CPT CODE: 99657     HISTORY: hx of MS, fall with weakness right hand, left leg     COMPARISON: No prior lumbar spine imaging available.     TECHNIQUE: Lumbar spine scanned with axial and sagittal T1W scans, axial and  sagittal T2W scans, and with post gadolinium axial and sagittal T1W scans. 12 mL  Dotarem IV contrast     FINDINGS:       Alignment is normal.  Vertebral body heights are normal.  No acute or chronic fractures. Disc heights are normal.  Lumbar spine marrow signal appears normal. No marrow edema or mass. Conus terminates at the lower T12 level with normal signal.  The cauda equina nerve roots layer normally within the dura, no nerve root  thickening or abnormal enhancement. No pathologic enhancement of the distal  thoracic spinal cord.     T12-L1: No focal disc pathology. No central canal or foraminal narrowing.       L1-L2: No focal disc pathology. No central canal or foraminal narrowing.       L2-L3: Minimal posterior disc bulging. Slight indentation of the ventral CSF  space but no central stenosis. No foraminal narrowing.       L3-L4: No focal posterior disc pathology. Minimal ligamentum flavum thickening  and mild facet DJD. No central stenosis. Mild left worse than right foraminal  narrowing.       L4-L5: No focal posterior disc pathology. Mild facet DJD, mild ligamentum flavum  thickening but no central stenosis. No significant foraminal narrowing.       L5-S1: No focal disc pathology. No central stenosis or foraminal narrowing.     IMPRESSION     1. No focal disc pathology in the lumbar spine. No central spinal canal stenosis  or significant foraminal narrowing at any level.  Some minor foraminal narrowing  bilaterally at L3/L4.     2. Conus medullaris terminates at the T12 level, normal signal. Cauda equina  nerve roots layer normally within the dura, no abnormal signal or abnormal  enhancement. Impression/Plan  Romulo Johnson is a 74 y.o. female whose history and physical are consistent with multiple sclerosis. This is a 45-year-old female who presented to the ED with some lower extremity weakness and numbness. Patient also gave history of a recent fall. Patient was evaluated and was admitted. Neurology was consulted and patient was started on high-dose Solu-Medrol. She completed inpatient rehab. Now home with at home PT/OT. Patient was also seen by orthopedics regarding right distal radius nondisplaced fracture. Orthopedics has recommended conservative management. Patient endorses 20 years ago diagnosis with MS and only being on Tecfidera. Said she was taking 1/2 the prescribed dose for three years. Now not on a disease modifying medication. Will consult with neurologist regarding therapy choice and labs needed prior to starting. Will likely need JCV and Hep B testing. Discussed option for Ocrevus. Will have staff update her via telephone. Will need repeat imaging possibly in 6 months. Will defer this to follow up. Keep a 6 week follow up with our office. We will contract her sooner via telephone for next steps. All questions addressed and patient and patient's son are agreeable with plan of care. Diagnoses and all orders for this visit:    1. Sciatica of right side  -     gabapentin (NEURONTIN) 300 mg capsule; Take 1 Capsule by mouth every twelve (12) hours. Max Daily Amount: 600 mg. Indications: neuropathic pain      I spent at least 30 minutes on this visit with this established patient. Signed By: Hieu Escamilla NP        PLEASE NOTE:   Portions of this document may have been produced using voice recognition software.  Unrecognized errors in transcription may be present.

## 2022-06-22 ENCOUNTER — HOME CARE VISIT (OUTPATIENT)
Dept: SCHEDULING | Facility: HOME HEALTH | Age: 67
End: 2022-06-22
Payer: MEDICARE

## 2022-06-22 ENCOUNTER — HOME CARE VISIT (OUTPATIENT)
Dept: HOME HEALTH SERVICES | Facility: HOME HEALTH | Age: 67
End: 2022-06-22
Payer: MEDICARE

## 2022-06-22 VITALS
TEMPERATURE: 98.2 F | DIASTOLIC BLOOD PRESSURE: 64 MMHG | SYSTOLIC BLOOD PRESSURE: 121 MMHG | HEART RATE: 77 BPM | OXYGEN SATURATION: 99 %

## 2022-06-22 VITALS
DIASTOLIC BLOOD PRESSURE: 70 MMHG | OXYGEN SATURATION: 97 % | SYSTOLIC BLOOD PRESSURE: 130 MMHG | HEART RATE: 88 BPM | RESPIRATION RATE: 16 BRPM | TEMPERATURE: 97 F

## 2022-06-22 PROCEDURE — G0157 HHC PT ASSISTANT EA 15: HCPCS

## 2022-06-22 PROCEDURE — 3331090001 HH PPS REVENUE CREDIT

## 2022-06-22 PROCEDURE — G0152 HHCP-SERV OF OT,EA 15 MIN: HCPCS

## 2022-06-22 PROCEDURE — 3331090002 HH PPS REVENUE DEBIT

## 2022-06-22 RX ORDER — GABAPENTIN 300 MG/1
300 CAPSULE ORAL EVERY 12 HOURS
Qty: 60 CAPSULE | Refills: 5 | Status: SHIPPED | OUTPATIENT
Start: 2022-06-22 | End: 2022-07-06 | Stop reason: SDUPTHER

## 2022-06-22 NOTE — HOME HEALTH
Skilled Reason for admission/summary of clinical condition: Ruben Schrader is a 74 y.o.  female who has history of multiple sclerosis presented to the ER with difficulty walking and weakness in bilateral legs for 3 days. Patient had fallen 3 days before admission was pain left arm. Patient also had pain to the right leg and foot drop bilateral leg Patient has been following up with neurology Dr. Rafiq Phillips but not seen lately. Patient was admitted to the hospital had neurology consult with Dr. Nolberto Stanley started with IV Solu-Medrol Patient has been complaining of pain in the right elbow and right hip and thigh after she fell down before her admission X-ray of the right elbow nondisplaced radial head fracture with some comminution. Patient had orthopedic consult due to the injury has been more than 1 week patient had active range of motion neurosurgery recommended patient was advised to avoid lifting pushing or pulling with her arm nothing heavier than a glass of water and follow-up with x-ray in 1 to 2 weeks and follow-up orthopedic Patient had telemetry neurology consult was Dr. Lauree Brittle finish course of a steroid 1 g IV for 5 days. Patient was told to hold on Tecfidera until further instruction.     PMHx: Multiple sclerosis exacerbation (HCC) G35  Weakness of both lower extremities R29.898  Vitamin D deficiency E55.9  Elevated TSH R79.89  Elevated serum free T4 level R79.89  Impaired mobility and ADLs Z74.09, Z78.9  Multiple sclerosis (HCC) G35  Sciatica of right side M54.31  Closed fracture of proximal end of left radius with routine healing S52.102D  PRECAUTIONS: Pt is NWB to RUE due to radius fx and avoid pushing, pulling or lifting anything heavier than a glass of water. PLOF: Pt lives in a 2 story house with spouse and son and she stays on 1st floor of house. Prior to referral, she was independent with ADL's and IADL's and was using cane for mobility.      Caregiver: Pt spouse assists with IADL's. Medications: Pt reports no changes to medications since last reviewed. Pt educated to continue as directed per MD.    SUBJECTIVE: Pt report pain in right arm and right leg. Pt aware of her RUE precautions. Pt works from home as an  part time  DME ORDERED/RECOMMENEDED: N/A    OBJECTIVE:  BATHING: Pt has step in shower with tub transfer bench. Pt able to complete upper and lower body bathing with SBA  TOILETING: Pt supervision with toileting tasks from regular toilet  UB DRESSING: Pt supervsion with upper body dressing  to sana/doff shirts  LB DRESSING: Pt supervision with lower body dressing to sana/doff socks, shoes, pants and AFO with increased time. GROOMING: Pt supervision/ set up with grooming tasks to wash face/ hands, oral care and hair care with pt alternating sit to stand  FEEDING: Pt independent for self feeding`    [place] RPE 6/10 after performing ambulation, transfers, and I/ADL assessment     OT instructed/demonstrated pt the following with good understanding:     IADL: Pt able to complete simple meal prep (coffee, light breakfast) as needed and has son/ assist with other IADLs. AMBULATION: Pt supervision for ambulating short house hold distances using quad cane  EOB/BED TRANSFER: Pt supervision for bed mobility  COUCH: Pt supervision for sit to stand transfers  TOILET:  Pt supervision for toilet transfers  TUB SHOWER: UNable to assess with pt  using shower. PATIENT RESPONSE TO TREATMENT:  Pt responded well to home health OT assessment with pt reaching out to furniture for balance and using large based quad cane     PATIENT EDUCATION PROVIDED THIS VISIT: OT role, energy conservation, fall prevention/safety training ADL/IADL tasks, continue diet and medications as instructed per MD, consult MD or urgent care for medical assistance as opposed to ER unless situation emergent.     PATIENT LEVEL OF UNDERSTANDING OF EDUCATION PROVIDED:  Pt able to teach back role of OT with good understanding. Pt able to teach back energy conservation techniques using handout for reference. Pt able to teach back fall hazards including importance to use cane during functional mobility with good understanding. REHAB POTENTIAL: Mrs. Webster presents with good rehab potenital to increase her balance and saftey with ADLs, IADLs and functional mobility within her home environemnt. Pt currently could benifit from adatpive techniques and equipment recommendations to review and practice for increasing her saftey and indepenence with her daily routine. She currenlty faitgues quickly qith light activity and has decreased balance with NWB to her RUE. Pt is agreeable to continued homehealth occuaptional therapy services to increase her saftey and functional independence. HOME EXERCISE PROGRAM: energy conservation while performing bathing, dressing, and setup such as set clothing out night before, gather items required to perform task in one trip, sit while performing tasks, take rest breaks as needed, perform shower/bathing on days with no other appointments or activities scheduled, etc. Pt provided with handout for refernce    CONTINUED NEED FOR THE FOLLOWING SKILLS: HH OT is medically necessary to address pain,  decreased functional strength, decreased independence and safety with functional transfers, decreased independence and safety performing ADL/IADL tasks, decreased activity and standing tolerance, decreased functional endurance, and impaired balance in order to improve functional independence, obtain set goals, reduce risk of falls, reduce pain, improve quality of life, and return to PLOF. ASSESSMENT: Pt presents with right radial fx with pt unable to wegiht bear through her RUE or lift more than 1 cup of water. Pt has MS and fatigues quickly. Pt reports Modified [place] scale rating of 6/10 after light activity. She reports fatiguing quickly when completing her daily routine. She would benfit from energy conservation education/implamentation to increase her tolerance for independence with her daily routine. Pt with continues concerns and questions to increase her saftey when completing IADL tasks and bathroom mobility. Pt would benifit from saftey education and adaptive techniques/equipment recommendations to increase her saftey while increasing her independence withing the home. Skilled Care Provided: Pt completed full occupational therapy assessment to include balance, coordination, strengthening, ADL education/training, functional mobility and home safety. PLAN: D/C 1w1, 2w1, 1w1, with plans to discharge when goals are met or maximal potenital achieved.

## 2022-06-22 NOTE — Clinical Note
Therapy Functional Score Assessment  Question                                            Score   Grooming                            1       Upper Dressing   1      Lower Dressing   1      Bathing                 2     Toilet Transfer                  1    Transfer                1            Ambulation                         3   Dyspnea                     3       Pain Interfering with activity        4  Est number therapy visits      4    Mrs. Webster presents with good rehab potential to increase her balance and safety with ADLs, IADLs and functional mobility within her home environment. Pt currently could benefit from adaptive techniques and equipment recommendations to review and practice for increasing her safety and independence with her daily routine. She currently faitgue's quickly with light activity and has decreased balance with NWB to her RUE. Pt is agreeable to continued homehealth occupational therapy services to increase her safety and functional independence. PLAN: D/C 1w1, 2w1, 1w1, with plans to discharge when goals are met or maximal potential achieved.

## 2022-06-22 NOTE — Clinical Note
Mrs. Jeral Hatchet presents with good rehab potential to increase her balance and safety with ADLs, IADLs and functional mobility within her home environment. Pt currently could benefit from adaptive techniques and equipment recommendations to review and practice for increasing her safety and independence with her daily routine. She currently faitgue's quickly with light activity and has decreased balance with NWB to her RUE. Pt is agreeable to continued homehealth occupational therapy services to increase her safety and functional independence. PLAN: D/C 1w1, 2w1, 1w1, with plans to discharge when goals are met or maximal potential achieved.

## 2022-06-22 NOTE — HOME HEALTH
Patient's Subjective: The patient reports having sciatic pain that hinders her at times. Falls since last session: No new falls  Pain/Discomfort ? Yes- see pain page  New Meds: No  Upcoming MD appointments: No  .  Caregiver involvement/assistance needed for: The patient's caregiver assists with meals, transportation and some chores. .  Home health supplies by type and quantity ordered/delivered this visit include:  None  . Objective:  See interventions. Patient response to treatment:  Fair + tolerance to exercises and amb today. Some exacerbation of sciatica. Recommended she use the cold pack post exercises to calm the nerve and numb the area. Patient level of understanding of education provided:  -The patient has been instructed in the following medicine education:   If there are any medicines/supplements (by MD order or OTC) added or DC 'd let [facility] staff know and have bottles/packages available. It is necessary to keep an accurate record of meds to avoid any medication errors. She reports understanding.   - Introduced exercises for HEP with Fair return demonstration. Assess of progress towards goals:   No new falls to date. Continues with pain in R arm and  sciatic nerve. Fair ability to perform exercises. Continued need for the following skills:Home Health PT is medically necessary to address the following:  pain, decreased strength, decreased Ind with functional transfers, impaired gait, impaired stair negotiation and impaired stability to decrease sx, improve functional Ind, quality of life, reduce the fall risk. Plan for next visit: Assess remaining transfers in the home. Add standing exercises.     The following discharge was discussed with the patient/caregiver :   Estimated PT DC date  7/14  [facility] HEART needed: Yes

## 2022-06-23 PROCEDURE — 3331090001 HH PPS REVENUE CREDIT

## 2022-06-23 PROCEDURE — 3331090002 HH PPS REVENUE DEBIT

## 2022-06-24 ENCOUNTER — HOME CARE VISIT (OUTPATIENT)
Dept: SCHEDULING | Facility: HOME HEALTH | Age: 67
End: 2022-06-24
Payer: MEDICARE

## 2022-06-24 VITALS
RESPIRATION RATE: 16 BRPM | DIASTOLIC BLOOD PRESSURE: 70 MMHG | OXYGEN SATURATION: 94 % | TEMPERATURE: 97.3 F | SYSTOLIC BLOOD PRESSURE: 120 MMHG | HEART RATE: 64 BPM

## 2022-06-24 PROCEDURE — G0157 HHC PT ASSISTANT EA 15: HCPCS

## 2022-06-24 PROCEDURE — 3331090002 HH PPS REVENUE DEBIT

## 2022-06-24 PROCEDURE — 3331090001 HH PPS REVENUE CREDIT

## 2022-06-24 NOTE — HOME HEALTH
Patient's Subjective: The patient reports intermittent pain in her back ad down the leg at times. Falls since last session: No  Pain/Discomfort ? Yes- see pain page  New Meds: No  Upcoming MD appointments:  no  .  Caregiver involvement/assistance needed for: The patient's caregiver assists with transportation, some chores, meals  . Home health supplies by type and quantity ordered/delivered this visit include:  None  . Objective:  See interventions. Patient response to treatment:    Pt with Fair exercise tolerance. She was fatigued post amb. Patient level of understanding of education provided:  - Introduced new exercises in standing holding the couch armrest and cane with close SBA. She was able to return demonstrate.   - Educated on the rollator brakes and how to use/reasoning for of the 2. Assess of progress towards goals:   She continues with sciatica. No falls per her report. She reports compliance with HEP. MOD I to Ind with all indoor transfers. Progressing towards Ind with all transfers. Have not yet assessed car transfers. Continued need for the following skills: Home Health PT is medically necessary to address the following:  LB pain, decreased ROM, decreased strength, increased edema, I  impaired gait, impaired stair negotiation and impaired stability to decrease sx, improve functional Ind, quality of life, reduce the fall risk. Plan for next visit: Assess Ind with new exercises. Educated in amb with rollator. She has not used it yet.      The following discharge was discussed with the patient/caregiver :   Estimated PT DC date 7/14  [facility] needed Yes

## 2022-06-25 PROCEDURE — 3331090001 HH PPS REVENUE CREDIT

## 2022-06-25 PROCEDURE — 3331090002 HH PPS REVENUE DEBIT

## 2022-06-26 PROCEDURE — 3331090001 HH PPS REVENUE CREDIT

## 2022-06-26 PROCEDURE — 3331090002 HH PPS REVENUE DEBIT

## 2022-06-27 ENCOUNTER — HOME CARE VISIT (OUTPATIENT)
Dept: SCHEDULING | Facility: HOME HEALTH | Age: 67
End: 2022-06-27
Payer: MEDICARE

## 2022-06-27 VITALS
SYSTOLIC BLOOD PRESSURE: 130 MMHG | HEART RATE: 81 BPM | DIASTOLIC BLOOD PRESSURE: 81 MMHG | OXYGEN SATURATION: 98 % | TEMPERATURE: 97.2 F

## 2022-06-27 PROCEDURE — 3331090002 HH PPS REVENUE DEBIT

## 2022-06-27 PROCEDURE — 3331090001 HH PPS REVENUE CREDIT

## 2022-06-27 PROCEDURE — G0152 HHCP-SERV OF OT,EA 15 MIN: HCPCS

## 2022-06-27 NOTE — HOME HEALTH
SUBJECTIVE: Pt finishing getting dressing and washed up upon OT arrival with no concerns. Pt returning to work today via online at home. CAREGIVER INVOLVEMENT/ASSISTANCE NEEDED FOR: Pt  assists with IADLs as needed    HOME HEALTH SUPPLIES BY TYPE AND QUANTITY ORDERED/DELIVERED THIS VISIT INCLUDE: N/A    OBJECTIVE: See interventions    PATIENT RESPONSE TO TREATMENT: Pt responded well to home health occupational therapy treatment session. Pt main bathroom used was assessed since it was occupaied at time of evaluation. Pt has walk in shower and shower chair. PATIENT LEVEL OF UNDERSTANDING OF EDUCATION PROVIDED: Pt educated on energy conservation techniques including seated rest breaks, bathing on times/days of no other appointments, and using reacher to obtain hard to reach objects. Pt was aslo educated on adaptive technqiues for IADLs like breaking down big tasks into smaller ones, planning activity in advance, and compelting in sitting when possible. Pt able to teach back energy conservation techniques and IADL adaptive technqiues with good understanding. Pt educated on adaptive equipment recomenations for her bathroom to include grab bars in bathroom by toilet and shower. ASSESSMENT OF PROGRESS TOWARD GOALS: Pt progressing well with her goals with pt provided with saftey recomendations to include adaptive equipment options as well as optional grab bar placements for increased saftey and reducing her risk of falls. Pt progressing with her energy conservation goals with pt able to teach back energy conservation techniques and express those already implamenting in her daily routine. Pt provided with handout for refernence for continued carry over. Pt educated on IADL adaptive techniques with pt receptive. Pt however could benifit from functional balance training during simulated/real IADLs.      CONTINUED NEED FOR THE FOLLOWING SKILLS: Due to reduced functional activity tolerance, BUE weakness, functional mobility, balance, and IADL function, pt would benefit from OT [facility] services in order to maximize safety and independence in home environment.     PLAN FOR NEXT VISIT: Progress IADLs and standing balance/tolerance    THE FOLLOWING DISCHARGE PLANNING WAS DISCUSSED WITH THE PATIENT/CAREGIVER: 1w2 with plan to dc to home environment once [facility] OT goals have been met or has met max potential.

## 2022-06-28 ENCOUNTER — HOME CARE VISIT (OUTPATIENT)
Dept: SCHEDULING | Facility: HOME HEALTH | Age: 67
End: 2022-06-28
Payer: MEDICARE

## 2022-06-28 PROCEDURE — 3331090002 HH PPS REVENUE DEBIT

## 2022-06-28 PROCEDURE — G0157 HHC PT ASSISTANT EA 15: HCPCS

## 2022-06-28 PROCEDURE — 3331090001 HH PPS REVENUE CREDIT

## 2022-06-29 DIAGNOSIS — G35 MULTIPLE SCLEROSIS (HCC): Primary | ICD-10-CM

## 2022-06-29 DIAGNOSIS — Z11.59 ENCOUNTER FOR SCREENING FOR OTHER VIRAL DISEASES: ICD-10-CM

## 2022-06-29 DIAGNOSIS — Z51.81 MEDICATION MONITORING ENCOUNTER: ICD-10-CM

## 2022-06-29 PROCEDURE — 3331090001 HH PPS REVENUE CREDIT

## 2022-06-29 PROCEDURE — 3331090002 HH PPS REVENUE DEBIT

## 2022-06-29 RX ORDER — DIMETHYL FUMARATE 240 MG/1
240 CAPSULE ORAL 2 TIMES DAILY
Qty: 60 CAPSULE | Refills: 5 | Status: SHIPPED | OUTPATIENT
Start: 2022-06-29 | End: 2022-07-06

## 2022-06-30 ENCOUNTER — HOME CARE VISIT (OUTPATIENT)
Dept: SCHEDULING | Facility: HOME HEALTH | Age: 67
End: 2022-06-30
Payer: MEDICARE

## 2022-06-30 VITALS
TEMPERATURE: 97.5 F | OXYGEN SATURATION: 94 % | HEART RATE: 62 BPM | DIASTOLIC BLOOD PRESSURE: 73 MMHG | SYSTOLIC BLOOD PRESSURE: 126 MMHG

## 2022-06-30 PROCEDURE — G0152 HHCP-SERV OF OT,EA 15 MIN: HCPCS

## 2022-06-30 PROCEDURE — 3331090001 HH PPS REVENUE CREDIT

## 2022-06-30 PROCEDURE — 3331090002 HH PPS REVENUE DEBIT

## 2022-06-30 PROCEDURE — G0157 HHC PT ASSISTANT EA 15: HCPCS

## 2022-06-30 NOTE — HOME HEALTH
SUBJECTIVE: Pt reports consistant pain from siatica but no other complaunts     CAREGIVER INVOLVEMENT/ASSISTANCE NEEDED FOR: Pt son and  assist with IADLs    HOME HEALTH SUPPLIES BY TYPE AND QUANTITY ORDERED/DELIVERED THIS VISIT INCLUDE: N/A    OBJECTIVE: See Interventions    PATIENT RESPONSE TO TREATMENT: Pt responded well to home health occupational therapy treatment session and receptive to adaptive techniques/equipment options suggested to increase her saftey and independene within her home. PATIENT LEVEL OF UNDERSTANDING OF EDUCATION PROVIDED: Pt educated on adaptive techniques for IADL tasks to including using reacher to obtain hard to reach objects, complete IADLs in sitting when possible and prep planning activiites prior to engaging. Pt able to teach back in her kitchen environment and demonstrate good understanding. ASSESSMENT OF PROGRESS TOWARD GOALS: Pt making great progress towards her goals and on track to discharge next visit next week. She has met her IADL and energy conservation goals with pt able to teach back adaptive techniques for IADLs and energy conservation techniques for her daily routine with good understanding. She has been implamenting thse and plans to continue to implament these techniques within her daily routine. Pt prrogressed to stanidng for 4 minutes with fair balance without seated rest break however required break seated due to increased siatic pain experienced. CONTINUED NEED FOR THE FOLLOWING SKILLS: Due to reduced functional activity tolerance, weakness, functional mobility, balance, and IADL function, pt would benefit from OT [facility] services in order to maximize safety and independence in home environment.     PLAN FOR NEXT VISIT: Discharge    THE FOLLOWING DISCHARGE PLANNING WAS DISCUSSED WITH THE PATIENT/CAREGIVER: 1w1 with plan to dc to home environment once [facility] OT goals have been met or has met max potential.

## 2022-07-01 PROCEDURE — 3331090002 HH PPS REVENUE DEBIT

## 2022-07-01 PROCEDURE — 3331090001 HH PPS REVENUE CREDIT

## 2022-07-02 PROCEDURE — 3331090001 HH PPS REVENUE CREDIT

## 2022-07-02 PROCEDURE — 3331090002 HH PPS REVENUE DEBIT

## 2022-07-03 PROCEDURE — 3331090001 HH PPS REVENUE CREDIT

## 2022-07-03 PROCEDURE — 3331090002 HH PPS REVENUE DEBIT

## 2022-07-04 PROCEDURE — 3331090001 HH PPS REVENUE CREDIT

## 2022-07-04 PROCEDURE — 3331090002 HH PPS REVENUE DEBIT

## 2022-07-05 ENCOUNTER — HOME CARE VISIT (OUTPATIENT)
Dept: SCHEDULING | Facility: HOME HEALTH | Age: 67
End: 2022-07-05
Payer: MEDICARE

## 2022-07-05 ENCOUNTER — TELEPHONE (OUTPATIENT)
Dept: NEUROLOGY | Age: 67
End: 2022-07-05

## 2022-07-05 VITALS
HEART RATE: 90 BPM | DIASTOLIC BLOOD PRESSURE: 71 MMHG | OXYGEN SATURATION: 98 % | TEMPERATURE: 98.1 F | SYSTOLIC BLOOD PRESSURE: 128 MMHG

## 2022-07-05 DIAGNOSIS — M54.31 SCIATICA OF RIGHT SIDE: Chronic | ICD-10-CM

## 2022-07-05 PROCEDURE — 3331090002 HH PPS REVENUE DEBIT

## 2022-07-05 PROCEDURE — 3331090001 HH PPS REVENUE CREDIT

## 2022-07-05 PROCEDURE — G0157 HHC PT ASSISTANT EA 15: HCPCS

## 2022-07-05 PROCEDURE — G0152 HHCP-SERV OF OT,EA 15 MIN: HCPCS

## 2022-07-05 NOTE — TELEPHONE ENCOUNTER
Home Health called for your information regarding patient's increased pain. Patient completed OT.  Patient wants to know about the other medication she stopped the the tecfedera

## 2022-07-05 NOTE — TELEPHONE ENCOUNTER
Received call from fay, with Home Health patient Being discharged from [facility] and reports having increase pain in Back Sciatica. NP to advise patient  Number to reach [phone].

## 2022-07-05 NOTE — HOME HEALTH
Pt reports increased sciatic pain impacting her tolerance for completion of daily activities. Contacted Eric Barney NP and spoke with Nurse Tom Leal.  Pt neurologists no longer at the preactice and information relayed to Raymond Sesay NP.

## 2022-07-05 NOTE — Clinical Note
Pt discharged from home health OT. Pt reports increased sciatic pain impacting her tolerance for completion of daily activities. Contacted Warren Horn NP and spoke with Nurse Matthew Whitmore.  Pt neurologists no longer at the preactice and information relayed to Stephanie Chen NP.

## 2022-07-05 NOTE — TELEPHONE ENCOUNTER
Patient request to [address] to [facility] on [address] in [place] due to insurance.

## 2022-07-06 ENCOUNTER — TELEPHONE (OUTPATIENT)
Dept: NEUROLOGY | Age: 67
End: 2022-07-06

## 2022-07-06 VITALS
HEART RATE: 85 BPM | OXYGEN SATURATION: 98 % | TEMPERATURE: 97.4 F | TEMPERATURE: 98.2 F | HEART RATE: 80 BPM | DIASTOLIC BLOOD PRESSURE: 82 MMHG | HEART RATE: 88 BPM | TEMPERATURE: 98 F | SYSTOLIC BLOOD PRESSURE: 120 MMHG | SYSTOLIC BLOOD PRESSURE: 124 MMHG | OXYGEN SATURATION: 96 % | SYSTOLIC BLOOD PRESSURE: 130 MMHG | DIASTOLIC BLOOD PRESSURE: 78 MMHG | OXYGEN SATURATION: 99 % | DIASTOLIC BLOOD PRESSURE: 70 MMHG

## 2022-07-06 DIAGNOSIS — M54.31 SCIATICA OF RIGHT SIDE: Chronic | ICD-10-CM

## 2022-07-06 PROCEDURE — 3331090001 HH PPS REVENUE CREDIT

## 2022-07-06 PROCEDURE — 3331090002 HH PPS REVENUE DEBIT

## 2022-07-06 RX ORDER — GABAPENTIN 300 MG/1
300 CAPSULE ORAL EVERY 12 HOURS
Qty: 60 CAPSULE | Refills: 5 | Status: SHIPPED | OUTPATIENT
Start: 2022-07-06

## 2022-07-06 RX ORDER — DIMETHYL FUMARATE 120-240 MG
KIT ORAL
Qty: 60 CAPSULE | Refills: 0 | Status: SHIPPED | OUTPATIENT
Start: 2022-07-06 | End: 2022-07-15

## 2022-07-06 RX ORDER — GABAPENTIN 300 MG/1
300 CAPSULE ORAL EVERY 12 HOURS
Qty: 60 CAPSULE | Refills: 5 | Status: CANCELLED | OUTPATIENT
Start: 2022-07-06

## 2022-07-06 NOTE — TELEPHONE ENCOUNTER
Spoke with patient, verified name and . Patient reports , had stopped Tecfidera while in Rehab patient states she was on program   Due to expensive medication. She continued gabapentin.  Rx's need to go to [facility] on [address] in [place], [place]

## 2022-07-06 NOTE — TELEPHONE ENCOUNTER
Attempted to contact patient, no answer. Left VM to return call to office. Patient will need sooner appointment, with medication adjustment.

## 2022-07-06 NOTE — HOME HEALTH
SUBJECTIVE: The patient reports no pain at start of session but explaines that HEP has not been done due to feeling it increased pain and pain was very bad last night. No Falls reported  No changes in Meds. Caregiver Participation: Patient is Ind with ADL's, lives with spouse for assistance when needed for meals, transportation, and household chores. OBJECTIVE:  See interventions. Tinetti 14/28, improved from 8/28 at al    Patient Education and Level of Understanding: Educated patient on use of rollator within house, required Wade for undertanding and demonstrate use of AD. Educated patient on HEP with rest breaks as neded for pain and SOB. Pt able to return demonstrate HEP. Educated patient on fall prevention with correct footwear and wearing AFO and pt with verbal nderstanding and acknoledgement  . ASSESSMENT:Patient Response to [address]: Patient with poor exercise tolerance due to c/o pain. Patient able to perform increased ambulation with rollator training with decreased fatigue noted. Progress Toward Goals: Patient with improved balance as noted with increased TInetti score of 14/28,  Pt continues wotk toward gait and endurance goals with ambulation x 100' with rollator. Pt contiues with sciatica. She reports non compliance with HEP due to pain increase last night after HEP. PLAN:Next Visit: Assess Ind with car transfers, assess TUG and FTSTS if able, Educate gait with stairs and outdoors.   Discharge:Discussed discharge with patient, estimated date 7/14/22  [facility] needed Yes

## 2022-07-07 PROCEDURE — 3331090002 HH PPS REVENUE DEBIT

## 2022-07-07 PROCEDURE — 3331090001 HH PPS REVENUE CREDIT

## 2022-07-08 ENCOUNTER — HOME CARE VISIT (OUTPATIENT)
Dept: HOME HEALTH SERVICES | Facility: HOME HEALTH | Age: 67
End: 2022-07-08
Payer: MEDICARE

## 2022-07-08 PROCEDURE — 3331090001 HH PPS REVENUE CREDIT

## 2022-07-08 PROCEDURE — 3331090002 HH PPS REVENUE DEBIT

## 2022-07-09 PROCEDURE — 3331090002 HH PPS REVENUE DEBIT

## 2022-07-09 PROCEDURE — 3331090001 HH PPS REVENUE CREDIT

## 2022-07-09 NOTE — HOME HEALTH
SUBJECTIVE: The patient reports 10/10 for pain, and has called MD or visit or increased Meds. No Falls reported  No changes in Meds. Caregiver Participation: Patient is Ind with ADL's,and spouse assistance when needed for meals, transportation, and household chores. OBJECTIVE:  See interventions. Patient Education and Level of Understanding:   Patient educated on pain management, repositioning and stretches to alleviate intense pain. Pt able to demostrate stretches and verbal understanding of pain managemeent techniques. Educated patient on fall prevention. Pt with verbal understanding and acknoledgement. ASSESSMENT:Patient Response to [facility]: Patient had difficulty with all tasks/training due to c/o intense pain. Patient  able to tolerate gait training, but returned to seated stretches following ambulation. Progress Toward Goals: Patient with improved gait and endurance working toward goals. Pt continues with sciatica Pain. PLAN:Next Visit:  sign [address], assess with car transfers, Continue to educate gait with stairs and outdoors.   Discharge:Discussed discharge with patient, estimated date 7/14/22  [address] needed Yes-next visit

## 2022-07-09 NOTE — HOME HEALTH
SUBJECTIVE: The patient reports not wanting to go outside or attempt front steps today, has had increased anxiety thinking about ask. Only1/10 pain at start of session   No Falls reported  No changes in Meds. Caregiver Participation: Patient is Ind with ADL's, spouse assistance when needed for meals, transportation, and household chores. OBJECTIVE:  See interventions. TUG 36 seconds  FTSTS 28 seconds    Patient Education and Level of Understanding: Educated patient on HEP with rest breaks as neded for pain and able to return demonstrate HEP. Educated patient on fall prevention with correct footwear and using AD even in short distance. Pt with verbal understanding and acknoledgement    ASSESSMENT:Patient Response to [place]: Patient with improved tolerance for exercises with decreased c/o pain. Patient with increased anxiety over front steps training at start of session, but performed up/down 1 step 5 x and stated decreased anxiety. Progress Toward Goals: Patient with improved strength with FTSTS of 28 seconds  Pt continues wotk toward gait goal with Rusty with rollator x 100ft. and Rusty with QC x 50, improved from 30 ft at eval.  Pt contiues with sciatica pain that fluxuates. She reports compliance with HEP     PLAN:Next Visit: Assess Ind with car transfers, Continue to educate gait with stairs and outdoors.   Discharge:Discussed discharge with patient, estimated date 7/14/22  [facility] HEART needed Yes

## 2022-07-10 PROCEDURE — 3331090002 HH PPS REVENUE DEBIT

## 2022-07-10 PROCEDURE — 3331090001 HH PPS REVENUE CREDIT

## 2022-07-11 PROCEDURE — 3331090002 HH PPS REVENUE DEBIT

## 2022-07-11 PROCEDURE — 3331090001 HH PPS REVENUE CREDIT

## 2022-07-12 ENCOUNTER — HOME CARE VISIT (OUTPATIENT)
Dept: SCHEDULING | Facility: HOME HEALTH | Age: 67
End: 2022-07-12
Payer: MEDICARE

## 2022-07-12 PROCEDURE — G0157 HHC PT ASSISTANT EA 15: HCPCS

## 2022-07-12 PROCEDURE — 3331090002 HH PPS REVENUE DEBIT

## 2022-07-12 PROCEDURE — 3331090001 HH PPS REVENUE CREDIT

## 2022-07-13 PROCEDURE — 3331090001 HH PPS REVENUE CREDIT

## 2022-07-13 PROCEDURE — 3331090002 HH PPS REVENUE DEBIT

## 2022-07-14 ENCOUNTER — HOME CARE VISIT (OUTPATIENT)
Dept: SCHEDULING | Facility: HOME HEALTH | Age: 67
End: 2022-07-14
Payer: MEDICARE

## 2022-07-14 VITALS
SYSTOLIC BLOOD PRESSURE: 120 MMHG | DIASTOLIC BLOOD PRESSURE: 80 MMHG | HEART RATE: 72 BPM | TEMPERATURE: 97.6 F | RESPIRATION RATE: 18 BRPM | OXYGEN SATURATION: 97 %

## 2022-07-14 PROCEDURE — G0151 HHCP-SERV OF PT,EA 15 MIN: HCPCS

## 2022-07-14 PROCEDURE — 3331090002 HH PPS REVENUE DEBIT

## 2022-07-14 PROCEDURE — 3331090001 HH PPS REVENUE CREDIT

## 2022-07-15 ENCOUNTER — VIRTUAL VISIT (OUTPATIENT)
Dept: NEUROLOGY | Age: 67
End: 2022-07-15
Payer: MEDICARE

## 2022-07-15 VITALS
TEMPERATURE: 97.9 F | HEART RATE: 89 BPM | SYSTOLIC BLOOD PRESSURE: 118 MMHG | OXYGEN SATURATION: 99 % | DIASTOLIC BLOOD PRESSURE: 78 MMHG

## 2022-07-15 DIAGNOSIS — Z74.09 IMPAIRED MOBILITY: ICD-10-CM

## 2022-07-15 DIAGNOSIS — R79.89 LOW VITAMIN D LEVEL: ICD-10-CM

## 2022-07-15 DIAGNOSIS — G35 MULTIPLE SCLEROSIS (HCC): Primary | ICD-10-CM

## 2022-07-15 DIAGNOSIS — E55.9 VITAMIN D DEFICIENCY, UNSPECIFIED: ICD-10-CM

## 2022-07-15 DIAGNOSIS — Z11.59 ENCOUNTER FOR SCREENING FOR OTHER VIRAL DISEASES: ICD-10-CM

## 2022-07-15 PROCEDURE — G8399 PT W/DXA RESULTS DOCUMENT: HCPCS | Performed by: NURSE PRACTITIONER

## 2022-07-15 PROCEDURE — 1101F PT FALLS ASSESS-DOCD LE1/YR: CPT | Performed by: NURSE PRACTITIONER

## 2022-07-15 PROCEDURE — 3331090002 HH PPS REVENUE DEBIT

## 2022-07-15 PROCEDURE — 1090F PRES/ABSN URINE INCON ASSESS: CPT | Performed by: NURSE PRACTITIONER

## 2022-07-15 PROCEDURE — 99215 OFFICE O/P EST HI 40 MIN: CPT | Performed by: NURSE PRACTITIONER

## 2022-07-15 PROCEDURE — G0463 HOSPITAL OUTPT CLINIC VISIT: HCPCS | Performed by: NURSE PRACTITIONER

## 2022-07-15 PROCEDURE — G8427 DOCREV CUR MEDS BY ELIG CLIN: HCPCS | Performed by: NURSE PRACTITIONER

## 2022-07-15 PROCEDURE — 3331090001 HH PPS REVENUE CREDIT

## 2022-07-15 PROCEDURE — 1123F ACP DISCUSS/DSCN MKR DOCD: CPT | Performed by: NURSE PRACTITIONER

## 2022-07-15 PROCEDURE — 3017F COLORECTAL CA SCREEN DOC REV: CPT | Performed by: NURSE PRACTITIONER

## 2022-07-15 PROCEDURE — G8432 DEP SCR NOT DOC, RNG: HCPCS | Performed by: NURSE PRACTITIONER

## 2022-07-15 RX ORDER — CHOLECALCIFEROL (VITAMIN D3) 125 MCG
2000 CAPSULE ORAL
COMMUNITY

## 2022-07-15 NOTE — PROGRESS NOTES
Abhi Foy is a 74 y.o. female who was seen by synchronous (real-time) audio-video technology on 7/15/2022 for Multiple Sclerosis (follow up)        Assessment & Plan:   Diagnoses and all orders for this visit:    1. Multiple sclerosis ([facility] [address]  -     VITAMIN D, 25 HYDROXY; Future  -     HEP B SURFACE AG; Future  -     HEPATITIS B CORE AB, TOTAL; Future  -     STRATIFY JCV AB WITH INDEX W/RELEX INHIBITION ASSAY  -     CBC WITH AUTOMATED DIFF; Future  -     METABOLIC PANEL, COMPREHENSIVE; Future    2. Low vitamin D level  -     VITAMIN D, 25 HYDROXY; Future    3. Vitamin D deficiency, unspecified   -     VITAMIN D, 25 HYDROXY; Future    4. Encounter for screening for other viral diseases   -     HEP B SURFACE AG; Future  -     HEPATITIS B CORE AB, TOTAL; Future    This is a 72-year-old female who presents in follow-up for multiple sclerosis. She has had a diagnosis since 1996. She was on no DMT for a time then was on Tecfidera for 5 years. She was recently on only once daily dosing of the medication as opposed to twice daily. She came to the hospital with an exacerbation in May. She presented at that time with a fall and worsened left sided weakness. She also has complaint of right lower extremity pain and burning. Radiating paresthesias believed to be most likely related to thoracic cord lesion. Lumbar spine unremarkable. She has concerns about resuming Tecfidera. She does have a low WBC and lymphopenia which would need to be monitored. We will obtain lab work and consider DMT options such as Vumerity or Randye Aver. We will obtain CBC with differential, CMP, GIOVANNA virus antibodies, hep B testing, and vitamin D level. She is taking vitamin D 2000 units daily. Her right lower extremity paresthesias are improving. We will continue the gabapentin 300 mg twice daily. Discussed potential side effects of medication. She completed home health PT and OT. She would like to hold off on outpatient PT at this time. Recommended to continue home exercises. We will provide DME wheelchair order. Provided numbers for primary care offices. We will contact after lab results regarding follow-up. I spent at least 45 minutes on this visit with this established patient. Subjective:     Akin Beckford presents in follow-up for MS. She was seen in hospital consultation by Dr. Jolynn Duvall on 5/10/2022. At that time she had been admitted for a fall. She has MS diagnosis since 1996. She came with left-sided weakness at that time as well as lower extremity paresthesias and was using a cane. She lives with her  and son. She was given IV Solu-Medrol x 5 days. It was noted that she was taking Tecfidera once daily and was recommended to increase to twice daily. Gabapentin was increased to 300 mg BID and vitamin D was recommended. MRI brain 5/9/2022: \"1. Redemonstrated cerebral and brainstem white matter lesions consistent with  the history of multiple sclerosis. Compared with 11/2017 MRI brain no new or enhancing lesions. No findings of PML. 2. Mild global cerebral and cerebellar volume loss. No hydrocephalus. 3. No acute intracranial abnormality. \"  MRI c-spine: \"1. Similar appearance of the cord lesions at C3 and C4/5 compared with 11/2016 MRI. May be a subtle small lesion in the right side of the cord at C5.  2. The dorsal cord lesion at the T1 level has abnormal postcontrast enhancement suggesting active demyelination related to history of multiple sclerosis. 3. Relatively mild degenerative changes mostly at the C6/C7 level. Moderate bilateral neural foraminal narrowing. Other levels as described. \"  MRI t-spine: \"1. T2 hyperintense lesions in the thoracic spinal cord at T1 and T6 have postcontrast enhancement consistent with active demyelination related to multiple sclerosis. 2. Additional nonenhancing chronic appearing cord lesion at T10.  3. No significant degenerative disc disease in the thoracic spine.  No masses. \"  MRI lumbar spine: \"1. No focal disc pathology in the lumbar spine. No central spinal canal stenosis or significant foraminal narrowing at any level. Some minor foraminal narrowing bilaterally at L3/L4. 2. Conus medullaris terminates at the T12 level, normal signal. Cauda equina nerve roots layer normally within the dura, no abnormal signal or abnormal enhancement. \"    She was last seen here on 6/21/2022 by ZELALEM Cordova. She presents today in follow-up. Endorses history of MS diagnosis since 1996, was not on DMT until she was on Tecfidera for 5 years but no longer taking Tecfidera. She was previously seeing Dr. Wilbert Franklin. She was last seen there before covid. She was on Tecfidera 240 mg but taking once a day. She was going to go back to twice a day but somehow it was stopped in rehab. After the ARU she went to rehab at [facility] rehab facility. She endorses side effect of flushing with Tecfidera but took aspirin with it. She is concerned with the cost of the med. Denies s/s of infection or UTI. She recently completed [facility] PT and OT. Reports her sciatic nerve pain was acting up last month. She was experiencing pain down RLE. The TENS was helping a lot. She has now gone back to work fully, 4 hours a day, she is an . Reports doing well. Not using the cane. Uses wheelchair if going to dining room or bathroom/shower. Has a shower bench. Otherwise ambulates. Denies further falls. Prior to Admission medications    Medication Sig Start Date End Date Taking? Authorizing Provider   gabapentin (NEURONTIN) 300 mg capsule Take 1 Capsule by mouth every twelve (12) hours. Max Daily Amount: 600 mg. Indications: neuropathic pain 7/6/22  Yes Bari Epley, NP   dimethyl fumarate (Tecfidera) 120 mg (14)- 240 mg (46) cpDR Take 120 mg by mouth twice daily for 1 week then 240 mg twice daily thereafter.  7/6/22  Yes Bari Epley, NP   acetaminophen (TYLENOL) 325 mg tablet Take 2 Tablets by mouth every six (6) hours as needed for Pain. 5/31/22  Yes Ishan MARAVILLA NP   aspirin 81 mg chewable tablet Take 1 Tablet by mouth nightly. Patient not taking: Reported on 6/21/2022 5/31/22   Ishan MARAVILLA NP     Patient Active Problem List   Diagnosis Code    Multiple sclerosis exacerbation ([facility] [address] G35    Weakness of both lower extremities R29.898    Vitamin D deficiency E55.9    Elevated TSH R79.89    Elevated serum free T4 level R79.89    Impaired mobility and ADLs Z74.09, Z78.9    Multiple sclerosis (HCC) G35    Sciatica of right side M54.31    Closed fracture of proximal end of left radius with routine healing S52.102D     Current Outpatient Medications   Medication Sig Dispense Refill    gabapentin (NEURONTIN) 300 mg capsule Take 1 Capsule by mouth every twelve (12) hours. Max Daily Amount: 600 mg. Indications: neuropathic pain 60 Capsule 5    dimethyl fumarate (Tecfidera) 120 mg (14)- 240 mg (46) cpDR Take 120 mg by mouth twice daily for 1 week then 240 mg twice daily thereafter. 60 Capsule 0    acetaminophen (TYLENOL) 325 mg tablet Take 2 Tablets by mouth every six (6) hours as needed for Pain. 10 Tablet 0    aspirin 81 mg chewable tablet Take 1 Tablet by mouth nightly.  (Patient not taking: Reported on 6/21/2022) 30 Tablet 0     Allergies   Allergen Reactions    Amoxicillin Hives and Rash    Clindamycin Hives and Rash    Metronidazole Hives    Tetracycline Hives and Rash     Past Medical History:   Diagnosis Date    Closed fracture of proximal end of left radius with routine healing 5/10/2022    Elevated serum free T4 level 5/16/2022    Free T4 (5/16/2022) = 1.7    Elevated TSH 5/15/2022    TSH (5/15/2022) = 4.00    MS (multiple sclerosis) (HCC)     Multiple sclerosis (HCC)     Sciatica of right side     Vitamin D deficiency 5/9/2022    Vitamin D 25-Hydroxy (5/9/2022) = 16.6      Past Surgical History:   Procedure Laterality Date    MD BIOPSY OF BREAST, INCISIONAL  RT BENIGN OVER 10 YRS. AGO PER PT. History reviewed. No pertinent family history. Social History     Tobacco Use    Smoking status: Former Smoker     Types: Cigarettes     Quit date: 1980     Years since quittin.1    Smokeless tobacco: Never Used   Substance Use Topics    Alcohol use: Not Currently       ROS  GENERAL: Denies fever or fatigue  CARDIAC: No CP or SOB  PULMONARY: No cough or SOB  MUSCULOSKELETAL: No new joint pain. +RLE pain. NEURO: SEE HPI    Objective:     Patient-Reported Vitals 7/15/2022   Patient-Reported Weight 130lb        Constitutional: [x] Appears well-developed and well-nourished [x] No apparent distress      [] Abnormal -     Mental status: [x] Alert and awake  [x] Oriented to person/place/time [x] Able to follow commands    [] Abnormal -     Eyes:   EOM    [x]  Normal    [] Abnormal -   Sclera  [x]  Normal    [] Abnormal -          Discharge [x]  None visible   [] Abnormal -     HENT: [x] Normocephalic, atraumatic  [] Abnormal -   [] Mouth/Throat: Mucous membranes are moist    External Ears [] Normal  [] Abnormal -    Neck: [] No visualized mass [] Abnormal -     Pulmonary/Chest: [x] Respiratory effort normal   [x] No visualized signs of difficulty breathing or respiratory distress        [] Abnormal -      Musculoskeletal:   [] Normal gait with no signs of ataxia         [x] Normal range of motion of neck        [] Abnormal -     Neurological:        [x] No Facial Asymmetry (Cranial nerve 7 motor function) (limited exam due to video visit). Speech is fluent. Speech clear. Moves BUE. BLE exam and gait exam deferred.         [x] No gaze palsy        [] Abnormal -          Skin:        [x] No significant exanthematous lesions or discoloration noted on facial skin         [] Abnormal -            Psychiatric:       [x] Normal Affect [] Abnormal -        [x] No Hallucinations    Other pertinent observable physical exam findings:-        We discussed the expected course, resolution and complications of the diagnosis(es) in detail. Medication risks, benefits, costs, interactions, and alternatives were discussed as indicated. I advised her to contact the office if her condition worsens, changes or fails to improve as anticipated. She expressed understanding with the diagnosis(es) and plan. Dakotah Landry, was evaluated through a synchronous (real-time) audio-video encounter. The patient (or guardian if applicable) is aware that this is a billable service, which includes applicable co-pays. This Virtual Visit was conducted with patient's (and/or legal guardian's) consent. The visit was conducted pursuant to the emergency declaration under the [facility], [address] [place] [address] authority and the [employer] General Act. Patient identification was verified, and a caregiver was present when appropriate.   The patient was located at: Home: [address] [place] [id number]  The provider was located at: Home:         Susan Snell NP

## 2022-07-15 NOTE — HOME HEALTH
SUBJECTIVE: Pt observed ambulating without AFO and without AD upon arrival.Pt using TENS unit to alleviate pain with positive results. No falls reported  No changes in medications per patient    CAREGIVER INVOLVEMENT/ASSISTANCE: Patient takes seated rest breaks during ADL's, and requires SUP for bathing,  assist with ADL's and chores as needed. .  OBJECTIVE:  See interventions. Gait with rollator x 100ft indoors, Rusty  Gait up/down 3 front steps with QC with CGA and verbal cues for sequencing with increased anxiety noted. TUG 19.8  Tinetti 21/28  FTSTS 18.9    PATIENT EDUCATION PROVIDED THIS VISIT: Educated pt on fall risk, to use AD, wear propper footwear with AFO for increased stability. Educated on HEP to increase strength and stability to bed done 3x day. PATIENT RESPONSE TO EDUCATION PROVIDED: Pt able to demostrate HEP Ind. Pt with verbal understanding of using AFO and proper footwear for increased stability. PATIENT RESPONSE TO TREATMENT: Patient able to tolerate gait with decreased c/o SOB, able to ambulate short distances without AD  Pt performed HEP with no c/o increased pain  . ASSESSMENT OF PROGRESS TOWARD GOALS:   Pt has met transfer goals. Pt showing increased stability with Tinetti of 21/28 and TUG 19.8 meeting goals for assessments. Jael Oleary PLAN:  THE FOLLOWING DISCHARGE PLANNING WAS DISCUSSED WITH THE PATIENT/CAREGIVER: Pt set for DC on 7/14/22    Request for Transport Manual Wheelchair:  The transport manual wheelchair is reasonable and necessary due to Patients diagnosis of MS and her additional fracture of Left radius  with a non weight-bearing status. Her diagnosis and limited mobility significantly impairs her MRADL's in the home including dressing, grooming, and bathing because she is unable to complete MRADL's within a reasonable time frame. A cane or walker will not sufficiently resolve the patient's limited mobility.  The transport manual wheelchair will significantly improve the patient's ability to participate in her MRADL's. The patient has expressed a willingness to use the transport manual wheelchair on a regular basis in the home. The patient is unable to use a standard manual wheelchair. The patient has a caregiver that is able to provide assistance with the wheelchair.

## 2022-07-15 NOTE — HOME HEALTH
SUBJECTIVE no complaint noted   CAREGIVER ASSISTANCE: is able to assist as needed   MEDICATIONS REVIEWED AND UPDATED: no changes in medications at this time  WOUND none   ROM wfl   BED MOBILITY independent upon dc from [address] upon dc, patient was able to demo independence with transfers without any AD from Min A during eval   GAIT TRAINING patient was seen ambulating without any AD upon dc from needing Wade and RW from eval   STAIRS patient is staying on 1st floor of house but able to negotiate stairs with supervision upon dc from not able during eval   THERAPEUTIC EXERCISES independent   BALANCE Tinetti 22/28 from 11/28   PATIENT/CAREGIVER EDUCATION THIS VISIT: fall prevention, safety, need for assistance as needed for transfers and gait  ASSESSMENT: patient provided with skilled PT intervention consisting of graded therapeutic exercises, gait training, balance training, safe transfers training, caregiver education and Home exercise program education. Patient at time of discharge was able to demonstrate independence with bed mobility, transfers and gait without AD. pt. also noted that she is doing HEP 1x a day.   EDUCATION PROVIDED to continue with HEP daily    PATIENT LEVEL OF UNDERSTANDING OF EDUCATION PROVIDED Patient verbalized understanding   PATIENT RESPONSE TO PROCEDURE PERFORMED Patient was able to demo independence with HEP   PLAN dc at this time due to goals are met   DISCHARGE PLANNING DISCUSSED: dc to self and family under MD supervision

## 2022-07-15 NOTE — PROGRESS NOTES
Patrica Laila presents today for   Chief Complaint   Patient presents with    Multiple Sclerosis     follow up       Is someone accompanying this pt? Virtual visit [phone]    Is the patient using any DME equipment during OV? no    Depression Screening:  No flowsheet data found. Learning Assessment:  Learning Assessment 12/18/2013   PRIMARY LEARNER Patient   HIGHEST LEVEL OF EDUCATION - PRIMARY LEARNER  SOME COLLEGE   BARRIERS PRIMARY LEARNER NONE   CO-LEARNER CAREGIVER No   PRIMARY LANGUAGE ENGLISH   LEARNER PREFERENCE PRIMARY LISTENING   ANSWERED BY patient   RELATIONSHIP SELF       Abuse Screening:  No flowsheet data found. Fall Risk  No flowsheet data found. Coordination of Care:  1. Have you been to the ER, urgent care clinic since your last visit? Hospitalized since your last visit? no    2. Have you seen or consulted any other health care providers outside of the [address] [place] [address] since your last visit? Include any pap smears or colon screening.  no

## 2022-07-16 PROCEDURE — 3331090002 HH PPS REVENUE DEBIT

## 2022-07-16 PROCEDURE — 3331090001 HH PPS REVENUE CREDIT

## 2022-07-17 PROCEDURE — 3331090001 HH PPS REVENUE CREDIT

## 2022-07-17 PROCEDURE — 3331090002 HH PPS REVENUE DEBIT

## 2022-07-20 ENCOUNTER — HOSPITAL ENCOUNTER (OUTPATIENT)
Dept: LAB | Age: 67
Discharge: HOME | End: 2022-07-20
Payer: MEDICARE

## 2022-07-20 DIAGNOSIS — E55.9 VITAMIN D DEFICIENCY, UNSPECIFIED: ICD-10-CM

## 2022-07-20 DIAGNOSIS — R79.89 LOW VITAMIN D LEVEL: ICD-10-CM

## 2022-07-20 DIAGNOSIS — Z11.59 ENCOUNTER FOR SCREENING FOR OTHER VIRAL DISEASES: ICD-10-CM

## 2022-07-20 DIAGNOSIS — G35 MULTIPLE SCLEROSIS (HCC): ICD-10-CM

## 2022-07-20 LAB
25(OH)D3 SERPL-MCNC: 41.9 NG/ML (ref 30–100)
ALBUMIN SERPL-MCNC: 3.7 G/DL (ref 3.4–5)
ALBUMIN/GLOB SERPL: 1.2 {RATIO} (ref 0.8–1.7)
ALP SERPL-CCNC: 74 U/L (ref 45–117)
ALT SERPL-CCNC: 16 U/L (ref 13–56)
ANION GAP SERPL CALC-SCNC: 6 MMOL/L (ref 3–18)
AST SERPL-CCNC: 15 U/L (ref 10–38)
BASOPHILS # BLD: 0 K/UL (ref 0–0.1)
BASOPHILS NFR BLD: 0 % (ref 0–2)
BILIRUB SERPL-MCNC: 0.3 MG/DL (ref 0.2–1)
BUN SERPL-MCNC: 20 MG/DL (ref 7–18)
BUN/CREAT SERPL: 29 (ref 12–20)
CALCIUM SERPL-MCNC: 9.5 MG/DL (ref 8.5–10.1)
CHLORIDE SERPL-SCNC: 109 MMOL/L (ref 100–111)
CO2 SERPL-SCNC: 28 MMOL/L (ref 21–32)
CREAT SERPL-MCNC: 0.68 MG/DL (ref 0.6–1.3)
DIFFERENTIAL METHOD BLD: ABNORMAL
EOSINOPHIL # BLD: 0 K/UL (ref 0–0.4)
EOSINOPHIL NFR BLD: 1 % (ref 0–5)
ERYTHROCYTE [DISTWIDTH] IN BLOOD BY AUTOMATED COUNT: 12.4 % (ref 11.6–14.5)
GLOBULIN SER CALC-MCNC: 3 G/DL (ref 2–4)
GLUCOSE SERPL-MCNC: 96 MG/DL (ref 74–99)
HBV SURFACE AG SER QL: <0.1 INDEX
HBV SURFACE AG SER QL: NEGATIVE
HCT VFR BLD AUTO: 41.1 % (ref 35–45)
HGB BLD-MCNC: 13.6 G/DL (ref 12–16)
IMM GRANULOCYTES # BLD AUTO: 0 K/UL (ref 0–0.04)
IMM GRANULOCYTES NFR BLD AUTO: 0 % (ref 0–0.5)
LYMPHOCYTES # BLD: 1 K/UL (ref 0.9–3.6)
LYMPHOCYTES NFR BLD: 20 % (ref 21–52)
MCH RBC QN AUTO: 30.8 PG (ref 24–34)
MCHC RBC AUTO-ENTMCNC: 33.1 G/DL (ref 31–37)
MCV RBC AUTO: 93 FL (ref 78–100)
MONOCYTES # BLD: 0.4 K/UL (ref 0.05–1.2)
MONOCYTES NFR BLD: 8 % (ref 3–10)
NEUTS SEG # BLD: 3.3 K/UL (ref 1.8–8)
NEUTS SEG NFR BLD: 70 % (ref 40–73)
NRBC # BLD: 0 K/UL (ref 0–0.01)
NRBC BLD-RTO: 0 PER 100 WBC
PLATELET # BLD AUTO: 186 K/UL (ref 135–420)
PMV BLD AUTO: 10.8 FL (ref 9.2–11.8)
POTASSIUM SERPL-SCNC: 4.3 MMOL/L (ref 3.5–5.5)
PROT SERPL-MCNC: 6.7 G/DL (ref 6.4–8.2)
RBC # BLD AUTO: 4.42 M/UL (ref 4.2–5.3)
SODIUM SERPL-SCNC: 143 MMOL/L (ref 136–145)
WBC # BLD AUTO: 4.7 K/UL (ref 4.6–13.2)

## 2022-07-20 PROCEDURE — 87340 HEPATITIS B SURFACE AG IA: CPT

## 2022-07-20 PROCEDURE — 86704 HEP B CORE ANTIBODY TOTAL: CPT

## 2022-07-20 PROCEDURE — 36415 COLL VENOUS BLD VENIPUNCTURE: CPT

## 2022-07-20 PROCEDURE — 85025 COMPLETE CBC W/AUTO DIFF WBC: CPT

## 2022-07-20 PROCEDURE — 82306 VITAMIN D 25 HYDROXY: CPT

## 2022-07-20 PROCEDURE — 80053 COMPREHEN METABOLIC PANEL: CPT

## 2022-07-21 LAB — HBV CORE AB SERPL QL IA: NEGATIVE

## 2022-07-22 ENCOUNTER — TELEPHONE (OUTPATIENT)
Dept: NEUROLOGY | Age: 67
End: 2022-07-22

## 2022-07-22 NOTE — TELEPHONE ENCOUNTER
Pt. Called because she wanted to let you and lebron know she has gotten her blood work done. Please advise.

## 2022-07-25 ENCOUNTER — TELEPHONE (OUTPATIENT)
Dept: NEUROLOGY | Age: 67
End: 2022-07-25

## 2022-07-28 ENCOUNTER — TELEPHONE (OUTPATIENT)
Dept: NEUROLOGY | Age: 67
End: 2022-07-28

## 2022-07-28 NOTE — TELEPHONE ENCOUNTER
Spoke with patient verified name and  informed patient of contact number for questions regarding Vumerity. [phone]

## 2022-08-06 LAB
Lab: NORMAL
REFERENCE LAB,REFLB: NORMAL
TEST DESCRIPTION:,ATST: NORMAL

## 2022-08-08 ENCOUNTER — TELEPHONE (OUTPATIENT)
Dept: NEUROLOGY | Age: 67
End: 2022-08-08

## 2022-08-09 ENCOUNTER — TELEPHONE (OUTPATIENT)
Dept: NEUROLOGY | Age: 67
End: 2022-08-09

## 2022-08-09 DIAGNOSIS — G35 MULTIPLE SCLEROSIS (HCC): Primary | ICD-10-CM

## 2022-08-09 RX ORDER — DIROXIMEL FUMARATE 231 MG/1
231 CAPSULE ORAL 2 TIMES DAILY
Qty: 120 EACH | Refills: 3 | Status: SHIPPED | OUTPATIENT
Start: 2022-08-09 | End: 2022-08-16

## 2022-08-09 NOTE — TELEPHONE ENCOUNTER
Spoke with patient regarding labs. Will start Vumerity for DMT. 231 mg twice daily then after 7 days, 462 mg twice daily. We will follow up in a few weeks after starting. We will obtain labs including CBC with differential and CMP 3 to 6 months after starting.

## 2022-08-18 ENCOUNTER — TELEPHONE (OUTPATIENT)
Dept: NEUROLOGY | Age: 67
End: 2022-08-18

## 2022-08-18 NOTE — TELEPHONE ENCOUNTER
Pt. Called stating she needed to talk with Mrs. Bravo Check regarding a call she receive regarding her prescription. Contact: [phone]  Please advise.

## 2022-08-19 ENCOUNTER — TELEPHONE (OUTPATIENT)
Dept: NEUROLOGY | Age: 67
End: 2022-08-19

## 2022-08-19 NOTE — TELEPHONE ENCOUNTER
Sarah Rubio from [facility] called to check the status of a prior auth for Vumerity; please advise

## 2022-08-24 ENCOUNTER — TELEPHONE (OUTPATIENT)
Dept: NEUROLOGY | Age: 67
End: 2022-08-24

## 2022-08-24 NOTE — TELEPHONE ENCOUNTER
Pt. Called stating she would like the nurse to call her insurance at [phone] because the authorization she sent was  and they need more information, and would like a phone call when everything is sent to them so she can call them. Contact: [phone]  Please advise.

## 2022-08-24 NOTE — TELEPHONE ENCOUNTER
Patient called and stated [facility] stated they are still awaiting documentation from our office; [facility] would like the office to give them a call at [phone]

## 2022-08-25 NOTE — TELEPHONE ENCOUNTER
Spoke with patient's insurance plan prior auth department, informed  Rx for Vumerity need clinical questions Faxed back today, and Plan pays for [facility].

## 2022-08-31 ENCOUNTER — TELEPHONE (OUTPATIENT)
Dept: NEUROLOGY | Age: 67
End: 2022-08-31

## 2022-09-01 DIAGNOSIS — G35 MULTIPLE SCLEROSIS (HCC): Primary | ICD-10-CM

## 2022-09-01 RX ORDER — DIROXIMEL FUMARATE 231 MG/1
CAPSULE ORAL
Qty: 120 EACH | Refills: 5 | Status: SHIPPED | OUTPATIENT
Start: 2022-09-01 | End: 2022-10-06

## 2022-09-13 ENCOUNTER — TELEPHONE (OUTPATIENT)
Dept: NEUROLOGY | Age: 67
End: 2022-09-13

## 2022-09-14 ENCOUNTER — TELEPHONE (OUTPATIENT)
Dept: NEUROLOGY | Age: 67
End: 2022-09-14

## 2022-09-16 ENCOUNTER — TELEPHONE (OUTPATIENT)
Dept: NEUROLOGY | Age: 67
End: 2022-09-16

## 2022-10-05 DIAGNOSIS — G35 MULTIPLE SCLEROSIS (HCC): ICD-10-CM

## 2022-10-06 RX ORDER — DIROXIMEL FUMARATE 231 MG/1
462 CAPSULE ORAL 2 TIMES DAILY
Qty: 120 EACH | Refills: 2 | Status: SHIPPED | OUTPATIENT
Start: 2022-10-06

## 2023-01-04 DIAGNOSIS — M54.31 SCIATICA OF RIGHT SIDE: Chronic | ICD-10-CM

## 2023-01-04 NOTE — TELEPHONE ENCOUNTER
Requested Prescriptions     Pending Prescriptions Disp Refills    gabapentin (NEURONTIN) 300 mg capsule 60 Capsule 5     Sig: Take 1 Capsule by mouth every twelve (12) hours. Max Daily Amount: 600 mg.  Indications: neuropathic pain

## 2023-01-08 RX ORDER — GABAPENTIN 300 MG/1
300 CAPSULE ORAL EVERY 12 HOURS
Qty: 60 CAPSULE | Refills: 1 | Status: SHIPPED | OUTPATIENT
Start: 2023-01-08

## 2023-09-11 RX ORDER — DIROXIMEL FUMARATE 231 MG/1
CAPSULE ORAL
Qty: 360 CAPSULE | Refills: 0 | Status: SHIPPED | OUTPATIENT
Start: 2023-09-11

## 2023-11-09 ENCOUNTER — OFFICE VISIT (OUTPATIENT)
Age: 68
End: 2023-11-09
Payer: MEDICARE

## 2023-11-09 VITALS
SYSTOLIC BLOOD PRESSURE: 104 MMHG | WEIGHT: 129 LBS | RESPIRATION RATE: 16 BRPM | HEIGHT: 59 IN | DIASTOLIC BLOOD PRESSURE: 64 MMHG | OXYGEN SATURATION: 99 % | BODY MASS INDEX: 26 KG/M2 | HEART RATE: 73 BPM

## 2023-11-09 DIAGNOSIS — G35 MULTIPLE SCLEROSIS (HCC): Primary | ICD-10-CM

## 2023-11-09 PROCEDURE — 3017F COLORECTAL CA SCREEN DOC REV: CPT | Performed by: NURSE PRACTITIONER

## 2023-11-09 PROCEDURE — 99214 OFFICE O/P EST MOD 30 MIN: CPT | Performed by: NURSE PRACTITIONER

## 2023-11-09 PROCEDURE — G8484 FLU IMMUNIZE NO ADMIN: HCPCS | Performed by: NURSE PRACTITIONER

## 2023-11-09 PROCEDURE — 1123F ACP DISCUSS/DSCN MKR DOCD: CPT | Performed by: NURSE PRACTITIONER

## 2023-11-09 PROCEDURE — 4004F PT TOBACCO SCREEN RCVD TLK: CPT | Performed by: NURSE PRACTITIONER

## 2023-11-09 PROCEDURE — G8419 CALC BMI OUT NRM PARAM NOF/U: HCPCS | Performed by: NURSE PRACTITIONER

## 2023-11-09 PROCEDURE — G8399 PT W/DXA RESULTS DOCUMENT: HCPCS | Performed by: NURSE PRACTITIONER

## 2023-11-09 PROCEDURE — 1090F PRES/ABSN URINE INCON ASSESS: CPT | Performed by: NURSE PRACTITIONER

## 2023-11-09 PROCEDURE — G8427 DOCREV CUR MEDS BY ELIG CLIN: HCPCS | Performed by: NURSE PRACTITIONER

## 2023-11-09 RX ORDER — DIROXIMEL FUMARATE 231 MG/1
CAPSULE ORAL
Qty: 360 CAPSULE | Refills: 1 | Status: SHIPPED | OUTPATIENT
Start: 2023-11-09

## 2023-11-09 RX ORDER — DIROXIMEL FUMARATE 231 MG/1
CAPSULE ORAL
Qty: 360 CAPSULE | Refills: 3 | Status: SHIPPED | OUTPATIENT
Start: 2023-11-09 | End: 2023-11-09

## 2023-11-09 NOTE — PATIENT INSTRUCTIONS
Patient instructions:  -We will request the lab work from primary care office. Please call us if you don't hear from us that we've received it in about a week. Or ask them to please send it after your upcoming visit near the end of the month. -Recommend MRI brain, c-spine, and t-spine with and without contrast due to change in DMT. (Ordered).    -Continue Vumerity

## 2023-11-29 ENCOUNTER — TELEPHONE (OUTPATIENT)
Age: 68
End: 2023-11-29

## 2023-11-30 ENCOUNTER — HOSPITAL ENCOUNTER (OUTPATIENT)
Facility: HOSPITAL | Age: 68
End: 2023-11-30
Payer: MEDICARE

## 2023-11-30 ENCOUNTER — HOSPITAL ENCOUNTER (OUTPATIENT)
Facility: HOSPITAL | Age: 68
Discharge: HOME | End: 2023-11-30
Payer: MEDICARE

## 2023-11-30 DIAGNOSIS — G35 MULTIPLE SCLEROSIS (HCC): ICD-10-CM

## 2023-11-30 LAB — CREAT UR-MCNC: 0.7 MG/DL (ref 0.6–1.3)

## 2023-11-30 PROCEDURE — A9577 INJ MULTIHANCE: HCPCS | Performed by: NURSE PRACTITIONER

## 2023-11-30 PROCEDURE — 72156 MRI NECK SPINE W/O & W/DYE: CPT

## 2023-11-30 PROCEDURE — 70553 MRI BRAIN STEM W/O & W/DYE: CPT

## 2023-11-30 PROCEDURE — 82565 ASSAY OF CREATININE: CPT

## 2023-11-30 PROCEDURE — 72157 MRI CHEST SPINE W/O & W/DYE: CPT

## 2023-11-30 PROCEDURE — 6360000004 HC RX CONTRAST MEDICATION: Performed by: NURSE PRACTITIONER

## 2023-11-30 RX ADMIN — GADOBENATE DIMEGLUMINE 12 ML: 529 INJECTION, SOLUTION INTRAVENOUS at 17:30

## 2023-11-30 RX ADMIN — GADOBENATE DIMEGLUMINE 12 ML: 529 INJECTION, SOLUTION INTRAVENOUS at 17:33

## 2023-11-30 RX ADMIN — GADOBENATE DIMEGLUMINE 12 ML: 529 INJECTION, SOLUTION INTRAVENOUS at 17:39

## 2024-01-25 ENCOUNTER — TELEPHONE (OUTPATIENT)
Age: 69
End: 2024-01-25

## 2024-01-25 NOTE — TELEPHONE ENCOUNTER
Patient called requesting for a approval for her Diroximel Fumarate (VUMERITY) 231 MG CPDR . Stated she would like a call back when approval is done

## 2024-01-26 ENCOUNTER — TELEPHONE (OUTPATIENT)
Age: 69
End: 2024-01-26

## 2024-01-26 NOTE — TELEPHONE ENCOUNTER
Disregard message    Double Island Pedicle Flap Text: The defect edges were debeveled with a #15 scalpel blade.  Given the location of the defect, shape of the defect and the proximity to free margins a double island pedicle advancement flap was deemed most appropriate.  Using a sterile surgical marker, an appropriate advancement flap was drawn incorporating the defect, outlining the appropriate donor tissue and placing the expected incisions within the relaxed skin tension lines where possible.    The area thus outlined was incised deep to adipose tissue with a #15 scalpel blade.  The skin margins were undermined to an appropriate distance in all directions around the primary defect and laterally outward around the island pedicle utilizing iris scissors.  There was minimal undermining beneath the pedicle flap.

## 2024-02-06 DIAGNOSIS — G35 MULTIPLE SCLEROSIS (HCC): ICD-10-CM

## 2024-02-10 RX ORDER — DIROXIMEL FUMARATE 231 MG/1
CAPSULE ORAL
Qty: 360 CAPSULE | Refills: 0 | Status: SHIPPED | OUTPATIENT
Start: 2024-02-10

## 2024-06-12 ENCOUNTER — HOSPITAL ENCOUNTER (OUTPATIENT)
Facility: HOSPITAL | Age: 69
Discharge: HOME | End: 2024-06-15
Attending: FAMILY MEDICINE
Payer: MEDICARE

## 2024-06-12 DIAGNOSIS — Z13.820 SCREENING FOR OSTEOPOROSIS: ICD-10-CM

## 2024-06-12 DIAGNOSIS — Z78.0 POST-MENOPAUSAL: ICD-10-CM

## 2024-06-12 PROCEDURE — 77080 DXA BONE DENSITY AXIAL: CPT

## 2024-11-11 ENCOUNTER — OFFICE VISIT (OUTPATIENT)
Age: 69
End: 2024-11-11
Payer: MEDICARE

## 2024-11-11 VITALS
TEMPERATURE: 98 F | OXYGEN SATURATION: 98 % | BODY MASS INDEX: 26.53 KG/M2 | HEIGHT: 58 IN | HEART RATE: 89 BPM | DIASTOLIC BLOOD PRESSURE: 72 MMHG | SYSTOLIC BLOOD PRESSURE: 114 MMHG | WEIGHT: 126.4 LBS

## 2024-11-11 DIAGNOSIS — E55.9 VITAMIN D DEFICIENCY, UNSPECIFIED: ICD-10-CM

## 2024-11-11 DIAGNOSIS — G35 MULTIPLE SCLEROSIS (HCC): ICD-10-CM

## 2024-11-11 PROCEDURE — G8484 FLU IMMUNIZE NO ADMIN: HCPCS | Performed by: NURSE PRACTITIONER

## 2024-11-11 PROCEDURE — 3017F COLORECTAL CA SCREEN DOC REV: CPT | Performed by: NURSE PRACTITIONER

## 2024-11-11 PROCEDURE — 1036F TOBACCO NON-USER: CPT | Performed by: NURSE PRACTITIONER

## 2024-11-11 PROCEDURE — G8427 DOCREV CUR MEDS BY ELIG CLIN: HCPCS | Performed by: NURSE PRACTITIONER

## 2024-11-11 PROCEDURE — G8399 PT W/DXA RESULTS DOCUMENT: HCPCS | Performed by: NURSE PRACTITIONER

## 2024-11-11 PROCEDURE — 1090F PRES/ABSN URINE INCON ASSESS: CPT | Performed by: NURSE PRACTITIONER

## 2024-11-11 PROCEDURE — 1123F ACP DISCUSS/DSCN MKR DOCD: CPT | Performed by: NURSE PRACTITIONER

## 2024-11-11 PROCEDURE — 1160F RVW MEDS BY RX/DR IN RCRD: CPT | Performed by: NURSE PRACTITIONER

## 2024-11-11 PROCEDURE — 1159F MED LIST DOCD IN RCRD: CPT | Performed by: NURSE PRACTITIONER

## 2024-11-11 PROCEDURE — 99213 OFFICE O/P EST LOW 20 MIN: CPT | Performed by: NURSE PRACTITIONER

## 2024-11-11 PROCEDURE — G8419 CALC BMI OUT NRM PARAM NOF/U: HCPCS | Performed by: NURSE PRACTITIONER

## 2024-11-11 PROCEDURE — 1126F AMNT PAIN NOTED NONE PRSNT: CPT | Performed by: NURSE PRACTITIONER

## 2024-11-11 RX ORDER — DIROXIMEL FUMARATE 231 MG/1
CAPSULE ORAL
Qty: 360 CAPSULE | Refills: 2 | Status: SHIPPED | OUTPATIENT
Start: 2024-11-11

## 2024-11-11 NOTE — PROGRESS NOTES
Dec. 2023 was stable.  Continue current regimen of Vumerity 462 mg BID.  Continue vitamin D 2000 units daily supplement.  Advised to obtain labs for monitoring on Vumerity.  Will obtain CBC, CMP, and vitamin D level.  She is concerned about the cost of the labs; she would prefer to wait and see what lab tests she had done in May.  Will request the lab results from PCP office.  Advised to call us in about a week if she has not heard from us that we have received the lab results.  Continue regular exercise.  Will consider follow up MRI studies after next visit.  Follow up in 6 months.     Antonette was seen today for follow-up.    Diagnoses and all orders for this visit:    Multiple sclerosis (HCC)  -     Diroximel Fumarate (VUMERITY) 231 MG CPDR; TAKE 2 CAPSULES (462MG) BY MOUTH TWICE DAILY.    Vitamin D deficiency, unspecified      Signed By: GAURAV MIGUEL NP        PLEASE NOTE:   Portions of this document may have been produced using voice recognition software. Unrecognized errors in transcription may be present.

## 2024-11-11 NOTE — PATIENT INSTRUCTIONS
Patient instructions:  -continue Vumerity 462 mg twice daily  -continue vitamin D 2000 units daily  -labs- at [facility] or [facility]  -**call us in about a week if you haven't hear from us that we have received the lab results

## 2024-11-19 ENCOUNTER — TELEPHONE (OUTPATIENT)
Age: 69
End: 2024-11-19

## 2024-11-26 NOTE — TELEPHONE ENCOUNTER
Discussed lab results with patient via phone.  We received labs from [facility] from 5/29/2024.  Hepatic function panel normal.  Vitamin D level 32.2.  CBC from 7/12/2024 from [facility] was normal.  Metabolic panel from [facility] from 5/31/2024 was unremarkable.  Advised patient she can boost the vitamin D dose to 6061-0945 units daily.  Can have updated labs some time prior to next visit.

## 2025-04-28 ENCOUNTER — TELEPHONE (OUTPATIENT)
Age: 70
End: 2025-04-28

## 2025-04-28 DIAGNOSIS — G35 MULTIPLE SCLEROSIS (HCC): ICD-10-CM

## 2025-04-28 NOTE — TELEPHONE ENCOUNTER
Pharmacy called need new prescription for vumerity sent to [facility].

## 2025-04-30 DIAGNOSIS — G35 MULTIPLE SCLEROSIS (HCC): ICD-10-CM

## 2025-04-30 RX ORDER — DIROXIMEL FUMARATE 231 MG/1
CAPSULE ORAL
Qty: 360 CAPSULE | Refills: 0 | Status: SHIPPED | OUTPATIENT
Start: 2025-04-30

## 2025-04-30 RX ORDER — DIROXIMEL FUMARATE 231 MG/1
CAPSULE ORAL
Qty: 360 CAPSULE | Refills: 0 | Status: SHIPPED | OUTPATIENT
Start: 2025-04-30 | End: 2025-04-30 | Stop reason: SDUPTHER

## 2025-05-01 ENCOUNTER — TELEPHONE (OUTPATIENT)
Age: 70
End: 2025-05-01

## 2025-05-05 ENCOUNTER — TELEPHONE (OUTPATIENT)
Age: 70
End: 2025-05-05

## 2025-05-05 DIAGNOSIS — G35 MULTIPLE SCLEROSIS (HCC): ICD-10-CM

## 2025-05-05 RX ORDER — DIROXIMEL FUMARATE 231 MG/1
CAPSULE ORAL
Qty: 360 CAPSULE | Refills: 0 | Status: SHIPPED | OUTPATIENT
Start: 2025-05-05 | End: 2025-05-13 | Stop reason: SDUPTHER

## 2025-05-13 ENCOUNTER — OFFICE VISIT (OUTPATIENT)
Age: 70
End: 2025-05-13
Payer: MEDICARE

## 2025-05-13 VITALS
DIASTOLIC BLOOD PRESSURE: 70 MMHG | OXYGEN SATURATION: 98 % | SYSTOLIC BLOOD PRESSURE: 122 MMHG | HEART RATE: 86 BPM | BODY MASS INDEX: 26.36 KG/M2 | RESPIRATION RATE: 18 BRPM | HEIGHT: 58 IN | WEIGHT: 125.6 LBS

## 2025-05-13 DIAGNOSIS — E55.9 VITAMIN D DEFICIENCY, UNSPECIFIED: ICD-10-CM

## 2025-05-13 DIAGNOSIS — G35 MULTIPLE SCLEROSIS (HCC): Primary | ICD-10-CM

## 2025-05-13 PROCEDURE — 1159F MED LIST DOCD IN RCRD: CPT | Performed by: NURSE PRACTITIONER

## 2025-05-13 PROCEDURE — 99213 OFFICE O/P EST LOW 20 MIN: CPT | Performed by: NURSE PRACTITIONER

## 2025-05-13 PROCEDURE — G8419 CALC BMI OUT NRM PARAM NOF/U: HCPCS | Performed by: NURSE PRACTITIONER

## 2025-05-13 PROCEDURE — 1090F PRES/ABSN URINE INCON ASSESS: CPT | Performed by: NURSE PRACTITIONER

## 2025-05-13 PROCEDURE — 1160F RVW MEDS BY RX/DR IN RCRD: CPT | Performed by: NURSE PRACTITIONER

## 2025-05-13 PROCEDURE — 1123F ACP DISCUSS/DSCN MKR DOCD: CPT | Performed by: NURSE PRACTITIONER

## 2025-05-13 PROCEDURE — 1126F AMNT PAIN NOTED NONE PRSNT: CPT | Performed by: NURSE PRACTITIONER

## 2025-05-13 PROCEDURE — G8399 PT W/DXA RESULTS DOCUMENT: HCPCS | Performed by: NURSE PRACTITIONER

## 2025-05-13 PROCEDURE — 3017F COLORECTAL CA SCREEN DOC REV: CPT | Performed by: NURSE PRACTITIONER

## 2025-05-13 PROCEDURE — 1036F TOBACCO NON-USER: CPT | Performed by: NURSE PRACTITIONER

## 2025-05-13 PROCEDURE — G8427 DOCREV CUR MEDS BY ELIG CLIN: HCPCS | Performed by: NURSE PRACTITIONER

## 2025-05-13 RX ORDER — DIROXIMEL FUMARATE 231 MG/1
CAPSULE ORAL
Qty: 360 CAPSULE | Refills: 2 | Status: SHIPPED | OUTPATIENT
Start: 2025-05-13

## 2025-05-13 NOTE — PROGRESS NOTES
[facility]  [address] [place] [address]  Office:  [phone]  Fax: [phone]  Chief Complaint   Patient presents with    Follow-up       HPI: Antonette Deluca presents in follow-up for multiple sclerosis.  She was last seen here on 11/11/2024.  Reported doing well.  Continued Vumerity 462 mg BID and vitamin D 2000 units daily.     From 11/19/2024: \"Discussed lab results with patient via phone.  We received labs from [facility] from 5/29/2024.  Hepatic function panel normal.  Vitamin D level 32.2.  CBC from 7/12/2024 from [facility] was normal.  Metabolic panel from [facility] from 5/31/2024 was unremarkable.  Advised patient she can boost the vitamin D dose to 7361-7925 units daily.  Can have updated labs some time prior to next visit.\"    She presents today in follow-up.  She is doing well.  Denies complaint.  Stays active.  Lost a few lb, walking more.  Eating healthy.  Denies new motor, sensory, or vision complaints.  Uses the quad cane.  Has some sciatic nerve problems.  Continues on Vumerity.  Was on the financial assistance program and then they stopped it then [employer] gave her another company to go through ([employer]).  She is getting the Vumerity.  Taking vitamin D 2000 units daily.  Works from [employer] ().  She would like to hold off on the MRIs.      Past Medical History:   Diagnosis Date    Closed fracture of proximal end of left radius with routine healing 5/10/2022    Elevated serum free T4 level 5/16/2022    Free T4 (5/16/2022) = 1.7    Elevated TSH 5/15/2022    TSH (5/15/2022) = 4.00    MS (multiple sclerosis) (HCC)     Multiple sclerosis (HCC)     Sciatica of right side     Vitamin D deficiency 5/9/2022    Vitamin D 25-Hydroxy (5/9/2022) = 16.6        Past Surgical History:   Procedure Laterality Date    BIOPSY OF BREAST, INCISIONAL  RT BENIGN    OVER 10 YRS. AGO PER PT.       Current Outpatient Medications   Medication Sig

## 2025-05-13 NOTE — PATIENT INSTRUCTIONS
Patient instructions:  -Please have upcoming lab test results from PCP office sent here  (Would like to follow CBC, CMP) (follow liver function on the medication)    -vitamin D 0000-4306 units once daily  It comes in 1000, 2000, or 5000 unit pills.

## 2025-08-11 ENCOUNTER — TELEPHONE (OUTPATIENT)
Age: 70
End: 2025-08-11